# Patient Record
Sex: MALE | Race: WHITE | NOT HISPANIC OR LATINO | URBAN - METROPOLITAN AREA
[De-identification: names, ages, dates, MRNs, and addresses within clinical notes are randomized per-mention and may not be internally consistent; named-entity substitution may affect disease eponyms.]

---

## 2017-01-19 ENCOUNTER — OUTPATIENT (OUTPATIENT)
Dept: OUTPATIENT SERVICES | Facility: HOSPITAL | Age: 82
LOS: 1 days | Discharge: HOME | End: 2017-01-19

## 2017-01-19 ENCOUNTER — RESULT REVIEW (OUTPATIENT)
Age: 82
End: 2017-01-19

## 2017-01-19 DIAGNOSIS — Z01.818 ENCOUNTER FOR OTHER PREPROCEDURAL EXAMINATION: ICD-10-CM

## 2017-01-20 ENCOUNTER — OUTPATIENT (OUTPATIENT)
Dept: OUTPATIENT SERVICES | Facility: HOSPITAL | Age: 82
LOS: 1 days | Discharge: HOME | End: 2017-01-20
Payer: MEDICARE

## 2017-01-20 ENCOUNTER — TRANSCRIPTION ENCOUNTER (OUTPATIENT)
Age: 82
End: 2017-01-20

## 2017-01-20 VITALS
TEMPERATURE: 97 F | DIASTOLIC BLOOD PRESSURE: 70 MMHG | HEART RATE: 81 BPM | WEIGHT: 175.05 LBS | RESPIRATION RATE: 18 BRPM | OXYGEN SATURATION: 97 % | SYSTOLIC BLOOD PRESSURE: 120 MMHG | HEIGHT: 69 IN

## 2017-01-20 LAB
ANION GAP SERPL CALC-SCNC: 8 MMOL/L — SIGNIFICANT CHANGE UP (ref 5–17)
BUN SERPL-MCNC: 14 MG/DL — SIGNIFICANT CHANGE UP (ref 7–23)
CALCIUM SERPL-MCNC: 8.5 MG/DL — SIGNIFICANT CHANGE UP (ref 8.5–10.1)
CHLORIDE SERPL-SCNC: 107 MMOL/L — SIGNIFICANT CHANGE UP (ref 96–108)
CO2 SERPL-SCNC: 24 MMOL/L — SIGNIFICANT CHANGE UP (ref 22–31)
CREAT SERPL-MCNC: 0.9 MG/DL — SIGNIFICANT CHANGE UP (ref 0.5–1.3)
GLUCOSE SERPL-MCNC: 112 MG/DL — HIGH (ref 70–99)
HCT VFR BLD CALC: 42.6 % — SIGNIFICANT CHANGE UP (ref 39–50)
HGB BLD-MCNC: 15 G/DL — SIGNIFICANT CHANGE UP (ref 13–17)
INR BLD: 1.27 RATIO — HIGH (ref 0.88–1.16)
MCHC RBC-ENTMCNC: 33.6 PG — SIGNIFICANT CHANGE UP (ref 27–34)
MCHC RBC-ENTMCNC: 35.1 GM/DL — SIGNIFICANT CHANGE UP (ref 32–36)
MCV RBC AUTO: 95.8 FL — SIGNIFICANT CHANGE UP (ref 80–100)
PLATELET # BLD AUTO: 298 K/UL — SIGNIFICANT CHANGE UP (ref 150–400)
POTASSIUM SERPL-MCNC: 4.3 MMOL/L — SIGNIFICANT CHANGE UP (ref 3.5–5.3)
POTASSIUM SERPL-SCNC: 4.3 MMOL/L — SIGNIFICANT CHANGE UP (ref 3.5–5.3)
PROTHROM AB SERPL-ACNC: 14.3 SEC — HIGH (ref 10–13.1)
RBC # BLD: 4.45 M/UL — SIGNIFICANT CHANGE UP (ref 4.2–5.8)
RBC # FLD: 12 % — SIGNIFICANT CHANGE UP (ref 11–15)
SODIUM SERPL-SCNC: 139 MMOL/L — SIGNIFICANT CHANGE UP (ref 135–145)
WBC # BLD: 8.9 K/UL — SIGNIFICANT CHANGE UP (ref 3.8–10.5)
WBC # FLD AUTO: 8.9 K/UL — SIGNIFICANT CHANGE UP (ref 3.8–10.5)

## 2017-01-20 PROCEDURE — 88300 SURGICAL PATH GROSS: CPT | Mod: 26

## 2017-01-20 NOTE — ASU PATIENT PROFILE, ADULT - PMH
BPH (Benign Prostatic Hyperplasia)    Hypertension    Hypotension  Postural, positive Tilt Table Test.  Syncope

## 2017-01-20 NOTE — BRIEF OPERATIVE NOTE - PROCEDURE
Extracapsular cataract extraction with posterior chamber insertion of intraocular lens  01/20/2017    Active  DEONTE

## 2017-01-20 NOTE — BRIEF OPERATIVE NOTE - PRE-OP DX
Cataract, nuclear sclerotic senile, bilateral  01/20/2017    Active  Abhinav Estrada Cataract, nuclear sclerotic senile, bilateral  01/20/2017    Active  Abhinav Estrada  Cataracta brunescens, left  01/20/2017    Active  Abhinav Estrada

## 2017-01-20 NOTE — H&P ADULT. - HISTORY OF PRESENT ILLNESS
Patient with painless, progressive visual loss OS>OD.  He is on coumadin for afib which has been held. His visual acuity of OD 20/100 and OS 20/400 is interfering with daily activities.  He was found at slit lamp exam to have a mature 4+ dense nuclear sclerotic cataract OS>OD.  His abnormal labs are being followed by his internist.  Risks and benefits of cataract surgery were discussed.  Due to the density of the lens, a ECCE is contemplated.

## 2017-01-20 NOTE — ASU PATIENT PROFILE, ADULT - REASON FOR ADMISSION, PROFILE
Left patient teaching: safety be mindful of depth preception may be off for two days,make sure foot is planted jayden when using stairs. cataract

## 2017-01-23 LAB — SURGICAL PATHOLOGY FINAL REPORT - CH: SIGNIFICANT CHANGE UP

## 2017-01-24 DIAGNOSIS — H25.092 OTHER AGE-RELATED INCIPIENT CATARACT, LEFT EYE: ICD-10-CM

## 2017-01-24 DIAGNOSIS — I10 ESSENTIAL (PRIMARY) HYPERTENSION: ICD-10-CM

## 2017-01-24 DIAGNOSIS — I48.91 UNSPECIFIED ATRIAL FIBRILLATION: ICD-10-CM

## 2017-01-24 DIAGNOSIS — Z79.01 LONG TERM (CURRENT) USE OF ANTICOAGULANTS: ICD-10-CM

## 2017-01-24 DIAGNOSIS — N40.0 BENIGN PROSTATIC HYPERPLASIA WITHOUT LOWER URINARY TRACT SYMPTOMS: ICD-10-CM

## 2017-01-24 DIAGNOSIS — Z96.643 PRESENCE OF ARTIFICIAL HIP JOINT, BILATERAL: ICD-10-CM

## 2017-05-01 ENCOUNTER — OUTPATIENT (OUTPATIENT)
Dept: OUTPATIENT SERVICES | Facility: HOSPITAL | Age: 82
LOS: 1 days | Discharge: HOME | End: 2017-05-01

## 2017-05-01 DIAGNOSIS — R94.31 ABNORMAL ELECTROCARDIOGRAM [ECG] [EKG]: ICD-10-CM

## 2017-05-04 ENCOUNTER — RESULT REVIEW (OUTPATIENT)
Age: 82
End: 2017-05-04

## 2017-05-04 ENCOUNTER — TRANSCRIPTION ENCOUNTER (OUTPATIENT)
Age: 82
End: 2017-05-04

## 2017-05-04 ENCOUNTER — OUTPATIENT (OUTPATIENT)
Dept: OUTPATIENT SERVICES | Facility: HOSPITAL | Age: 82
LOS: 1 days | Discharge: HOME | End: 2017-05-04
Payer: MEDICARE

## 2017-05-04 VITALS
TEMPERATURE: 98 F | HEART RATE: 67 BPM | RESPIRATION RATE: 16 BRPM | SYSTOLIC BLOOD PRESSURE: 124 MMHG | HEIGHT: 70 IN | DIASTOLIC BLOOD PRESSURE: 75 MMHG | OXYGEN SATURATION: 97 % | WEIGHT: 175.05 LBS

## 2017-05-04 VITALS
RESPIRATION RATE: 16 BRPM | DIASTOLIC BLOOD PRESSURE: 70 MMHG | OXYGEN SATURATION: 100 % | SYSTOLIC BLOOD PRESSURE: 130 MMHG | TEMPERATURE: 98 F | HEART RATE: 80 BPM

## 2017-05-04 DIAGNOSIS — I26.99 OTHER PULMONARY EMBOLISM WITHOUT ACUTE COR PULMONALE: ICD-10-CM

## 2017-05-04 PROCEDURE — 88300 SURGICAL PATH GROSS: CPT | Mod: 26

## 2017-05-04 NOTE — BRIEF OPERATIVE NOTE - PROCEDURE
Extracapsular cataract extraction of right eye with insertion of intraocular lens  05/04/2017    Active  DEONTE

## 2017-05-04 NOTE — BRIEF OPERATIVE NOTE - PRE-OP DX
Nuclear cataract of right eye  05/04/2017    Active  Abhinav Estrada
Nuclear cataract of right eye  05/04/2017    Active  Abhinav Estrada

## 2017-05-04 NOTE — BRIEF OPERATIVE NOTE - POST-OP DX
Nuclear cataract, right  05/04/2017    Active  Abhinav Estrada
Nuclear cataract, right  05/04/2017    Active  Abhinav Estrada

## 2017-05-04 NOTE — PROGRESS NOTE ADULT - SUBJECTIVE AND OBJECTIVE BOX
Patient is s/p ECCE with implant OS. He has painless, progressive visual loss OD.  His visual acuity of OD 20/200 and OS 20/30 is interfering with daily activities.  He was found a slit lamp exam to have a dense nuclear sclerotic cataract OD  Risks and benefits of cataract extraction OD with implant were discussed.  Patient stopped his coumadin 5 days ago.

## 2017-05-04 NOTE — ASU DISCHARGE PLAN (ADULT/PEDIATRIC). - MEDICATION SUMMARY - MEDICATIONS TO TAKE
I will START or STAY ON the medications listed below when I get home from the hospital:    warfarin 5 mg oral tablet  -- 1 tab(s) by mouth once a day  -- Indication: For blood thinner

## 2017-05-08 DIAGNOSIS — H25.89 OTHER AGE-RELATED CATARACT: ICD-10-CM

## 2017-05-08 DIAGNOSIS — Z91.010 ALLERGY TO PEANUTS: ICD-10-CM

## 2017-05-08 LAB — SURGICAL PATHOLOGY FINAL REPORT - CH: SIGNIFICANT CHANGE UP

## 2017-12-28 ENCOUNTER — INPATIENT (INPATIENT)
Facility: HOSPITAL | Age: 82
LOS: 14 days | Discharge: NURSING, REHAB OR LONG-TERM CARE | End: 2018-01-12
Attending: HOSPITALIST | Admitting: HOSPITALIST
Payer: MEDICARE

## 2017-12-28 VITALS
SYSTOLIC BLOOD PRESSURE: 134 MMHG | DIASTOLIC BLOOD PRESSURE: 52 MMHG | HEIGHT: 68 IN | HEART RATE: 75 BPM | OXYGEN SATURATION: 99 % | RESPIRATION RATE: 18 BRPM | TEMPERATURE: 100 F | WEIGHT: 169.98 LBS

## 2017-12-28 DIAGNOSIS — I48.2 CHRONIC ATRIAL FIBRILLATION: ICD-10-CM

## 2017-12-28 DIAGNOSIS — I10 ESSENTIAL (PRIMARY) HYPERTENSION: ICD-10-CM

## 2017-12-28 DIAGNOSIS — J18.1 LOBAR PNEUMONIA, UNSPECIFIED ORGANISM: ICD-10-CM

## 2017-12-28 DIAGNOSIS — N40.0 BENIGN PROSTATIC HYPERPLASIA WITHOUT LOWER URINARY TRACT SYMPTOMS: ICD-10-CM

## 2017-12-28 LAB
ALBUMIN SERPL ELPH-MCNC: 2.3 G/DL — LOW (ref 3.3–5)
ALP SERPL-CCNC: 72 U/L — SIGNIFICANT CHANGE UP (ref 40–120)
ALT FLD-CCNC: 41 U/L — SIGNIFICANT CHANGE UP (ref 12–78)
ANION GAP SERPL CALC-SCNC: 12 MMOL/L — SIGNIFICANT CHANGE UP (ref 5–17)
AST SERPL-CCNC: 55 U/L — HIGH (ref 15–37)
BASOPHILS # BLD AUTO: 0.1 K/UL — SIGNIFICANT CHANGE UP (ref 0–0.2)
BASOPHILS NFR BLD AUTO: 0.7 % — SIGNIFICANT CHANGE UP (ref 0–2)
BILIRUB SERPL-MCNC: 0.5 MG/DL — SIGNIFICANT CHANGE UP (ref 0.2–1.2)
BUN SERPL-MCNC: 41 MG/DL — HIGH (ref 7–23)
CALCIUM SERPL-MCNC: 7.6 MG/DL — LOW (ref 8.5–10.1)
CHLORIDE SERPL-SCNC: 100 MMOL/L — SIGNIFICANT CHANGE UP (ref 96–108)
CK MB CFR SERPL CALC: 1.1 NG/ML — SIGNIFICANT CHANGE UP (ref 0.5–3.6)
CO2 SERPL-SCNC: 23 MMOL/L — SIGNIFICANT CHANGE UP (ref 22–31)
CREAT SERPL-MCNC: 1.07 MG/DL — SIGNIFICANT CHANGE UP (ref 0.5–1.3)
EOSINOPHIL # BLD AUTO: 0 K/UL — SIGNIFICANT CHANGE UP (ref 0–0.5)
EOSINOPHIL NFR BLD AUTO: 0 % — SIGNIFICANT CHANGE UP (ref 0–6)
FLUAV SPEC QL CULT: NEGATIVE — SIGNIFICANT CHANGE UP
FLUBV AG SPEC QL IA: NEGATIVE — SIGNIFICANT CHANGE UP
GLUCOSE SERPL-MCNC: 111 MG/DL — HIGH (ref 70–99)
HCT VFR BLD CALC: 43.7 % — SIGNIFICANT CHANGE UP (ref 39–50)
HGB BLD-MCNC: 15.4 G/DL — SIGNIFICANT CHANGE UP (ref 13–17)
LACTATE SERPL-SCNC: 2.4 MMOL/L — HIGH (ref 0.7–2)
LIDOCAIN IGE QN: 77 U/L — SIGNIFICANT CHANGE UP (ref 73–393)
LYMPHOCYTES # BLD AUTO: 0.8 K/UL — LOW (ref 1–3.3)
LYMPHOCYTES # BLD AUTO: 6.8 % — LOW (ref 13–44)
MAGNESIUM SERPL-MCNC: 2.6 MG/DL — SIGNIFICANT CHANGE UP (ref 1.6–2.6)
MCHC RBC-ENTMCNC: 33.8 PG — SIGNIFICANT CHANGE UP (ref 27–34)
MCHC RBC-ENTMCNC: 35.2 GM/DL — SIGNIFICANT CHANGE UP (ref 32–36)
MCV RBC AUTO: 96.2 FL — SIGNIFICANT CHANGE UP (ref 80–100)
MONOCYTES # BLD AUTO: 0.8 K/UL — SIGNIFICANT CHANGE UP (ref 0–0.9)
MONOCYTES NFR BLD AUTO: 6.6 % — SIGNIFICANT CHANGE UP (ref 2–14)
NEUTROPHILS # BLD AUTO: 10.5 K/UL — HIGH (ref 1.8–7.4)
NEUTROPHILS NFR BLD AUTO: 85.9 % — HIGH (ref 43–77)
NT-PROBNP SERPL-SCNC: 1998 PG/ML — HIGH (ref 0–450)
PLATELET # BLD AUTO: 268 K/UL — SIGNIFICANT CHANGE UP (ref 150–400)
POTASSIUM SERPL-MCNC: 3.7 MMOL/L — SIGNIFICANT CHANGE UP (ref 3.5–5.3)
POTASSIUM SERPL-SCNC: 3.7 MMOL/L — SIGNIFICANT CHANGE UP (ref 3.5–5.3)
PROT SERPL-MCNC: 6.3 GM/DL — SIGNIFICANT CHANGE UP (ref 6–8.3)
RBC # BLD: 4.54 M/UL — SIGNIFICANT CHANGE UP (ref 4.2–5.8)
RBC # FLD: 12.1 % — SIGNIFICANT CHANGE UP (ref 11–15)
SODIUM SERPL-SCNC: 135 MMOL/L — SIGNIFICANT CHANGE UP (ref 135–145)
TROPONIN I SERPL-MCNC: <.015 NG/ML — SIGNIFICANT CHANGE UP (ref 0.01–0.04)
WBC # BLD: 12.2 K/UL — HIGH (ref 3.8–10.5)
WBC # FLD AUTO: 12.2 K/UL — HIGH (ref 3.8–10.5)

## 2017-12-28 PROCEDURE — 71010: CPT | Mod: 26

## 2017-12-28 PROCEDURE — 99223 1ST HOSP IP/OBS HIGH 75: CPT

## 2017-12-28 PROCEDURE — 99285 EMERGENCY DEPT VISIT HI MDM: CPT

## 2017-12-28 RX ORDER — ACETAMINOPHEN 500 MG
650 TABLET ORAL ONCE
Qty: 0 | Refills: 0 | Status: COMPLETED | OUTPATIENT
Start: 2017-12-28 | End: 2017-12-28

## 2017-12-28 RX ORDER — CEFTRIAXONE 500 MG/1
1 INJECTION, POWDER, FOR SOLUTION INTRAMUSCULAR; INTRAVENOUS ONCE
Qty: 0 | Refills: 0 | Status: COMPLETED | OUTPATIENT
Start: 2017-12-28 | End: 2017-12-28

## 2017-12-28 RX ORDER — ACETAMINOPHEN 500 MG
650 TABLET ORAL EVERY 6 HOURS
Qty: 0 | Refills: 0 | Status: DISCONTINUED | OUTPATIENT
Start: 2017-12-28 | End: 2018-01-12

## 2017-12-28 RX ORDER — ALBUTEROL 90 UG/1
2.5 AEROSOL, METERED ORAL EVERY 6 HOURS
Qty: 0 | Refills: 0 | Status: DISCONTINUED | OUTPATIENT
Start: 2017-12-28 | End: 2018-01-01

## 2017-12-28 RX ORDER — AMLODIPINE BESYLATE 2.5 MG/1
2.5 TABLET ORAL DAILY
Qty: 0 | Refills: 0 | Status: DISCONTINUED | OUTPATIENT
Start: 2017-12-28 | End: 2017-12-30

## 2017-12-28 RX ORDER — FINASTERIDE 5 MG/1
5 TABLET, FILM COATED ORAL DAILY
Qty: 0 | Refills: 0 | Status: DISCONTINUED | OUTPATIENT
Start: 2017-12-28 | End: 2018-01-12

## 2017-12-28 RX ORDER — AZITHROMYCIN 500 MG/1
500 TABLET, FILM COATED ORAL ONCE
Qty: 0 | Refills: 0 | Status: COMPLETED | OUTPATIENT
Start: 2017-12-28 | End: 2017-12-28

## 2017-12-28 RX ORDER — IPRATROPIUM/ALBUTEROL SULFATE 18-103MCG
3 AEROSOL WITH ADAPTER (GRAM) INHALATION ONCE
Qty: 0 | Refills: 0 | Status: COMPLETED | OUTPATIENT
Start: 2017-12-28 | End: 2017-12-28

## 2017-12-28 RX ADMIN — Medication 3 MILLILITER(S): at 17:02

## 2017-12-28 RX ADMIN — Medication 650 MILLIGRAM(S): at 18:39

## 2017-12-28 RX ADMIN — AZITHROMYCIN 255 MILLIGRAM(S): 500 TABLET, FILM COATED ORAL at 19:18

## 2017-12-28 RX ADMIN — CEFTRIAXONE 100 GRAM(S): 500 INJECTION, POWDER, FOR SOLUTION INTRAMUSCULAR; INTRAVENOUS at 18:40

## 2017-12-28 NOTE — ED ADULT TRIAGE NOTE - CHIEF COMPLAINT QUOTE
BIBA for difficulty breathing, family called ambulance for patient who is having increased diffculty breathing starting this morning as per ems.

## 2017-12-28 NOTE — ED ADULT NURSE NOTE - OBJECTIVE STATEMENT
assumed care of pt in bed 9. pt alert and oriented times 2. pt states he had a cold since [place]. today pt presents with worsening dyspnea. pt h/o of heart murmur. this nurse placed pt on monitor.

## 2017-12-28 NOTE — H&P ADULT - NSHPPHYSICALEXAM_GEN_ALL_CORE
GENERAL: NAD well-developed  HEAD:  Atraumatic, Normocephalic  EYES: EOMI, PERRLA, conjunctiva and sclera clear  ENMT: No tonsillar erythema, exudates, or enlargement; Moist mucous membranes, Good dentition, No lesions  NECK: Supple, No JVD, Normal thyroid  NERVOUS SYSTEM:  Alert & Oriented X3, Good concentration; Motor Strength 5/5 B/L upper and lower extremities; DTRs 2+ intact and symmetric  CHEST/LUNG: Clear to percussion bilaterally; No rales, rhonchi, wheezing, or rubs  HEART: Regular rate and rhythm; No murmurs, rubs, or gallops  ABDOMEN: Soft, Nontender, Nondistended; Bowel sounds present  EXTREMITIES:  2+ Peripheral Pulses, No clubbing, cyanosis, or edema  LYMPH: No lymphadenopathy   SKIN: No rashes or lesions ICU Vital Signs Last 24 Hrs  T(C): 37.9 (28 Dec 2017 15:39), Max: 37.9 (28 Dec 2017 15:39)  T(F): 100.3 (28 Dec 2017 15:39), Max: 100.3 (28 Dec 2017 15:39)  HR: 86 (28 Dec 2017 19:13) (75 - 86)  BP: 108/56 (28 Dec 2017 19:13) (108/56 - 134/52)  BP(mean): --  ABP: --  ABP(mean): --  RR: 22 (28 Dec 2017 19:13) (18 - 22)  SpO2: 97% (28 Dec 2017 19:13) (97% - 99%)      GENERAL:  well-developed mild respiratory distress   HEAD:  Atraumatic, Normocephalic  EYES: EOMI, PERRLA, conjunctiva and sclera clear  ENMT: No tonsillar erythema, exudates, or enlargement; Moist mucous membranes, Good dentition, No lesions  NECK: Supple, No JVD, Normal thyroid  NERVOUS SYSTEM:  Alert & Oriented X3, Good concentration; Motor Strength 5/5 B/L upper and lower extremities; DTRs 2+ intact and symmetric  CHEST/LUNG: audible wheezing most in right upper chest   HEART: Regular rate and rhythm; No murmurs, rubs, or gallops  ABDOMEN: Soft, Nontender, Nondistended; Bowel sounds present  EXTREMITIES:  2+ Peripheral Pulses, No clubbing, cyanosis, or edema  LYMPH: No lymphadenopathy   SKIN: No rashes or lesions

## 2017-12-28 NOTE — H&P ADULT - NSHPREVIEWOFSYSTEMS_GEN_ALL_CORE
CONSTITUTIONAL: No fever, weight loss, or fatigue  EYES: No eye pain, visual disturbances, or discharge  ENMT:  No difficulty hearing, tinnitus, vertigo; No sinus or throat pain  NECK: No pain or stiffness  RESPIRATORY: No cough, wheezing, chills or hemoptysis; No shortness of breath  CARDIOVASCULAR: No chest pain, palpitations, dizziness, or leg swelling  GASTROINTESTINAL: No abdominal or epigastric pain. No nausea, vomiting, or hematemesis; No diarrhea or constipation. No melena or hematochezia.  GENITOURINARY: No dysuria, frequency, hematuria, or incontinence  NEUROLOGICAL: No headaches, memory loss, loss of strength, numbness, or tremors  SKIN: No itching, burning, rashes, or lesions   LYMPH NODES: No enlarged glands  ENDOCRINE: No heat or cold intolerance; No hair loss  MUSCULOSKELETAL: No joint pain or swelling; No muscle, back, or extremity pain  PSYCHIATRIC: No depression, anxiety, mood swings, or difficulty sleeping  HEME/LYMPH: No easy bruising, or bleeding gums  ALLERGY AND IMMUNOLOGIC: No hives or eczema CONSTITUTIONAL: POSITIVE  fever, weight loss, or fatigue  EYES: No eye pain, visual disturbances, or discharge  ENMT:  No difficulty hearing, tinnitus, vertigo; No sinus or throat pain  NECK: No pain or stiffness  RESPIRATORY: POSITIVE  cough, wheezing, chills or hemoptysis; POSITIVE  shortness of breath  CARDIOVASCULAR: No chest pain, palpitations, dizziness, or leg swelling  GASTROINTESTINAL: No abdominal or epigastric pain. No nausea, vomiting, or hematemesis; No diarrhea or constipation. No melena or hematochezia.  GENITOURINARY: No dysuria, frequency, hematuria, or incontinence  NEUROLOGICAL: No headaches, memory loss, loss of strength, numbness, or tremors  SKIN: No itching, burning, rashes, or lesions   LYMPH NODES: No enlarged glands  ENDOCRINE: No heat or cold intolerance; No hair loss  MUSCULOSKELETAL: No joint pain or swelling; No muscle, back, or extremity pain  PSYCHIATRIC: No depression, anxiety, mood swings, or difficulty sleeping  HEME/LYMPH: No easy bruising, or bleeding gums  ALLERGY AND IMMUNOLOGIC: No hives or eczema

## 2017-12-28 NOTE — ED PROVIDER NOTE - OBJECTIVE STATEMENT
Pt is a 81 yo gentleman with a pmhx of afib on coumadin, hypotension who presents to the ED with sob and cough since [place]. Questionable fever, never checked w thermometer, no change in mental status. Cough is nonproductive, but can hear mucous in lungs. No chest pain, no abdominal pain, no hx of dvt/pe. No recent hospitalizations.

## 2017-12-28 NOTE — H&P ADULT - HISTORY OF PRESENT ILLNESS
Pt is a 83 yo gentleman with a pmhx of afib on coumadin, hypotension who presents to the ED with sob and cough since [place]. Questionable fever, never checked w thermometer, no change in mental status. Cough is nonproductive, but can hear mucous in lungs. No chest pain, no abdominal pain, no hx of dvt/pe. No recent hospitalizations.

## 2017-12-28 NOTE — H&P ADULT - NSHPLABSRESULTS_GEN_ALL_CORE
15.4   12.2  )-----------( 268      ( 28 Dec 2017 17:59 )             43.7   12-28    135  |  100  |  41<H>  ----------------------------<  111<H>  3.7   |  23  |  1.07    Ca    7.6<L>      28 Dec 2017 17:59  Mg     2.6     12-28    TPro  6.3  /  Alb  2.3<L>  /  TBili  0.5  /  DBili  x   /  AST  55<H>  /  ALT  41  /  AlkPhos  72  12-28  < from: Xray Chest 1 View AP/PA. (12.28.17 @ 16:49) >    There is a left lungconsolidation. The right lung is clear. The cardiac   and mediastinal contours are prominent, which may be due to magnification   from AP technique and shallow inspiration. The osseous structures are   intact.

## 2017-12-29 DIAGNOSIS — E87.6 HYPOKALEMIA: ICD-10-CM

## 2017-12-29 LAB
ANION GAP SERPL CALC-SCNC: 10 MMOL/L — SIGNIFICANT CHANGE UP (ref 5–17)
BUN SERPL-MCNC: 41 MG/DL — HIGH (ref 7–23)
CALCIUM SERPL-MCNC: 7.5 MG/DL — LOW (ref 8.5–10.1)
CHLORIDE SERPL-SCNC: 101 MMOL/L — SIGNIFICANT CHANGE UP (ref 96–108)
CO2 SERPL-SCNC: 24 MMOL/L — SIGNIFICANT CHANGE UP (ref 22–31)
CREAT SERPL-MCNC: 0.94 MG/DL — SIGNIFICANT CHANGE UP (ref 0.5–1.3)
GLUCOSE SERPL-MCNC: 112 MG/DL — HIGH (ref 70–99)
HCT VFR BLD CALC: 40.2 % — SIGNIFICANT CHANGE UP (ref 39–50)
HGB BLD-MCNC: 14.2 G/DL — SIGNIFICANT CHANGE UP (ref 13–17)
INR BLD: 3.87 RATIO — HIGH (ref 0.88–1.16)
INR BLD: 5.11 RATIO — CRITICAL HIGH (ref 0.88–1.16)
LACTATE SERPL-SCNC: 2.1 MMOL/L — HIGH (ref 0.7–2)
LACTATE SERPL-SCNC: 3.7 MMOL/L — HIGH (ref 0.7–2)
MCHC RBC-ENTMCNC: 33.4 PG — SIGNIFICANT CHANGE UP (ref 27–34)
MCHC RBC-ENTMCNC: 35.3 GM/DL — SIGNIFICANT CHANGE UP (ref 32–36)
MCV RBC AUTO: 94.6 FL — SIGNIFICANT CHANGE UP (ref 80–100)
PLATELET # BLD AUTO: 269 K/UL — SIGNIFICANT CHANGE UP (ref 150–400)
POTASSIUM SERPL-MCNC: 3.4 MMOL/L — LOW (ref 3.5–5.3)
POTASSIUM SERPL-SCNC: 3.4 MMOL/L — LOW (ref 3.5–5.3)
PROTHROM AB SERPL-ACNC: 43.4 SEC — HIGH (ref 9.8–12.7)
PROTHROM AB SERPL-ACNC: 57.6 SEC — HIGH (ref 9.8–12.7)
RBC # BLD: 4.26 M/UL — SIGNIFICANT CHANGE UP (ref 4.2–5.8)
RBC # FLD: 12.1 % — SIGNIFICANT CHANGE UP (ref 11–15)
SODIUM SERPL-SCNC: 135 MMOL/L — SIGNIFICANT CHANGE UP (ref 135–145)
WBC # BLD: 11.4 K/UL — HIGH (ref 3.8–10.5)
WBC # FLD AUTO: 11.4 K/UL — HIGH (ref 3.8–10.5)

## 2017-12-29 PROCEDURE — 99233 SBSQ HOSP IP/OBS HIGH 50: CPT

## 2017-12-29 RX ORDER — POTASSIUM CHLORIDE 20 MEQ
40 PACKET (EA) ORAL ONCE
Qty: 0 | Refills: 0 | Status: COMPLETED | OUTPATIENT
Start: 2017-12-29 | End: 2017-12-29

## 2017-12-29 RX ORDER — SODIUM CHLORIDE 9 MG/ML
1000 INJECTION INTRAMUSCULAR; INTRAVENOUS; SUBCUTANEOUS
Qty: 0 | Refills: 0 | Status: DISCONTINUED | OUTPATIENT
Start: 2017-12-29 | End: 2017-12-30

## 2017-12-29 RX ORDER — WARFARIN SODIUM 2.5 MG/1
5 TABLET ORAL ONCE
Qty: 0 | Refills: 0 | Status: DISCONTINUED | OUTPATIENT
Start: 2017-12-29 | End: 2017-12-29

## 2017-12-29 RX ADMIN — FINASTERIDE 5 MILLIGRAM(S): 5 TABLET, FILM COATED ORAL at 12:20

## 2017-12-29 RX ADMIN — ALBUTEROL 2.5 MILLIGRAM(S): 90 AEROSOL, METERED ORAL at 07:00

## 2017-12-29 RX ADMIN — ALBUTEROL 2.5 MILLIGRAM(S): 90 AEROSOL, METERED ORAL at 12:14

## 2017-12-29 RX ADMIN — SODIUM CHLORIDE 80 MILLILITER(S): 9 INJECTION INTRAMUSCULAR; INTRAVENOUS; SUBCUTANEOUS at 02:21

## 2017-12-29 RX ADMIN — Medication 40 MILLIEQUIVALENT(S): at 09:53

## 2017-12-29 RX ADMIN — AMLODIPINE BESYLATE 2.5 MILLIGRAM(S): 2.5 TABLET ORAL at 06:16

## 2017-12-29 RX ADMIN — ALBUTEROL 2.5 MILLIGRAM(S): 90 AEROSOL, METERED ORAL at 00:00

## 2017-12-29 RX ADMIN — ALBUTEROL 2.5 MILLIGRAM(S): 90 AEROSOL, METERED ORAL at 17:35

## 2017-12-29 RX ADMIN — SODIUM CHLORIDE 80 MILLILITER(S): 9 INJECTION INTRAMUSCULAR; INTRAVENOUS; SUBCUTANEOUS at 17:56

## 2017-12-29 NOTE — CHART NOTE - NSCHARTNOTEFT_GEN_A_CORE
DCed coumadin as inr 3.87.  As per family, did not take dose today.  Will hold coumadin today, repeat inr tomorrow.

## 2017-12-29 NOTE — PROGRESS NOTE ADULT - SUBJECTIVE AND OBJECTIVE BOX
CHIEF COMPLAINT/INTERVAL HISTORY:    Patient is a 82y old  Male who presents with a chief complaint of pna (28 Dec 2017 18:56)      HPI:  Pt is a 83 yo gentleman with a pmhx of afib on coumadin, hypotension who presents to the ED with sob and cough since [place]. Questionable fever, never checked w thermometer, no change in mental status. Cough is nonproductive, but can hear mucous in lungs. No chest pain, no abdominal pain, no hx of dvt/pe. No recent hospitalizations. (28 Dec 2017 18:56)    Overnight issues  patient feeling better with less short of breath and wheezing   SUBJECTIVE & OBJECTIVE: Pt seen and examined at bedside.   ROS:  CONSTITUTIONAL: No fever, weight loss, or fatigue  NECK: No pain or stiffness  mild shortness of breath  CARDIOVASCULAR: No chest pain, palpitations, dizziness, or leg swelling  GASTROINTESTINAL: No abdominal or epigastric pain. No nausea, vomiting, or hematemesis; No diarrhea or constipation. No melena or hematochezia.  GENITOURINARY: No dysuria, frequency, hematuria, or incontinence  NEUROLOGICAL: No headaches, memory loss, loss of strength, numbness, or tremors  SKIN: No itching, burning, rashes, or lesions   ICU Vital Signs Last 24 Hrs  T(C): 37.3 (29 Dec 2017 07:52), Max: 37.9 (28 Dec 2017 15:39)  T(F): 99.2 (29 Dec 2017 07:52), Max: 100.3 (28 Dec 2017 15:39)  HR: 91 (29 Dec 2017 07:52) (75 - 91)  BP: 126/62 (29 Dec 2017 07:52) (108/56 - 134/52)  BP(mean): --  ABP: --  ABP(mean): --  RR: 18 (29 Dec 2017 07:52) (18 - 25)  SpO2: 95% (29 Dec 2017 07:52) (93% - 99%)        MEDICATIONS  (STANDING):  ALBUTerol    0.083% 2.5 milliGRAM(s) Nebulizer every 6 hours  amLODIPine   Tablet 2.5 milliGRAM(s) Oral daily  finasteride 5 milliGRAM(s) Oral daily  levoFLOXacin IVPB 500 milliGRAM(s) IV Intermittent every 24 hours  levoFLOXacin IVPB      sodium chloride 0.9%. 1000 milliLiter(s) (80 mL/Hr) IV Continuous <Continuous>    MEDICATIONS  (PRN):  acetaminophen   Tablet 650 milliGRAM(s) Oral every 6 hours PRN For Temp greater than 38 C (100.4 F)        PHYSICAL EXAM:    GENERAL: NAD, well-groomed, well-developed  HEAD:  Atraumatic, Normocephalic  EYES: EOMI, PERRLA, conjunctiva and sclera clear  ENMT: Moist mucous membranes  NECK: Supple, No JVD  NERVOUS SYSTEM:  Alert & Oriented X3, Motor Strength 5/5 B/L upper and lower extremities; DTRs 2+ intact and symmetric  CHEST/LUNG: mild wheezing, to left upper lobe   HEART: Regular rate and rhythm; No murmurs, rubs, or gallops  ABDOMEN: Soft, Nontender, Nondistended; Bowel sounds present  EXTREMITIES:  2+ Peripheral Pulses, No clubbing, cyanosis, or edema    LABS:                        14.2   11.4  )-----------( 269      ( 29 Dec 2017 05:12 )             40.2     12-29    135  |  101  |  41<H>  ----------------------------<  112<H>  3.4<L>   |  24  |  0.94    Ca    7.5<L>      29 Dec 2017 05:12  Mg     2.6     12-28    TPro  6.3  /  Alb  2.3<L>  /  TBili  0.5  /  DBili  x   /  AST  55<H>  /  ALT  41  /  AlkPhos  72  12-28    PT/INR - ( 29 Dec 2017 00:45 )   PT: 43.4 sec;   INR: 3.87 ratio               CAPILLARY BLOOD GLUCOSE          RECENT CULTURES:      RADIOLOGY & ADDITIONAL TESTS:  Imaging Personally Reviewed:  [ ] YES      Consultant(s) Notes Reviewed:  [ ] YES     Care Discussed with [ ] Consultants [X ] Patient [ ] Family  [x ]    [x ]  Other; RN  HEALTH ISSUES - PROBLEM Dx:  Chronic atrial fibrillation: Chronic atrial fibrillation  Essential hypertension: Essential hypertension  BPH (Benign Prostatic Hyperplasia): BPH (Benign Prostatic Hyperplasia)  Pneumonia of left lower lobe due to infectious organism: Pneumonia of left lower lobe due to infectious organism        DVT/GI ppx  Discussed with pt @ bedside

## 2017-12-30 DIAGNOSIS — I95.9 HYPOTENSION, UNSPECIFIED: ICD-10-CM

## 2017-12-30 LAB
ANION GAP SERPL CALC-SCNC: 15 MMOL/L — SIGNIFICANT CHANGE UP (ref 5–17)
BUN SERPL-MCNC: 53 MG/DL — HIGH (ref 7–23)
CALCIUM SERPL-MCNC: 6.8 MG/DL — LOW (ref 8.5–10.1)
CHLORIDE SERPL-SCNC: 108 MMOL/L — SIGNIFICANT CHANGE UP (ref 96–108)
CO2 SERPL-SCNC: 16 MMOL/L — LOW (ref 22–31)
CREAT SERPL-MCNC: 1.35 MG/DL — HIGH (ref 0.5–1.3)
GLUCOSE SERPL-MCNC: 152 MG/DL — HIGH (ref 70–99)
HCT VFR BLD CALC: 26.8 % — LOW (ref 39–50)
HCT VFR BLD CALC: 28 % — LOW (ref 39–50)
HCT VFR BLD CALC: 30.4 % — LOW (ref 39–50)
HCT VFR BLD CALC: 32.6 % — LOW (ref 39–50)
HCT VFR BLD CALC: 35.8 % — LOW (ref 39–50)
HGB BLD-MCNC: 10 G/DL — LOW (ref 13–17)
HGB BLD-MCNC: 10.4 G/DL — LOW (ref 13–17)
HGB BLD-MCNC: 11.4 G/DL — LOW (ref 13–17)
HGB BLD-MCNC: 12.5 G/DL — LOW (ref 13–17)
HGB BLD-MCNC: 9.4 G/DL — LOW (ref 13–17)
INR BLD: 2 RATIO — HIGH (ref 0.88–1.16)
INR BLD: 5.35 RATIO — CRITICAL HIGH (ref 0.88–1.16)
INR BLD: 6.79 RATIO — CRITICAL HIGH (ref 0.88–1.16)
LACTATE SERPL-SCNC: 4.7 MMOL/L — CRITICAL HIGH (ref 0.7–2)
LACTATE SERPL-SCNC: 5.2 MMOL/L — CRITICAL HIGH (ref 0.7–2)
MCHC RBC-ENTMCNC: 33.3 PG — SIGNIFICANT CHANGE UP (ref 27–34)
MCHC RBC-ENTMCNC: 33.8 PG — SIGNIFICANT CHANGE UP (ref 27–34)
MCHC RBC-ENTMCNC: 34 PG — SIGNIFICANT CHANGE UP (ref 27–34)
MCHC RBC-ENTMCNC: 34.2 GM/DL — SIGNIFICANT CHANGE UP (ref 32–36)
MCHC RBC-ENTMCNC: 34.2 PG — HIGH (ref 27–34)
MCHC RBC-ENTMCNC: 34.6 PG — HIGH (ref 27–34)
MCHC RBC-ENTMCNC: 34.8 GM/DL — SIGNIFICANT CHANGE UP (ref 32–36)
MCHC RBC-ENTMCNC: 35 GM/DL — SIGNIFICANT CHANGE UP (ref 32–36)
MCHC RBC-ENTMCNC: 35.1 GM/DL — SIGNIFICANT CHANGE UP (ref 32–36)
MCHC RBC-ENTMCNC: 35.7 GM/DL — SIGNIFICANT CHANGE UP (ref 32–36)
MCV RBC AUTO: 96.5 FL — SIGNIFICANT CHANGE UP (ref 80–100)
MCV RBC AUTO: 96.8 FL — SIGNIFICANT CHANGE UP (ref 80–100)
MCV RBC AUTO: 96.8 FL — SIGNIFICANT CHANGE UP (ref 80–100)
MCV RBC AUTO: 97.3 FL — SIGNIFICANT CHANGE UP (ref 80–100)
MCV RBC AUTO: 98.2 FL — SIGNIFICANT CHANGE UP (ref 80–100)
OB PNL STL: POSITIVE
PLATELET # BLD AUTO: 340 K/UL — SIGNIFICANT CHANGE UP (ref 150–400)
PLATELET # BLD AUTO: 357 K/UL — SIGNIFICANT CHANGE UP (ref 150–400)
PLATELET # BLD AUTO: 412 K/UL — HIGH (ref 150–400)
PLATELET # BLD AUTO: 422 K/UL — HIGH (ref 150–400)
PLATELET # BLD AUTO: 435 K/UL — HIGH (ref 150–400)
POTASSIUM SERPL-MCNC: 4.7 MMOL/L — SIGNIFICANT CHANGE UP (ref 3.5–5.3)
POTASSIUM SERPL-SCNC: 4.7 MMOL/L — SIGNIFICANT CHANGE UP (ref 3.5–5.3)
PROTHROM AB SERPL-ACNC: 22.1 SEC — HIGH (ref 9.8–12.7)
PROTHROM AB SERPL-ACNC: 60.4 SEC — HIGH (ref 9.8–12.7)
PROTHROM AB SERPL-ACNC: 77 SEC — HIGH (ref 9.8–12.7)
RBC # BLD: 2.77 M/UL — LOW (ref 4.2–5.8)
RBC # BLD: 2.89 M/UL — LOW (ref 4.2–5.8)
RBC # BLD: 3.12 M/UL — LOW (ref 4.2–5.8)
RBC # BLD: 3.32 M/UL — LOW (ref 4.2–5.8)
RBC # BLD: 3.71 M/UL — LOW (ref 4.2–5.8)
RBC # FLD: 12.2 % — SIGNIFICANT CHANGE UP (ref 11–15)
RBC # FLD: 12.3 % — SIGNIFICANT CHANGE UP (ref 11–15)
RBC # FLD: 12.6 % — SIGNIFICANT CHANGE UP (ref 11–15)
RBC # FLD: 12.6 % — SIGNIFICANT CHANGE UP (ref 11–15)
RBC # FLD: 12.8 % — SIGNIFICANT CHANGE UP (ref 11–15)
SODIUM SERPL-SCNC: 139 MMOL/L — SIGNIFICANT CHANGE UP (ref 135–145)
WBC # BLD: 15.6 K/UL — HIGH (ref 3.8–10.5)
WBC # BLD: 19.5 K/UL — HIGH (ref 3.8–10.5)
WBC # BLD: 23.9 K/UL — HIGH (ref 3.8–10.5)
WBC # BLD: 28.7 K/UL — HIGH (ref 3.8–10.5)
WBC # BLD: 30.4 K/UL — HIGH (ref 3.8–10.5)
WBC # FLD AUTO: 15.6 K/UL — HIGH (ref 3.8–10.5)
WBC # FLD AUTO: 19.5 K/UL — HIGH (ref 3.8–10.5)
WBC # FLD AUTO: 23.9 K/UL — HIGH (ref 3.8–10.5)
WBC # FLD AUTO: 28.7 K/UL — HIGH (ref 3.8–10.5)
WBC # FLD AUTO: 30.4 K/UL — HIGH (ref 3.8–10.5)

## 2017-12-30 PROCEDURE — 99233 SBSQ HOSP IP/OBS HIGH 50: CPT

## 2017-12-30 PROCEDURE — 74000: CPT | Mod: 26,76

## 2017-12-30 PROCEDURE — 71010: CPT | Mod: 26

## 2017-12-30 PROCEDURE — 43752 NASAL/OROGASTRIC W/TUBE PLMT: CPT

## 2017-12-30 RX ORDER — SODIUM CHLORIDE 9 MG/ML
1000 INJECTION INTRAMUSCULAR; INTRAVENOUS; SUBCUTANEOUS
Qty: 0 | Refills: 0 | Status: DISCONTINUED | OUTPATIENT
Start: 2017-12-30 | End: 2018-01-03

## 2017-12-30 RX ORDER — WARFARIN SODIUM 2.5 MG/1
1 TABLET ORAL
Qty: 0 | Refills: 0 | COMMUNITY

## 2017-12-30 RX ORDER — VANCOMYCIN HCL 1 G
1000 VIAL (EA) INTRAVENOUS EVERY 24 HOURS
Qty: 0 | Refills: 0 | Status: DISCONTINUED | OUTPATIENT
Start: 2017-12-30 | End: 2018-01-01

## 2017-12-30 RX ORDER — SODIUM CHLORIDE 9 MG/ML
1000 INJECTION INTRAMUSCULAR; INTRAVENOUS; SUBCUTANEOUS ONCE
Qty: 0 | Refills: 0 | Status: COMPLETED | OUTPATIENT
Start: 2017-12-30 | End: 2017-12-30

## 2017-12-30 RX ORDER — PHYTONADIONE (VIT K1) 5 MG
5 TABLET ORAL ONCE
Qty: 0 | Refills: 0 | Status: COMPLETED | OUTPATIENT
Start: 2017-12-30 | End: 2017-12-30

## 2017-12-30 RX ORDER — AZITHROMYCIN 500 MG/1
250 TABLET, FILM COATED ORAL DAILY
Qty: 0 | Refills: 0 | Status: DISCONTINUED | OUTPATIENT
Start: 2017-12-30 | End: 2018-01-06

## 2017-12-30 RX ORDER — PANTOPRAZOLE SODIUM 20 MG/1
8 TABLET, DELAYED RELEASE ORAL
Qty: 80 | Refills: 0 | Status: DISCONTINUED | OUTPATIENT
Start: 2017-12-30 | End: 2018-01-03

## 2017-12-30 RX ORDER — MEROPENEM 1 G/30ML
500 INJECTION INTRAVENOUS EVERY 8 HOURS
Qty: 0 | Refills: 0 | Status: DISCONTINUED | OUTPATIENT
Start: 2017-12-30 | End: 2018-01-06

## 2017-12-30 RX ADMIN — ALBUTEROL 2.5 MILLIGRAM(S): 90 AEROSOL, METERED ORAL at 05:54

## 2017-12-30 RX ADMIN — SODIUM CHLORIDE 1000 MILLILITER(S): 9 INJECTION INTRAMUSCULAR; INTRAVENOUS; SUBCUTANEOUS at 01:33

## 2017-12-30 RX ADMIN — MEROPENEM 100 MILLIGRAM(S): 1 INJECTION INTRAVENOUS at 07:03

## 2017-12-30 RX ADMIN — AZITHROMYCIN 250 MILLIGRAM(S): 500 TABLET, FILM COATED ORAL at 21:52

## 2017-12-30 RX ADMIN — MEROPENEM 100 MILLIGRAM(S): 1 INJECTION INTRAVENOUS at 21:52

## 2017-12-30 RX ADMIN — MEROPENEM 100 MILLIGRAM(S): 1 INJECTION INTRAVENOUS at 14:22

## 2017-12-30 RX ADMIN — ALBUTEROL 2.5 MILLIGRAM(S): 90 AEROSOL, METERED ORAL at 00:07

## 2017-12-30 RX ADMIN — ALBUTEROL 2.5 MILLIGRAM(S): 90 AEROSOL, METERED ORAL at 23:45

## 2017-12-30 RX ADMIN — Medication 101 MILLIGRAM(S): at 01:31

## 2017-12-30 RX ADMIN — ALBUTEROL 2.5 MILLIGRAM(S): 90 AEROSOL, METERED ORAL at 17:02

## 2017-12-30 RX ADMIN — PANTOPRAZOLE SODIUM 10 MG/HR: 20 TABLET, DELAYED RELEASE ORAL at 18:50

## 2017-12-30 RX ADMIN — SODIUM CHLORIDE 666.67 MILLILITER(S): 9 INJECTION INTRAMUSCULAR; INTRAVENOUS; SUBCUTANEOUS at 03:00

## 2017-12-30 RX ADMIN — SODIUM CHLORIDE 75 MILLILITER(S): 9 INJECTION INTRAMUSCULAR; INTRAVENOUS; SUBCUTANEOUS at 08:14

## 2017-12-30 RX ADMIN — SODIUM CHLORIDE 100 MILLILITER(S): 9 INJECTION INTRAMUSCULAR; INTRAVENOUS; SUBCUTANEOUS at 10:42

## 2017-12-30 RX ADMIN — PANTOPRAZOLE SODIUM 10 MG/HR: 20 TABLET, DELAYED RELEASE ORAL at 09:01

## 2017-12-30 RX ADMIN — ALBUTEROL 2.5 MILLIGRAM(S): 90 AEROSOL, METERED ORAL at 11:00

## 2017-12-30 RX ADMIN — Medication 250 MILLIGRAM(S): at 21:52

## 2017-12-30 RX ADMIN — SODIUM CHLORIDE 100 MILLILITER(S): 9 INJECTION INTRAMUSCULAR; INTRAVENOUS; SUBCUTANEOUS at 18:53

## 2017-12-30 RX ADMIN — PANTOPRAZOLE SODIUM 10 MG/HR: 20 TABLET, DELAYED RELEASE ORAL at 01:32

## 2017-12-30 NOTE — PROGRESS NOTE ADULT - SUBJECTIVE AND OBJECTIVE BOX
CC/F/U for: pneumonia and GI bleed    INTERVAL HPI/OVERNIGHT EVENTS:  Pt seen and examined at bedside.     Allergies/Intolerance: chocolate (Unknown)  No Known Drug Allergies  peanuts (Anaphylaxis)      MEDICATIONS  (STANDING):  ALBUTerol    0.083% 2.5 milliGRAM(s) Nebulizer every 6 hours  finasteride 5 milliGRAM(s) Oral daily  meropenem IVPB 500 milliGRAM(s) IV Intermittent every 8 hours  pantoprazole Infusion 8 mG/Hr (10 mL/Hr) IV Continuous <Continuous>  sodium chloride 0.9%. 1000 milliLiter(s) (125 mL/Hr) IV Continuous <Continuous>    MEDICATIONS  (PRN):  acetaminophen   Tablet 650 milliGRAM(s) Oral every 6 hours PRN For Temp greater than 38 C (100.4 F)        ROS: all systems reviewed and wnl      PHYSICAL EXAMINATION:  Vital Signs Last 24 Hrs  T(C): 37.7 (30 Dec 2017 09:23), Max: 37.7 (30 Dec 2017 09:23)  T(F): 99.8 (30 Dec 2017 09:23), Max: 99.8 (30 Dec 2017 09:23)  HR: 89 (30 Dec 2017 09:23) (72 - 104)  BP: 114/58 (30 Dec 2017 09:23) (113/53 - 137/76)  BP(mean): --  RR: 20 (30 Dec 2017 09:23) (18 - 23)  SpO2: 95% (30 Dec 2017 09:23) (90% - 100%)  CAPILLARY BLOOD GLUCOSE          12-29 @ 07:01  -  12-30 @ 07:00  --------------------------------------------------------  IN: 2790 mL / OUT: 1475 mL / NET: 1315 mL    12-30 @ 07:01  -  12-30 @ 10:08  --------------------------------------------------------  IN: 0 mL / OUT: 200 mL / NET: -200 mL        GENERAL:   NECK: supple, No JVD  CHEST/LUNG: clear to auscultation bilaterally; no rales, rhonchi, or wheezing b/l  HEART: normal S1, S2  ABDOMEN: BS+, soft, ND, NT   EXTREMITIES:  pulses palpable; no clubbing, cyanosis, or edema b/l LEs  NEURO: awake, alert, interactive; moves all extremities  SKIN: no rashes or lesions      LABS:                        10.4   30.4  )-----------( 357      ( 30 Dec 2017 08:47 )             30.4     12-30    139  |  108  |  53<H>  ----------------------------<  152<H>  4.7   |  16<L>  |  1.35<H>    Ca    6.8<L>      30 Dec 2017 04:19  Mg     2.6     12-28    TPro  6.3  /  Alb  2.3<L>  /  TBili  0.5  /  DBili  x   /  AST  55<H>  /  ALT  41  /  AlkPhos  72  12-28    PT/INR - ( 30 Dec 2017 08:47 )   PT: 22.1 sec;   INR: 2.00 ratio CC/F/U for: pneumonia and GI bleed    INTERVAL HPI/OVERNIGHT EVENTS:  Pt seen and examined at bedside.     Allergies/Intolerance: chocolate (Unknown)  No Known Drug Allergies  peanuts (Anaphylaxis)      MEDICATIONS  (STANDING):  ALBUTerol    0.083% 2.5 milliGRAM(s) Nebulizer every 6 hours  finasteride 5 milliGRAM(s) Oral daily  meropenem IVPB 500 milliGRAM(s) IV Intermittent every 8 hours  pantoprazole Infusion 8 mG/Hr (10 mL/Hr) IV Continuous <Continuous>  sodium chloride 0.9%. 1000 milliLiter(s) (125 mL/Hr) IV Continuous <Continuous>    MEDICATIONS  (PRN):  acetaminophen   Tablet 650 milliGRAM(s) Oral every 6 hours PRN For Temp greater than 38 C (100.4 F)        ROS: all systems reviewed and wnl      PHYSICAL EXAMINATION:  Vital Signs Last 24 Hrs  T(C): 37.7 (30 Dec 2017 09:23), Max: 37.7 (30 Dec 2017 09:23)  T(F): 99.8 (30 Dec 2017 09:23), Max: 99.8 (30 Dec 2017 09:23)  HR: 89 (30 Dec 2017 09:23) (72 - 104)  BP: 114/58 (30 Dec 2017 09:23) (113/53 - 137/76)  BP(mean): --  RR: 20 (30 Dec 2017 09:23) (18 - 23)  SpO2: 95% (30 Dec 2017 09:23) (90% - 100%)  CAPILLARY BLOOD GLUCOSE          12-29 @ 07:01  -  12-30 @ 07:00  --------------------------------------------------------  IN: 2790 mL / OUT: 1475 mL / NET: 1315 mL    12-30 @ 07:01  -  12-30 @ 10:08  --------------------------------------------------------  IN: 0 mL / OUT: 200 mL / NET: -200 mL        GENERAL: in bed, NGT in place, no SOB, CP or abdominal pain  NECK: supple, No JVD  CHEST/LUNG: clear to auscultation bilaterally; no rales, rhonchi, or wheezing b/l  HEART: normal S1, S2  ABDOMEN: BS+, soft, ND, NT   EXTREMITIES:  pulses palpable; no clubbing, cyanosis, or edema b/l LEs  NEURO: awake, alert, interactive; moves all extremities  SKIN: no rashes or lesions      LABS:                        10.4   30.4  )-----------( 357      ( 30 Dec 2017 08:47 )             30.4     12-30    139  |  108  |  53<H>  ----------------------------<  152<H>  4.7   |  16<L>  |  1.35<H>    Ca    6.8<L>      30 Dec 2017 04:19  Mg     2.6     12-28    TPro  6.3  /  Alb  2.3<L>  /  TBili  0.5  /  DBili  x   /  AST  55<H>  /  ALT  41  /  AlkPhos  72  12-28    PT/INR - ( 30 Dec 2017 08:47 )   PT: 22.1 sec;   INR: 2.00 ratio

## 2017-12-30 NOTE — CHART NOTE - NSCHARTNOTEFT_GEN_A_CORE
Medicine Hospitalist PA    Requested to place ngtube. NGT placed earlier was dislodged. Placed Bremer sump. Pt tolerated the procedure. Confirmed with auscultation and +biliary output will do KUB.

## 2017-12-30 NOTE — CHART NOTE - NSCHARTNOTEFT_GEN_A_CORE
Spoke with Dr Arredondo regarding current H&H result .  Advised to check CBC in 6 hours.  If <9 then consider transfusion.  Monitor for now.

## 2017-12-30 NOTE — CHART NOTE - NSCHARTNOTEFT_GEN_A_CORE
Medicine Hospitalist PA    Was notified by RN pt passed bloody BM also has blood tinged sputum. Pt seen and examined at bedside, states he feels fine, just sounds congested. Pt spitting out brown tinged sputum with coughing fits. Otherwise denies any complaints, denies cp, sob, n/v, palpitations, headache. Admitted with PNA found to have supratherapeutic INR, 3.87 now elevated to 5.0.     Vital Signs Last 24 Hrs  T(C): 36.8 (29 Dec 2017 23:40), Max: 37.4 (29 Dec 2017 11:00)  T(F): 98.2 (29 Dec 2017 23:40), Max: 99.3 (29 Dec 2017 11:00)  HR: 90 (30 Dec 2017 00:50) (72 - 92)  BP: 111/62 (29 Dec 2017 00:40) (116/67 - 137/76)  RR: 23 (29 Dec 2017 23:40) (18 - 23)  SpO2: 92% (30 Dec 2017 00:50) (90% - 98%)    General: awake, mild respiratory distress  CV: S1 S2  Resp: b/l wheezing, rhonchi, mild labored breathing  Abd: slightly distended, NT, BS+  Ext: no LE edema    A/P: 82 YOM with a PMHx of afib on coumadin (held), HTN, BPH admitted with PNA being treated with ABx now with episode of GI bleed.  - NGT placed to low wall suction: 300mL dark brown output  - STAT KUB: confirm placement  - STAT FOBT: positive  - STAT Vitamin K 5mg IVPB  - STAT Protonix drip  - 1L bolus  - CBC q4hrs  - Vitals q4hrs  - CT abd and pelvis w/wo IV contrast  - STAT LABS CBC/CMP/Lactate/Coags    Labs show increase in WBC 11 to 15. Supratherapeutic INR: 6. Lactate: 5.2. Add another 1L bolus. Repeat INR. Medicine Hospitalist PA    Was notified by RN pt passed bloody BM also has blood tinged sputum. Pt seen and examined at bedside, states he feels fine, just sounds congested. Pt spitting out brown tinged sputum with coughing fits. Otherwise denies any complaints, denies cp, sob, n/v, palpitations, headache. Admitted with PNA found to have supratherapeutic INR, 3.87 now elevated to 5.0.     Vital Signs Last 24 Hrs  T(C): 36.8 (29 Dec 2017 23:40), Max: 37.4 (29 Dec 2017 11:00)  T(F): 98.2 (29 Dec 2017 23:40), Max: 99.3 (29 Dec 2017 11:00)  HR: 90 (30 Dec 2017 00:50) (72 - 92)  BP: 111/62 (29 Dec 2017 00:40) (116/67 - 137/76)  RR: 23 (29 Dec 2017 23:40) (18 - 23)  SpO2: 92% (30 Dec 2017 00:50) (90% - 98%)    General: awake, mild respiratory distress  CV: S1 S2  Resp: b/l wheezing, rhonchi, mild labored breathing  Abd: slightly distended, NT, BS+  Ext: no LE edema    A/P: 82 YOM with a PMHx of afib on coumadin (held), HTN, BPH admitted with PNA being treated with ABx now with episode of GI bleed.  - NGT placed to low wall suction: 300mL dark brown output  - STAT KUB: confirm placement  - STAT FOBT: positive  - STAT Vitamin K 5mg IVPB  - STAT Protonix drip  - 1L bolus  - CBC q4hrs  - Vitals q4hrs  - CT abd and pelvis w/wo IV contrast  - STAT LABS CBC/CMP/Lactate/Coags    Labs show increase in WBC 11 to 15. Supratherapeutic INR: 6. Lactate: 5.2. Add another 1L bolus. Repeat INR.     D/w Dr. Parra    ADDENDUN  INR trending down. No other episodes of blood in BM overnight. Started meropenem for anaerobic coverage for aspiration pna since WBC trending up. Will get GI consult. Blood cultures ordered. Repeat lactate and INR.

## 2017-12-30 NOTE — PROGRESS NOTE ADULT - PROBLEM SELECTOR PLAN 1
levaquin  changed to Meropemen. ID consult called. oxygen  infectious disease consult. Duonebs every 6 hours. levaquin  changed to Meropemen 500 mg every 8 hours. ID consult called today.  Duonebs every 6 hours. HERMILA likely from sepsis. Continue  cc/h.

## 2017-12-30 NOTE — PROGRESS NOTE ADULT - ASSESSMENT
Pt is a 83 yo gentleman with a pmhx of afib on coumadin, hypotension who presents to the ED with sob and cough since [place]. Questionable fever, never checked w thermometer, no change in mental status. Cough is nonproductive, but can hear mucous in lungs. No chest pain, no abdominal pain, no hx of dvt/pe. No recent hospitalizations. (28 Dec 2017 18:56)

## 2017-12-30 NOTE — CONSULT NOTE ADULT - SUBJECTIVE AND OBJECTIVE BOX
Pt is a 83 yo gentleman with a pmhx of afib on coumadin, hypotension who presents to the ED with sob and cough since [place]. Questionable fever, never checked w thermometer, no change in mental status. Cough is nonproductive, but can hear mucous in lungs. No chest pain, no abdominal pain, no hx of dvt/pe. No recent hospitalizations. (28 Dec 2017 18:56)  started on levaquin   course complicated by supratherapeutic pt/nr ; gi bleed and   drop in hb/hct ; also worsening wbc and lactate  merem started yesterday   and wbc even worse     PAST MEDICAL & SURGICAL HISTORY:  Hypertension  BPH (Benign Prostatic Hyperplasia)  Hypotension: Postural, positive Tilt Table Test.  Syncope  S/P hip replacement: Bilateral, Left ORIF and Rt. Hip Replacement      SOCHX:   tobacco,  -  alcohol    FMHX: FA/MO  - contributory       Recent Travel:    Immunizations:    Allergies    chocolate (Unknown)  No Known Drug Allergies  peanuts (Anaphylaxis)    Intolerances        MEDICATIONS  (STANDING):  ALBUTerol    0.083% 2.5 milliGRAM(s) Nebulizer every 6 hours  finasteride 5 milliGRAM(s) Oral daily  meropenem IVPB 500 milliGRAM(s) IV Intermittent every 8 hours  pantoprazole Infusion 8 mG/Hr (10 mL/Hr) IV Continuous <Continuous>  sodium chloride 0.9%. 1000 milliLiter(s) (100 mL/Hr) IV Continuous <Continuous>    MEDICATIONS  (PRN):  acetaminophen   Tablet 650 milliGRAM(s) Oral every 6 hours PRN For Temp greater than 38 C (100.4 F)      REVIEW OF SYSTEMS:  CONSTITUTIONAL: No fever, weight loss, or fatigue  EYES: No eye pain, visual disturbances, or discharge  ENMT:  No difficulty hearing, tinnitus, vertigo; No sinus or throat pain  NECK: No pain or stiffness  BREASTS: No pain, masses, or nipple discharge  RESPIRATORY: No cough, wheezing, chills or hemoptysis; No shortness of breath  CARDIOVASCULAR: No chest pain, palpitations, dizziness, or leg swelling  GASTROINTESTINAL: No abdominal or epigastric pain. No nausea, vomiting, or hematemesis; No diarrhea or constipation. No melena or hematochezia.  GENITOURINARY: No dysuria, frequency, hematuria, or incontinence  NEUROLOGICAL: No headaches, memory loss, loss of strength, numbness, or tremors  SKIN: No itching, burning, rashes, or lesions   LYMPH NODES: No enlarged glands  ENDOCRINE: No heat or cold intolerance; No hair loss  MUSCULOSKELETAL: No joint pain or swelling; No muscle, back, or extremity pain  PSYCHIATRIC: No depression, anxiety, mood swings, or difficulty sleeping  HEME/LYMPH: No easy bruising, or bleeding gums  ALLERGY AND IMMUNOLOGIC: No hives or eczema    VITAL SIGNS:    T(C): 37.6 (12-30-17 @ 11:50), Max: 37.7 (12-30-17 @ 09:23)  T(F): 99.6 (12-30-17 @ 11:50), Max: 99.8 (12-30-17 @ 09:23)  HR: 106 (12-30-17 @ 17:20) (79 - 107)  BP: 97/56 (12-30-17 @ 11:50) (97/56 - 118/62)  RR: 19 (12-30-17 @ 11:50) (19 - 23)  SpO2: 97% (12-30-17 @ 17:20) (90% - 100%)    PHYSICAL EXAM:    GENERAL: NAD, well-groomed, well-developed  HEAD:  Atraumatic, Normocephalic  EYES: EOMI, PERRLA, conjunctiva and sclera clear  ENMT: No tonsillar erythema, exudates, or enlargement; Moist mucous membranes, Good dentition, No lesions  NECK: Supple, No JVD, Normal thyroid  NERVOUS SYSTEM:  Alert & Oriented X3, Good concentration; Motor Strength 5/5 B/L upper and lower extremities; DTRs 2+ intact and symmetric  CHEST/LUNG: Clear to auscultation bilaterally; No rales, rhonchi, wheezing bilaterally  HEART: Regular rate and rhythm; No murmurs, rubs, or gallops  ABDOMEN: Soft, Nontender, Nondistended; Bowel sounds present  EXTREMITIES:  2+ Peripheral Pulses, No clubbing, cyanosis, or edema  LYMPH: No lymphadenopathy noted  SKIN: No rashes or lesions  BACK: no pressor sore     LABS:                         10.0   28.7  )-----------( 422      ( 30 Dec 2017 15:23 )             28.0     12-30    139  |  108  |  53<H>  ----------------------------<  152<H>  4.7   |  16<L>  |  1.35<H>    Ca    6.8<L>      30 Dec 2017 04:19        PT/INR - ( 30 Dec 2017 08:47 )   PT: 22.1 sec;   INR: 2.00 ratio                                 Culture Results:   No growth to date. (12-29 @ 00:18)  Culture Results:   No growth to date. (12-29 @ 00:18)                Radiology: Pt is a 83 yo gentleman with a pmhx of afib on coumadin, hypotension who presents to the ED with sob and cough since [place]. Questionable fever, never checked w thermometer, no change in mental status. Cough is nonproductive, but can hear mucous in lungs. No chest pain, no abdominal pain, no hx of dvt/pe. No recent hospitalizations. (28 Dec 2017 18:56)  started on levaquin   course complicated by supratherapeutic pt/nr ; gi bleed and   drop in hb/hct ; also worsening wbc and lactate  merem started yesterday   and wbc even worse       PAST MEDICAL & SURGICAL HISTORY:  Hypertension  BPH (Benign Prostatic Hyperplasia)  Hypotension: Postural, positive Tilt Table Test.  Syncope  S/P hip replacement: Bilateral, Left ORIF and Rt. Hip Replacement      SOCHX:   no tobacco,  -no  alcohol    FMHX: FA/MO  -non contributory       Recent Travel:    Immunizations:    Allergies    chocolate (Unknown)  No Known Drug Allergies  peanuts (Anaphylaxis)    Intolerances        MEDICATIONS  (STANDING):  ALBUTerol    0.083% 2.5 milliGRAM(s) Nebulizer every 6 hours  finasteride 5 milliGRAM(s) Oral daily  meropenem IVPB 500 milliGRAM(s) IV Intermittent every 8 hours  pantoprazole Infusion 8 mG/Hr (10 mL/Hr) IV Continuous <Continuous>  sodium chloride 0.9%. 1000 milliLiter(s) (100 mL/Hr) IV Continuous <Continuous>    MEDICATIONS  (PRN):  acetaminophen   Tablet 650 milliGRAM(s) Oral every 6 hours PRN For Temp greater than 38 C (100.4 F)      REVIEW OF SYSTEMS:  CONSTITUTIONAL: No fever, weight loss, or fatigue  EYES: No eye pain, visual disturbances, or discharge  ENMT:  No difficulty hearing, tinnitus, vertigo; No sinus or throat pain  NECK: No pain or stiffness  RESPIRATORY: pos  cough, wheezing,   no chills or hemoptysis; No shortness of breath  CARDIOVASCULAR: No chest pain, palpitations, dizziness, or leg swelling  GASTROINTESTINAL: No abdominal or epigastric pain. No nausea, vomiting, or hematemesis; No diarrhea or constipation. No melena or hematochezia.  GENITOURINARY: No dysuria, frequency, hematuria, or incontinence  NEUROLOGICAL: No headaches, memory loss, loss of strength, numbness, or tremors  SKIN: No itching, burning, rashes, or lesions   LYMPH NODES: No enlarged glands  ENDOCRINE: No heat or cold intolerance; No hair loss  MUSCULOSKELETAL: No joint pain or swelling; No muscle, back, or extremity pain  contractures rt arm from scuba diving in [place]   PSYCHIATRIC: No depression, anxiety, mood swings, or difficulty sleeping  HEME/LYMPH: No easy bruising, or bleeding gums  ALLERGY AND IMMUNOLOGIC: No hives or eczema    VITAL SIGNS:    T(C): 37.6 (12-30-17 @ 11:50), Max: 37.7 (12-30-17 @ 09:23)  T(F): 99.6 (12-30-17 @ 11:50), Max: 99.8 (12-30-17 @ 09:23)  HR: 106 (12-30-17 @ 17:20) (79 - 107)  BP: 97/56 (12-30-17 @ 11:50) (97/56 - 118/62)  RR: 19 (12-30-17 @ 11:50) (19 - 23)  SpO2: 97% (12-30-17 @ 17:20) (90% - 100%)    PHYSICAL EXAM:    GENERAL: NAD, well-groomed, well-developed  HEAD:  Atraumatic, Normocephalic  EYES: EOMI, PERRLA, conjunctiva and sclera clear  ENMT: Moist mucous membranes,   very congested  NECK: Supple, No JVD, Normal thyroid  NERVOUS SYSTEM:  Alert & Oriented X3, Good concentration; Motor Strength 5/5 B/L upper and lower extremities;  rt arm and hand contractures  CHEST/LUNG: bilateral  rales, rhonchi, wheezing bilaterally  HEART: Regular rate and rhythm; No murmurs, rubs, or gallops  ABDOMEN: Soft, Nontender, Nondistended; Bowel sounds present  EXTREMITIES:  2+ Peripheral Pulses, No clubbing, cyanosis, or edema  LYMPH: No lymphadenopathy noted  SKIN: No rashes or lesions  BACK: no pressor sore     LABS:                         10.0   28.7  )-----------( 422      ( 30 Dec 2017 15:23 )             28.0     12-30    139  |  108  |  53<H>  ----------------------------<  152<H>  4.7   |  16<L>  |  1.35<H>    Ca    6.8<L>      30 Dec 2017 04:19        PT/INR - ( 30 Dec 2017 08:47 )   PT: 22.1 sec;   INR: 2.00 ratio                                 Culture Results:   No growth to date. (12-29 @ 00:18)  Culture Results:   No growth to date. (12-29 @ 00:18)                Radiology:  < from: Xray Chest 1 View AP/PA. (12.30.17 @ 01:24) >    IMPRESSION: Improving left lateral chest density. Old left rib fractures   again seen.                CORTEZ SILVESTRE M.D., ATTENDING RADIOLOGIST  This document has been electronically signed. Dec 30 2017 11:04AM                < end of copied text >

## 2017-12-30 NOTE — PROGRESS NOTE ADULT - PROBLEM SELECTOR PLAN 4
hold warfarin as inr elevated on arrival. Repeat inr better 2.00  Hgb stable 10.4. Follow CBC every 6 hours. NGT placed last evening.   IV Protonix drip started. GI consult called this AM. hold warfarin as inr elevated on arrival. Repeat inr better 2.00  Hgb stable 10.4. Follow CBC every 6 hours. NGT placed last evening.   IV Protonix drip started. GI consult called this AM.   No need to CT of abdomen at present.

## 2017-12-31 LAB
ABO RH CONFIRMATION: SIGNIFICANT CHANGE UP
BLD GP AB SCN SERPL QL: SIGNIFICANT CHANGE UP
HCT VFR BLD CALC: 25.5 % — LOW (ref 39–50)
HGB BLD-MCNC: 8.7 G/DL — LOW (ref 13–17)
INR BLD: 1.39 RATIO — HIGH (ref 0.88–1.16)
LEGIONELLA AG UR QL: NEGATIVE — SIGNIFICANT CHANGE UP
MCHC RBC-ENTMCNC: 33.5 PG — SIGNIFICANT CHANGE UP (ref 27–34)
MCHC RBC-ENTMCNC: 34.1 GM/DL — SIGNIFICANT CHANGE UP (ref 32–36)
MCV RBC AUTO: 98.3 FL — SIGNIFICANT CHANGE UP (ref 80–100)
PLATELET # BLD AUTO: 469 K/UL — HIGH (ref 150–400)
PROCALCITONIN SERPL-MCNC: 0.52 NG/ML — HIGH (ref 0–0.04)
PROTHROM AB SERPL-ACNC: 15.3 SEC — HIGH (ref 9.8–12.7)
RBC # BLD: 2.59 M/UL — LOW (ref 4.2–5.8)
RBC # FLD: 12.8 % — SIGNIFICANT CHANGE UP (ref 11–15)
WBC # BLD: 20.9 K/UL — HIGH (ref 3.8–10.5)
WBC # FLD AUTO: 20.9 K/UL — HIGH (ref 3.8–10.5)

## 2017-12-31 PROCEDURE — 71250 CT THORAX DX C-: CPT | Mod: 26

## 2017-12-31 PROCEDURE — 99233 SBSQ HOSP IP/OBS HIGH 50: CPT

## 2017-12-31 RX ORDER — SUCRALFATE 1 G
1 TABLET ORAL
Qty: 0 | Refills: 0 | Status: DISCONTINUED | OUTPATIENT
Start: 2017-12-31 | End: 2018-01-12

## 2017-12-31 RX ADMIN — MEROPENEM 100 MILLIGRAM(S): 1 INJECTION INTRAVENOUS at 13:27

## 2017-12-31 RX ADMIN — ALBUTEROL 2.5 MILLIGRAM(S): 90 AEROSOL, METERED ORAL at 17:54

## 2017-12-31 RX ADMIN — ALBUTEROL 2.5 MILLIGRAM(S): 90 AEROSOL, METERED ORAL at 05:36

## 2017-12-31 RX ADMIN — PANTOPRAZOLE SODIUM 10 MG/HR: 20 TABLET, DELAYED RELEASE ORAL at 18:03

## 2017-12-31 RX ADMIN — SODIUM CHLORIDE 75 MILLILITER(S): 9 INJECTION INTRAMUSCULAR; INTRAVENOUS; SUBCUTANEOUS at 22:15

## 2017-12-31 RX ADMIN — Medication 250 MILLIGRAM(S): at 22:09

## 2017-12-31 RX ADMIN — Medication 1 GRAM(S): at 18:33

## 2017-12-31 RX ADMIN — SODIUM CHLORIDE 100 MILLILITER(S): 9 INJECTION INTRAMUSCULAR; INTRAVENOUS; SUBCUTANEOUS at 05:38

## 2017-12-31 RX ADMIN — AZITHROMYCIN 250 MILLIGRAM(S): 500 TABLET, FILM COATED ORAL at 21:27

## 2017-12-31 RX ADMIN — ALBUTEROL 2.5 MILLIGRAM(S): 90 AEROSOL, METERED ORAL at 11:11

## 2017-12-31 RX ADMIN — FINASTERIDE 5 MILLIGRAM(S): 5 TABLET, FILM COATED ORAL at 13:27

## 2017-12-31 RX ADMIN — PANTOPRAZOLE SODIUM 10 MG/HR: 20 TABLET, DELAYED RELEASE ORAL at 06:38

## 2017-12-31 RX ADMIN — MEROPENEM 100 MILLIGRAM(S): 1 INJECTION INTRAVENOUS at 05:33

## 2017-12-31 RX ADMIN — MEROPENEM 100 MILLIGRAM(S): 1 INJECTION INTRAVENOUS at 21:27

## 2017-12-31 NOTE — PROGRESS NOTE ADULT - SUBJECTIVE AND OBJECTIVE BOX
Pt still with dark stools; H/H slowly trending down  off coumadin      MEDICATIONS  (STANDING):  ALBUTerol    0.083% 2.5 milliGRAM(s) Nebulizer every 6 hours  azithromycin   Tablet 250 milliGRAM(s) Oral daily  finasteride 5 milliGRAM(s) Oral daily  meropenem IVPB 500 milliGRAM(s) IV Intermittent every 8 hours  pantoprazole Infusion 8 mG/Hr (10 mL/Hr) IV Continuous <Continuous>  sodium chloride 0.9%. 1000 milliLiter(s) (75 mL/Hr) IV Continuous <Continuous>  vancomycin  IVPB 1000 milliGRAM(s) IV Intermittent every 24 hours    MEDICATIONS  (PRN):  acetaminophen   Tablet 650 milliGRAM(s) Oral every 6 hours PRN For Temp greater than 38 C (100.4 F)      Allergies    chocolate (Unknown)  No Known Drug Allergies  peanuts (Anaphylaxis)    Intolerances        Vital Signs Last 24 Hrs  T(C): 36.6 (31 Dec 2017 17:08), Max: 37.2 (30 Dec 2017 19:19)  T(F): 97.8 (31 Dec 2017 17:08), Max: 98.9 (30 Dec 2017 19:19)  HR: 91 (31 Dec 2017 17:08) (79 - 99)  BP: 131/57 (31 Dec 2017 17:08) (119/61 - 132/77)  BP(mean): --  RR: 18 (31 Dec 2017 17:08) (15 - 19)  SpO2: 95% (31 Dec 2017 17:08) (95% - 97%)    PHYSICAL EXAM:  General: NAD.  CVS: S1, S2  Chest: air entry bilaterally present, decreased on left  Abd: BS present, soft, non-tender      LABS:                        8.7    20.9  )-----------( 469      ( 31 Dec 2017 07:48 )             25.5     12-30    139  |  108  |  53<H>  ----------------------------<  152<H>  4.7   |  16<L>  |  1.35<H>    Ca    6.8<L>      30 Dec 2017 04:19      PT/INR - ( 31 Dec 2017 07:48 )   PT: 15.3 sec;   INR: 1.39 ratio           Continue IV protonix  cbc in AM  add carafate   Chest CT shows ADITI infiltrate

## 2017-12-31 NOTE — PROGRESS NOTE ADULT - ASSESSMENT
Pt is a 83 yo gentleman with a pmhx of afib on coumadin, hypotension who presents to the ED with sob and cough since [place]. Questionable fever, never checked w thermometer, no change in mental status. Cough is nonproductive, but can hear mucous in lungs. No chest pain, no abdominal pain, no hx of dvt/pe. No recent hospitalizations. (28 Dec 2017 18:56) Pt is a 83 yo gentleman with a pmhx of afib on coumadin, hypotension who presents to the ED with sob and cough since [place]. Questionable fever, never checked w thermometer, no change in mental status. Cough is nonproductive, but can hear mucous in lungs. No chest pain, no abdominal pain, no hx of dvt/pe. No recent hospitalizations.

## 2017-12-31 NOTE — CONSULT NOTE ADULT - SUBJECTIVE AND OBJECTIVE BOX
Patient  is an 81 y/o male with PMhx of A fib on coumadin, who presents to the ED with sob and cough since [place]. Admitted to medicine for pneumonia treated with antibiotics followed by  ID. On 12/30 overnight, patient had an episode bloody BM also has blood tinged sputum. Patient INR was high, coumadin was held. Today, patient hemoglobin dropped from 12.5 to 8.7, and 1 unit of blood is being ordered.  Patient had CT of chest done today, showed Left upper lobe pneumonia and small   PAST MEDICAL & SURGICAL HISTORY:  Hypertension  BPH (Benign Prostatic Hyperplasia)  Hypotension: Postural, positive Tilt Table Test.  Syncope  S/P hip replacement: Bilateral, Left ORIF and Rt. Hip Replacement    FAMILY HISTORY:  No pertinent family history in first degree relatives    Social History: Allergies    chocolate (Unknown)  No Known Drug Allergies  peanuts (Anaphylaxis)    Intolerances        MEDICATIONS  (STANDING):  ALBUTerol    0.083% 2.5 milliGRAM(s) Nebulizer every 6 hours  azithromycin   Tablet 250 milliGRAM(s) Oral daily  finasteride 5 milliGRAM(s) Oral daily  meropenem IVPB 500 milliGRAM(s) IV Intermittent every 8 hours  pantoprazole Infusion 8 mG/Hr (10 mL/Hr) IV Continuous <Continuous>  sodium chloride 0.9%. 1000 milliLiter(s) (75 mL/Hr) IV Continuous <Continuous>  sucralfate suspension 1 Gram(s) Oral four times a day  vancomycin  IVPB 1000 milliGRAM(s) IV Intermittent every 24 hours    MEDICATIONS  (PRN):  acetaminophen   Tablet 650 milliGRAM(s) Oral every 6 hours PRN For Temp greater than 38 C (100.4 F)      REVIEW OF SYSTEMS:    CONSTITUTIONAL: No fever, weight loss, or fatigue  EYES: No eye pain, visual disturbances, or discharge  ENMT:  No difficulty hearing, tinnitus, vertigo; No sinus or throat pain  NECK: No pain or stiffness  BREASTS: No pain, masses, or nipple discharge  RESPIRATORY: No cough, wheezing, chills or hemoptysis; No shortness of breath  CARDIOVASCULAR: No chest pain, palpitations, dizziness, or leg swelling  GASTROINTESTINAL: No abdominal or epigastric pain. No nausea, vomiting, or hematemesis; No diarrhea or constipation. No melena or hematochezia.  GENITOURINARY: No dysuria, frequency, hematuria, or incontinence  NEUROLOGICAL: No headaches, memory loss, loss of strength, numbness, or tremors  SKIN: No itching, burning, rashes, or lesions   LYMPH NODES: No enlarged glands  ENDOCRINE: No heat or cold intolerance; No hair loss  MUSCULOSKELETAL: No joint pain or swelling; No muscle, back, or extremity pain  PSYCHIATRIC: No depression, anxiety, mood swings, or difficulty sleeping  HEME/LYMPH: No easy bruising, or bleeding gums  ALLERGY AND IMMUNOLOGIC: No hives or eczema      PHYSICAL EXAM:  ICU Vital Signs Last 24 Hrs  T(C): 36.6 (31 Dec 2017 17:08), Max: 37.2 (30 Dec 2017 19:19)  T(F): 97.8 (31 Dec 2017 17:08), Max: 98.9 (30 Dec 2017 19:19)  HR: 91 (31 Dec 2017 17:08) (79 - 99)  BP: 131/57 (31 Dec 2017 17:08) (119/61 - 132/77)  BP(mean): --  ABP: --  ABP(mean): --  RR: 18 (31 Dec 2017 17:08) (15 - 19)  SpO2: 95% (31 Dec 2017 17:08) (95% - 97%)    GENERAL: NAD, well-groomed, well-developed  HEAD:  Atraumatic, Normocephalic  EYES: EOMI, PERRLA, conjunctiva and sclera clear  NECK: Supple, No JVD, Normal thyroid  NERVOUS SYSTEM:  Alert & Oriented X3, Good concentration;   Motor Strength 5/5 B/L upper and lower extremities; DTRs 2+ intact and symmetric  CHEST/LUNG: CTAbilaterally; No rales, rhonchi, wheezing, or rubs  HEART: Regular rate and rhythm; No murmurs, rubs, or gallops  ABDOMEN: Soft, Nontender, Nondistended; Bowel sounds present  EXTREMITIES:  2+ Peripheral Pulses, No clubbing, cyanosis, or edema  SKIN: No rashes or lesions        LABS:                        8.7    20.9  )-----------( 469      ( 31 Dec 2017 07:48 )             25.5     12-30    139  |  108  |  53<H>  ----------------------------<  152<H>  4.7   |  16<L>  |  1.35<H>    Ca    6.8<L>      30 Dec 2017 04:19      PT/INR - ( 31 Dec 2017 07:48 )   PT: 15.3 sec;   INR: 1.39 ratio                   RADIOLOGY & ADDITIONAL STUDIES:    EKG:            CRITICAL CARE TIME SPENT: Patient  is an 83 y/o male with PMhx of A fib on coumadin, who presents to the ED with sob and cough since [place]. Admitted to medicine for pneumonia treated with antibiotics followed by  ID. On 12/30 overnight, patient had an episode bloody BM also has blood tinged sputum. Patient INR was high, coumadin was held. Today, patient hemoglobin dropped from 12.5 to 8.7, and 1 unit of blood is being ordered.  Patient had CT of chest done today, showed Left upper lobe pneumonia and small b/l pleural effusion    PAST MEDICAL & SURGICAL HISTORY:  BPH (Benign Prostatic Hyperplasia)  Afib on cuomadin  S/P hip replacement: Bilateral, Left ORIF and Rt. Hip Replacement    FAMILY HISTORY:  No pertinent family history in first degree relatives      Allergies:  chocolate (Unknown)  No Known Drug Allergies  peanuts (Anaphylaxis)    Intolerances        MEDICATIONS  (STANDING):  ALBUTerol    0.083% 2.5 milliGRAM(s) Nebulizer every 6 hours  azithromycin   Tablet 250 milliGRAM(s) Oral daily  finasteride 5 milliGRAM(s) Oral daily  meropenem IVPB 500 milliGRAM(s) IV Intermittent every 8 hours  pantoprazole Infusion 8 mG/Hr (10 mL/Hr) IV Continuous <Continuous>  sodium chloride 0.9%. 1000 milliLiter(s) (75 mL/Hr) IV Continuous <Continuous>  sucralfate suspension 1 Gram(s) Oral four times a day  vancomycin  IVPB 1000 milliGRAM(s) IV Intermittent every 24 hours    MEDICATIONS  (PRN):  acetaminophen   Tablet 650 milliGRAM(s) Oral every 6 hours PRN For Temp greater than 38 C (100.4 F)      REVIEW OF SYSTEMS:    some SOB, and mild chest pain when cough      PHYSICAL EXAM:    T(C): 36.6 (31 Dec 2017 17:08), Max: 37.2 (30 Dec 2017 19:19)  T(F): 97.8 (31 Dec 2017 17:08), Max: 98.9 (30 Dec 2017 19:19)  HR: 91 (31 Dec 2017 17:08) (79 - 99)  BP: 131/57 (31 Dec 2017 17:08) (119/61 - 132/77)  RR: 18 (31 Dec 2017 17:08) (15 - 19)  SpO2: 95% (31 Dec 2017 17:08) (95% - 97%)    GENERAL:lying in bed, not in distress, getting albuterol treatmetn    NERVOUS SYSTEM:  Alert & Oriented X3, Good concentration;   CHEST/LUNG: b/l rales, rhonchi, no wheezing  HEART: irrgeular rate and rhythm;   ABDOMEN: Soft, Nontender, Nondistended; Bowel sounds present  EXTREMITIES:  2+ Peripheral Pulses, edema        LABS:                        8.7    20.9  )-----------( 469      ( 31 Dec 2017 07:48 )             25.5     12-30    139  |  108  |  53<H>  ----------------------------<  152<H>  4.7   |  16<L>  |  1.35<H>    Ca    6.8<L>      30 Dec 2017 04:19      PT/INR - ( 31 Dec 2017 07:48 )   PT: 15.3 sec;   INR: 1.39 ratio

## 2017-12-31 NOTE — PROGRESS NOTE ADULT - SUBJECTIVE AND OBJECTIVE BOX
CC/F/U for:  GI bleed, chronic A.fib and LLL pneumonia    INTERVAL HPI/OVERNIGHT EVENTS:  Pt seen and examined at bedside.     Allergies/Intolerance: chocolate (Unknown)  No Known Drug Allergies  peanuts (Anaphylaxis)      MEDICATIONS  (STANDING):  ALBUTerol    0.083% 2.5 milliGRAM(s) Nebulizer every 6 hours  azithromycin   Tablet 250 milliGRAM(s) Oral daily  finasteride 5 milliGRAM(s) Oral daily  meropenem IVPB 500 milliGRAM(s) IV Intermittent every 8 hours  pantoprazole Infusion 8 mG/Hr (10 mL/Hr) IV Continuous <Continuous>  sodium chloride 0.9%. 1000 milliLiter(s) (100 mL/Hr) IV Continuous <Continuous>  vancomycin  IVPB 1000 milliGRAM(s) IV Intermittent every 24 hours    MEDICATIONS  (PRN):  acetaminophen   Tablet 650 milliGRAM(s) Oral every 6 hours PRN For Temp greater than 38 C (100.4 F)        ROS: all systems reviewed and wnl      PHYSICAL EXAMINATION:  Vital Signs Last 24 Hrs  T(C): 36.7 (31 Dec 2017 05:40), Max: 37.6 (30 Dec 2017 11:50)  T(F): 98 (31 Dec 2017 05:40), Max: 99.6 (30 Dec 2017 11:50)  HR: 84 (31 Dec 2017 06:33) (79 - 107)  BP: 119/61 (31 Dec 2017 05:40) (97/56 - 132/77)  BP(mean): --  RR: 19 (31 Dec 2017 05:40) (15 - 19)  SpO2: 97% (31 Dec 2017 06:33) (94% - 97%)  CAPILLARY BLOOD GLUCOSE          12-30 @ 07:01  -  12-31 @ 07:00  --------------------------------------------------------  IN: 2625 mL / OUT: 400 mL / NET: 2225 mL        GENERAL:   NECK: supple, No JVD  CHEST/LUNG: clear to auscultation bilaterally; no rales, rhonchi, or wheezing b/l  HEART: normal S1, S2  ABDOMEN: BS+, soft, ND, NT   EXTREMITIES:  pulses palpable; no clubbing, cyanosis, or edema b/l LEs  NEURO: awake, alert, interactive; moves all extremities  SKIN: no rashes or lesions      LABS:                        8.7    20.9  )-----------( 469      ( 31 Dec 2017 07:48 )             25.5     12-30    139  |  108  |  53<H>  ----------------------------<  152<H>  4.7   |  16<L>  |  1.35<H>    Ca    6.8<L>      30 Dec 2017 04:19      PT/INR - ( 31 Dec 2017 07:48 )   PT: 15.3 sec;   INR: 1.39 ratio CC/F/U for:  GI bleed, chronic A.fib and LLL pneumonia    INTERVAL HPI/OVERNIGHT EVENTS:  Pt seen and examined at bedside.     Allergies/Intolerance: chocolate (Unknown)  No Known Drug Allergies  peanuts (Anaphylaxis)      MEDICATIONS  (STANDING):  ALBUTerol    0.083% 2.5 milliGRAM(s) Nebulizer every 6 hours  azithromycin   Tablet 250 milliGRAM(s) Oral daily  finasteride 5 milliGRAM(s) Oral daily  meropenem IVPB 500 milliGRAM(s) IV Intermittent every 8 hours  pantoprazole Infusion 8 mG/Hr (10 mL/Hr) IV Continuous <Continuous>  sodium chloride 0.9%. 1000 milliLiter(s) (100 mL/Hr) IV Continuous <Continuous>  vancomycin  IVPB 1000 milliGRAM(s) IV Intermittent every 24 hours    MEDICATIONS  (PRN):  acetaminophen   Tablet 650 milliGRAM(s) Oral every 6 hours PRN For Temp greater than 38 C (100.4 F)        ROS: all systems reviewed and wnl      PHYSICAL EXAMINATION:  Vital Signs Last 24 Hrs  T(C): 36.7 (31 Dec 2017 05:40), Max: 37.6 (30 Dec 2017 11:50)  T(F): 98 (31 Dec 2017 05:40), Max: 99.6 (30 Dec 2017 11:50)  HR: 84 (31 Dec 2017 06:33) (79 - 107)  BP: 119/61 (31 Dec 2017 05:40) (97/56 - 132/77)  BP(mean): --  RR: 19 (31 Dec 2017 05:40) (15 - 19)  SpO2: 97% (31 Dec 2017 06:33) (94% - 97%)  CAPILLARY BLOOD GLUCOSE          12-30 @ 07:01  -  12-31 @ 07:00  --------------------------------------------------------  IN: 2625 mL / OUT: 400 mL / NET: 2225 mL        GENERAL: in bed, NAD, no SOB, fevers or abdominal pain. Had melena past 24 hours.   NECK: supple, No JVD  CHEST/LUNG: clear to auscultation bilaterally; no rales, rhonchi, or wheezing b/l  HEART: normal S1, S2  ABDOMEN: BS+, soft, ND, NT   EXTREMITIES:  pulses palpable; no clubbing, cyanosis, or edema b/l LEs  NEURO: awake, alert, interactive; moves all extremities  SKIN: no rashes or lesions      LABS:                        8.7    20.9  )-----------( 469      ( 31 Dec 2017 07:48 )             25.5     12-30    139  |  108  |  53<H>  ----------------------------<  152<H>  4.7   |  16<L>  |  1.35<H>    Ca    6.8<L>      30 Dec 2017 04:19      PT/INR - ( 31 Dec 2017 07:48 )   PT: 15.3 sec;   INR: 1.39 ratio

## 2017-12-31 NOTE — PROGRESS NOTE ADULT - PROBLEM SELECTOR PLAN 4
hold warfarin as inr elevated on arrival. Repeat inr better 1.39.   Hgb trending down 8.7  from 10.4. Follow CBC every AM. NGT removed.    IV Protonix drip started. GI consult in chart. Coumadin reversed and on hold. Likely EGD after weekent. Continue IV Protonix drip. hold warfarin as inr elevated on arrival. Repeat inr better 1.39.   Hgb trending down 8.7  from 10.4. Follow CBC every AM. NGT removed past 24 hours. IV Protonix drip started. GI consult in chart. Coumadin reversed and on hold. Likely EGD after weekend. Continue IV Protonix drip. Started on clears by GI.

## 2017-12-31 NOTE — PROGRESS NOTE ADULT - PROBLEM SELECTOR PLAN 1
ID consult changed to IV Meropemen 500 mg every 8 hours, IV Vancomycin 1,000 mg/day and oral Zithromax 250 mg/day. ID consult reviewed. Duonebs every 6 hours. HERMILA likely from sepsis. Continue  cc/h. Basic panel in AM. ID consult changed to IV Meropemen 500 mg every 8 hours, IV Vancomycin 1,000 mg/day and oral Zithromax 250 mg/day. ID consult reviewed. Duonebs every 6 hours. HERMILA likely from sepsis. Continue  cc/h. Basic panel in AM. ID   consult reviewed. CT chest ordered.

## 2018-01-01 DIAGNOSIS — K92.2 GASTROINTESTINAL HEMORRHAGE, UNSPECIFIED: ICD-10-CM

## 2018-01-01 LAB
ANION GAP SERPL CALC-SCNC: 9 MMOL/L — SIGNIFICANT CHANGE UP (ref 5–17)
BUN SERPL-MCNC: 23 MG/DL — SIGNIFICANT CHANGE UP (ref 7–23)
CALCIUM SERPL-MCNC: 7.2 MG/DL — LOW (ref 8.5–10.1)
CHLORIDE SERPL-SCNC: 112 MMOL/L — HIGH (ref 96–108)
CO2 SERPL-SCNC: 23 MMOL/L — SIGNIFICANT CHANGE UP (ref 22–31)
CREAT SERPL-MCNC: 0.92 MG/DL — SIGNIFICANT CHANGE UP (ref 0.5–1.3)
GLUCOSE SERPL-MCNC: 110 MG/DL — HIGH (ref 70–99)
HCT VFR BLD CALC: 21.1 % — LOW (ref 39–50)
HCT VFR BLD CALC: 26.8 % — LOW (ref 39–50)
HGB BLD-MCNC: 7.4 G/DL — LOW (ref 13–17)
HGB BLD-MCNC: 9.3 G/DL — LOW (ref 13–17)
INR BLD: 1.41 RATIO — HIGH (ref 0.88–1.16)
LACTATE SERPL-SCNC: 3.8 MMOL/L — HIGH (ref 0.7–2)
MCHC RBC-ENTMCNC: 33 PG — SIGNIFICANT CHANGE UP (ref 27–34)
MCHC RBC-ENTMCNC: 33.3 PG — SIGNIFICANT CHANGE UP (ref 27–34)
MCHC RBC-ENTMCNC: 34.7 GM/DL — SIGNIFICANT CHANGE UP (ref 32–36)
MCHC RBC-ENTMCNC: 35 GM/DL — SIGNIFICANT CHANGE UP (ref 32–36)
MCV RBC AUTO: 95.2 FL — SIGNIFICANT CHANGE UP (ref 80–100)
MCV RBC AUTO: 95.3 FL — SIGNIFICANT CHANGE UP (ref 80–100)
PLATELET # BLD AUTO: 317 K/UL — SIGNIFICANT CHANGE UP (ref 150–400)
PLATELET # BLD AUTO: 413 K/UL — HIGH (ref 150–400)
POTASSIUM SERPL-MCNC: 4 MMOL/L — SIGNIFICANT CHANGE UP (ref 3.5–5.3)
POTASSIUM SERPL-SCNC: 4 MMOL/L — SIGNIFICANT CHANGE UP (ref 3.5–5.3)
PROTHROM AB SERPL-ACNC: 15.5 SEC — HIGH (ref 9.8–12.7)
RBC # BLD: 2.22 M/UL — LOW (ref 4.2–5.8)
RBC # BLD: 2.82 M/UL — LOW (ref 4.2–5.8)
RBC # FLD: 13.7 % — SIGNIFICANT CHANGE UP (ref 11–15)
RBC # FLD: 13.8 % — SIGNIFICANT CHANGE UP (ref 11–15)
SODIUM SERPL-SCNC: 144 MMOL/L — SIGNIFICANT CHANGE UP (ref 135–145)
VANCOMYCIN TROUGH SERPL-MCNC: 6 UG/ML — LOW (ref 10–20)
WBC # BLD: 22.2 K/UL — HIGH (ref 3.8–10.5)
WBC # BLD: 26.8 K/UL — HIGH (ref 3.8–10.5)
WBC # FLD AUTO: 22.2 K/UL — HIGH (ref 3.8–10.5)
WBC # FLD AUTO: 26.8 K/UL — HIGH (ref 3.8–10.5)

## 2018-01-01 PROCEDURE — 99233 SBSQ HOSP IP/OBS HIGH 50: CPT

## 2018-01-01 PROCEDURE — 99291 CRITICAL CARE FIRST HOUR: CPT

## 2018-01-01 RX ORDER — FUROSEMIDE 40 MG
40 TABLET ORAL ONCE
Qty: 0 | Refills: 0 | Status: COMPLETED | OUTPATIENT
Start: 2018-01-01 | End: 2018-01-01

## 2018-01-01 RX ORDER — ALBUTEROL 90 UG/1
2.5 AEROSOL, METERED ORAL EVERY 4 HOURS
Qty: 0 | Refills: 0 | Status: DISCONTINUED | OUTPATIENT
Start: 2018-01-01 | End: 2018-01-06

## 2018-01-01 RX ORDER — VANCOMYCIN HCL 1 G
1500 VIAL (EA) INTRAVENOUS EVERY 24 HOURS
Qty: 0 | Refills: 0 | Status: DISCONTINUED | OUTPATIENT
Start: 2018-01-01 | End: 2018-01-02

## 2018-01-01 RX ADMIN — ALBUTEROL 2.5 MILLIGRAM(S): 90 AEROSOL, METERED ORAL at 05:24

## 2018-01-01 RX ADMIN — ALBUTEROL 2.5 MILLIGRAM(S): 90 AEROSOL, METERED ORAL at 22:09

## 2018-01-01 RX ADMIN — Medication 1 GRAM(S): at 23:19

## 2018-01-01 RX ADMIN — Medication 1 GRAM(S): at 12:31

## 2018-01-01 RX ADMIN — SODIUM CHLORIDE 75 MILLILITER(S): 9 INJECTION INTRAMUSCULAR; INTRAVENOUS; SUBCUTANEOUS at 12:34

## 2018-01-01 RX ADMIN — ALBUTEROL 2.5 MILLIGRAM(S): 90 AEROSOL, METERED ORAL at 00:38

## 2018-01-01 RX ADMIN — MEROPENEM 100 MILLIGRAM(S): 1 INJECTION INTRAVENOUS at 22:42

## 2018-01-01 RX ADMIN — SODIUM CHLORIDE 75 MILLILITER(S): 9 INJECTION INTRAMUSCULAR; INTRAVENOUS; SUBCUTANEOUS at 23:19

## 2018-01-01 RX ADMIN — PANTOPRAZOLE SODIUM 10 MG/HR: 20 TABLET, DELAYED RELEASE ORAL at 05:38

## 2018-01-01 RX ADMIN — ALBUTEROL 2.5 MILLIGRAM(S): 90 AEROSOL, METERED ORAL at 17:08

## 2018-01-01 RX ADMIN — FINASTERIDE 5 MILLIGRAM(S): 5 TABLET, FILM COATED ORAL at 12:31

## 2018-01-01 RX ADMIN — PANTOPRAZOLE SODIUM 10 MG/HR: 20 TABLET, DELAYED RELEASE ORAL at 18:32

## 2018-01-01 RX ADMIN — MEROPENEM 100 MILLIGRAM(S): 1 INJECTION INTRAVENOUS at 13:14

## 2018-01-01 RX ADMIN — MEROPENEM 100 MILLIGRAM(S): 1 INJECTION INTRAVENOUS at 05:38

## 2018-01-01 RX ADMIN — AZITHROMYCIN 250 MILLIGRAM(S): 500 TABLET, FILM COATED ORAL at 23:19

## 2018-01-01 RX ADMIN — ALBUTEROL 2.5 MILLIGRAM(S): 90 AEROSOL, METERED ORAL at 11:19

## 2018-01-01 RX ADMIN — Medication 1 GRAM(S): at 05:38

## 2018-01-01 RX ADMIN — Medication 1 GRAM(S): at 00:24

## 2018-01-01 RX ADMIN — Medication 1 GRAM(S): at 17:14

## 2018-01-01 NOTE — PROGRESS NOTE ADULT - ASSESSMENT
Pt is a 81 yo gentleman with a pmhx of afib on coumadin, hypotension who presents to the ED with sob and cough since [place]. Questionable fever, never checked w thermometer, no change in mental status. Cough is nonproductive, but can hear mucous in lungs. No chest pain, no abdominal pain, no hx of dvt/pe. No recent hospitalizations.

## 2018-01-01 NOTE — PROGRESS NOTE ADULT - PROBLEM SELECTOR PLAN 1
ID consult changed to IV Meropemen 500 mg every 8 hours, IV Vancomycin 1,000 mg/day and oral Zithromax 250 mg/day. ID consult reviewed and appreciated. Duonebs every 6 hours. Continue IVF 75 cc/h. BP stable 120/70. Basic panel in AM.    CT chest confirms  ADITI pneumonia and bilateral effusions.

## 2018-01-01 NOTE — PROGRESS NOTE ADULT - SUBJECTIVE AND OBJECTIVE BOX
HPI:  Pt is a 81 yo gentleman with a pmhx of afib on coumadin, hypotension who presents to the ED with sob and cough since [place]. Questionable fever, never checked w thermometer, no change in mental status. Cough is nonproductive, but can hear mucous in lungs. No chest pain, no abdominal pain, no hx of dvt/pe. No recent hospitalizations. (28 Dec 2017 18:56)  work up consistent with community acquired pneumonia   on multiple antibiotics   today complicated by gi bleed and drop in hb/hct       Allergies    chocolate (Unknown)  No Known Drug Allergies  peanuts (Anaphylaxis)    Intolerances        MEDICATIONS  (STANDING):  ALBUTerol    0.083% 2.5 milliGRAM(s) Nebulizer every 6 hours  azithromycin   Tablet 250 milliGRAM(s) Oral daily  finasteride 5 milliGRAM(s) Oral daily  meropenem IVPB 500 milliGRAM(s) IV Intermittent every 8 hours  pantoprazole Infusion 8 mG/Hr (10 mL/Hr) IV Continuous <Continuous>  sodium chloride 0.9%. 1000 milliLiter(s) (75 mL/Hr) IV Continuous <Continuous>  sucralfate suspension 1 Gram(s) Oral four times a day  vancomycin  IVPB 1000 milliGRAM(s) IV Intermittent every 24 hours    MEDICATIONS  (PRN):  acetaminophen   Tablet 650 milliGRAM(s) Oral every 6 hours PRN For Temp greater than 38 C (100.4 F)      REVIEW OF SYSTEMS:    CONSTITUTIONAL: No fever, chills, weight loss, or fatigue  HEENT: No sore throat, runny nose, ear ache  RESPIRATORY: No cough, wheezing, No shortness of breath  CARDIOVASCULAR: No chest pain, palpitations, dizziness  GASTROINTESTINAL: No abdominal pain. No nausea, vomiting, diarrhea  GENITOURINARY: No dysuria, increase frequency, hematuria, or incontinence  NEUROLOGICAL: No headaches, memory loss, loss of strength, numbness, or tremors, no weakness  EXTREMITY: No pedal edema BLE  SKIN: No itching, burning, rashes, or lesions     VITAL SIGNS:  T(C): 37 (01-01-18 @ 11:01), Max: 37.1 (12-31-17 @ 18:38)  T(F): 98.6 (01-01-18 @ 11:01), Max: 98.8 (12-31-17 @ 18:38)  HR: 94 (01-01-18 @ 17:28) (86 - 97)  BP: 127/65 (01-01-18 @ 17:28) (107/61 - 135/59)  RR: 21 (01-01-18 @ 17:28) (18 - 21)  SpO2: 98% (01-01-18 @ 17:28) (92% - 98%)  Wt(kg): --    PHYSICAL EXAM:    GENERAL: not in any distress  HEENT: Neck is supple, normocephalic, atraumatic   CHEST/LUNG: Clear to auscultation bilaterally; No rales, rhonchi, wheezing  HEART: Regular rate and rhythm; No murmurs, rubs, or gallops  ABDOMEN: Soft, Nontender, Nondistended; Bowel sounds present, no rebound   EXTREMITIES:  2+ Peripheral Pulses, No clubbing, cyanosis, or edema  GENITOURINARY:   SKIN: No rashes or lesions  BACK: no pressor sore   NERVOUS SYSTEM:  Alert & Oriented X3, Good concentration  PSYCH: normal affect     LABS:                         7.4    22.2  )-----------( 317      ( 01 Jan 2018 14:53 )             21.1             PT/INR - ( 31 Dec 2017 07:48 )   PT: 15.3 sec;   INR: 1.39 ratio                                 Culture Results:   No growth to date. (12-30 @ 10:23)  Culture Results:   No growth to date. (12-30 @ 10:23)  Culture Results:   No growth to date. (12-29 @ 00:18)  Culture Results:   No growth to date. (12-29 @ 00:18)          Legionella Antigen, Urine: Negative (12-30 @ 10:45)        Radiology: HPI:  Pt is a 81 yo gentleman with a pmhx of afib on coumadin, hypotension who presents to the ED with sob and cough since [place]. Questionable fever, never checked w thermometer, no change in mental status. Cough is nonproductive, but can hear mucous in lungs. No chest pain, no abdominal pain, no hx of dvt/pe. No recent hospitalizations. (28 Dec 2017 18:56)  work up consistent with community acquired pneumonia   on multiple antibiotics   today complicated by gi bleed and drop in hb/hct       Allergies    chocolate (Unknown)  No Known Drug Allergies  peanuts (Anaphylaxis)    Intolerances        MEDICATIONS  (STANDING):  ALBUTerol    0.083% 2.5 milliGRAM(s) Nebulizer every 6 hours  azithromycin   Tablet 250 milliGRAM(s) Oral daily  finasteride 5 milliGRAM(s) Oral daily  meropenem IVPB 500 milliGRAM(s) IV Intermittent every 8 hours  pantoprazole Infusion 8 mG/Hr (10 mL/Hr) IV Continuous <Continuous>  sodium chloride 0.9%. 1000 milliLiter(s) (75 mL/Hr) IV Continuous <Continuous>  sucralfate suspension 1 Gram(s) Oral four times a day  vancomycin  IVPB 1000 milliGRAM(s) IV Intermittent every 24 hours    MEDICATIONS  (PRN):  acetaminophen   Tablet 650 milliGRAM(s) Oral every 6 hours PRN For Temp greater than 38 C (100.4 F)      REVIEW OF SYSTEMS:    poor historian   feels ok   i have a pneumonia !!    VITAL SIGNS:  T(C): 37 (01-01-18 @ 11:01), Max: 37.1 (12-31-17 @ 18:38)  T(F): 98.6 (01-01-18 @ 11:01), Max: 98.8 (12-31-17 @ 18:38)  HR: 94 (01-01-18 @ 17:28) (86 - 97)  BP: 127/65 (01-01-18 @ 17:28) (107/61 - 135/59)  RR: 21 (01-01-18 @ 17:28) (18 - 21)  SpO2: 98% (01-01-18 @ 17:28) (92% - 98%)  Wt(kg): --    PHYSICAL EXAM:    GENERAL: not in any distress  HEENT: Neck is supple, normocephalic, atraumatic   CHEST/LUNG: Clear to auscultation bilaterally; No rales, rhonchi, wheezing  HEART: Regular rate and rhythm; No murmurs, rubs, or gallops  ABDOMEN: Soft, Nontender, Nondistended; Bowel sounds present, no rebound   EXTREMITIES:  2+ Peripheral Pulses, No clubbing, cyanosis, or edema  GENITOURINARY: incontinent   SKIN: No rashes or lesions  BACK: no pressor sore   NERVOUS SYSTEM:  Alert & Oriented X 2   LABS:                         7.4    22.2  )-----------( 317      ( 01 Jan 2018 14:53 )             21.1             PT/INR - ( 31 Dec 2017 07:48 )   PT: 15.3 sec;   INR: 1.39 ratio                                 Culture Results:   No growth to date. (12-30 @ 10:23)  Culture Results:   No growth to date. (12-30 @ 10:23)  Culture Results:   No growth to date. (12-29 @ 00:18)  Culture Results:   No growth to date. (12-29 @ 00:18)          Legionella Antigen, Urine: Negative (12-30 @ 10:45)        Radiology:    < from: CT Chest No Cont (12.31.17 @ 16:15) >    IMPRESSION: Left upper lobe pneumonia and small bilateral pleural   effusions.                    CLAUDETTE BOLIVAR M.D., ATTENDING RADIOLOGIST  This document has been electronically signed. Dec 31 2017  4:29PM                < end of copied text >

## 2018-01-01 NOTE — CONSULT NOTE ADULT - SUBJECTIVE AND OBJECTIVE BOX
Patient is a 82y old  Male who presents with a chief complaint of pna (28 Dec 2017 18:56)      HPI:  Pt is a 83 yo gentleman with a pmhx of afib on coumadin, hypotension who presents to the ED with sob and cough since [place]. Questionable fever, never checked w thermometer, no change in mental status. Cough is nonproductive, but can hear mucous in lungs. No chest pain, no abdominal pain, no hx of dvt/pe. No recent hospitalizations. (28 Dec 2017 18:56)    Patient admitted to floor with hg 15.  over course of 4 days, hg drop to 7's despite transfusion. patient having bleeding from above and below.  Patient also wheezing and complaining of SOB.        Allergies    chocolate (Unknown)  No Known Drug Allergies  peanuts (Anaphylaxis)    Intolerances        MEDICATIONS  (STANDING):  ALBUTerol    0.083% 2.5 milliGRAM(s) Nebulizer every 6 hours  azithromycin   Tablet 250 milliGRAM(s) Oral daily  finasteride 5 milliGRAM(s) Oral daily  meropenem IVPB 500 milliGRAM(s) IV Intermittent every 8 hours  pantoprazole Infusion 8 mG/Hr (10 mL/Hr) IV Continuous <Continuous>  sodium chloride 0.9%. 1000 milliLiter(s) (75 mL/Hr) IV Continuous <Continuous>  sucralfate suspension 1 Gram(s) Oral four times a day  vancomycin  IVPB 1000 milliGRAM(s) IV Intermittent every 24 hours    MEDICATIONS  (PRN):  acetaminophen   Tablet 650 milliGRAM(s) Oral every 6 hours PRN For Temp greater than 38 C (100.4 F)      Daily     Daily Weight in k.7 (2018 05:46)    Drug Dosing Weight  Height (cm): 172.72 (28 Dec 2017 15:39)  Weight (kg): 77.1 (28 Dec 2017 15:39)  BMI (kg/m2): 25.8 (28 Dec 2017 15:39)  BSA (m2): 1.91 (28 Dec 2017 15:39)    PAST MEDICAL & SURGICAL HISTORY:  Hypertension  BPH (Benign Prostatic Hyperplasia)  Hypotension: Postural, positive Tilt Table Test.  Syncope  S/P hip replacement: Bilateral, Left ORIF and Rt. Hip Replacement      FAMILY HISTORY:  No pertinent family history in first degree relatives      SOCIAL HISTORY: denies tobacco ivdu     ADVANCE DIRECTIVES: full code    REVIEW OF SYSTEMS:    CONSTITUTIONAL: No fever, weight loss, or fatigue  EYES: No eye pain, visual disturbances, or discharge  ENMT:  No difficulty hearing, tinnitus, vertigo; No sinus or throat pain  NECK: No pain or stiffness  BREASTS: No pain, masses, or nipple discharge  RESPIRATORY: +cough, wheezing, +chills; +shortness of breath  CARDIOVASCULAR: No chest pain, palpitations, dizziness, or leg swelling  GASTROINTESTINAL: No abdominal or epigastric pain. No nausea, vomiting, or hematemesis; No diarrhea or constipation. + melena  GENITOURINARY: No dysuria, frequency, hematuria, or incontinence  NEUROLOGICAL: No headaches, memory loss, loss of strength, numbness, or tremors  SKIN: No itching, burning, rashes, or lesions   LYMPH NODES: No enlarged glands  ENDOCRINE: No heat or cold intolerance; No hair loss  MUSCULOSKELETAL: No joint pain or swelling; chronic right arm pain  PSYCHIATRIC: No depression, anxiety, mood swings, or difficulty sleeping  HEME/LYMPH: No easy bruising, or bleeding gums          ICU Vital Signs Last 24 Hrs  T(C): 36.7 (2018 18:24), Max: 37 (2018 11:01)  T(F): 98 (2018 18:24), Max: 98.6 (2018 11:01)  HR: 98 (2018 18:24) (86 - 98)  BP: 144/68 (2018 18:24) (107/61 - 144/68)  BP(mean): --  ABP: --  ABP(mean): --  RR: 18 (2018 18:24) (18 - 21)  SpO2: 94% (2018 18:24) (93% - 98%)          I&O's Detail    31 Dec 2017 07:01  -  2018 07:00  --------------------------------------------------------  IN:    Oral Fluid: 720 mL    Packed Red Blood Cells: 370 mL    pantoprazole Infusion: 196 mL    sodium chloride 0.9%.: 1650 mL    Solution: 250 mL    Solution: 150 mL  Total IN: 3336 mL    OUT:  Total OUT: 0 mL    Total NET: 3336 mL      2018 07:01  -  2018 18:50  --------------------------------------------------------  IN:    pantoprazole Infusion: 100 mL    sodium chloride 0.9%.: 900 mL  Total IN: 1000 mL    OUT:  Total OUT: 0 mL    Total NET: 1000 mL          PHYSICAL EXAM:    GENERAL: NAD, well-groomed, well-developed  HEAD:  Atraumatic, Normocephalic  EYES: EOMI, PERRLA, conjunctiva and sclera clear  ENMT: No tonsillar erythema, exudates, or enlargement; Moist mucous membranes, Good dentition, No lesions  NECK: Supple, No JVD, Normal thyroid  NERVOUS SYSTEM:  Alert & Oriented X3, Good concentration; Motor Strength 5/5 left upper and lower extremities; DTRs 2+ intact and symmetric  CHEST/LUNG: Clear to percussion bilaterally; wheezes throughout lung field  HEART: irreg; No murmurs, rubs, or gallops  ABDOMEN: Soft, Nontender, Nondistended; Bowel sounds present  EXTREMITIES:  2+ Peripheral Pulses, No clubbing, cyanosis, or edema  LYMPH: No lymphadenopathy noted  SKIN: No rashes or lesions    LABS:  CBC Full  -  ( 2018 14:53 )  WBC Count : 22.2 K/uL  Hemoglobin : 7.4 g/dL  Hematocrit : 21.1 %  Platelet Count - Automated : 317 K/uL  Mean Cell Volume : 95.3 fl  Mean Cell Hemoglobin : 33.3 pg  Mean Cell Hemoglobin Concentration : 35.0 gm/dL  Auto Neutrophil # : x  Auto Lymphocyte # : x  Auto Monocyte # : x  Auto Eosinophil # : x  Auto Basophil # : x  Auto Neutrophil % : x  Auto Lymphocyte % : x  Auto Monocyte % : x  Auto Eosinophil % : x  Auto Basophil % : x          CAPILLARY BLOOD GLUCOSE        PT/INR - ( 31 Dec 2017 07:48 )   PT: 15.3 sec;   INR: 1.39 ratio          Culture Results:   No growth to date. ( @ 10:23)  Culture Results:   No growth to date. ( @ 10:23)  Lactate, Blood: 4.7 mmol/L ( @ 08:42)  Lactate, Blood: 5.2 mmol/L ( @ 00:32)  bloo    EKG: afib    ECHO, US: pending    RADIOLOGY:< from: CT Chest No Cont (17 @ 16:15) >  XAM:  CT CHEST                            PROCEDURE DATE:  2017          INTERPRETATION:  CLINICAL INFORMATION: Cough. Leukocytosis.    COMPARISON: None    PROCEDURE:   CT of the Chest was performed without intravenous contrast.  Sagittal and coronal reformats were performed.      FINDINGS:    CHEST:     LUNGS AND LARGE AIRWAYS: Secretions are present in the distal trachea.   Left upper lobe interstitial and airspace opacities. Bibasilar dependent   atelectasis. No pulmonary nodules.  PLEURA: Small bilateral pleural effusions, left greater than right.  VESSELS: Within normal limits.  HEART: Heart size is normal.No pericardial effusion.  MEDIASTINUM AND RANDOLPH: No lymphadenopathy.  CHEST WALL AND LOWER NECK: Within normal limits.  VISUALIZED UPPER ABDOMEN: 1 cm segment right hepatic lobe cyst. Bilateral   renal cysts.  BONES: Old healed left-sided rib fractures. Degenerative changes of the   spine..    IMPRESSION: Left upper lobe pneumonia and small bilateral pleural   effusions.      CRITICAL CARE TIME SPENT:40 min

## 2018-01-01 NOTE — PROGRESS NOTE ADULT - PROBLEM SELECTOR PLAN 4
hold warfarin as inr elevated on arrival. Repeat inr better 1.39.   Hgb trending down 7.4 from 9.2. Follow CBC every AM. NGT removed past 24 hours. IV Protonix drip started. GI consult in chart. Coumadin reversed and on hold. Likely EGD when stable. Continue IV Protonix drip. Started on clears by GI. ICU consult noted and reviewed.  DVT prophylaxis with PAS. hold warfarin as inr elevated on arrival. Repeat inr better 1.39.   Hgb trending down 7.4 from 9.2. Follow CBC every AM. NGT removed past 24 hours. IV Protonix drip started. 2 units PRBC given today. GI consult in chart. Coumadin reversed and on hold. Likely EGD when stable. Continue IV Protonix drip. Started on clears by GI. ICU consult noted and reviewed.  DVT prophylaxis with PAS. hold warfarin as inr elevated on arrival. Repeat inr better 1.39.   Hgb trending down 7.4 from 9.2. Follow CBC every AM. NGT removed past 24 hours. IV Protonix drip started. 2 units PRBC given today. GI consult in chart. Coumadin reversed and on hold. Likely EGD when stable. Continue IV Protonix drip. Started on clears by GI. ICU consult noted and reviewed.  DVT prophylaxis with PAS.  TTE ordered to theresa POOLE.

## 2018-01-01 NOTE — PROGRESS NOTE ADULT - ASSESSMENT
community acquired pneumonia gi bleed   persistent increased wbc   ?? ischemic colitis   will get ct abdominal and pelvis   will follow with you thanks

## 2018-01-01 NOTE — CONSULT NOTE ADULT - ASSESSMENT
82 year old male with GI bleed and PNA      NEURO: monitor mental status, q2hour neuro checks  RESP: q4 hour nebs, may require diuresis.  patient being treated for CAP  CV:maintain map >65.  needs tte.  hx of afib, hold ac in setting of GI bleed. s/p vit K. trend pt/ptt  FEN/GI: NPO, follow up GI consult.  will need scope above and below  RENAL: monitor cr and uop. give 40mg  of lasix in setting of transfusion if UOP not great than 2 ml/kg/hour  ID: CAP tx, CTX and azithro  ENDO : SSI , maintain Bg 110-180  LABS: repeat cbc cmp coags lactate 4 hours after transfusion  PPX:scd hold [facility]  ETHICS: full code  DISPO: admit to MICU for further monitoring and treatment    I have decided to review and order of clinical lab tests  Relevant radiology studies were reviewed  Decision made to obtain available old records  Discussion of test results with performing physician  Review and summarization of old records obtaining history from .... discussion of case with another health care provider  I have visualized independent imaging, EKGs and radiologies studies available but not otherwise officially read  Patient is critically ill and could decompensate imminently.  The patient required immediate attention    ccm 40 min
Patient  is an 83 y/o male with PMhx of A fib on coumadin, admitted for pneumonia, now with GI bleed  ICU consulted for GI bleed, patient no active bleed, when evaluated.  Patient hemoglobin is 8.7, seen by GI, recommend protonix drip, and carafate, with cbc in the morning.  Patient is hemodynic stable, /66 HR 84 O2 100% not in distress   -continue antibiotics per ID for pneumonia.    At this point patient does not need icu care, if condition changes, please re-consult.
community acquired pneumonia   post ?? viral x 1 week sick at home   most info from wife by bedside   antibiotics extended to [place] methicillin resitant staph aureus   will follow with you thanks   ct chest am

## 2018-01-01 NOTE — PROGRESS NOTE ADULT - SUBJECTIVE AND OBJECTIVE BOX
Pt stable - no active bleeding  On antibx for pnuemonia      MEDICATIONS  (STANDING):  ALBUTerol    0.083% 2.5 milliGRAM(s) Nebulizer every 6 hours  azithromycin   Tablet 250 milliGRAM(s) Oral daily  finasteride 5 milliGRAM(s) Oral daily  meropenem IVPB 500 milliGRAM(s) IV Intermittent every 8 hours  pantoprazole Infusion 8 mG/Hr (10 mL/Hr) IV Continuous <Continuous>  sodium chloride 0.9%. 1000 milliLiter(s) (75 mL/Hr) IV Continuous <Continuous>  sucralfate suspension 1 Gram(s) Oral four times a day  vancomycin  IVPB 1000 milliGRAM(s) IV Intermittent every 24 hours    MEDICATIONS  (PRN):  acetaminophen   Tablet 650 milliGRAM(s) Oral every 6 hours PRN For Temp greater than 38 C (100.4 F)      Allergies    chocolate (Unknown)  No Known Drug Allergies  peanuts (Anaphylaxis)    Intolerances        Vital Signs Last 24 Hrs  T(C): 37 (01 Jan 2018 11:01), Max: 37.1 (31 Dec 2017 18:38)  T(F): 98.6 (01 Jan 2018 11:01), Max: 98.8 (31 Dec 2017 18:38)  HR: 91 (01 Jan 2018 11:01) (86 - 97)  BP: 120/69 (01 Jan 2018 11:01) (107/61 - 135/59)  BP(mean): --  RR: 20 (01 Jan 2018 11:01) (18 - 20)  SpO2: 94% (01 Jan 2018 11:01) (92% - 97%)    PHYSICAL EXAM:  General: NAD.  CVS: S1, S2  Chest: air entry bilaterally present; decrease BS  Abd: BS present, soft, non-tender      LABS:                        9.3    26.8  )-----------( 413      ( 01 Jan 2018 07:50 )             26.8           PT/INR - ( 31 Dec 2017 07:48 )   PT: 15.3 sec;   INR: 1.39 ratio           continue antibx  follow labs Pt stable - had bloody BM today  On antibx for pnuemonia      MEDICATIONS  (STANDING):  ALBUTerol    0.083% 2.5 milliGRAM(s) Nebulizer every 6 hours  azithromycin   Tablet 250 milliGRAM(s) Oral daily  finasteride 5 milliGRAM(s) Oral daily  meropenem IVPB 500 milliGRAM(s) IV Intermittent every 8 hours  pantoprazole Infusion 8 mG/Hr (10 mL/Hr) IV Continuous <Continuous>  sodium chloride 0.9%. 1000 milliLiter(s) (75 mL/Hr) IV Continuous <Continuous>  sucralfate suspension 1 Gram(s) Oral four times a day  vancomycin  IVPB 1000 milliGRAM(s) IV Intermittent every 24 hours    MEDICATIONS  (PRN):  acetaminophen   Tablet 650 milliGRAM(s) Oral every 6 hours PRN For Temp greater than 38 C (100.4 F)      Allergies    chocolate (Unknown)  No Known Drug Allergies  peanuts (Anaphylaxis)    Intolerances        Vital Signs Last 24 Hrs  T(C): 37 (01 Jan 2018 11:01), Max: 37.1 (31 Dec 2017 18:38)  T(F): 98.6 (01 Jan 2018 11:01), Max: 98.8 (31 Dec 2017 18:38)  HR: 91 (01 Jan 2018 11:01) (86 - 97)  BP: 120/69 (01 Jan 2018 11:01) (107/61 - 135/59)  BP(mean): --  RR: 20 (01 Jan 2018 11:01) (18 - 20)  SpO2: 94% (01 Jan 2018 11:01) (92% - 97%)    PHYSICAL EXAM:  General: NAD.  CVS: S1, S2  Chest: air entry bilaterally present; decrease BS  Abd: BS present, soft, non-tender      LABS:                        9.3    26.8  )-----------( 413      ( 01 Jan 2018 07:50 )             26.8           PT/INR - ( 31 Dec 2017 07:48 )   PT: 15.3 sec;   INR: 1.39 ratio         Repeat CBC at 3pm  continue antibx  follow labs

## 2018-01-01 NOTE — PROGRESS NOTE ADULT - SUBJECTIVE AND OBJECTIVE BOX
CC/F/U for: GI bleed, anemia and pneumonia    INTERVAL HPI/OVERNIGHT EVENTS:  Pt seen and examined at bedside.     Allergies/Intolerance: chocolate (Unknown)  No Known Drug Allergies  peanuts (Anaphylaxis)      MEDICATIONS  (STANDING):  ALBUTerol    0.083% 2.5 milliGRAM(s) Nebulizer every 6 hours  azithromycin   Tablet 250 milliGRAM(s) Oral daily  finasteride 5 milliGRAM(s) Oral daily  meropenem IVPB 500 milliGRAM(s) IV Intermittent every 8 hours  pantoprazole Infusion 8 mG/Hr (10 mL/Hr) IV Continuous <Continuous>  sodium chloride 0.9%. 1000 milliLiter(s) (75 mL/Hr) IV Continuous <Continuous>  sucralfate suspension 1 Gram(s) Oral four times a day  vancomycin  IVPB 1000 milliGRAM(s) IV Intermittent every 24 hours    MEDICATIONS  (PRN):  acetaminophen   Tablet 650 milliGRAM(s) Oral every 6 hours PRN For Temp greater than 38 C (100.4 F)        ROS: all systems reviewed and wnl      PHYSICAL EXAMINATION:  Vital Signs Last 24 Hrs  T(C): 36.9 (01 Jan 2018 17:28), Max: 37.1 (31 Dec 2017 18:38)  T(F): 98.4 (01 Jan 2018 17:28), Max: 98.8 (31 Dec 2017 18:38)  HR: 91 (01 Jan 2018 17:49) (86 - 94)  BP: 127/65 (01 Jan 2018 17:28) (107/61 - 135/59)  BP(mean): --  RR: 21 (01 Jan 2018 17:28) (18 - 21)  SpO2: 96% (01 Jan 2018 17:49) (93% - 98%)  CAPILLARY BLOOD GLUCOSE          12-31 @ 07:01 - 01-01 @ 07:00  --------------------------------------------------------  IN: 3336 mL / OUT: 0 mL / NET: 3336 mL    01-01 @ 07:01 - 01-01 @ 18:16  --------------------------------------------------------  IN: 1000 mL / OUT: 0 mL / NET: 1000 mL        GENERAL: alert, answers questions appropriately, no CP, SOB, had melena earlier today  NECK: supple, No JVD  CHEST/LUNG: clear to auscultation bilaterally; no rales, rhonchi, or wheezing b/l  HEART: normal S1, S2  ABDOMEN: BS+, soft, ND, NT   EXTREMITIES:  pulses palpable; no clubbing, cyanosis, or edema b/l LEs  NEURO: awake, alert, interactive; moves all extremities  SKIN: no rashes or lesions      LABS:                        7.4    22.2  )-----------( 317      ( 01 Jan 2018 14:53 )             21.1           PT/INR - ( 31 Dec 2017 07:48 )   PT: 15.3 sec;   INR: 1.39 ratio

## 2018-01-02 LAB
ALBUMIN SERPL ELPH-MCNC: 2.1 G/DL — LOW (ref 3.3–5)
ALP SERPL-CCNC: 91 U/L — SIGNIFICANT CHANGE UP (ref 40–120)
ALT FLD-CCNC: 90 U/L — HIGH (ref 12–78)
ANION GAP SERPL CALC-SCNC: 11 MMOL/L — SIGNIFICANT CHANGE UP (ref 5–17)
AST SERPL-CCNC: 141 U/L — HIGH (ref 15–37)
BILIRUB SERPL-MCNC: 1.9 MG/DL — HIGH (ref 0.2–1.2)
BUN SERPL-MCNC: 20 MG/DL — SIGNIFICANT CHANGE UP (ref 7–23)
CALCIUM SERPL-MCNC: 7.1 MG/DL — LOW (ref 8.5–10.1)
CHLORIDE SERPL-SCNC: 109 MMOL/L — HIGH (ref 96–108)
CO2 SERPL-SCNC: 22 MMOL/L — SIGNIFICANT CHANGE UP (ref 22–31)
CREAT SERPL-MCNC: 0.86 MG/DL — SIGNIFICANT CHANGE UP (ref 0.5–1.3)
GLUCOSE SERPL-MCNC: 112 MG/DL — HIGH (ref 70–99)
HCT VFR BLD CALC: 34.3 % — LOW (ref 39–50)
HGB BLD-MCNC: 12.6 G/DL — LOW (ref 13–17)
INR BLD: 1.45 RATIO — HIGH (ref 0.88–1.16)
MAGNESIUM SERPL-MCNC: 2.3 MG/DL — SIGNIFICANT CHANGE UP (ref 1.6–2.6)
MCHC RBC-ENTMCNC: 34.3 PG — HIGH (ref 27–34)
MCHC RBC-ENTMCNC: 36.6 GM/DL — HIGH (ref 32–36)
MCV RBC AUTO: 93.6 FL — SIGNIFICANT CHANGE UP (ref 80–100)
PHOSPHATE SERPL-MCNC: 1.8 MG/DL — LOW (ref 2.5–4.5)
PLATELET # BLD AUTO: 341 K/UL — SIGNIFICANT CHANGE UP (ref 150–400)
POTASSIUM SERPL-MCNC: 4.1 MMOL/L — SIGNIFICANT CHANGE UP (ref 3.5–5.3)
POTASSIUM SERPL-SCNC: 4.1 MMOL/L — SIGNIFICANT CHANGE UP (ref 3.5–5.3)
PROT SERPL-MCNC: 5.4 GM/DL — LOW (ref 6–8.3)
PROTHROM AB SERPL-ACNC: 15.9 SEC — HIGH (ref 9.8–12.7)
RBC # BLD: 3.66 M/UL — LOW (ref 4.2–5.8)
RBC # FLD: 13.5 % — SIGNIFICANT CHANGE UP (ref 11–15)
SODIUM SERPL-SCNC: 142 MMOL/L — SIGNIFICANT CHANGE UP (ref 135–145)
WBC # BLD: 29.3 K/UL — HIGH (ref 3.8–10.5)
WBC # FLD AUTO: 29.3 K/UL — HIGH (ref 3.8–10.5)

## 2018-01-02 PROCEDURE — 99233 SBSQ HOSP IP/OBS HIGH 50: CPT

## 2018-01-02 PROCEDURE — 71045 X-RAY EXAM CHEST 1 VIEW: CPT | Mod: 26

## 2018-01-02 RX ORDER — ACETYLCYSTEINE 200 MG/ML
2 VIAL (ML) MISCELLANEOUS EVERY 8 HOURS
Qty: 0 | Refills: 0 | Status: DISCONTINUED | OUTPATIENT
Start: 2018-01-02 | End: 2018-01-06

## 2018-01-02 RX ORDER — VANCOMYCIN HCL 1 G
750 VIAL (EA) INTRAVENOUS EVERY 12 HOURS
Qty: 0 | Refills: 0 | Status: DISCONTINUED | OUTPATIENT
Start: 2018-01-02 | End: 2018-01-06

## 2018-01-02 RX ADMIN — MEROPENEM 100 MILLIGRAM(S): 1 INJECTION INTRAVENOUS at 15:23

## 2018-01-02 RX ADMIN — SODIUM CHLORIDE 75 MILLILITER(S): 9 INJECTION INTRAMUSCULAR; INTRAVENOUS; SUBCUTANEOUS at 16:15

## 2018-01-02 RX ADMIN — ALBUTEROL 2.5 MILLIGRAM(S): 90 AEROSOL, METERED ORAL at 17:57

## 2018-01-02 RX ADMIN — ALBUTEROL 2.5 MILLIGRAM(S): 90 AEROSOL, METERED ORAL at 01:07

## 2018-01-02 RX ADMIN — Medication 1 GRAM(S): at 18:00

## 2018-01-02 RX ADMIN — Medication 1 GRAM(S): at 11:28

## 2018-01-02 RX ADMIN — Medication 1 GRAM(S): at 05:32

## 2018-01-02 RX ADMIN — MEROPENEM 100 MILLIGRAM(S): 1 INJECTION INTRAVENOUS at 21:48

## 2018-01-02 RX ADMIN — FINASTERIDE 5 MILLIGRAM(S): 5 TABLET, FILM COATED ORAL at 11:27

## 2018-01-02 RX ADMIN — Medication 40 MILLIGRAM(S): at 00:25

## 2018-01-02 RX ADMIN — ALBUTEROL 2.5 MILLIGRAM(S): 90 AEROSOL, METERED ORAL at 09:18

## 2018-01-02 RX ADMIN — SODIUM CHLORIDE 75 MILLILITER(S): 9 INJECTION INTRAMUSCULAR; INTRAVENOUS; SUBCUTANEOUS at 21:48

## 2018-01-02 RX ADMIN — ALBUTEROL 2.5 MILLIGRAM(S): 90 AEROSOL, METERED ORAL at 12:43

## 2018-01-02 RX ADMIN — Medication 300 MILLIGRAM(S): at 00:25

## 2018-01-02 RX ADMIN — ALBUTEROL 2.5 MILLIGRAM(S): 90 AEROSOL, METERED ORAL at 06:06

## 2018-01-02 RX ADMIN — AZITHROMYCIN 250 MILLIGRAM(S): 500 TABLET, FILM COATED ORAL at 21:48

## 2018-01-02 RX ADMIN — ALBUTEROL 2.5 MILLIGRAM(S): 90 AEROSOL, METERED ORAL at 22:14

## 2018-01-02 RX ADMIN — Medication 150 MILLIGRAM(S): at 18:00

## 2018-01-02 RX ADMIN — MEROPENEM 100 MILLIGRAM(S): 1 INJECTION INTRAVENOUS at 05:32

## 2018-01-02 RX ADMIN — PANTOPRAZOLE SODIUM 10 MG/HR: 20 TABLET, DELAYED RELEASE ORAL at 16:41

## 2018-01-02 RX ADMIN — Medication 2 MILLILITER(S): at 22:13

## 2018-01-02 RX ADMIN — Medication 2 MILLILITER(S): at 12:42

## 2018-01-02 NOTE — PROGRESS NOTE ADULT - ASSESSMENT
community acquired pneumonia gi bleed   persistent increased wbc   ?? ischemic colitis   will get ct abdominal and pelvis still pending   discussed with nursing   now in icu

## 2018-01-02 NOTE — DIETITIAN INITIAL EVALUATION ADULT. - OTHER INFO
Pt seen for CCU admission. Pt lives with wife; noted wife does cooking & food shopping. Pt with few natural teeth & prostetic right arm requires food cut up.  Pt with LLL pneumonia & GIB states appetite & po intake poor during hospitalization. Last BM x 1(1/1).  Noted pt with<50% nutritional needs x 5 days. (Pt NPO/Clear liquids x 4 days hospitalization). Pt unsure of UBW.

## 2018-01-02 NOTE — PROGRESS NOTE ADULT - ASSESSMENT
Pt is an 83 yo WM with h/o A fib on Coumadin, BPH and b/l hip replacements who presented 2 to SOB and cough; admitted 2 to PNA (CT chest L PNA with pleural effusion). Pt then with an episode bloody BM and also had blood tinged sputum in setting of elevated INR. Since then pt has had decreasing Hb and was transfused PRBC. Admitting dx: 1) L PNA 2) Acute blood loss anemia 2 to GIB    Resp: Supplemental O2   ID: Abx as per ID  CVS: Stable  HEME: Would still not restart Coumadin/ Follow Hb  FEN: May start clears and then dc IVF if taking clears well  GI: F/u as per GI/ No immediate EGD/Colonoscopy/ PPI + Carafate  Social: Daughter at the bedside and updated

## 2018-01-02 NOTE — DIETITIAN INITIAL EVALUATION ADULT. - PERTINENT LABORATORY DATA
01-02 Na 142 mmol/L Glu 112 mg/dL<H> K+ 4.1 mmol/L Cr  0.86 mg/dL BUN 20 mg/dL Phos 1.8 mg/dL<L> Alb 2.1 g/dL<L> PAB n/a   Hgb 12.6 g/dL<L> Hct 34.3 %<L>, WBC=29.3(1/1)

## 2018-01-02 NOTE — DIETITIAN INITIAL EVALUATION ADULT. - NS AS NUTRI INTERV MEALS SNACK
Adv po diet when medically feasible Full liquids to low fiber diet/Other (specify)/Texture-modified diet/Fiber - modified diet

## 2018-01-02 NOTE — PROGRESS NOTE ADULT - SUBJECTIVE AND OBJECTIVE BOX
Pt with active GI bleeding last night - case discussed with DR Nichols and pt transferred to CCU for closer observation  Transfused 2u prbc's last night (total 3 units)  on antibx for pneumonia      MEDICATIONS  (STANDING):  ALBUTerol    0.083% 2.5 milliGRAM(s) Nebulizer every 4 hours  azithromycin   Tablet 250 milliGRAM(s) Oral daily  finasteride 5 milliGRAM(s) Oral daily  meropenem IVPB 500 milliGRAM(s) IV Intermittent every 8 hours  pantoprazole Infusion 8 mG/Hr (10 mL/Hr) IV Continuous <Continuous>  sodium chloride 0.9%. 1000 milliLiter(s) (75 mL/Hr) IV Continuous <Continuous>  sucralfate suspension 1 Gram(s) Oral four times a day  vancomycin  IVPB 1500 milliGRAM(s) IV Intermittent every 24 hours    MEDICATIONS  (PRN):  acetaminophen   Tablet 650 milliGRAM(s) Oral every 6 hours PRN For Temp greater than 38 C (100.4 F)      Allergies    chocolate (Unknown)  No Known Drug Allergies  peanuts (Anaphylaxis)    Intolerances        Vital Signs Last 24 Hrs  T(C): 37.4 (02 Jan 2018 08:00), Max: 37.6 (01 Jan 2018 21:00)  T(F): 99.3 (02 Jan 2018 08:00), Max: 99.6 (01 Jan 2018 21:00)  HR: 85 (02 Jan 2018 08:00) (84 - 99)  BP: 128/72 (02 Jan 2018 08:00) (102/69 - 152/72)  BP(mean): 85 (02 Jan 2018 08:00) (73 - 96)  RR: 18 (02 Jan 2018 08:00) (18 - 33)  SpO2: 96% (02 Jan 2018 08:00) (91% - 98%)    PHYSICAL EXAM:  General: NAD.  CVS: S1, S2  Chest: air entry bilaterally present but decreased  Abd: BS present, soft, non-tender      LABS:                        12.6   29.3  )-----------( 341      ( 02 Jan 2018 04:28 )             34.3     01-02    142  |  109<H>  |  20  ----------------------------<  112<H>  4.1   |  22  |  0.86    Ca    7.1<L>      02 Jan 2018 04:28  Phos  1.8     01-02  Mg     2.3     01-02    TPro  5.4<L>  /  Alb  2.1<L>  /  TBili  1.9<H>  /  DBili  x   /  AST  141<H>  /  ALT  90<H>  /  AlkPhos  91  01-02    PT/INR - ( 02 Jan 2018 04:28 )   PT: 15.9 sec;   INR: 1.45 ratio         Continue to monitor H/H; INR still a little elevated  continue protonix and carafate  will possibly advance to clear liquids today  pt will eventually need EGD but if H/H remains stable would prefer to hold off until respiratory status improves

## 2018-01-02 NOTE — PROGRESS NOTE ADULT - SUBJECTIVE AND OBJECTIVE BOX
Pt is a 81 yo gentleman with a pmhx of afib on coumadin, hypotension who presents to the ED with sob and cough since [place]. Questionable fever, never checked w thermometer, no change in mental status. Cough is nonproductive, but can hear mucous in lungs. No chest pain, no abdominal pain, no hx of dvt/pe. No recent hospitalizations. (28 Dec 2017 18:56)  work up consistent with community acquired pneumonia   on multiple antibiotics   today complicated by gi bleed and drop in hb/hct   in icu   Allergies    chocolate (Unknown)  No Known Drug Allergies  peanuts (Anaphylaxis)    Intolerances        MEDICATIONS  (STANDING):  acetylcysteine 20% Inhalation 2 milliLiter(s) Inhalation every 8 hours  ALBUTerol    0.083% 2.5 milliGRAM(s) Nebulizer every 4 hours  azithromycin   Tablet 250 milliGRAM(s) Oral daily  finasteride 5 milliGRAM(s) Oral daily  meropenem IVPB 500 milliGRAM(s) IV Intermittent every 8 hours  pantoprazole Infusion 8 mG/Hr (10 mL/Hr) IV Continuous <Continuous>  sodium chloride 0.9%. 1000 milliLiter(s) (75 mL/Hr) IV Continuous <Continuous>  sucralfate suspension 1 Gram(s) Oral four times a day  vancomycin  IVPB 750 milliGRAM(s) IV Intermittent every 12 hours    MEDICATIONS  (PRN):  acetaminophen   Tablet 650 milliGRAM(s) Oral every 6 hours PRN For Temp greater than 38 C (100.4 F)      REVIEW OF SYSTEMS:  feels well   no complaints   VITAL SIGNS:  T(C): 37.3 (01-02-18 @ 15:01), Max: 37.6 (01-01-18 @ 21:00)  T(F): 99.1 (01-02-18 @ 15:01), Max: 99.6 (01-01-18 @ 21:00)  HR: 82 (01-02-18 @ 18:10) (78 - 129)  BP: 123/58 (01-02-18 @ 18:00) (102/69 - 152/72)  RR: 23 (01-02-18 @ 18:00) (16 - 33)  SpO2: 95% (01-02-18 @ 18:10) (91% - 99%)  Wt(kg): --    PHYSICAL EXAM:    GENERAL: not in any distress  HEENT: Neck is supple, normocephalic, atraumatic   CHEST/LUNG: Clear to auscultation bilaterally; No rales, rhonchi, wheezing  HEART: Regular rate and rhythm; No murmurs, rubs, or gallops  ABDOMEN: Soft, Nontender, Nondistended; Bowel sounds present, no rebound   EXTREMITIES:  2+ Peripheral Pulses, No clubbing, cyanosis, or edema  GENITOURINARY: NEGATIVE   SKIN: No rashes or lesions  BACK: no pressor sore   NERVOUS SYSTEM:  Alert & Oriented X3,   LABS:                         12.6   29.3  )-----------( 341      ( 02 Jan 2018 04:28 )             34.3     01-02    142  |  109<H>  |  20  ----------------------------<  112<H>  4.1   |  22  |  0.86    Ca    7.1<L>      02 Jan 2018 04:28  Phos  1.8     01-02  Mg     2.3     01-02    TPro  5.4<L>  /  Alb  2.1<L>  /  TBili  1.9<H>  /  DBili  x   /  AST  141<H>  /  ALT  90<H>  /  AlkPhos  91  01-02    LIVER FUNCTIONS - ( 02 Jan 2018 04:28 )  Alb: 2.1 g/dL / Pro: 5.4 gm/dL / ALK PHOS: 91 U/L / ALT: 90 U/L / AST: 141 U/L / GGT: x           PT/INR - ( 02 Jan 2018 04:28 )   PT: 15.9 sec;   INR: 1.45 ratio                           Vancomycin Level, Trough: 6.0 ug/mL (01-01 @ 22:06)        Culture Results:   No growth to date. (12-30 @ 10:23)  Culture Results:   No growth to date. (12-30 @ 10:23)  Culture Results:   No growth to date. (12-29 @ 00:18)  Culture Results:   No growth to date. (12-29 @ 00:18)          Legionella Antigen, Urine: Negative (12-30 @ 10:45)        Radiology:  awaiting ct

## 2018-01-02 NOTE — PROGRESS NOTE ADULT - SUBJECTIVE AND OBJECTIVE BOX
HPI:  Pt is an 83 yo WM with h/o A fib on Coumadin, BPH and b/l hip replacements who presented 2 to SOB and cough; admitted 2 to PNA (CT chest L PNA with pleural effusion). Pt then with an episode bloody BM and also had blood tinged sputum in setting of elevated INR. Since then pt has had decreasing Hb and was transfused PRBC      ## Labs:  CBC:                        12.6   29.3  )-----------( 341      ( 2018 04:28 )             34.3     Chem:      142  |  109<H>  |  20  ----------------------------<  112<H>  4.1   |  22  |  0.86    Ca    7.1<L>      2018 04:28  Phos  1.8       Mg     2.3         TPro  5.4<L>  /  Alb  2.1<L>  /  TBili  1.9<H>  /  DBili  x   /  AST  141<H>  /  ALT  90<H>  /  AlkPhos  91  02    Coags:  PT/INR - ( 2018 04:28 )   PT: 15.9 sec;   INR: 1.45 ratio        ## Imaging:    ## Medications:  azithromycin   Tablet 250 milliGRAM(s) Oral daily  meropenem IVPB 500 milliGRAM(s) IV Intermittent every 8 hours  vancomycin  IVPB 1500 milliGRAM(s) IV Intermittent every 24 hours      acetylcysteine 20% Inhalation 2 milliLiter(s) Inhalation every 8 hours  ALBUTerol    0.083% 2.5 milliGRAM(s) Nebulizer every 4 hours    finasteride 5 milliGRAM(s) Oral daily      pantoprazole Infusion 8 mG/Hr IV Continuous <Continuous>  sucralfate suspension 1 Gram(s) Oral four times a day    acetaminophen   Tablet 650 milliGRAM(s) Oral every 6 hours PRN    ## Vitals:  T(C): 37.4 (18 @ 11:49), Max: 37.6 (18 @ 21:00)  HR: 83 (18 @ 12:53) (78 - 99)  BP: 132/68 (18 @ 11:00) (102/69 - 152/72)  BP(mean): 80 (18 @ 11:00) (73 - 96)  RR: 20 (18 @ 11:00) (16 - 33)  SpO2: 97% (18 @ 12:53) (91% - 98%)  Wt(kg): --  Vent:   AB-01 @ 07:  -   @ 07:00  --------------------------------------------------------  IN: 1825 mL / OUT: 2325 mL / NET: -500 mL     @ 07:  -   @ 13:49  --------------------------------------------------------  IN: 170 mL / OUT: 300 mL / NET: -130 mL      ## P/E:  Gen: lying comfortably in bed in no apparent distress  Lungs: b/l exp wheezes  Heart: Irregular  Abd: Soft/+BS  Ext: R UE contracted/ b/l LE edema  Neuro: Awake/ alert    CENTRAL LINE: [ ] YES [ x] NO  LOCATION:   DATE INSERTED:  REMOVE: [ ] YES [ ] NO      CARLOS: [ ] YES [ ] NO    DATE INSERTED:  REMOVE:  [ ] YES [ ] NO      A-LINE:  [ ] YES [x ] NO  LOCATION:   DATE INSERTED:  REMOVE:  [ ] YES [ ] NO  EXPLAIN:    CODE STATUS: [x ] full code  [ ] DNR  [ ] DNI  [ ] [facility]  Goals of care discussion: [ ] yes

## 2018-01-02 NOTE — DIETITIAN INITIAL EVALUATION ADULT. - PHYSICAL APPEARANCE
overweight/BMI=29.6; +1 edema Ja ankles, Feet & arms; Nutrition focused physical exam conducted ; found no signs of malnutrition

## 2018-01-03 LAB
ANION GAP SERPL CALC-SCNC: 10 MMOL/L — SIGNIFICANT CHANGE UP (ref 5–17)
BUN SERPL-MCNC: 16 MG/DL — SIGNIFICANT CHANGE UP (ref 7–23)
CALCIUM SERPL-MCNC: 6.8 MG/DL — LOW (ref 8.5–10.1)
CHLORIDE SERPL-SCNC: 108 MMOL/L — SIGNIFICANT CHANGE UP (ref 96–108)
CO2 SERPL-SCNC: 25 MMOL/L — SIGNIFICANT CHANGE UP (ref 22–31)
CREAT SERPL-MCNC: 0.67 MG/DL — SIGNIFICANT CHANGE UP (ref 0.5–1.3)
CULTURE RESULTS: SIGNIFICANT CHANGE UP
CULTURE RESULTS: SIGNIFICANT CHANGE UP
GLUCOSE SERPL-MCNC: 98 MG/DL — SIGNIFICANT CHANGE UP (ref 70–99)
HCT VFR BLD CALC: 31.9 % — LOW (ref 39–50)
HCT VFR BLD CALC: 35.2 % — LOW (ref 39–50)
HGB BLD-MCNC: 11.3 G/DL — LOW (ref 13–17)
HGB BLD-MCNC: 12.4 G/DL — LOW (ref 13–17)
MAGNESIUM SERPL-MCNC: 2.1 MG/DL — SIGNIFICANT CHANGE UP (ref 1.6–2.6)
MCHC RBC-ENTMCNC: 33.2 PG — SIGNIFICANT CHANGE UP (ref 27–34)
MCHC RBC-ENTMCNC: 33.4 PG — SIGNIFICANT CHANGE UP (ref 27–34)
MCHC RBC-ENTMCNC: 35.1 GM/DL — SIGNIFICANT CHANGE UP (ref 32–36)
MCHC RBC-ENTMCNC: 35.4 GM/DL — SIGNIFICANT CHANGE UP (ref 32–36)
MCV RBC AUTO: 94.5 FL — SIGNIFICANT CHANGE UP (ref 80–100)
MCV RBC AUTO: 94.5 FL — SIGNIFICANT CHANGE UP (ref 80–100)
PHOSPHATE SERPL-MCNC: 1.7 MG/DL — LOW (ref 2.5–4.5)
PLATELET # BLD AUTO: 310 K/UL — SIGNIFICANT CHANGE UP (ref 150–400)
PLATELET # BLD AUTO: 330 K/UL — SIGNIFICANT CHANGE UP (ref 150–400)
POTASSIUM SERPL-MCNC: 3.4 MMOL/L — LOW (ref 3.5–5.3)
POTASSIUM SERPL-SCNC: 3.4 MMOL/L — LOW (ref 3.5–5.3)
RBC # BLD: 3.37 M/UL — LOW (ref 4.2–5.8)
RBC # BLD: 3.73 M/UL — LOW (ref 4.2–5.8)
RBC # FLD: 13.5 % — SIGNIFICANT CHANGE UP (ref 11–15)
RBC # FLD: 13.5 % — SIGNIFICANT CHANGE UP (ref 11–15)
SODIUM SERPL-SCNC: 143 MMOL/L — SIGNIFICANT CHANGE UP (ref 135–145)
SPECIMEN SOURCE: SIGNIFICANT CHANGE UP
SPECIMEN SOURCE: SIGNIFICANT CHANGE UP
VANCOMYCIN TROUGH SERPL-MCNC: 12.8 UG/ML — SIGNIFICANT CHANGE UP (ref 10–20)
WBC # BLD: 22.7 K/UL — HIGH (ref 3.8–10.5)
WBC # BLD: 22.8 K/UL — HIGH (ref 3.8–10.5)
WBC # FLD AUTO: 22.7 K/UL — HIGH (ref 3.8–10.5)
WBC # FLD AUTO: 22.8 K/UL — HIGH (ref 3.8–10.5)

## 2018-01-03 PROCEDURE — 93306 TTE W/DOPPLER COMPLETE: CPT | Mod: 26

## 2018-01-03 PROCEDURE — 99232 SBSQ HOSP IP/OBS MODERATE 35: CPT

## 2018-01-03 RX ORDER — POTASSIUM CHLORIDE 20 MEQ
40 PACKET (EA) ORAL ONCE
Qty: 0 | Refills: 0 | Status: COMPLETED | OUTPATIENT
Start: 2018-01-03 | End: 2018-01-03

## 2018-01-03 RX ORDER — PANTOPRAZOLE SODIUM 20 MG/1
40 TABLET, DELAYED RELEASE ORAL
Qty: 0 | Refills: 0 | Status: DISCONTINUED | OUTPATIENT
Start: 2018-01-03 | End: 2018-01-04

## 2018-01-03 RX ORDER — POTASSIUM PHOSPHATE, MONOBASIC POTASSIUM PHOSPHATE, DIBASIC 236; 224 MG/ML; MG/ML
15 INJECTION, SOLUTION INTRAVENOUS ONCE
Qty: 0 | Refills: 0 | Status: COMPLETED | OUTPATIENT
Start: 2018-01-03 | End: 2018-01-03

## 2018-01-03 RX ADMIN — Medication 150 MILLIGRAM(S): at 17:21

## 2018-01-03 RX ADMIN — ALBUTEROL 2.5 MILLIGRAM(S): 90 AEROSOL, METERED ORAL at 18:02

## 2018-01-03 RX ADMIN — ALBUTEROL 2.5 MILLIGRAM(S): 90 AEROSOL, METERED ORAL at 13:12

## 2018-01-03 RX ADMIN — PANTOPRAZOLE SODIUM 40 MILLIGRAM(S): 20 TABLET, DELAYED RELEASE ORAL at 17:22

## 2018-01-03 RX ADMIN — Medication 2 MILLILITER(S): at 21:54

## 2018-01-03 RX ADMIN — ALBUTEROL 2.5 MILLIGRAM(S): 90 AEROSOL, METERED ORAL at 21:54

## 2018-01-03 RX ADMIN — Medication 1 GRAM(S): at 13:41

## 2018-01-03 RX ADMIN — FINASTERIDE 5 MILLIGRAM(S): 5 TABLET, FILM COATED ORAL at 13:42

## 2018-01-03 RX ADMIN — MEROPENEM 100 MILLIGRAM(S): 1 INJECTION INTRAVENOUS at 05:53

## 2018-01-03 RX ADMIN — ALBUTEROL 2.5 MILLIGRAM(S): 90 AEROSOL, METERED ORAL at 06:05

## 2018-01-03 RX ADMIN — Medication 1 GRAM(S): at 05:53

## 2018-01-03 RX ADMIN — MEROPENEM 100 MILLIGRAM(S): 1 INJECTION INTRAVENOUS at 13:41

## 2018-01-03 RX ADMIN — Medication 1 GRAM(S): at 00:19

## 2018-01-03 RX ADMIN — Medication 1 GRAM(S): at 17:22

## 2018-01-03 RX ADMIN — ALBUTEROL 2.5 MILLIGRAM(S): 90 AEROSOL, METERED ORAL at 01:36

## 2018-01-03 RX ADMIN — ALBUTEROL 2.5 MILLIGRAM(S): 90 AEROSOL, METERED ORAL at 09:26

## 2018-01-03 RX ADMIN — Medication 1 GRAM(S): at 23:40

## 2018-01-03 RX ADMIN — AZITHROMYCIN 250 MILLIGRAM(S): 500 TABLET, FILM COATED ORAL at 22:56

## 2018-01-03 RX ADMIN — Medication 2 MILLILITER(S): at 13:12

## 2018-01-03 RX ADMIN — MEROPENEM 100 MILLIGRAM(S): 1 INJECTION INTRAVENOUS at 22:56

## 2018-01-03 RX ADMIN — Medication 2 MILLILITER(S): at 06:05

## 2018-01-03 RX ADMIN — POTASSIUM PHOSPHATE, MONOBASIC POTASSIUM PHOSPHATE, DIBASIC 63.75 MILLIMOLE(S): 236; 224 INJECTION, SOLUTION INTRAVENOUS at 06:33

## 2018-01-03 RX ADMIN — Medication 150 MILLIGRAM(S): at 08:00

## 2018-01-03 RX ADMIN — Medication 40 MILLIEQUIVALENT(S): at 05:53

## 2018-01-03 NOTE — PHYSICAL THERAPY INITIAL EVALUATION ADULT - ACTIVE RANGE OF MOTION EXAMINATION, REHAB EVAL
R shoulder limited to degrees flexion, R elbow fixed in flexion 90 degrees, R wrist drop (pt has a brace for hand/not at bedside), B hip flexion 40 degrees, B ankles to neutral

## 2018-01-03 NOTE — PHYSICAL THERAPY INITIAL EVALUATION ADULT - BED MOBILITY LIMITATIONS, REHAB EVAL
decreased ability to use arms for pushing/pulling/decreased ability to use legs for bridging/pushing/R UE

## 2018-01-03 NOTE — PHYSICAL THERAPY INITIAL EVALUATION ADULT - FOLLOWS COMMANDS/ANSWERS QUESTIONS, REHAB EVAL
verbal cues and manual guidance with carryover/able to follow single-step instructions/100% of the time

## 2018-01-03 NOTE — PHYSICAL THERAPY INITIAL EVALUATION ADULT - GENERAL OBSERVATIONS, REHAB EVAL
Pt encountered supine in bed, alert and oriented x3, cardiac monitor, venodyne pumps, supplemental oxygen via nasal canula, NAD.

## 2018-01-03 NOTE — PROGRESS NOTE ADULT - SUBJECTIVE AND OBJECTIVE BOX
Pt stable - appears more comfortable  on IV antibx    MEDICATIONS  (STANDING):  acetylcysteine 20% Inhalation 2 milliLiter(s) Inhalation every 8 hours  ALBUTerol    0.083% 2.5 milliGRAM(s) Nebulizer every 4 hours  azithromycin   Tablet 250 milliGRAM(s) Oral daily  finasteride 5 milliGRAM(s) Oral daily  meropenem IVPB 500 milliGRAM(s) IV Intermittent every 8 hours  pantoprazole Infusion 8 mG/Hr (10 mL/Hr) IV Continuous <Continuous>  sodium chloride 0.9%. 1000 milliLiter(s) (75 mL/Hr) IV Continuous <Continuous>  sucralfate suspension 1 Gram(s) Oral four times a day  vancomycin  IVPB 750 milliGRAM(s) IV Intermittent every 12 hours    MEDICATIONS  (PRN):  acetaminophen   Tablet 650 milliGRAM(s) Oral every 6 hours PRN For Temp greater than 38 C (100.4 F)      Allergies    chocolate (Unknown)  No Known Drug Allergies  peanuts (Anaphylaxis)    Intolerances        Vital Signs Last 24 Hrs  T(C): 36.9 (03 Jan 2018 05:00), Max: 37.4 (02 Jan 2018 11:49)  T(F): 98.5 (03 Jan 2018 05:00), Max: 99.4 (02 Jan 2018 11:49)  HR: 80 (03 Jan 2018 11:00) (75 - 129)  BP: 145/73 (03 Jan 2018 11:00) (100/56 - 145/73)  BP(mean): 86 (03 Jan 2018 11:00) (57 - 87)  RR: 18 (03 Jan 2018 11:00) (12 - 28)  SpO2: 97% (03 Jan 2018 11:00) (91% - 99%)    PHYSICAL EXAM:  General: NAD.  CVS: S1, S2  Chest: air entry bilaterally present  Abd: BS present, soft, non-tender      LABS:                        11.3   22.7  )-----------( 310      ( 03 Jan 2018 04:22 )             31.9     01-03    143  |  108  |  16  ----------------------------<  98  3.4<L>   |  25  |  0.67    Ca    6.8<L>      03 Jan 2018 04:22  Phos  1.7     01-03  Mg     2.1     01-03    TPro  5.4<L>  /  Alb  2.1<L>  /  TBili  1.9<H>  /  DBili  x   /  AST  141<H>  /  ALT  90<H>  /  AlkPhos  91  01-02    PT/INR - ( 02 Jan 2018 04:28 )   PT: 15.9 sec;   INR: 1.45 ratio           continue IV antibx  cbc, INR in AM  continue protonix and Carafate

## 2018-01-03 NOTE — PROGRESS NOTE ADULT - SUBJECTIVE AND OBJECTIVE BOX
Pt is a 81 yo gentleman with a pmhx of afib on coumadin, hypotension who presents to the ED with sob and cough since [place]. Questionable fever, never checked w thermometer, no change in mental status. Cough is nonproductive, but can hear mucous in lungs. No chest pain, no abdominal pain, no hx of dvt/pe. No recent hospitalizations. (28 Dec 2017 18:56)  work up consistent with community acquired pneumonia   on multiple antibiotics   today complicated by gi bleed and drop in hb/hct  back on reg floor   in icu x 2 days   Allergies    chocolate (Unknown)  No Known Drug Allergies  peanuts (Anaphylaxis)    Intolerances        MEDICATIONS  (STANDING):  acetylcysteine 20% Inhalation 2 milliLiter(s) Inhalation every 8 hours  ALBUTerol    0.083% 2.5 milliGRAM(s) Nebulizer every 4 hours  azithromycin   Tablet 250 milliGRAM(s) Oral daily  finasteride 5 milliGRAM(s) Oral daily  meropenem IVPB 500 milliGRAM(s) IV Intermittent every 8 hours  pantoprazole  Injectable 40 milliGRAM(s) IV Push two times a day  sucralfate suspension 1 Gram(s) Oral four times a day  vancomycin  IVPB 750 milliGRAM(s) IV Intermittent every 12 hours    MEDICATIONS  (PRN):  acetaminophen   Tablet 650 milliGRAM(s) Oral every 6 hours PRN For Temp greater than 38 C (100.4 F)      REVIEW OF SYSTEMS:    feels better   no more bleeding   VITAL SIGNS:  T(C): 36.7 (01-03-18 @ 18:48), Max: 37.2 (01-03-18 @ 12:00)  T(F): 98.1 (01-03-18 @ 18:48), Max: 98.9 (01-03-18 @ 12:00)  HR: 81 (01-03-18 @ 21:58) (75 - 93)  BP: 136/72 (01-03-18 @ 18:48) (100/56 - 145/73)  RR: 18 (01-03-18 @ 18:48) (12 - 21)  SpO2: 94% (01-03-18 @ 21:58) (91% - 98%)  Wt(kg): --    PHYSICAL EXAM:    GENERAL: not in any distress  HEENT: Neck is supple, normocephalic, atraumatic   CHEST/LUNG: Clear to auscultation bilaterally; No rales, rhonchi, wheezing  HEART: Regular rate and rhythm; No murmurs, rubs, or gallops  ABDOMEN: Soft, Nontender, Nondistended; Bowel sounds present, no rebound   EXTREMITIES:  2+ Peripheral Pulses, No clubbing, cyanosis, or edema  GENITOURINARY: NEGATIVE   SKIN: No rashes or lesions  BACK: no pressor sore   NERVOUS SYSTEM:  Alert & Oriented X3, LABS:                         12.4   22.8  )-----------( 330      ( 03 Jan 2018 16:00 )             35.2     01-03    143  |  108  |  16  ----------------------------<  98  3.4<L>   |  25  |  0.67    Ca    6.8<L>      03 Jan 2018 04:22  Phos  1.7     01-03  Mg     2.1     01-03    TPro  5.4<L>  /  Alb  2.1<L>  /  TBili  1.9<H>  /  DBili  x   /  AST  141<H>  /  ALT  90<H>  /  AlkPhos  91  01-02    LIVER FUNCTIONS - ( 02 Jan 2018 04:28 )  Alb: 2.1 g/dL / Pro: 5.4 gm/dL / ALK PHOS: 91 U/L / ALT: 90 U/L / AST: 141 U/L / GGT: x           PT/INR - ( 02 Jan 2018 04:28 )   PT: 15.9 sec;   INR: 1.45 ratio                           Vancomycin Level, Trough: 12.8 ug/mL (01-03 @ 04:22)        Culture Results:   No growth to date. (12-30 @ 10:23)  Culture Results:   No growth to date. (12-30 @ 10:23)  Culture Results:   No growth at 5 days. (12-29 @ 00:18)  Culture Results:   No growth at 5 days. (12-29 @ 00:18)          Legionella Antigen, Urine: Negative (12-30 @ 10:45)        Radiology:

## 2018-01-03 NOTE — PHYSICAL THERAPY INITIAL EVALUATION ADULT - SIT-TO-STAND BALANCE
Regarding: possible yeast infection  ----- Message from Lilia Hawkins sent at 6/17/2017 12:52 PM CDT -----  Patient Name: Jah Rodriguez  Specialist or PCP: Dr Kimble  Pregnant (If Yes, how long?):no  Symptoms: possible yeast infection   Call Back #:[phone]  Is the patient’s permanent residence located in [place], [place], or a [facility]? Yes [facility] [address]  Call Center Account #:[id number]       to be assessed

## 2018-01-03 NOTE — PHYSICAL THERAPY INITIAL EVALUATION ADULT - ADDITIONAL COMMENTS
Pt has a rolling walker, there is a ramp to enter home, no steps to negotiate. Pt has had a home health aide in the past, not recently.

## 2018-01-03 NOTE — PROGRESS NOTE ADULT - ASSESSMENT
community acquired pneumonia gi bleed   persistent increased wbc   ?? ischemic colitis   will get ct abdominal and pelvis still pending   continue broad spectrum antibiotics

## 2018-01-03 NOTE — PROGRESS NOTE ADULT - SUBJECTIVE AND OBJECTIVE BOX
HPI:  Pt is an 83 yo WM with h/o A fib on Coumadin, BPH and b/l hip replacements who presented 2 to SOB and cough; admitted 2 to PNA (CT chest L PNA with pleural effusion). Pt then with an episode bloody BM and also had blood tinged sputum in setting of elevated INR. Since then pt has had decreasing Hb and was transfused PRBC. Admitting dx: 1) L PNA 2) Acute blood loss anemia 2 to GIB      24 hr events: Uneventful      ## Labs:  CBC:                        11.3   22.7  )-----------( 310      ( 2018 04:22 )             31.9     Chem:      143  |  108  |  16  ----------------------------<  98  3.4<L>   |  25  |  0.67    Ca    6.8<L>      2018 04:22  Phos  1.7       Mg     2.1         TPro  5.4<L>  /  Alb  2.1<L>  /  TBili  1.9<H>  /  DBili  x   /  AST  141<H>  /  ALT  90<H>  /  AlkPhos  91  -    Coags:  PT/INR - ( 2018 04:28 )   PT: 15.9 sec;   INR: 1.45 ratio         ## Imaging:    ## Medications:  azithromycin   Tablet 250 milliGRAM(s) Oral daily  meropenem IVPB 500 milliGRAM(s) IV Intermittent every 8 hours  vancomycin  IVPB 750 milliGRAM(s) IV Intermittent every 12 hours      acetylcysteine 20% Inhalation 2 milliLiter(s) Inhalation every 8 hours  ALBUTerol    0.083% 2.5 milliGRAM(s) Nebulizer every 4 hours    finasteride 5 milliGRAM(s) Oral daily      pantoprazole Infusion 8 mG/Hr IV Continuous <Continuous>  sucralfate suspension 1 Gram(s) Oral four times a day    acetaminophen   Tablet 650 milliGRAM(s) Oral every 6 hours PRN      ## Vitals:  T(C): 36.9 (18 @ 05:00), Max: 37.3 (18 @ 15:01)  HR: 78 (18 @ 12:00) (75 - 129)  BP: 135/65 (18 @ 12:00) (100/56 - 145/73)  BP(mean): 80 (18 @ 12:00) (57 - 87)  RR: 16 (18 @ 12:00) (12 - 26)  SpO2: 96% (18 @ 12:00) (91% - 99%)  Wt(kg): --  Vent:   AB-02 @ 07:01  -   @ 07:00  --------------------------------------------------------  IN: 2450 mL / OUT: 1275 mL / NET: 1175 mL     @ 07:01  -   @ 12:59  --------------------------------------------------------  IN: 425 mL / OUT: 400 mL / NET: 25 mL      ## P/E:  Gen: lying comfortably in bed in no apparent distress  Lungs: CTA  Heart: Irregular  Abd: Soft/+BS  Ext: R UE contracted/ b/l LE edema  Neuro: Awake/ alert    CENTRAL LINE: [ ] YES [ x] NO  LOCATION:   DATE INSERTED:  REMOVE: [ ] YES [ ] NO      GONZALEZ: [ ] YES [ ] NO    DATE INSERTED:  REMOVE:  [ ] YES [ ] NO      A-LINE:  [ ] YES [x ] NO  LOCATION:   DATE INSERTED:  REMOVE:  [ ] YES [ ] NO  EXPLAIN:    CODE STATUS: [x ] full code  [ ] DNR  [ ] DNI  [ ] [facility]  Goals of care discussion: [ ] yes

## 2018-01-03 NOTE — PROGRESS NOTE ADULT - ASSESSMENT
Pt is an 83 yo WM with h/o A fib on Coumadin, BPH and b/l hip replacements who presented 2 to SOB and cough; admitted 2 to PNA (CT chest L PNA with pleural effusion). Pt then with an episode bloody BM and also had blood tinged sputum in setting of elevated INR. Since then pt has had decreasing Hb and was transfused PRBC. Admitting dx: 1) L PNA 2) Acute blood loss anemia 2 to GIB    Resp: Supplemental O2   ID: Abx as per ID  CVS: Stable  HEME: Would still not restart Coumadin/ Follow Hb  FEN: Advance po diet as per GI/ Replace K and Phos  GI: F/u as per GI/ No immediate EGD/Colonoscopy/ PPI + Carafate  Social: Wife at the bedside and updated/ May transfer out of ICU

## 2018-01-03 NOTE — CHART NOTE - NSCHARTNOTEFT_GEN_A_CORE
Pt medically stable for transfer to medical floor.      Pt is an 81 yo WM with h/o A fib on Coumadin, BPH and b/l hip replacements who presented 2 to SOB and cough; admitted 2 to PNA (CT chest L PNA with pleural effusion). Pt then with an episode bloody BM and also had blood tinged sputum in setting of elevated INR. Since then pt has had decreasing Hb and was transfused PRBC. Admitting dx: 1) L PNA 2) Acute blood loss anemia 2 to GIB    Pt continues to be stable after last blood transfusion with no further episodes of bleeding.  As per GI, will advance diet for now and change Protonix to 40mg bid.  Will plan for EGD once pulmonary status improves. IV abx as per ID.  CT abd ordered by Dr. Veliz Pt medically stable for transfer to medical floor.  signed out to Dr. Shine    Pt is an 81 yo WM with h/o A fib on Coumadin, BPH and b/l hip replacements who presented 2 to SOB and cough; admitted 2 to PNA (CT chest L PNA with pleural effusion). Pt then with an episode bloody BM and also had blood tinged sputum in setting of elevated INR. Since then pt has had decreasing Hb and was transfused PRBC. Admitting dx: 1) L PNA 2) Acute blood loss anemia 2 to GIB    Pt continues to be stable after last blood transfusion with no further episodes of bleeding.  As per GI, will advance diet for now and change Protonix to 40mg bid.  Will plan for EGD once pulmonary status improves. IV abx as per ID.  CT abd ordered by Dr. Veliz

## 2018-01-04 DIAGNOSIS — D62 ACUTE POSTHEMORRHAGIC ANEMIA: ICD-10-CM

## 2018-01-04 DIAGNOSIS — E83.39 OTHER DISORDERS OF PHOSPHORUS METABOLISM: ICD-10-CM

## 2018-01-04 DIAGNOSIS — J18.9 PNEUMONIA, UNSPECIFIED ORGANISM: ICD-10-CM

## 2018-01-04 LAB
ANION GAP SERPL CALC-SCNC: 8 MMOL/L — SIGNIFICANT CHANGE UP (ref 5–17)
BUN SERPL-MCNC: 15 MG/DL — SIGNIFICANT CHANGE UP (ref 7–23)
CALCIUM SERPL-MCNC: 7.1 MG/DL — LOW (ref 8.5–10.1)
CHLORIDE SERPL-SCNC: 107 MMOL/L — SIGNIFICANT CHANGE UP (ref 96–108)
CO2 SERPL-SCNC: 25 MMOL/L — SIGNIFICANT CHANGE UP (ref 22–31)
CREAT SERPL-MCNC: 0.58 MG/DL — SIGNIFICANT CHANGE UP (ref 0.5–1.3)
CULTURE RESULTS: SIGNIFICANT CHANGE UP
CULTURE RESULTS: SIGNIFICANT CHANGE UP
GLUCOSE SERPL-MCNC: 95 MG/DL — SIGNIFICANT CHANGE UP (ref 70–99)
HCT VFR BLD CALC: 32.9 % — LOW (ref 39–50)
HCT VFR BLD CALC: 32.9 % — LOW (ref 39–50)
HGB BLD-MCNC: 11.7 G/DL — LOW (ref 13–17)
HGB BLD-MCNC: 12 G/DL — LOW (ref 13–17)
INR BLD: 1.24 RATIO — HIGH (ref 0.88–1.16)
MAGNESIUM SERPL-MCNC: 2 MG/DL — SIGNIFICANT CHANGE UP (ref 1.6–2.6)
MCHC RBC-ENTMCNC: 34.2 PG — HIGH (ref 27–34)
MCHC RBC-ENTMCNC: 35.2 PG — HIGH (ref 27–34)
MCHC RBC-ENTMCNC: 35.7 GM/DL — SIGNIFICANT CHANGE UP (ref 32–36)
MCHC RBC-ENTMCNC: 36.6 GM/DL — HIGH (ref 32–36)
MCV RBC AUTO: 95.8 FL — SIGNIFICANT CHANGE UP (ref 80–100)
MCV RBC AUTO: 95.9 FL — SIGNIFICANT CHANGE UP (ref 80–100)
PHOSPHATE SERPL-MCNC: 1.7 MG/DL — LOW (ref 2.5–4.5)
PLATELET # BLD AUTO: 268 K/UL — SIGNIFICANT CHANGE UP (ref 150–400)
PLATELET # BLD AUTO: 286 K/UL — SIGNIFICANT CHANGE UP (ref 150–400)
POTASSIUM SERPL-MCNC: 3.8 MMOL/L — SIGNIFICANT CHANGE UP (ref 3.5–5.3)
POTASSIUM SERPL-SCNC: 3.8 MMOL/L — SIGNIFICANT CHANGE UP (ref 3.5–5.3)
PROTHROM AB SERPL-ACNC: 13.6 SEC — HIGH (ref 9.8–12.7)
RBC # BLD: 3.43 M/UL — LOW (ref 4.2–5.8)
RBC # BLD: 3.43 M/UL — LOW (ref 4.2–5.8)
RBC # FLD: 14.2 % — SIGNIFICANT CHANGE UP (ref 11–15)
RBC # FLD: 14.4 % — SIGNIFICANT CHANGE UP (ref 11–15)
SODIUM SERPL-SCNC: 140 MMOL/L — SIGNIFICANT CHANGE UP (ref 135–145)
SPECIMEN SOURCE: SIGNIFICANT CHANGE UP
SPECIMEN SOURCE: SIGNIFICANT CHANGE UP
VANCOMYCIN TROUGH SERPL-MCNC: 9.5 UG/ML — LOW (ref 10–20)
WBC # BLD: 18.6 K/UL — HIGH (ref 3.8–10.5)
WBC # BLD: 19.5 K/UL — HIGH (ref 3.8–10.5)
WBC # FLD AUTO: 18.6 K/UL — HIGH (ref 3.8–10.5)
WBC # FLD AUTO: 19.5 K/UL — HIGH (ref 3.8–10.5)

## 2018-01-04 PROCEDURE — 99233 SBSQ HOSP IP/OBS HIGH 50: CPT

## 2018-01-04 RX ORDER — PANTOPRAZOLE SODIUM 20 MG/1
40 TABLET, DELAYED RELEASE ORAL
Qty: 0 | Refills: 0 | Status: DISCONTINUED | OUTPATIENT
Start: 2018-01-04 | End: 2018-01-08

## 2018-01-04 RX ORDER — SODIUM,POTASSIUM PHOSPHATES 278-250MG
1 POWDER IN PACKET (EA) ORAL
Qty: 0 | Refills: 0 | Status: COMPLETED | OUTPATIENT
Start: 2018-01-04 | End: 2018-01-05

## 2018-01-04 RX ORDER — PANTOPRAZOLE SODIUM 20 MG/1
40 TABLET, DELAYED RELEASE ORAL
Qty: 0 | Refills: 0 | Status: DISCONTINUED | OUTPATIENT
Start: 2018-01-04 | End: 2018-01-04

## 2018-01-04 RX ADMIN — PANTOPRAZOLE SODIUM 40 MILLIGRAM(S): 20 TABLET, DELAYED RELEASE ORAL at 18:49

## 2018-01-04 RX ADMIN — Medication 1 GRAM(S): at 18:49

## 2018-01-04 RX ADMIN — Medication 2 MILLILITER(S): at 14:53

## 2018-01-04 RX ADMIN — MEROPENEM 100 MILLIGRAM(S): 1 INJECTION INTRAVENOUS at 22:58

## 2018-01-04 RX ADMIN — ALBUTEROL 2.5 MILLIGRAM(S): 90 AEROSOL, METERED ORAL at 14:54

## 2018-01-04 RX ADMIN — ALBUTEROL 2.5 MILLIGRAM(S): 90 AEROSOL, METERED ORAL at 09:36

## 2018-01-04 RX ADMIN — Medication 1 TABLET(S): at 18:48

## 2018-01-04 RX ADMIN — Medication 1 GRAM(S): at 05:37

## 2018-01-04 RX ADMIN — ALBUTEROL 2.5 MILLIGRAM(S): 90 AEROSOL, METERED ORAL at 18:34

## 2018-01-04 RX ADMIN — PANTOPRAZOLE SODIUM 40 MILLIGRAM(S): 20 TABLET, DELAYED RELEASE ORAL at 05:38

## 2018-01-04 RX ADMIN — ALBUTEROL 2.5 MILLIGRAM(S): 90 AEROSOL, METERED ORAL at 01:34

## 2018-01-04 RX ADMIN — Medication 1 GRAM(S): at 12:01

## 2018-01-04 RX ADMIN — Medication 2 MILLILITER(S): at 05:57

## 2018-01-04 RX ADMIN — MEROPENEM 100 MILLIGRAM(S): 1 INJECTION INTRAVENOUS at 13:37

## 2018-01-04 RX ADMIN — Medication 1 GRAM(S): at 23:52

## 2018-01-04 RX ADMIN — ALBUTEROL 2.5 MILLIGRAM(S): 90 AEROSOL, METERED ORAL at 05:56

## 2018-01-04 RX ADMIN — Medication 150 MILLIGRAM(S): at 18:49

## 2018-01-04 RX ADMIN — Medication 2 MILLILITER(S): at 21:29

## 2018-01-04 RX ADMIN — ALBUTEROL 2.5 MILLIGRAM(S): 90 AEROSOL, METERED ORAL at 21:29

## 2018-01-04 RX ADMIN — FINASTERIDE 5 MILLIGRAM(S): 5 TABLET, FILM COATED ORAL at 12:01

## 2018-01-04 RX ADMIN — AZITHROMYCIN 250 MILLIGRAM(S): 500 TABLET, FILM COATED ORAL at 21:46

## 2018-01-04 RX ADMIN — Medication 1 TABLET(S): at 22:58

## 2018-01-04 RX ADMIN — MEROPENEM 100 MILLIGRAM(S): 1 INJECTION INTRAVENOUS at 05:35

## 2018-01-04 RX ADMIN — Medication 150 MILLIGRAM(S): at 10:25

## 2018-01-04 NOTE — PROGRESS NOTE ADULT - SUBJECTIVE AND OBJECTIVE BOX
Patient is a 82y old  Male who presents with a chief complaint of pna (28 Dec 2017 18:56)       OVERNIGHT EVENTS: pt had an episode of bleeding per rectum    MEDICATIONS  (STANDING):  acetylcysteine 20% Inhalation 2 milliLiter(s) Inhalation every 8 hours  ALBUTerol    0.083% 2.5 milliGRAM(s) Nebulizer every 4 hours  azithromycin   Tablet 250 milliGRAM(s) Oral daily  finasteride 5 milliGRAM(s) Oral daily  meropenem IVPB 500 milliGRAM(s) IV Intermittent every 8 hours  pantoprazole    Tablet 40 milliGRAM(s) Oral two times a day before meals  sucralfate suspension 1 Gram(s) Oral four times a day  vancomycin  IVPB 750 milliGRAM(s) IV Intermittent every 12 hours    MEDICATIONS  (PRN):  acetaminophen   Tablet 650 milliGRAM(s) Oral every 6 hours PRN For Temp greater than 38 C (100.4 F)    Vital Signs Last 24 Hrs  T(C): 37.1 (04 Jan 2018 11:58), Max: 37.1 (03 Jan 2018 16:00)  T(F): 98.7 (04 Jan 2018 11:58), Max: 98.8 (03 Jan 2018 16:00)  HR: 86 (04 Jan 2018 11:58) (81 - 94)  BP: 127/67 (04 Jan 2018 11:58) (121/64 - 141/74)  BP(mean): 82 (03 Jan 2018 18:00) (76 - 88)  RR: 18 (04 Jan 2018 11:58) (16 - 20)  SpO2: 98% (04 Jan 2018 11:58) (92% - 98%)    PHYSICAL EXAM:  GENERAL: NAD, lying  in bed, daughter at bedside  HEAD:  Atraumatic, Normocephalic  EYES: EOMI, PERRLA, conjunctiva and sclera clear  ENMT: No tonsillar erythema, exudates, or enlargement; Moist mucous membranes   NECK: Supple, No JVD   NERVOUS SYSTEM:  Alert, awake , oriented, responding appropriately and asking Qs  CHEST/LUNG: Clear to auscultation  bilaterally; No rales, rhonchi, wheezing, or rubs  HEART: Regular rate and rhythm; No murmurs, rubs, or gallops  ABDOMEN: Soft, Nontender, Nondistended; Bowel sounds present  EXTREMITIES:   RUE contracted, b/l LE edema     LABS:                        12.0   18.6  )-----------( 268      ( 04 Jan 2018 08:37 )             32.9     01-04    140  |  107  |  15  ----------------------------<  95  3.8   |  25  |  0.58    Ca    7.1<L>      04 Jan 2018 08:37  Phos  1.7     01-04  Mg     2.0     01-04      PT/INR - ( 04 Jan 2018 08:37 )   PT: 13.6 sec;   INR: 1.24 ratio            cardiac markers     CAPILLARY BLOOD GLUCOSE        Cultures    RADIOLOGY & ADDITIONAL TESTS:    Imaging Personally Reviewed:  [ x] YES  [ ] NO    Consultant(s) Notes Reviewed:  [x ] YES  [ ] NO    Care Discussed with Consultants/Other Providers [ x] YES  [ ] NO Patient is a 82y old  Male who presents with a chief complaint of pna (28 Dec 2017 18:56)       OVERNIGHT EVENTS: pt had an episode of bleeding per rectum    MEDICATIONS  (STANDING):  acetylcysteine 20% Inhalation 2 milliLiter(s) Inhalation every 8 hours  ALBUTerol    0.083% 2.5 milliGRAM(s) Nebulizer every 4 hours  azithromycin   Tablet 250 milliGRAM(s) Oral daily  finasteride 5 milliGRAM(s) Oral daily  meropenem IVPB 500 milliGRAM(s) IV Intermittent every 8 hours  pantoprazole    Tablet 40 milliGRAM(s) Oral two times a day before meals  sucralfate suspension 1 Gram(s) Oral four times a day  vancomycin  IVPB 750 milliGRAM(s) IV Intermittent every 12 hours    MEDICATIONS  (PRN):  acetaminophen   Tablet 650 milliGRAM(s) Oral every 6 hours PRN For Temp greater than 38 C (100.4 F)    Vital Signs Last 24 Hrs  T(C): 37.1 (04 Jan 2018 11:58), Max: 37.1 (03 Jan 2018 16:00)  T(F): 98.7 (04 Jan 2018 11:58), Max: 98.8 (03 Jan 2018 16:00)  HR: 86 (04 Jan 2018 11:58) (81 - 94)  BP: 127/67 (04 Jan 2018 11:58) (121/64 - 141/74)  BP(mean): 82 (03 Jan 2018 18:00) (76 - 88)  RR: 18 (04 Jan 2018 11:58) (16 - 20)  SpO2: 98% (04 Jan 2018 11:58) (92% - 98%)    PHYSICAL EXAM:  GENERAL: NAD, lying  in bed, daughter at bedside  HEAD:  Atraumatic, Normocephalic  EYES: EOMI, PERRLA, conjunctiva and sclera clear  ENMT: No tonsillar erythema, exudates, or enlargement; Moist mucous membranes   NECK: Supple, No JVD   NERVOUS SYSTEM:  Alert, awake , oriented, responding appropriately and asking Qs  CHEST/LUNG: Clear to auscultation  bilaterally; No rales, rhonchi, wheezing, or rubs  HEART: Regular rate and rhythm; No murmurs, rubs, or gallops  ABDOMEN: Soft, Nontender, Nondistended; Bowel sounds present  EXTREMITIES:   RUE contracted, b/l LE edema     LABS:                        12.0   18.6  )-----------( 268      ( 04 Jan 2018 08:37 )             32.9     01-04    140  |  107  |  15  ----------------------------<  95  3.8   |  25  |  0.58    Ca    7.1<L>      04 Jan 2018 08:37  Phos  1.7     01-04  Mg     2.0     01-04      PT/INR - ( 04 Jan 2018 08:37 )   PT: 13.6 sec;   INR: 1.24 ratio            cardiac markers     CAPILLARY BLOOD GLUCOSE    Cultures    RADIOLOGY & ADDITIONAL TESTS: < from: Xray Chest 1 View AP -PORTABLE-Routine (01.02.18 @ 08:45) >  Worsening multifocal pneumonia. Left pleural effusion.       Imaging Personally Reviewed:  [ x] YES  [ ] NO    Consultant(s) Notes Reviewed:  [x ] YES  [ ] NO    Care Discussed with Consultants/Other Providers [ x] YES  [ ] NO

## 2018-01-04 NOTE — PROGRESS NOTE ADULT - ASSESSMENT
81 yo WM with h/o A fib on Coumadin, BPH and b/l hip replacements who presented 2 to SOB and cough; admitted 2 to PNA (CT chest L PNA with pleural effusion). Pt then with an episode bloody BM and also had blood tinged sputum in setting of elevated INR. Had  transfused PRBC, now trasfered to medical floor

## 2018-01-05 ENCOUNTER — RESULT REVIEW (OUTPATIENT)
Age: 83
End: 2018-01-05

## 2018-01-05 LAB
ANION GAP SERPL CALC-SCNC: 6 MMOL/L — SIGNIFICANT CHANGE UP (ref 5–17)
BUN SERPL-MCNC: 11 MG/DL — SIGNIFICANT CHANGE UP (ref 7–23)
CALCIUM SERPL-MCNC: 7.1 MG/DL — LOW (ref 8.5–10.1)
CHLORIDE SERPL-SCNC: 108 MMOL/L — SIGNIFICANT CHANGE UP (ref 96–108)
CO2 SERPL-SCNC: 27 MMOL/L — SIGNIFICANT CHANGE UP (ref 22–31)
CREAT SERPL-MCNC: 0.67 MG/DL — SIGNIFICANT CHANGE UP (ref 0.5–1.3)
GLUCOSE SERPL-MCNC: 115 MG/DL — HIGH (ref 70–99)
HCT VFR BLD CALC: 35 % — LOW (ref 39–50)
HGB BLD-MCNC: 11.6 G/DL — LOW (ref 13–17)
INR BLD: 1.27 RATIO — HIGH (ref 0.88–1.16)
MCHC RBC-ENTMCNC: 32.4 PG — SIGNIFICANT CHANGE UP (ref 27–34)
MCHC RBC-ENTMCNC: 33.1 GM/DL — SIGNIFICANT CHANGE UP (ref 32–36)
MCV RBC AUTO: 98 FL — SIGNIFICANT CHANGE UP (ref 80–100)
PLATELET # BLD AUTO: 288 K/UL — SIGNIFICANT CHANGE UP (ref 150–400)
POTASSIUM SERPL-MCNC: 3.6 MMOL/L — SIGNIFICANT CHANGE UP (ref 3.5–5.3)
POTASSIUM SERPL-SCNC: 3.6 MMOL/L — SIGNIFICANT CHANGE UP (ref 3.5–5.3)
PROTHROM AB SERPL-ACNC: 13.9 SEC — HIGH (ref 9.8–12.7)
RBC # BLD: 3.57 M/UL — LOW (ref 4.2–5.8)
RBC # FLD: 15 % — SIGNIFICANT CHANGE UP (ref 11–15)
SODIUM SERPL-SCNC: 141 MMOL/L — SIGNIFICANT CHANGE UP (ref 135–145)
WBC # BLD: 18.6 K/UL — HIGH (ref 3.8–10.5)
WBC # FLD AUTO: 18.6 K/UL — HIGH (ref 3.8–10.5)

## 2018-01-05 PROCEDURE — 99233 SBSQ HOSP IP/OBS HIGH 50: CPT

## 2018-01-05 PROCEDURE — 88312 SPECIAL STAINS GROUP 1: CPT | Mod: 26

## 2018-01-05 PROCEDURE — 88305 TISSUE EXAM BY PATHOLOGIST: CPT | Mod: 26

## 2018-01-05 RX ORDER — SODIUM CHLORIDE 9 MG/ML
1000 INJECTION, SOLUTION INTRAVENOUS
Qty: 0 | Refills: 0 | Status: DISCONTINUED | OUTPATIENT
Start: 2018-01-05 | End: 2018-01-05

## 2018-01-05 RX ORDER — SOD SULF/SODIUM/NAHCO3/KCL/PEG
4000 SOLUTION, RECONSTITUTED, ORAL ORAL ONCE
Qty: 0 | Refills: 0 | Status: COMPLETED | OUTPATIENT
Start: 2018-01-07 | End: 2018-01-07

## 2018-01-05 RX ORDER — NYSTATIN CREAM 100000 [USP'U]/G
1 CREAM TOPICAL THREE TIMES A DAY
Qty: 0 | Refills: 0 | Status: DISCONTINUED | OUTPATIENT
Start: 2018-01-05 | End: 2018-01-12

## 2018-01-05 RX ADMIN — MEROPENEM 100 MILLIGRAM(S): 1 INJECTION INTRAVENOUS at 22:22

## 2018-01-05 RX ADMIN — ALBUTEROL 2.5 MILLIGRAM(S): 90 AEROSOL, METERED ORAL at 11:35

## 2018-01-05 RX ADMIN — PANTOPRAZOLE SODIUM 40 MILLIGRAM(S): 20 TABLET, DELAYED RELEASE ORAL at 05:19

## 2018-01-05 RX ADMIN — NYSTATIN CREAM 1 APPLICATION(S): 100000 CREAM TOPICAL at 18:40

## 2018-01-05 RX ADMIN — Medication 1 GRAM(S): at 18:09

## 2018-01-05 RX ADMIN — Medication 1 GRAM(S): at 23:39

## 2018-01-05 RX ADMIN — Medication 2 MILLILITER(S): at 22:10

## 2018-01-05 RX ADMIN — Medication 2 MILLILITER(S): at 05:44

## 2018-01-05 RX ADMIN — ALBUTEROL 2.5 MILLIGRAM(S): 90 AEROSOL, METERED ORAL at 18:38

## 2018-01-05 RX ADMIN — MEROPENEM 100 MILLIGRAM(S): 1 INJECTION INTRAVENOUS at 15:50

## 2018-01-05 RX ADMIN — AZITHROMYCIN 250 MILLIGRAM(S): 500 TABLET, FILM COATED ORAL at 22:23

## 2018-01-05 RX ADMIN — MEROPENEM 100 MILLIGRAM(S): 1 INJECTION INTRAVENOUS at 05:19

## 2018-01-05 RX ADMIN — SODIUM CHLORIDE 100 MILLILITER(S): 9 INJECTION, SOLUTION INTRAVENOUS at 14:43

## 2018-01-05 RX ADMIN — Medication 1 TABLET(S): at 18:09

## 2018-01-05 RX ADMIN — Medication 150 MILLIGRAM(S): at 06:05

## 2018-01-05 RX ADMIN — ALBUTEROL 2.5 MILLIGRAM(S): 90 AEROSOL, METERED ORAL at 05:43

## 2018-01-05 RX ADMIN — Medication 150 MILLIGRAM(S): at 18:09

## 2018-01-05 RX ADMIN — PANTOPRAZOLE SODIUM 40 MILLIGRAM(S): 20 TABLET, DELAYED RELEASE ORAL at 18:10

## 2018-01-05 RX ADMIN — Medication 1 TABLET(S): at 15:58

## 2018-01-05 RX ADMIN — ALBUTEROL 2.5 MILLIGRAM(S): 90 AEROSOL, METERED ORAL at 01:19

## 2018-01-05 RX ADMIN — NYSTATIN CREAM 1 APPLICATION(S): 100000 CREAM TOPICAL at 22:23

## 2018-01-05 RX ADMIN — FINASTERIDE 5 MILLIGRAM(S): 5 TABLET, FILM COATED ORAL at 15:50

## 2018-01-05 RX ADMIN — ALBUTEROL 2.5 MILLIGRAM(S): 90 AEROSOL, METERED ORAL at 22:11

## 2018-01-05 NOTE — PROGRESS NOTE ADULT - ASSESSMENT
83 yo WM with h/o A fib on Coumadin, BPH and b/l hip replacements who presented 2 to SOB and cough; admitted 2 to PNA (CT chest L PNA with pleural effusion). Pt then with an episode bloody BM and also had blood tinged sputum in setting of elevated INR. Had  transfused PRBC, now trasfered to medical floor

## 2018-01-05 NOTE — PROGRESS NOTE ADULT - SUBJECTIVE AND OBJECTIVE BOX
HPI:  Pt is a 83 yo gentleman with a pmhx of afib on coumadin, hypotension who presents to the ED with sob and cough since [place]. Questionable fever, never checked w thermometer, no change in mental status. Cough is nonproductive, but can hear mucous in lungs. No chest pain, no abdominal pain, no hx of dvt/pe. No recent hospitalizations. (28 Dec 2017 18:56)      Allergies    chocolate (Unknown)  No Known Drug Allergies  peanuts (Anaphylaxis)    Intolerances        MEDICATIONS  (STANDING):  acetylcysteine 20% Inhalation 2 milliLiter(s) Inhalation every 8 hours  ALBUTerol    0.083% 2.5 milliGRAM(s) Nebulizer every 4 hours  azithromycin   Tablet 250 milliGRAM(s) Oral daily  finasteride 5 milliGRAM(s) Oral daily  meropenem IVPB 500 milliGRAM(s) IV Intermittent every 8 hours  pantoprazole  Injectable 40 milliGRAM(s) IV Push two times a day  sucralfate suspension 1 Gram(s) Oral four times a day  vancomycin  IVPB 750 milliGRAM(s) IV Intermittent every 12 hours    MEDICATIONS  (PRN):  acetaminophen   Tablet 650 milliGRAM(s) Oral every 6 hours PRN For Temp greater than 38 C (100.4 F)      REVIEW OF SYSTEMS:    CONSTITUTIONAL: No fever, chills, weight loss, or fatigue  HEENT: No sore throat, runny nose, ear ache  RESPIRATORY: No cough, wheezing, No shortness of breath  CARDIOVASCULAR: No chest pain, palpitations, dizziness  GASTROINTESTINAL: No abdominal pain. No nausea, vomiting, diarrhea  GENITOURINARY: No dysuria, increase frequency, hematuria, or incontinence  NEUROLOGICAL: No headaches, memory loss, loss of strength, numbness, or tremors, no weakness  EXTREMITY: No pedal edema BLE  SKIN: No itching, burning, rashes, or lesions     VITAL SIGNS:  T(C): 36.7 (01-05-18 @ 10:55), Max: 36.9 (01-05-18 @ 05:38)  T(F): 98 (01-05-18 @ 10:55), Max: 98.4 (01-05-18 @ 05:38)  HR: 89 (01-05-18 @ 10:55) (81 - 95)  BP: 129/89 (01-05-18 @ 10:55) (117/62 - 129/89)  RR: 18 (01-05-18 @ 10:55) (17 - 18)  SpO2: 98% (01-05-18 @ 10:55) (93% - 98%)  Wt(kg): --    PHYSICAL EXAM:    GENERAL: not in any distress  HEENT: Neck is supple, normocephalic, atraumatic   CHEST/LUNG: Clear to auscultation bilaterally; No rales, rhonchi, wheezing  HEART: Regular rate and rhythm; No murmurs, rubs, or gallops  ABDOMEN: Soft, Nontender, Nondistended; Bowel sounds present, no rebound   EXTREMITIES:  2+ Peripheral Pulses, No clubbing, cyanosis, or edema  GENITOURINARY:   SKIN: No rashes or lesions  BACK: no pressor sore   NERVOUS SYSTEM:  Alert & Oriented X3, Good concentration  PSYCH: normal affect     LABS:                         11.6   18.6  )-----------( 288      ( 05 Jan 2018 07:50 )             35.0     01-05    141  |  108  |  11  ----------------------------<  115<H>  3.6   |  27  |  0.67    Ca    7.1<L>      05 Jan 2018 07:50  Phos  1.7     01-04  Mg     2.0     01-04        PT/INR - ( 05 Jan 2018 07:50 )   PT: 13.9 sec;   INR: 1.27 ratio                           Vancomycin Level, Trough: 9.5 ug/mL (01-04 @ 08:37)        Culture Results:   No growth at 5 days. (12-30 @ 10:23)  Culture Results:   No growth at 5 days. (12-30 @ 10:23)                Radiology: HPI:  Pt is a 83 yo gentleman with a pmhx of afib on coumadin, hypotension who presents to the ED with sob and cough since [place]. Questionable fever, never checked w thermometer, no change in mental status. Cough is nonproductive, but can hear mucous in lungs. No chest pain, no abdominal pain, no hx of dvt/pe. No recent hospitalizations. (28 Dec 2017 18:56)      Allergies    chocolate (Unknown)  No Known Drug Allergies  peanuts (Anaphylaxis)    Intolerances        MEDICATIONS  (STANDING):  acetylcysteine 20% Inhalation 2 milliLiter(s) Inhalation every 8 hours  ALBUTerol    0.083% 2.5 milliGRAM(s) Nebulizer every 4 hours  azithromycin   Tablet 250 milliGRAM(s) Oral daily  finasteride 5 milliGRAM(s) Oral daily  meropenem IVPB 500 milliGRAM(s) IV Intermittent every 8 hours  pantoprazole  Injectable 40 milliGRAM(s) IV Push two times a day  sucralfate suspension 1 Gram(s) Oral four times a day  vancomycin  IVPB 750 milliGRAM(s) IV Intermittent every 12 hours    MEDICATIONS  (PRN):  acetaminophen   Tablet 650 milliGRAM(s) Oral every 6 hours PRN For Temp greater than 38 C (100.4 F)      REVIEW OF SYSTEMS:    no change in status   bleeding yesterday once   gi noted  VITAL SIGNS:  T(C): 36.7 (01-05-18 @ 10:55), Max: 36.9 (01-05-18 @ 05:38)  T(F): 98 (01-05-18 @ 10:55), Max: 98.4 (01-05-18 @ 05:38)  HR: 89 (01-05-18 @ 10:55) (81 - 95)  BP: 129/89 (01-05-18 @ 10:55) (117/62 - 129/89)  RR: 18 (01-05-18 @ 10:55) (17 - 18)  SpO2: 98% (01-05-18 @ 10:55) (93% - 98%)  Wt(kg): --    PHYSICAL EXAM:    GENERAL: not in any distress  HEENT: Neck is supple, normocephalic, atraumatic   CHEST/LUNG: Clear to auscultation bilaterally; No rales, rhonchi, wheezing  HEART: Regular rate and rhythm; No murmurs, rubs, or gallops  ABDOMEN: Soft, Nontender, Nondistended; Bowel sounds present, no rebound   EXTREMITIES:  2+ Peripheral Pulses, No clubbing, cyanosis,   1 plus  edema  SKIN: No rashes or lesions  BACK: no pressor sore   NERVOUS SYSTEM:  Alert & Oriented X3,   LABS:                         11.6   18.6  )-----------( 288      ( 05 Jan 2018 07:50 )             35.0     01-05    141  |  108  |  11  ----------------------------<  115<H>  3.6   |  27  |  0.67    Ca    7.1<L>      05 Jan 2018 07:50  Phos  1.7     01-04  Mg     2.0     01-04        PT/INR - ( 05 Jan 2018 07:50 )   PT: 13.9 sec;   INR: 1.27 ratio                           Vancomycin Level, Trough: 9.5 ug/mL (01-04 @ 08:37)        Culture Results:   No growth at 5 days. (12-30 @ 10:23)  Culture Results:   No growth at 5 days. (12-30 @ 10:23)                Radiology:    < from: Xray Chest 1 View AP -PORTABLE-Routine (01.02.18 @ 08:45) >  MPRESSION:  Worsening multifocal pneumonia. Left pleural effusion.    This was marked is a fourth finger x-ray but is a chest x-ray.                DIANNA SIMON M.D., ATTENDING RADIOLOGIST  This document has been electronically signed. Jan 2 2018 11:22AM          < end of copied text >

## 2018-01-05 NOTE — PROGRESS NOTE ADULT - SUBJECTIVE AND OBJECTIVE BOX
see gastroscopy report    Imp: GI bleed; anemia; elevated INR    Plan: advance diet; follow CBC; will consider colonoscopy if Hgb drops further

## 2018-01-05 NOTE — PROGRESS NOTE ADULT - ASSESSMENT
community acquired pneumonia gi bleed   persistent increased wbc   ?? ischemic colitis   continue current treatment   mildly better wbc count

## 2018-01-05 NOTE — PROGRESS NOTE ADULT - SUBJECTIVE AND OBJECTIVE BOX
Patient is a 82y old  Male who presents with a chief complaint of pna (28 Dec 2017 18:56)       OVERNIGHT EVENTS: none reported    MEDICATIONS  (STANDING):  acetylcysteine 20% Inhalation 2 milliLiter(s) Inhalation every 8 hours  ALBUTerol    0.083% 2.5 milliGRAM(s) Nebulizer every 4 hours  azithromycin   Tablet 250 milliGRAM(s) Oral daily  finasteride 5 milliGRAM(s) Oral daily  lactated ringers. 1000 milliLiter(s) (100 mL/Hr) IV Continuous <Continuous>  meropenem IVPB 500 milliGRAM(s) IV Intermittent every 8 hours  pantoprazole  Injectable 40 milliGRAM(s) IV Push two times a day  sucralfate suspension 1 Gram(s) Oral four times a day  vancomycin  IVPB 750 milliGRAM(s) IV Intermittent every 12 hours    MEDICATIONS  (PRN):  acetaminophen   Tablet 650 milliGRAM(s) Oral every 6 hours PRN For Temp greater than 38 C (100.4 F)          Vital Signs Last 24 Hrs  T(C): 36.6 (05 Jan 2018 14:45), Max: 36.9 (05 Jan 2018 05:38)  T(F): 97.8 (05 Jan 2018 14:45), Max: 98.4 (05 Jan 2018 05:38)  HR: 86 (05 Jan 2018 15:25) (81 - 95)  BP: 125/57 (05 Jan 2018 15:25) (94/51 - 129/89)  BP(mean): --  RR: 15 (05 Jan 2018 15:25) (15 - 22)  SpO2: 95% (05 Jan 2018 15:25) (93% - 98%)     PHYSICAL EXAM:  GENERAL: NAD, lying  in bed, daughter at bedside  HEAD:  Atraumatic, Normocephalic  EYES: EOMI, PERRLA, conjunctiva and sclera clear  ENMT: No tonsillar erythema, exudates, or enlargement; Moist mucous membranes   NECK: Supple, No JVD   NERVOUS SYSTEM:  Alert, awake , oriented, responding appropriately and asking Qs  CHEST/LUNG: Clear to auscultation  bilaterally; No rales, rhonchi, wheezing, or rubs  HEART: Irregular  ABDOMEN: Soft, Nontender, Nondistended; Bowel sounds present  EXTREMITIES:   RUE contracted, b/l LE edema         LABS:                        11.6   18.6  )-----------( 288      ( 05 Jan 2018 07:50 )             35.0     01-05    141  |  108  |  11  ----------------------------<  115<H>  3.6   |  27  |  0.67    Ca    7.1<L>      05 Jan 2018 07:50  Phos  1.7     01-04  Mg     2.0     01-04      PT/INR - ( 05 Jan 2018 07:50 )   PT: 13.9 sec;   INR: 1.27 ratio            cardiac markers     CAPILLARY BLOOD GLUCOSE        Cultures    RADIOLOGY & ADDITIONAL TESTS:    Imaging Personally Reviewed:  [x ] YES  [ ] NO    Consultant(s) Notes Reviewed:  [x ] YES  [ ] NO    Care Discussed with Consultants/Other Providers [ x] YES  [ ] NO

## 2018-01-06 DIAGNOSIS — E43 UNSPECIFIED SEVERE PROTEIN-CALORIE MALNUTRITION: ICD-10-CM

## 2018-01-06 LAB
ANION GAP SERPL CALC-SCNC: 5 MMOL/L — SIGNIFICANT CHANGE UP (ref 5–17)
BUN SERPL-MCNC: 12 MG/DL — SIGNIFICANT CHANGE UP (ref 7–23)
CALCIUM SERPL-MCNC: 7.1 MG/DL — LOW (ref 8.5–10.1)
CHLORIDE SERPL-SCNC: 109 MMOL/L — HIGH (ref 96–108)
CO2 SERPL-SCNC: 27 MMOL/L — SIGNIFICANT CHANGE UP (ref 22–31)
CREAT SERPL-MCNC: 0.66 MG/DL — SIGNIFICANT CHANGE UP (ref 0.5–1.3)
GLUCOSE SERPL-MCNC: 100 MG/DL — HIGH (ref 70–99)
HCT VFR BLD CALC: 33.1 % — LOW (ref 39–50)
HGB BLD-MCNC: 11.5 G/DL — LOW (ref 13–17)
INR BLD: 1.24 RATIO — HIGH (ref 0.88–1.16)
MCHC RBC-ENTMCNC: 34.1 PG — HIGH (ref 27–34)
MCHC RBC-ENTMCNC: 34.8 GM/DL — SIGNIFICANT CHANGE UP (ref 32–36)
MCV RBC AUTO: 98 FL — SIGNIFICANT CHANGE UP (ref 80–100)
PLATELET # BLD AUTO: 238 K/UL — SIGNIFICANT CHANGE UP (ref 150–400)
POTASSIUM SERPL-MCNC: 3.6 MMOL/L — SIGNIFICANT CHANGE UP (ref 3.5–5.3)
POTASSIUM SERPL-SCNC: 3.6 MMOL/L — SIGNIFICANT CHANGE UP (ref 3.5–5.3)
PROTHROM AB SERPL-ACNC: 13.6 SEC — HIGH (ref 9.8–12.7)
RBC # BLD: 3.38 M/UL — LOW (ref 4.2–5.8)
RBC # FLD: 14.8 % — SIGNIFICANT CHANGE UP (ref 11–15)
SODIUM SERPL-SCNC: 141 MMOL/L — SIGNIFICANT CHANGE UP (ref 135–145)
WBC # BLD: 18.5 K/UL — HIGH (ref 3.8–10.5)
WBC # FLD AUTO: 18.5 K/UL — HIGH (ref 3.8–10.5)

## 2018-01-06 PROCEDURE — 99233 SBSQ HOSP IP/OBS HIGH 50: CPT

## 2018-01-06 RX ORDER — IPRATROPIUM/ALBUTEROL SULFATE 18-103MCG
3 AEROSOL WITH ADAPTER (GRAM) INHALATION EVERY 6 HOURS
Qty: 0 | Refills: 0 | Status: DISCONTINUED | OUTPATIENT
Start: 2018-01-06 | End: 2018-01-11

## 2018-01-06 RX ADMIN — NYSTATIN CREAM 1 APPLICATION(S): 100000 CREAM TOPICAL at 14:54

## 2018-01-06 RX ADMIN — ALBUTEROL 2.5 MILLIGRAM(S): 90 AEROSOL, METERED ORAL at 05:51

## 2018-01-06 RX ADMIN — Medication 1 GRAM(S): at 14:55

## 2018-01-06 RX ADMIN — Medication 3 MILLILITER(S): at 11:08

## 2018-01-06 RX ADMIN — Medication 150 MILLIGRAM(S): at 06:22

## 2018-01-06 RX ADMIN — NYSTATIN CREAM 1 APPLICATION(S): 100000 CREAM TOPICAL at 21:43

## 2018-01-06 RX ADMIN — PANTOPRAZOLE SODIUM 40 MILLIGRAM(S): 20 TABLET, DELAYED RELEASE ORAL at 17:03

## 2018-01-06 RX ADMIN — NYSTATIN CREAM 1 APPLICATION(S): 100000 CREAM TOPICAL at 05:23

## 2018-01-06 RX ADMIN — Medication 2 MILLILITER(S): at 05:51

## 2018-01-06 RX ADMIN — MEROPENEM 100 MILLIGRAM(S): 1 INJECTION INTRAVENOUS at 05:22

## 2018-01-06 RX ADMIN — PANTOPRAZOLE SODIUM 40 MILLIGRAM(S): 20 TABLET, DELAYED RELEASE ORAL at 05:23

## 2018-01-06 RX ADMIN — Medication 3 MILLILITER(S): at 17:08

## 2018-01-06 RX ADMIN — Medication 1 GRAM(S): at 17:03

## 2018-01-06 RX ADMIN — Medication 1 GRAM(S): at 05:23

## 2018-01-06 RX ADMIN — Medication 3 MILLILITER(S): at 23:50

## 2018-01-06 RX ADMIN — FINASTERIDE 5 MILLIGRAM(S): 5 TABLET, FILM COATED ORAL at 14:55

## 2018-01-06 NOTE — PROGRESS NOTE ADULT - SUBJECTIVE AND OBJECTIVE BOX
Patient is a 82y old  Male who presents with a chief complaint of pna (28 Dec 2017 18:56)       OVERNIGHT EVENTS:    MEDICATIONS  (STANDING):  acetylcysteine 20% Inhalation 2 milliLiter(s) Inhalation every 8 hours  ALBUTerol    0.083% 2.5 milliGRAM(s) Nebulizer every 4 hours  azithromycin   Tablet 250 milliGRAM(s) Oral daily  finasteride 5 milliGRAM(s) Oral daily  meropenem IVPB 500 milliGRAM(s) IV Intermittent every 8 hours  nystatin Powder 1 Application(s) Topical three times a day  pantoprazole  Injectable 40 milliGRAM(s) IV Push two times a day  sucralfate suspension 1 Gram(s) Oral four times a day  vancomycin  IVPB 750 milliGRAM(s) IV Intermittent every 12 hours    MEDICATIONS  (PRN):  acetaminophen   Tablet 650 milliGRAM(s) Oral every 6 hours PRN For Temp greater than 38 C (100.4 F)         Vital Signs Last 24 Hrs  T(C): 36.6 (06 Jan 2018 05:39), Max: 37.1 (05 Jan 2018 16:40)  T(F): 97.9 (06 Jan 2018 05:39), Max: 98.7 (05 Jan 2018 16:40)  HR: 78 (06 Jan 2018 07:01) (78 - 95)  BP: 130/67 (06 Jan 2018 05:39) (94/51 - 130/67)  BP(mean): --  RR: 17 (06 Jan 2018 05:39) (15 - 22)  SpO2: 96% (06 Jan 2018 07:01) (91% - 98%)      PHYSICAL EXAM:  GENERAL: NAD, lying in bed , denies any further bleeding episodes  HEAD:  Atraumatic, Normocephalic  EYES: EOMI, PERRLA, conjunctiva and sclera clear  ENMT: No tonsillar erythema, exudates, or enlargement; Moist mucous membranes   NECK: Supple, No JVD   NERVOUS SYSTEM:  Alert, awake , oriented, responding appropriately and asking Qs  CHEST/LUNG: Clear to auscultation  bilaterally; No rales, rhonchi, wheezing, or rubs  HEART: Irregular  ABDOMEN: Soft, Nontender, Nondistended; Bowel sounds present  EXTREMITIES:   RUE contracted, b/l LE edema    LABS:                        11.5   18.5  )-----------( 238      ( 06 Jan 2018 09:06 )             33.1     01-06    141  |  109<H>  |  12  ----------------------------<  100<H>  3.6   |  27  |  0.66    Ca    7.1<L>      06 Jan 2018 09:06      PT/INR - ( 06 Jan 2018 09:06 )   PT: 13.6 sec;   INR: 1.24 ratio            cardiac markers     CAPILLARY BLOOD GLUCOSE        Cultures    RADIOLOGY & ADDITIONAL TESTS:    Imaging Personally Reviewed:  [x ] YES  [ ] NO    Consultant(s) Notes Reviewed:  [x ] YES  [ ] NO    Care Discussed with Consultants/Other Providers [ x] YES  [ ] NO

## 2018-01-06 NOTE — PROGRESS NOTE ADULT - SUBJECTIVE AND OBJECTIVE BOX
HPI:  Pt is a 83 yo gentleman with a pmhx of afib on coumadin, hypotension who presents to the ED with sob and cough since [place]. Questionable fever, never checked w thermometer, no change in mental status. Cough is nonproductive, but can hear mucous in lungs. No chest pain, no abdominal pain, no hx of dvt/pe. No recent hospitalizations. (28 Dec 2017 18:56)  work up consistent with community acquired pneumonia  also course complicated by gi bleed and persistent increased in wbc   today   rash all over   yesterday in groin and back     Allergies    chocolate (Unknown)  No Known Drug Allergies  peanuts (Anaphylaxis)    Intolerances        MEDICATIONS  (STANDING):  ALBUTerol/ipratropium for Nebulization 3 milliLiter(s) Nebulizer every 6 hours  finasteride 5 milliGRAM(s) Oral daily  nystatin Powder 1 Application(s) Topical three times a day  pantoprazole  Injectable 40 milliGRAM(s) IV Push two times a day  sucralfate suspension 1 Gram(s) Oral four times a day    MEDICATIONS  (PRN):  acetaminophen   Tablet 650 milliGRAM(s) Oral every 6 hours PRN For Temp greater than 38 C (100.4 F)      REVIEW OF SYSTEMS:    poor historian   seen with wife   no cough   denies itching   no sob  =    VITAL SIGNS:  T(C): 36.2 (01-06-18 @ 17:12), Max: 37.2 (01-06-18 @ 10:10)  T(F): 97.2 (01-06-18 @ 17:12), Max: 99 (01-06-18 @ 10:10)  HR: 83 (01-06-18 @ 17:12) (74 - 95)  BP: 112/60 (01-06-18 @ 17:12) (108/57 - 130/67)  RR: 18 (01-06-18 @ 17:12) (17 - 23)  SpO2: 96% (01-06-18 @ 17:12) (91% - 97%)  Wt(kg): --    PHYSICAL EXAM:    GENERAL: not in any distress  HEENT: Neck is supple, normocephalic, atraumatic   CHEST/LUNG: Clear to auscultation bilaterally; No rales, rhonchi, wheezing  HEART: Regular rate and rhythm; No murmurs, rubs, or gallops  ABDOMEN: Soft, Nontender, Nondistended; Bowel sounds present, no rebound   EXTREMITIES:  2+ Peripheral Pulses, No clubbing, cyanosis, or edema  GENITOURINARY: groin rash   SKIN: rash over chest abdominal and entire back   this am only in back   BACK: no pressor sore   NERVOUS SYSTEM:  Alert & Oriented X3,   LABS:                         11.5   18.5  )-----------( 238      ( 06 Jan 2018 09:06 )             33.1     01-06    141  |  109<H>  |  12  ----------------------------<  100<H>  3.6   |  27  |  0.66    Ca    7.1<L>      06 Jan 2018 09:06        PT/INR - ( 06 Jan 2018 09:06 )   PT: 13.6 sec;   INR: 1.24 ratio                           Vancomycin Level, Trough: 9.5 ug/mL (01-04 @ 08:37)                      Radiology:

## 2018-01-06 NOTE — CHART NOTE - NSCHARTNOTEFT_GEN_A_CORE
Assessment:  Pt with gastrointestinal hemorrhage pending colonoscopy 1/8. Pt with Lt  pneumonia & pleural effusion presents with +SOB & Cough. Pt with acute  blood loss & anemia s/p transfusion.      Factors impacting intake: [ ] none [ ] nausea  [ ] vomiting [ ] diarrhea [ ] constipation  [ ]chewing problems [ ] swallowing issues  [x ] other: Gastrointestinal hemorrhage & shortness of breath    Diet Prescription: Diet, Clear Liquid:   No Red Dye  Supplement Feeding Modality:  Oral  Ensure Clear Cans or Servings Per Day:  1       Frequency:  Three Times a day (01-03-18 @ 15:16)  Diet, NPO:   NPO for Procedure/Test     NPO Start Date: 07-Jan-2018,   NPO Start Time: 23:59  Except Medications (01-05-18 @ 14:05)    Intake: 0-80% consumed of clear liquid fed by staff    Current Weight:  Wt=93.1k(1/6); +3 edema Ja legs & ankles, Wt=92.9kg(12/30)  % Weight Change  0.2kg wt gain x 1 week    Pertinent Medications: MEDICATIONS  (STANDING):  acetylcysteine 20% Inhalation 2 milliLiter(s) Inhalation every 8 hours  ALBUTerol    0.083% 2.5 milliGRAM(s) Nebulizer every 4 hours  azithromycin   Tablet 250 milliGRAM(s) Oral daily  finasteride 5 milliGRAM(s) Oral daily  meropenem IVPB 500 milliGRAM(s) IV Intermittent every 8 hours  nystatin Powder 1 Application(s) Topical three times a day  pantoprazole  Injectable 40 milliGRAM(s) IV Push two times a day  sucralfate suspension 1 Gram(s) Oral four times a day  vancomycin  IVPB 750 milliGRAM(s) IV Intermittent every 12 hours    MEDICATIONS  (PRN):  acetaminophen   Tablet 650 milliGRAM(s) Oral every 6 hours PRN For Temp greater than 38 C (100.4 F)    Pertinent Labs: 01-06 Na141 mmol/L Glu 100 mg/dL<H> K+ 3.6 mmol/L Cr  0.66 mg/dL BUN 12 mg/dL Phos n/a   Alb n/a   PAB n/a        CAPILLARY BLOOD GLUCOSE    Nutrition focused physical exam conducted ; found signs of malnutrition [ ]absent [x ]present.  Subcutaneous fat loss: [WNL ] Orbital fat pads region, [ WNL]Buccal fat region, [WNL ]Triceps region,  [WNL ]Ribs region.  Muscle wasting: [Moderate ]Temples region, [ ]Clavicle region, [ WNL]Shoulder region, [unable ]Scapula region, [WNL ]Interosseous region,  [edematous ]thigh region, [edematous ]Calf region    Skin: intact    Estimated Needs:   [x ] no change since previous assessment  [ ] recalculated:     Previous Nutrition Diagnosis:   [x ] Inadequate Energy Intake [ ]Inadequate Oral Intake [ ] Excessive Energy Intake   [ ] Underweight [ ] Increased Nutrient Needs [ ] Overweight/Obesity   [ ] Altered GI Function [ ] Unintended Weight Loss [ ] Food & Nutrition Related Knowledge Deficit [ ] severe Malnutrition [ ] moderate malnutrition    Nutrition Diagnosis is [x ] ongoing  [ ] resolved  [ ] improved  [ ] not applicable     New Nutrition Diagnosis:  [ ] Inadequate Energy Intake [ ]Inadequate Oral Intake [ ] Excessive Energy Intake   [ ] Underweight [ ] Increased Nutrient Needs [ ] Overweight/Obesity   [ ] Altered GI Function [ ] Unintended Weight Loss [ ] Food & Nutrition Related Knowledge Deficit [x ] severe Malnutrition in the context of acute illness    Related to: Inadequate energy & protein intake related to Gastrointestinal hemorrhage & Lt pneumonia with pleural effusion    As evidenced by:  +3 edema Ja legs & ankles & po intake <75% nutritional needs x 7 days hospitalization      Interventions:   Recommend  [x ] Continue: Continue Clear liquids/Ensure Enlive 3 x day(720kcal & 24gm protein)  [ x] Change Diet To: Adv po diet pending colonoscopy 1/8 consider Full liquids/Ensure Enlive 3 x day(1050kcal & 20gm protein) to Low Fiber/Ensure Enlive 3 x day  [ ] Nutrition Supplement:  [ ] Nutrition Support:  [ ] Other:     Monitoring and Evaluation:   [x ] PO intake [ x ] Tolerance to diet prescription [ x ] weights [ x ] labs[ x ] follow up per protocol  [ ] other: Assessment:  Pt with gastrointestinal hemorrhage pending colonoscopy 1/8. Pt with Lt  pneumonia & pleural effusion presents with +SOB & Cough. Pt with acute  blood loss & anemia s/p transfusion.      Factors impacting intake: [ ] none [ ] nausea  [ ] vomiting [ ] diarrhea [ ] constipation  [ ]chewing problems [ ] swallowing issues  [x ] other: Gastrointestinal hemorrhage & shortness of breath    Diet Prescription: Diet, Clear Liquid:   No Red Dye  Supplement Feeding Modality:  Oral  Ensure Clear Cans or Servings Per Day:  1       Frequency:  Three Times a day (01-03-18 @ 15:16)  Diet, NPO:   NPO for Procedure/Test     NPO Start Date: 07-Jan-2018,   NPO Start Time: 23:59  Except Medications (01-05-18 @ 14:05)    Intake: 0-80% consumed of clear liquid fed by staff    Current Weight:  Wt=93.1k(1/6); +3 edema Ja legs & ankles, Wt=92.9kg(12/30)  % Weight Change  0.2kg wt gain x 1 week    Pertinent Medications: MEDICATIONS  (STANDING):  acetylcysteine 20% Inhalation 2 milliLiter(s) Inhalation every 8 hours  ALBUTerol    0.083% 2.5 milliGRAM(s) Nebulizer every 4 hours  azithromycin   Tablet 250 milliGRAM(s) Oral daily  finasteride 5 milliGRAM(s) Oral daily  meropenem IVPB 500 milliGRAM(s) IV Intermittent every 8 hours  nystatin Powder 1 Application(s) Topical three times a day  pantoprazole  Injectable 40 milliGRAM(s) IV Push two times a day  sucralfate suspension 1 Gram(s) Oral four times a day  vancomycin  IVPB 750 milliGRAM(s) IV Intermittent every 12 hours    MEDICATIONS  (PRN):  acetaminophen   Tablet 650 milliGRAM(s) Oral every 6 hours PRN For Temp greater than 38 C (100.4 F)    Pertinent Labs: 01-06 Na141 mmol/L Glu 100 mg/dL<H> K+ 3.6 mmol/L Cr  0.66 mg/dL BUN 12 mg/dL Phos n/a   Alb n/a   PAB n/a        CAPILLARY BLOOD GLUCOSE    Nutrition focused physical exam conducted ; found signs of malnutrition [ ]absent [x ]present.  Subcutaneous fat loss: [WNL ] Orbital fat pads region, [ WNL]Buccal fat region, [WNL ]Triceps region,  [WNL ]Ribs region.  Muscle wasting: [Moderate ]Temples region, [ ]Clavicle region, [ WNL]Shoulder region, [unable ]Scapula region, [WNL ]Interosseous region,  [edematous ]thigh region, [edematous ]Calf region    Skin: intact    Estimated Needs:   [x ] no change since previous assessment  [ ] recalculated:     Previous Nutrition Diagnosis:   [x ] Inadequate Energy Intake [ ]Inadequate Oral Intake [ ] Excessive Energy Intake   [ ] Underweight [ ] Increased Nutrient Needs [ ] Overweight/Obesity   [ ] Altered GI Function [ ] Unintended Weight Loss [ ] Food & Nutrition Related Knowledge Deficit [ ] severe Malnutrition [ ] moderate malnutrition    Nutrition Diagnosis is [x ] ongoing  [ ] resolved  [ ] improved  [ ] not applicable     New Nutrition Diagnosis:  [ ] Inadequate Energy Intake [ ]Inadequate Oral Intake [ ] Excessive Energy Intake   [ ] Underweight [ ] Increased Nutrient Needs [ ] Overweight/Obesity   [ ] Altered GI Function [ ] Unintended Weight Loss [ ] Food & Nutrition Related Knowledge Deficit [x ] severe Malnutrition in the context of acute illness    Related to: Inadequate energy & protein intake related to Gastrointestinal hemorrhage & Lt pneumonia with pleural effusion    As evidenced by:  +3 edema Ja legs & ankles & po intake <50% nutritional needs x 7 days hospitalization      Interventions:   Recommend  [x ] Continue: Continue Clear liquids/Ensure Enlive 3 x day(720kcal & 24gm protein)  [ x] Change Diet To: Adv po diet pending colonoscopy 1/8 consider Full liquids/Ensure Enlive 3 x day(1050kcal & 20gm protein) to Low Fiber/Ensure Enlive 3 x day  [ ] Nutrition Supplement:  [ ] Nutrition Support:  [ ] Other:     Monitoring and Evaluation:   [x ] PO intake [ x ] Tolerance to diet prescription [ x ] weights [ x ] labs[ x ] follow up per protocol  [ ] other: Assessment:  Pt with gastrointestinal hemorrhage pending colonoscopy 1/8. Pt with Lt  pneumonia & pleural effusion presents with +SOB & Cough. Pt with acute  blood loss & anemia s/p transfusion.      Factors impacting intake: [ ] none [ ] nausea  [ ] vomiting [ ] diarrhea [ ] constipation  [ ]chewing problems [ ] swallowing issues  [x ] other: Gastrointestinal hemorrhage & shortness of breath    Diet Prescription: Diet, Clear Liquid:   No Red Dye  Supplement Feeding Modality:  Oral  Ensure Clear Cans or Servings Per Day:  1       Frequency:  Three Times a day (01-03-18 @ 15:16)  Diet, NPO:   NPO for Procedure/Test     NPO Start Date: 07-Jan-2018,   NPO Start Time: 23:59  Except Medications (01-05-18 @ 14:05)    Intake: 0-80% consumed of clear liquid fed by staff    Current Weight:  Wt=93.1k(1/6); +3 edema Ja legs & ankles, Wt=92.9kg(12/30)  % Weight Change  0.2kg wt gain x 1 week    Pertinent Medications: MEDICATIONS  (STANDING):  acetylcysteine 20% Inhalation 2 milliLiter(s) Inhalation every 8 hours  ALBUTerol    0.083% 2.5 milliGRAM(s) Nebulizer every 4 hours  azithromycin   Tablet 250 milliGRAM(s) Oral daily  finasteride 5 milliGRAM(s) Oral daily  meropenem IVPB 500 milliGRAM(s) IV Intermittent every 8 hours  nystatin Powder 1 Application(s) Topical three times a day  pantoprazole  Injectable 40 milliGRAM(s) IV Push two times a day  sucralfate suspension 1 Gram(s) Oral four times a day  vancomycin  IVPB 750 milliGRAM(s) IV Intermittent every 12 hours    MEDICATIONS  (PRN):  acetaminophen   Tablet 650 milliGRAM(s) Oral every 6 hours PRN For Temp greater than 38 C (100.4 F)    Pertinent Labs: 01-06 Na141 mmol/L Glu 100 mg/dL<H> K+ 3.6 mmol/L Cr  0.66 mg/dL BUN 12 mg/dL Phos n/a   Alb n/a   PAB n/a , WBC=18.5(1/6), Alb=2.1(1/2)       CAPILLARY BLOOD GLUCOSE    Nutrition focused physical exam conducted ; found signs of malnutrition [ ]absent [x ]present.  Subcutaneous fat loss: [WNL ] Orbital fat pads region, [ WNL]Buccal fat region, [WNL ]Triceps region,  [WNL ]Ribs region.  Muscle wasting: [Moderate ]Temples region, [ ]Clavicle region, [ WNL]Shoulder region, [unable ]Scapula region, [WNL ]Interosseous region,  [edematous ]thigh region, [edematous ]Calf region    Skin: intact    Estimated Needs:   [x ] no change since previous assessment  [ ] recalculated:     Previous Nutrition Diagnosis:   [x ] Inadequate Energy Intake [ ]Inadequate Oral Intake [ ] Excessive Energy Intake   [ ] Underweight [ ] Increased Nutrient Needs [ ] Overweight/Obesity   [ ] Altered GI Function [ ] Unintended Weight Loss [ ] Food & Nutrition Related Knowledge Deficit [ ] severe Malnutrition [ ] moderate malnutrition    Nutrition Diagnosis is [x ] ongoing  [ ] resolved  [ ] improved  [ ] not applicable     New Nutrition Diagnosis:  [ ] Inadequate Energy Intake [ ]Inadequate Oral Intake [ ] Excessive Energy Intake   [ ] Underweight [ ] Increased Nutrient Needs [ ] Overweight/Obesity   [ ] Altered GI Function [ ] Unintended Weight Loss [ ] Food & Nutrition Related Knowledge Deficit [x ] severe Malnutrition in the context of acute illness    Related to: Inadequate energy & protein intake related to Gastrointestinal hemorrhage & Lt pneumonia with pleural effusion    As evidenced by:  +3 edema Ja legs & ankles & po intake <50% nutritional needs x 7 days hospitalization      Interventions:   Recommend  [x ] Continue: Continue Clear liquids/Ensure Enlive 3 x day(720kcal & 24gm protein)  [ x] Change Diet To: Adv po diet pending colonoscopy 1/8 consider Full liquids/Ensure Enlive 3 x day(1050kcal & 20gm protein) to Low Fiber/Ensure Enlive 3 x day  [ ] Nutrition Supplement:  [ ] Nutrition Support:  [ ] Other:     Monitoring and Evaluation:   [x ] PO intake [ x ] Tolerance to diet prescription [ x ] weights [ x ] labs[ x ] follow up per protocol  [ ] other: Assessment:  Pt with gastrointestinal hemorrhage pending colonoscopy 1/8. Pt with Lt  pneumonia & pleural effusion presents with +SOB & Cough. Pt with acute  blood loss & anemia s/p transfusion.  Pt currently NPO/Clear liquids x 7 days. Last BM x 1(1/5/18)    Factors impacting intake: [ ] none [ ] nausea  [ ] vomiting [ ] diarrhea [ ] constipation  [ ]chewing problems [ ] swallowing issues  [x ] other: Gastrointestinal hemorrhage & shortness of breath & +cough    Diet Prescription: Diet, Clear Liquid:   No Red Dye  Supplement Feeding Modality:  Oral  Ensure Clear Cans or Servings Per Day:  1       Frequency:  Three Times a day (01-03-18 @ 15:16)  Diet, NPO:   NPO for Procedure/Test     NPO Start Date: 07-Jan-2018,   NPO Start Time: 23:59  Except Medications (01-05-18 @ 14:05)    Intake: 0-80% consumed of clear liquid fed by staff;    Current Weight:  Wt=93.1k(1/6); +3 edema Ja legs & ankles, Wt=92.9kg(12/30)  % Weight Change  0.2kg wt gain x 1 week    Pertinent Medications: MEDICATIONS  (STANDING):  acetylcysteine 20% Inhalation 2 milliLiter(s) Inhalation every 8 hours  ALBUTerol    0.083% 2.5 milliGRAM(s) Nebulizer every 4 hours  azithromycin   Tablet 250 milliGRAM(s) Oral daily  finasteride 5 milliGRAM(s) Oral daily  meropenem IVPB 500 milliGRAM(s) IV Intermittent every 8 hours  nystatin Powder 1 Application(s) Topical three times a day  pantoprazole  Injectable 40 milliGRAM(s) IV Push two times a day  sucralfate suspension 1 Gram(s) Oral four times a day  vancomycin  IVPB 750 milliGRAM(s) IV Intermittent every 12 hours    MEDICATIONS  (PRN):  acetaminophen   Tablet 650 milliGRAM(s) Oral every 6 hours PRN For Temp greater than 38 C (100.4 F)    Pertinent Labs: 01-06 Na141 mmol/L Glu 100 mg/dL<H> K+ 3.6 mmol/L Cr  0.66 mg/dL BUN 12 mg/dL Phos n/a   Alb n/a   PAB n/a , WBC=18.5(1/6), Alb=2.1(1/2)       CAPILLARY BLOOD GLUCOSE    Nutrition focused physical exam conducted ; found signs of malnutrition [ ]absent [x ]present.  Subcutaneous fat loss: [WNL ] Orbital fat pads region, [ WNL]Buccal fat region, [WNL ]Triceps region,  [WNL ]Ribs region.  Muscle wasting: [Moderate ]Temples region, [ ]Clavicle region, [ WNL]Shoulder region, [unable ]Scapula region, [WNL ]Interosseous region,  [edematous ]thigh region, [edematous ]Calf region    Skin: intact    Estimated Needs:   [x ] no change since previous assessment  [ ] recalculated:     Previous Nutrition Diagnosis:   [x ] Inadequate Energy Intake [ ]Inadequate Oral Intake [ ] Excessive Energy Intake   [ ] Underweight [ ] Increased Nutrient Needs [ ] Overweight/Obesity   [ ] Altered GI Function [ ] Unintended Weight Loss [ ] Food & Nutrition Related Knowledge Deficit [ ] severe Malnutrition [ ] moderate malnutrition    Nutrition Diagnosis is [x ] ongoing  [ ] resolved  [ ] improved  [ ] not applicable     New Nutrition Diagnosis:  [ ] Inadequate Energy Intake [ ]Inadequate Oral Intake [ ] Excessive Energy Intake   [ ] Underweight [ ] Increased Nutrient Needs [ ] Overweight/Obesity   [ ] Altered GI Function [ ] Unintended Weight Loss [ ] Food & Nutrition Related Knowledge Deficit [x ] severe Malnutrition in the context of acute illness    Related to: Inadequate energy & protein intake related to Gastrointestinal hemorrhage & Lt pneumonia with pleural effusion    As evidenced by:  +3 edema Ja legs & ankles & po intake <50% nutritional needs x 7 days hospitalization      Interventions:   Recommend  [x ] Continue: Continue Clear liquids/Ensure Enlive 3 x day(720kcal & 24gm protein)  [ x] Change Diet To: Adv po diet pending colonoscopy 1/8 consider Full liquids/Ensure Enlive 3 x day(1050kcal & 20gm protein) to Low Fiber/Ensure Enlive 3 x day  [ ] Nutrition Supplement:  [ ] Nutrition Support:  [ ] Other:     Monitoring and Evaluation:   [x ] PO intake [ x ] Tolerance to diet prescription [ x ] weights [ x ] labs[ x ] follow up per protocol  [ ] other: Assessment:  Pt with gastrointestinal hemorrhage pending colonoscopy 1/8. Pt with Lt  pneumonia & pleural effusion presents with +SOB & Cough. Pt with acute  blood loss & anemia s/p transfusion.  Pt currently NPO/Clear liquids x 6 days of hospitalization. Last BM x 1(1/5/18)    Factors impacting intake: [ ] none [ ] nausea  [ ] vomiting [ ] diarrhea [ ] constipation  [ ]chewing problems [ ] swallowing issues  [x ] other: Gastrointestinal hemorrhage & shortness of breath & +cough    Diet Prescription: Diet, Clear Liquid:   No Red Dye  Supplement Feeding Modality:  Oral  Ensure Clear Cans or Servings Per Day:  1       Frequency:  Three Times a day (01-03-18 @ 15:16)  Diet, NPO:   NPO for Procedure/Test     NPO Start Date: 07-Jan-2018,   NPO Start Time: 23:59  Except Medications (01-05-18 @ 14:05)    Intake: 0-80% consumed of clear liquid fed by staff;    Current Weight:  Wt=93.1k(1/6); +3 edema Ja legs & ankles, Wt=92.9kg(12/30)  % Weight Change  0.2kg wt gain x 1 week    Pertinent Medications: MEDICATIONS  (STANDING):  acetylcysteine 20% Inhalation 2 milliLiter(s) Inhalation every 8 hours  ALBUTerol    0.083% 2.5 milliGRAM(s) Nebulizer every 4 hours  azithromycin   Tablet 250 milliGRAM(s) Oral daily  finasteride 5 milliGRAM(s) Oral daily  meropenem IVPB 500 milliGRAM(s) IV Intermittent every 8 hours  nystatin Powder 1 Application(s) Topical three times a day  pantoprazole  Injectable 40 milliGRAM(s) IV Push two times a day  sucralfate suspension 1 Gram(s) Oral four times a day  vancomycin  IVPB 750 milliGRAM(s) IV Intermittent every 12 hours    MEDICATIONS  (PRN):  acetaminophen   Tablet 650 milliGRAM(s) Oral every 6 hours PRN For Temp greater than 38 C (100.4 F)    Pertinent Labs: 01-06 Na141 mmol/L Glu 100 mg/dL<H> K+ 3.6 mmol/L Cr  0.66 mg/dL BUN 12 mg/dL Phos n/a   Alb n/a   PAB n/a , WBC=18.5(1/6), Alb=2.1(1/2)       CAPILLARY BLOOD GLUCOSE    Nutrition focused physical exam conducted ; found signs of malnutrition [ ]absent [x ]present.  Subcutaneous fat loss: [WNL ] Orbital fat pads region, [ WNL]Buccal fat region, [WNL ]Triceps region,  [WNL ]Ribs region.  Muscle wasting: [Moderate ]Temples region, [ ]Clavicle region, [ WNL]Shoulder region, [unable ]Scapula region, [WNL ]Interosseous region,  [edematous ]thigh region, [edematous ]Calf region    Skin: intact    Estimated Needs:   [x ] no change since previous assessment  [ ] recalculated:     Previous Nutrition Diagnosis:   [x ] Inadequate Energy Intake [ ]Inadequate Oral Intake [ ] Excessive Energy Intake   [ ] Underweight [ ] Increased Nutrient Needs [ ] Overweight/Obesity   [ ] Altered GI Function [ ] Unintended Weight Loss [ ] Food & Nutrition Related Knowledge Deficit [ ] severe Malnutrition [ ] moderate malnutrition    Nutrition Diagnosis is [x ] ongoing  [ ] resolved  [ ] improved  [ ] not applicable     New Nutrition Diagnosis:  [ ] Inadequate Energy Intake [ ]Inadequate Oral Intake [ ] Excessive Energy Intake   [ ] Underweight [ ] Increased Nutrient Needs [ ] Overweight/Obesity   [ ] Altered GI Function [ ] Unintended Weight Loss [ ] Food & Nutrition Related Knowledge Deficit [x ] severe Malnutrition in the context of acute illness    Related to: Inadequate energy & protein intake related to Gastrointestinal hemorrhage & Lt pneumonia with pleural effusion    As evidenced by:  +3 edema Ja legs & ankles & po intake <50% nutritional needs x 6 days hospitalization      Interventions:   Recommend  [x ] Continue: Continue Clear liquids/Ensure Enlive 3 x day(720kcal & 24gm protein)  [ x] Change Diet To: Adv po diet pending colonoscopy 1/8 consider Full liquids/Ensure Enlive 3 x day(1050kcal & 20gm protein) to Low Fiber/Ensure Enlive 3 x day  [ ] Nutrition Supplement:  [ ] Nutrition Support:  [ ] Other:     Monitoring and Evaluation:   [x ] PO intake [ x ] Tolerance to diet prescription [ x ] weights [ x ] labs[ x ] follow up per protocol  [ ] other:

## 2018-01-06 NOTE — CHART NOTE - NSCHARTNOTEFT_GEN_A_CORE
Upon Nutritional Assessment by the Registered Dietitian your patient was determined to meet criteria / has evidence of the following diagnosis/diagnoses:          [ ]  Mild Protein Calorie Malnutrition        [ ]  Moderate Protein Calorie Malnutrition        [x ] Severe Protein Calorie Malnutrition        [ ] Unspecified Protein Calorie Malnutrition        [ ] Underweight / BMI <19        [ ] Morbid Obesity / BMI > 40      Findings as based on:  •  Comprehensive nutrition assessment and consultation  •  Calorie counts (nutrient intake analysis)  •  Food acceptance and intake status from observations by staff  •  Follow up  •  Patient education  •  Intervention secondary to interdisciplinary rounds  •   concerns      Treatment:    The following diet has been recommended:  Adv po diet when medically feasible Full liquids/Ensure Enlive 3 x day(1050kcal & 60gm protein) to low fiber/Ensure Enlive 3 x day    PROVIDER Section:     By signing this assessment you are acknowledging and agree with the diagnosis/diagnoses assigned by the Registered Dietitian    Comments:

## 2018-01-07 DIAGNOSIS — R21 RASH AND OTHER NONSPECIFIC SKIN ERUPTION: ICD-10-CM

## 2018-01-07 LAB
ANION GAP SERPL CALC-SCNC: 7 MMOL/L — SIGNIFICANT CHANGE UP (ref 5–17)
BUN SERPL-MCNC: 12 MG/DL — SIGNIFICANT CHANGE UP (ref 7–23)
CALCIUM SERPL-MCNC: 7.5 MG/DL — LOW (ref 8.5–10.1)
CHLORIDE SERPL-SCNC: 107 MMOL/L — SIGNIFICANT CHANGE UP (ref 96–108)
CO2 SERPL-SCNC: 26 MMOL/L — SIGNIFICANT CHANGE UP (ref 22–31)
CREAT SERPL-MCNC: 0.69 MG/DL — SIGNIFICANT CHANGE UP (ref 0.5–1.3)
GLUCOSE SERPL-MCNC: 105 MG/DL — HIGH (ref 70–99)
HCT VFR BLD CALC: 34.3 % — LOW (ref 39–50)
HGB BLD-MCNC: 11.5 G/DL — LOW (ref 13–17)
INR BLD: 1.28 RATIO — HIGH (ref 0.88–1.16)
MCHC RBC-ENTMCNC: 33.2 PG — SIGNIFICANT CHANGE UP (ref 27–34)
MCHC RBC-ENTMCNC: 33.5 GM/DL — SIGNIFICANT CHANGE UP (ref 32–36)
MCV RBC AUTO: 99.1 FL — SIGNIFICANT CHANGE UP (ref 80–100)
PHOSPHATE SERPL-MCNC: 2.1 MG/DL — LOW (ref 2.5–4.5)
PLATELET # BLD AUTO: 242 K/UL — SIGNIFICANT CHANGE UP (ref 150–400)
POTASSIUM SERPL-MCNC: 3.8 MMOL/L — SIGNIFICANT CHANGE UP (ref 3.5–5.3)
POTASSIUM SERPL-SCNC: 3.8 MMOL/L — SIGNIFICANT CHANGE UP (ref 3.5–5.3)
PROTHROM AB SERPL-ACNC: 14 SEC — HIGH (ref 9.8–12.7)
RBC # BLD: 3.46 M/UL — LOW (ref 4.2–5.8)
RBC # FLD: 14.9 % — SIGNIFICANT CHANGE UP (ref 11–15)
SODIUM SERPL-SCNC: 140 MMOL/L — SIGNIFICANT CHANGE UP (ref 135–145)
WBC # BLD: 19.8 K/UL — HIGH (ref 3.8–10.5)
WBC # FLD AUTO: 19.8 K/UL — HIGH (ref 3.8–10.5)

## 2018-01-07 PROCEDURE — 99233 SBSQ HOSP IP/OBS HIGH 50: CPT

## 2018-01-07 RX ORDER — HYDROCORTISONE 1 %
1 OINTMENT (GRAM) TOPICAL
Qty: 0 | Refills: 0 | Status: DISCONTINUED | OUTPATIENT
Start: 2018-01-07 | End: 2018-01-12

## 2018-01-07 RX ORDER — SODIUM,POTASSIUM PHOSPHATES 278-250MG
1 POWDER IN PACKET (EA) ORAL
Qty: 0 | Refills: 0 | Status: COMPLETED | OUTPATIENT
Start: 2018-01-07 | End: 2018-01-09

## 2018-01-07 RX ADMIN — PANTOPRAZOLE SODIUM 40 MILLIGRAM(S): 20 TABLET, DELAYED RELEASE ORAL at 17:17

## 2018-01-07 RX ADMIN — Medication 1 GRAM(S): at 05:07

## 2018-01-07 RX ADMIN — NYSTATIN CREAM 1 APPLICATION(S): 100000 CREAM TOPICAL at 21:18

## 2018-01-07 RX ADMIN — Medication 1 GRAM(S): at 17:18

## 2018-01-07 RX ADMIN — Medication 1 GRAM(S): at 00:06

## 2018-01-07 RX ADMIN — Medication 1 TABLET(S): at 17:18

## 2018-01-07 RX ADMIN — Medication 1 GRAM(S): at 11:51

## 2018-01-07 RX ADMIN — Medication 4000 MILLILITER(S): at 11:50

## 2018-01-07 RX ADMIN — Medication 1 TABLET(S): at 21:18

## 2018-01-07 RX ADMIN — Medication 1 TABLET(S): at 13:08

## 2018-01-07 RX ADMIN — Medication 10 MILLIGRAM(S): at 17:17

## 2018-01-07 RX ADMIN — Medication 3 MILLILITER(S): at 11:36

## 2018-01-07 RX ADMIN — Medication 3 MILLILITER(S): at 05:44

## 2018-01-07 RX ADMIN — PANTOPRAZOLE SODIUM 40 MILLIGRAM(S): 20 TABLET, DELAYED RELEASE ORAL at 05:07

## 2018-01-07 RX ADMIN — FINASTERIDE 5 MILLIGRAM(S): 5 TABLET, FILM COATED ORAL at 11:51

## 2018-01-07 RX ADMIN — NYSTATIN CREAM 1 APPLICATION(S): 100000 CREAM TOPICAL at 13:08

## 2018-01-07 RX ADMIN — Medication 3 MILLILITER(S): at 17:10

## 2018-01-07 RX ADMIN — NYSTATIN CREAM 1 APPLICATION(S): 100000 CREAM TOPICAL at 05:07

## 2018-01-07 RX ADMIN — Medication 1 APPLICATION(S): at 17:17

## 2018-01-07 NOTE — PROGRESS NOTE ADULT - PROBLEM SELECTOR PLAN 1
-   Vancomycin IV, zithromax ,Meropenam were held 2/2 to rash by   - ID  on board   -monitor leukocytosis

## 2018-01-07 NOTE — PROGRESS NOTE ADULT - SUBJECTIVE AND OBJECTIVE BOX
Patient is a 82y old  Male who presents with a chief complaint of pna (28 Dec 2017 18:56)       OVERNIGHT EVENTS: rash, not itchy    MEDICATIONS  (STANDING):  ALBUTerol/ipratropium for Nebulization 3 milliLiter(s) Nebulizer every 6 hours  bisacodyl 10 milliGRAM(s) Oral once  finasteride 5 milliGRAM(s) Oral daily  nystatin Powder 1 Application(s) Topical three times a day  pantoprazole  Injectable 40 milliGRAM(s) IV Push two times a day  polyethylene glycol/electrolyte Solution. 4000 milliLiter(s) Oral once  sucralfate suspension 1 Gram(s) Oral four times a day    MEDICATIONS  (PRN):  acetaminophen   Tablet 650 milliGRAM(s) Oral every 6 hours PRN For Temp greater than 38 C (100.4 F)       Vital Signs Last 24 Hrs  T(C): 36.9 (07 Jan 2018 05:14), Max: 37.4 (06 Jan 2018 23:51)  T(F): 98.4 (07 Jan 2018 05:14), Max: 99.4 (06 Jan 2018 23:51)  HR: 86 (07 Jan 2018 05:44) (77 - 90)  BP: 124/55 (07 Jan 2018 05:14) (112/60 - 132/60)  BP(mean): --  RR: 18 (07 Jan 2018 05:14) (17 - 18)  SpO2: 100% (07 Jan 2018 05:44) (94% - 100%)     PHYSICAL EXAM:  GENERAL: NAD, lying in bed , denies any further bleeding episodes, daughter at bedside   HEAD:  Atraumatic, Normocephalic  EYES: EOMI, PERRLA, conjunctiva and sclera clear  ENMT: No tonsillar erythema, exudates, or enlargement; Moist mucous membranes   NECK: Supple, No JVD   NERVOUS SYSTEM:  Alert, awake , oriented, responding appropriately and asking Qs  CHEST/LUNG: Clear to auscultation  bilaterally; No rales, rhonchi, wheezing, or rubs  HEART: Irregular  ABDOMEN: Soft, Nontender, Nondistended; Bowel sounds present  EXTREMITIES:   RUE contracted, b/l LE edema  SKIN : generalized rash       LABS:                        11.5   19.8  )-----------( 242      ( 07 Jan 2018 07:14 )             34.3     01-07    140  |  107  |  12  ----------------------------<  105<H>  3.8   |  26  |  0.69    Ca    7.5<L>      07 Jan 2018 07:14  Phos  2.1     01-07      PT/INR - ( 07 Jan 2018 07:14 )   PT: 14.0 sec;   INR: 1.28 ratio            cardiac markers     CAPILLARY BLOOD GLUCOSE        Cultures    RADIOLOGY & ADDITIONAL TESTS:    Imaging Personally Reviewed:  [x ] YES  [ ] NO    Consultant(s) Notes Reviewed:  [x ] YES  [ ] NO    Care Discussed with Consultants/Other Providers [x ] YES  [ ] NO

## 2018-01-08 ENCOUNTER — RESULT REVIEW (OUTPATIENT)
Age: 83
End: 2018-01-08

## 2018-01-08 LAB
ANION GAP SERPL CALC-SCNC: 9 MMOL/L — SIGNIFICANT CHANGE UP (ref 5–17)
BUN SERPL-MCNC: 12 MG/DL — SIGNIFICANT CHANGE UP (ref 7–23)
CALCIUM SERPL-MCNC: 7.1 MG/DL — LOW (ref 8.5–10.1)
CHLORIDE SERPL-SCNC: 109 MMOL/L — HIGH (ref 96–108)
CO2 SERPL-SCNC: 24 MMOL/L — SIGNIFICANT CHANGE UP (ref 22–31)
CREAT SERPL-MCNC: 0.64 MG/DL — SIGNIFICANT CHANGE UP (ref 0.5–1.3)
GLUCOSE SERPL-MCNC: 78 MG/DL — SIGNIFICANT CHANGE UP (ref 70–99)
HCT VFR BLD CALC: 34.1 % — LOW (ref 39–50)
HGB BLD-MCNC: 11.8 G/DL — LOW (ref 13–17)
INR BLD: 1.33 RATIO — HIGH (ref 0.88–1.16)
MCHC RBC-ENTMCNC: 34.2 PG — HIGH (ref 27–34)
MCHC RBC-ENTMCNC: 34.5 GM/DL — SIGNIFICANT CHANGE UP (ref 32–36)
MCV RBC AUTO: 99.2 FL — SIGNIFICANT CHANGE UP (ref 80–100)
PLATELET # BLD AUTO: 216 K/UL — SIGNIFICANT CHANGE UP (ref 150–400)
POTASSIUM SERPL-MCNC: 4 MMOL/L — SIGNIFICANT CHANGE UP (ref 3.5–5.3)
POTASSIUM SERPL-SCNC: 4 MMOL/L — SIGNIFICANT CHANGE UP (ref 3.5–5.3)
PROTHROM AB SERPL-ACNC: 14.6 SEC — HIGH (ref 9.8–12.7)
RBC # BLD: 3.44 M/UL — LOW (ref 4.2–5.8)
RBC # FLD: 14.3 % — SIGNIFICANT CHANGE UP (ref 11–15)
SODIUM SERPL-SCNC: 142 MMOL/L — SIGNIFICANT CHANGE UP (ref 135–145)
SURGICAL PATHOLOGY FINAL REPORT - CH: SIGNIFICANT CHANGE UP
WBC # BLD: 17.7 K/UL — HIGH (ref 3.8–10.5)
WBC # FLD AUTO: 17.7 K/UL — HIGH (ref 3.8–10.5)

## 2018-01-08 PROCEDURE — 99233 SBSQ HOSP IP/OBS HIGH 50: CPT

## 2018-01-08 PROCEDURE — 88305 TISSUE EXAM BY PATHOLOGIST: CPT | Mod: 26

## 2018-01-08 RX ORDER — SODIUM CHLORIDE 9 MG/ML
1000 INJECTION, SOLUTION INTRAVENOUS
Qty: 0 | Refills: 0 | Status: DISCONTINUED | OUTPATIENT
Start: 2018-01-08 | End: 2018-01-08

## 2018-01-08 RX ORDER — WARFARIN SODIUM 2.5 MG/1
5 TABLET ORAL ONCE
Qty: 0 | Refills: 0 | Status: COMPLETED | OUTPATIENT
Start: 2018-01-08 | End: 2018-01-08

## 2018-01-08 RX ORDER — ONDANSETRON 8 MG/1
4 TABLET, FILM COATED ORAL ONCE
Qty: 0 | Refills: 0 | Status: DISCONTINUED | OUTPATIENT
Start: 2018-01-08 | End: 2018-01-08

## 2018-01-08 RX ORDER — PANTOPRAZOLE SODIUM 20 MG/1
40 TABLET, DELAYED RELEASE ORAL
Qty: 0 | Refills: 0 | Status: DISCONTINUED | OUTPATIENT
Start: 2018-01-08 | End: 2018-01-12

## 2018-01-08 RX ADMIN — Medication 1 APPLICATION(S): at 05:31

## 2018-01-08 RX ADMIN — Medication 1 TABLET(S): at 23:25

## 2018-01-08 RX ADMIN — Medication 1 GRAM(S): at 05:31

## 2018-01-08 RX ADMIN — Medication 1 TABLET(S): at 17:50

## 2018-01-08 RX ADMIN — NYSTATIN CREAM 1 APPLICATION(S): 100000 CREAM TOPICAL at 23:25

## 2018-01-08 RX ADMIN — PANTOPRAZOLE SODIUM 40 MILLIGRAM(S): 20 TABLET, DELAYED RELEASE ORAL at 05:31

## 2018-01-08 RX ADMIN — SODIUM CHLORIDE 75 MILLILITER(S): 9 INJECTION, SOLUTION INTRAVENOUS at 10:31

## 2018-01-08 RX ADMIN — Medication 3 MILLILITER(S): at 06:09

## 2018-01-08 RX ADMIN — WARFARIN SODIUM 5 MILLIGRAM(S): 2.5 TABLET ORAL at 23:25

## 2018-01-08 RX ADMIN — Medication 3 MILLILITER(S): at 11:10

## 2018-01-08 RX ADMIN — NYSTATIN CREAM 1 APPLICATION(S): 100000 CREAM TOPICAL at 05:31

## 2018-01-08 RX ADMIN — Medication 1 APPLICATION(S): at 19:32

## 2018-01-08 RX ADMIN — Medication 3 MILLILITER(S): at 00:09

## 2018-01-08 RX ADMIN — Medication 1 GRAM(S): at 00:02

## 2018-01-08 RX ADMIN — Medication 3 MILLILITER(S): at 23:52

## 2018-01-08 RX ADMIN — FINASTERIDE 5 MILLIGRAM(S): 5 TABLET, FILM COATED ORAL at 17:50

## 2018-01-08 RX ADMIN — Medication 3 MILLILITER(S): at 18:08

## 2018-01-08 RX ADMIN — Medication 1 GRAM(S): at 17:50

## 2018-01-08 NOTE — PROGRESS NOTE ADULT - PROBLEM SELECTOR PLAN 6
-Full liquids/Ensure Enlive 3 x day(1050kcal & 60gm protein)  swallow consult recommended prior to advancing diet as pt occassionally has cough

## 2018-01-08 NOTE — PROGRESS NOTE ADULT - SUBJECTIVE AND OBJECTIVE BOX
Pt is a 81 yo gentleman with a pmhx of afib on coumadin, hypotension who presents to the ED with sob and cough since [place]. Questionable fever, never checked w thermometer, no change in mental status. Cough is nonproductive, but can hear mucous in lungs. No chest pain, no abdominal pain, no hx of dvt/pe. No recent hospitalizations. (28 Dec 2017 18:56)  work up consistent with community acquired pneumonia  also course complicated by gi bleed and persistent increased in wbc   today   rash all over   yesterday in groin and back     No Known Drug Allergies  peanuts (Anaphylaxis)    Intolerances        MEDICATIONS  (STANDING):  ALBUTerol/ipratropium for Nebulization 3 milliLiter(s) Nebulizer every 6 hours  finasteride 5 milliGRAM(s) Oral daily  hydrocortisone 0.5% Ointment 1 Application(s) Topical two times a day  nystatin Powder 1 Application(s) Topical three times a day  pantoprazole    Tablet 40 milliGRAM(s) Oral before breakfast  potassium acid phosphate/sodium acid phosphate tablet (K-PHOS No. 2) 1 Tablet(s) Oral four times a day with meals  sucralfate suspension 1 Gram(s) Oral four times a day  warfarin 5 milliGRAM(s) Oral once    MEDICATIONS  (PRN):  acetaminophen   Tablet 650 milliGRAM(s) Oral every 6 hours PRN For Temp greater than 38 C (100.4 F)      REVIEW OF SYSTEMS:    feels chilly   otherwise ok   wife by bedside     VITAL SIGNS:  T(C): 36.4 (01-08-18 @ 11:16), Max: 37.1 (01-07-18 @ 17:02)  T(F): 97.5 (01-08-18 @ 11:16), Max: 98.7 (01-07-18 @ 17:02)  HR: 92 (01-08-18 @ 11:16) (84 - 94)  BP: 122/69 (01-08-18 @ 15:10) (88/46 - 122/69)  RR: 18 (01-08-18 @ 15:10) (17 - 31)  SpO2: 98% (01-08-18 @ 15:10) (93% - 98%)  Wt(kg): --    PHYSICAL EXAM:    GENERAL: not in any distress  HEENT: Neck is supple, normocephalic, atraumatic   CHEST/LUNG: Clear to auscultation bilaterally; No rales, rhonchi, wheezing  HEART: Regular rate and rhythm; No murmurs, rubs, or gallops  ABDOMEN: Soft, Nontender, Nondistended; Bowel sounds present, no rebound   EXTREMITIES:  2+ Peripheral Pulses, No clubbing, cyanosis, or edema  SKIN: gen body rash   BACK: no pressor sore   NERVOUS SYSTEM:  Alert & Oriented X2      LABS:                         11.8   17.7  )-----------( 216      ( 08 Jan 2018 08:02 )             34.1     01-08    142  |  109<H>  |  12  ----------------------------<  78  4.0   |  24  |  0.64    Ca    7.1<L>      08 Jan 2018 08:02  Phos  2.1     01-07        PT/INR - ( 08 Jan 2018 08:02 )   PT: 14.6 sec;   INR: 1.33 ratio                                               Radiology:

## 2018-01-08 NOTE — PROGRESS NOTE ADULT - PROBLEM SELECTOR PLAN 4
-  Discussed in detail w/  - ok to restart AC as benefits outweigh risks obviously with caution , starting low dose coumadin  - monitor CBC, INR

## 2018-01-08 NOTE — PROGRESS NOTE ADULT - SUBJECTIVE AND OBJECTIVE BOX
Patient is a 82y old  Male who presents with a chief complaint of pna (28 Dec 2017 18:56)       OVERNIGHT EVENTS:    MEDICATIONS  (STANDING):  ALBUTerol/ipratropium for Nebulization 3 milliLiter(s) Nebulizer every 6 hours  finasteride 5 milliGRAM(s) Oral daily  hydrocortisone 0.5% Ointment 1 Application(s) Topical two times a day  nystatin Powder 1 Application(s) Topical three times a day  pantoprazole  Injectable 40 milliGRAM(s) IV Push two times a day  potassium acid phosphate/sodium acid phosphate tablet (K-PHOS No. 2) 1 Tablet(s) Oral four times a day with meals  sucralfate suspension 1 Gram(s) Oral four times a day    MEDICATIONS  (PRN):  acetaminophen   Tablet 650 milliGRAM(s) Oral every 6 hours PRN For Temp greater than 38 C (100.4 F)       Vital Signs Last 24 Hrs  T(C): 36.4 (08 Jan 2018 11:16), Max: 37.1 (07 Jan 2018 17:02)  T(F): 97.5 (08 Jan 2018 11:16), Max: 98.7 (07 Jan 2018 17:02)  HR: 92 (08 Jan 2018 11:16) (84 - 94)  BP: 110/60 (08 Jan 2018 11:16) (88/46 - 122/63)  BP(mean): --  RR: 17 (08 Jan 2018 11:16) (17 - 31)  SpO2: 98% (08 Jan 2018 11:16) (93% - 98%)     PHYSICAL EXAM:  GENERAL: NAD, lying in bed , denies any further bleeding episodes, daughter at bedside   HEAD:  Atraumatic, Normocephalic  EYES: EOMI, PERRLA, conjunctiva and sclera clear  ENMT: No tonsillar erythema, exudates, or enlargement; Moist mucous membranes   NECK: Supple, No JVD   NERVOUS SYSTEM:  Alert, awake , oriented, responding appropriately and asking Qs  CHEST/LUNG: Clear to auscultation  bilaterally; No rales, rhonchi, wheezing, or rubs  HEART: Irregular  ABDOMEN: Soft, Nontender, Nondistended; Bowel sounds present  EXTREMITIES:   RUE contracted, b/l LE edema  SKIN : generalized rash       LABS:                        11.8   17.7  )-----------( 216      ( 08 Jan 2018 08:02 )             34.1     01-08    142  |  109<H>  |  12  ----------------------------<  78  4.0   |  24  |  0.64    Ca    7.1<L>      08 Jan 2018 08:02  Phos  2.1     01-07      PT/INR - ( 08 Jan 2018 08:02 )   PT: 14.6 sec;   INR: 1.33 ratio            cardiac markers     CAPILLARY BLOOD GLUCOSE        Cultures    RADIOLOGY & ADDITIONAL TESTS:    Imaging Personally Reviewed:  [x ] YES  [ ] NO    Consultant(s) Notes Reviewed:  [x ] YES  [ ] NO    Care Discussed with Consultants/Other Providers [x ] YES  [ ] NO

## 2018-01-08 NOTE — PROGRESS NOTE ADULT - ASSESSMENT
community acquired pneumonia gi bleed   persistent increased wbc ;; better off antibiotics   gi notes seen   hold off antibiotics   follow closely

## 2018-01-08 NOTE — PROGRESS NOTE ADULT - ASSESSMENT
81 yo WM with h/o A fib on Coumadin, BPH and b/l hip replacements who presented 2 to SOB and cough; admitted 2 to PNA (CT chest L PNA with pleural effusion). Pt then with an episode bloody BM and also had blood tinged sputum in setting of elevated INR. Had  transfused PRBC,  trasfered to medical floor, s/p EGD and colonoscopy

## 2018-01-09 LAB
ANION GAP SERPL CALC-SCNC: 12 MMOL/L — SIGNIFICANT CHANGE UP (ref 5–17)
BUN SERPL-MCNC: 15 MG/DL — SIGNIFICANT CHANGE UP (ref 7–23)
CALCIUM SERPL-MCNC: 7.5 MG/DL — LOW (ref 8.5–10.1)
CHLORIDE SERPL-SCNC: 106 MMOL/L — SIGNIFICANT CHANGE UP (ref 96–108)
CO2 SERPL-SCNC: 23 MMOL/L — SIGNIFICANT CHANGE UP (ref 22–31)
CREAT SERPL-MCNC: 0.67 MG/DL — SIGNIFICANT CHANGE UP (ref 0.5–1.3)
GLUCOSE SERPL-MCNC: 72 MG/DL — SIGNIFICANT CHANGE UP (ref 70–99)
HCT VFR BLD CALC: 34.4 % — LOW (ref 39–50)
HGB BLD-MCNC: 11.5 G/DL — LOW (ref 13–17)
INR BLD: 1.31 RATIO — HIGH (ref 0.88–1.16)
MCHC RBC-ENTMCNC: 33.4 GM/DL — SIGNIFICANT CHANGE UP (ref 32–36)
MCHC RBC-ENTMCNC: 33.4 PG — SIGNIFICANT CHANGE UP (ref 27–34)
MCV RBC AUTO: 100 FL — SIGNIFICANT CHANGE UP (ref 80–100)
PLATELET # BLD AUTO: 250 K/UL — SIGNIFICANT CHANGE UP (ref 150–400)
POTASSIUM SERPL-MCNC: 3.9 MMOL/L — SIGNIFICANT CHANGE UP (ref 3.5–5.3)
POTASSIUM SERPL-SCNC: 3.9 MMOL/L — SIGNIFICANT CHANGE UP (ref 3.5–5.3)
PROTHROM AB SERPL-ACNC: 14.4 SEC — HIGH (ref 9.8–12.7)
RBC # BLD: 3.44 M/UL — LOW (ref 4.2–5.8)
RBC # FLD: 14.7 % — SIGNIFICANT CHANGE UP (ref 11–15)
SODIUM SERPL-SCNC: 141 MMOL/L — SIGNIFICANT CHANGE UP (ref 135–145)
SURGICAL PATHOLOGY FINAL REPORT - CH: SIGNIFICANT CHANGE UP
WBC # BLD: 18.2 K/UL — HIGH (ref 3.8–10.5)
WBC # FLD AUTO: 18.2 K/UL — HIGH (ref 3.8–10.5)

## 2018-01-09 PROCEDURE — 99233 SBSQ HOSP IP/OBS HIGH 50: CPT

## 2018-01-09 RX ORDER — WARFARIN SODIUM 2.5 MG/1
5 TABLET ORAL ONCE
Qty: 0 | Refills: 0 | Status: COMPLETED | OUTPATIENT
Start: 2018-01-09 | End: 2018-01-09

## 2018-01-09 RX ADMIN — Medication 1 GRAM(S): at 23:30

## 2018-01-09 RX ADMIN — Medication 1 TABLET(S): at 08:55

## 2018-01-09 RX ADMIN — Medication 1 GRAM(S): at 13:17

## 2018-01-09 RX ADMIN — Medication 3 MILLILITER(S): at 17:17

## 2018-01-09 RX ADMIN — Medication 3 MILLILITER(S): at 06:08

## 2018-01-09 RX ADMIN — FINASTERIDE 5 MILLIGRAM(S): 5 TABLET, FILM COATED ORAL at 13:17

## 2018-01-09 RX ADMIN — NYSTATIN CREAM 1 APPLICATION(S): 100000 CREAM TOPICAL at 13:17

## 2018-01-09 RX ADMIN — PANTOPRAZOLE SODIUM 40 MILLIGRAM(S): 20 TABLET, DELAYED RELEASE ORAL at 08:55

## 2018-01-09 RX ADMIN — NYSTATIN CREAM 1 APPLICATION(S): 100000 CREAM TOPICAL at 21:44

## 2018-01-09 RX ADMIN — NYSTATIN CREAM 1 APPLICATION(S): 100000 CREAM TOPICAL at 05:42

## 2018-01-09 RX ADMIN — WARFARIN SODIUM 5 MILLIGRAM(S): 2.5 TABLET ORAL at 21:45

## 2018-01-09 RX ADMIN — Medication 3 MILLILITER(S): at 11:18

## 2018-01-09 RX ADMIN — Medication 1 APPLICATION(S): at 05:42

## 2018-01-09 RX ADMIN — Medication 1 GRAM(S): at 05:43

## 2018-01-09 RX ADMIN — Medication 1 GRAM(S): at 19:04

## 2018-01-09 RX ADMIN — Medication 1 APPLICATION(S): at 19:04

## 2018-01-09 NOTE — SWALLOW BEDSIDE ASSESSMENT ADULT - COMMENTS
CXR 1/2/2018 IMPRESSION:Worsening multifocal pneumonia. Left pleural effusion.This was marked is a fourth finger x-ray but is a chest x-ray. pt stated "it's hard to breath" after trials of thin and at the end of the eval

## 2018-01-09 NOTE — SWALLOW BEDSIDE ASSESSMENT ADULT - SWALLOW EVAL: PATIENT/FAMILY GOALS STATEMENT
pt reported "it's getting better"- referring to the cough and he denied difficulty swallowing. pt asked if he can have food

## 2018-01-09 NOTE — SWALLOW BEDSIDE ASSESSMENT ADULT - ORAL PHASE
Within functional limits Delayed oral transit time Delayed oral transit time/oral holding - suspect compensatory

## 2018-01-09 NOTE — PROGRESS NOTE ADULT - SUBJECTIVE AND OBJECTIVE BOX
Patient is a 82y old  Male who presents with a chief complaint of pna (28 Dec 2017 18:56)       OVERNIGHT EVENTS: no reported events    MEDICATIONS  (STANDING):  ALBUTerol/ipratropium for Nebulization 3 milliLiter(s) Nebulizer every 6 hours  finasteride 5 milliGRAM(s) Oral daily  hydrocortisone 0.5% Ointment 1 Application(s) Topical two times a day  nystatin Powder 1 Application(s) Topical three times a day  pantoprazole    Tablet 40 milliGRAM(s) Oral before breakfast  sucralfate suspension 1 Gram(s) Oral four times a day    MEDICATIONS  (PRN):  acetaminophen   Tablet 650 milliGRAM(s) Oral every 6 hours PRN For Temp greater than 38 C (100.4 F)      Vital Signs Last 24 Hrs  T(C): 37.3 (09 Jan 2018 05:18), Max: 37.3 (09 Jan 2018 05:18)  T(F): 99.2 (09 Jan 2018 05:18), Max: 99.2 (09 Jan 2018 05:18)  HR: 91 (09 Jan 2018 11:19) (62 - 97)  BP: 113/56 (09 Jan 2018 05:18) (106/53 - 122/69)  BP(mean): --  RR: 18 (09 Jan 2018 05:18) (18 - 18)  SpO2: 94% (09 Jan 2018 11:19) (93% - 98%)        PHYSICAL EXAM:  GENERAL: NAD, lying in bed , denies any further bleeding episodes, spouse at bedside   HEAD:  Atraumatic, Normocephalic  EYES: EOMI, PERRLA, conjunctiva and sclera clear  ENMT: No tonsillar erythema, exudates, or enlargement; Moist mucous membranes   NECK: Supple, No JVD   NERVOUS SYSTEM:  Alert, awake , oriented, responding appropriately and asking Qs  CHEST/LUNG: Clear to auscultation  bilaterally; No rales, rhonchi, wheezing, or rubs  HEART: Irregular  ABDOMEN: Soft, Nontender, Nondistended; Bowel sounds present  EXTREMITIES:   RUE contracted, b/l LE edema  SKIN : generalized rash (improving)    LABS:                        11.5   18.2  )-----------( 250      ( 09 Jan 2018 07:34 )             34.4     01-09    141  |  106  |  15  ----------------------------<  72  3.9   |  23  |  0.67    Ca    7.5<L>      09 Jan 2018 07:34      PT/INR - ( 09 Jan 2018 07:34 )   PT: 14.4 sec;   INR: 1.31 ratio            cardiac markers     CAPILLARY BLOOD GLUCOSE        Cultures    RADIOLOGY & ADDITIONAL TESTS:    Imaging Personally Reviewed:  [ x] YES  [ ] NO    Consultant(s) Notes Reviewed:  [x] YES  [ ] NO    Care Discussed with Consultants/Other Providers [x ] YES  [ ] NO

## 2018-01-09 NOTE — PROGRESS NOTE ADULT - ASSESSMENT
community acquired pneumonia gi bleed   persistent increased wbc ;; better off antibiotics   gi notes seen   hold off antibiotics   follow closely  rash ?? drug rash   leucocytosis persists

## 2018-01-09 NOTE — PROGRESS NOTE ADULT - PROBLEM SELECTOR PLAN 7
-improving, ? drug related, improving rash   -abx held for the same by   - hydrcortisone ointment topical

## 2018-01-09 NOTE — PROGRESS NOTE ADULT - PROBLEM SELECTOR PLAN 4
-  Discussed in detail w/  - ok to restart AC as benefits outweigh risks obviously with caution , started   coumadin 5mg    - monitor CBC, INR

## 2018-01-09 NOTE — SWALLOW BEDSIDE ASSESSMENT ADULT - SLP PERTINENT HISTORY OF CURRENT PROBLEM
83 yo WM with h/o A fib on Coumadin, BPH and b/l hip replacements who presented 2 to SOB and cough; admitted 2 to PNA (CT chest L PNA with pleural effusion). Pt then with an episode bloody BM and also had blood tinged sputum in setting of elevated INR. Had  transfused PRBC,  trasnfered to medical floor, s/p EGD and colonoscopy

## 2018-01-09 NOTE — PROGRESS NOTE ADULT - SUBJECTIVE AND OBJECTIVE BOX
Pt is a 81 yo gentleman with a pmhx of afib on coumadin, hypotension who presents to the ED with sob and cough since [place]. Questionable fever, never checked w thermometer, no change in mental status. Cough is nonproductive, but can hear mucous in lungs. No chest pain, no abdominal pain, no hx of dvt/pe. No recent hospitalizations. (28 Dec 2017 18:56)  work up consistent with community acquired pneumonia  also course complicated by gi bleed and persistent increased in wbc   today   rash all over   but better    Allergies    chocolate (Unknown)  No Known Drug Allergies  peanuts (Anaphylaxis)    Intolerances        MEDICATIONS  (STANDING):  ALBUTerol/ipratropium for Nebulization 3 milliLiter(s) Nebulizer every 6 hours  finasteride 5 milliGRAM(s) Oral daily  hydrocortisone 0.5% Ointment 1 Application(s) Topical two times a day  nystatin Powder 1 Application(s) Topical three times a day  pantoprazole    Tablet 40 milliGRAM(s) Oral before breakfast  sucralfate suspension 1 Gram(s) Oral four times a day  warfarin 5 milliGRAM(s) Oral once    MEDICATIONS  (PRN):  acetaminophen   Tablet 650 milliGRAM(s) Oral every 6 hours PRN For Temp greater than 38 C (100.4 F)      REVIEW OF SYSTEMS:    no change in status   no itch   no cough short of breath     VITAL SIGNS:  T(C): 37.1 (01-09-18 @ 16:50), Max: 37.3 (01-09-18 @ 05:18)  T(F): 98.8 (01-09-18 @ 16:50), Max: 99.2 (01-09-18 @ 05:18)  HR: 90 (01-09-18 @ 17:30) (62 - 95)  BP: 111/57 (01-09-18 @ 16:50) (111/57 - 116/60)  RR: 17 (01-09-18 @ 16:50) (17 - 18)  SpO2: 94% (01-09-18 @ 17:30) (94% - 99%)  Wt(kg): --    PHYSICAL EXAM:    GENERAL: not in any distress  HEENT: Neck is supple, normocephalic, atraumatic   CHEST/LUNG: Clear to auscultation bilaterally; No rales, rhonchi, wheezing  HEART: Regular rate and rhythm; No murmurs, rubs, or gallops  ABDOMEN: Soft, Nontender, Nondistended; Bowel sounds present, no rebound   EXTREMITIES:  2+ Peripheral Pulses, No clubbing, cyanosis, or edema  SKIN: gen rash   BACK: no pressor sore   NERVOUS SYSTEM:  Alert & Oriented X3,LABS:                         11.5   18.2  )-----------( 250      ( 09 Jan 2018 07:34 )             34.4     01-09    141  |  106  |  15  ----------------------------<  72  3.9   |  23  |  0.67    Ca    7.5<L>      09 Jan 2018 07:34        PT/INR - ( 09 Jan 2018 07:34 )   PT: 14.4 sec;   INR: 1.31 ratio                                               Radiology:

## 2018-01-09 NOTE — PROGRESS NOTE ADULT - PROBLEM SELECTOR PLAN 6
-Full liquids/Ensure Enlive 3 x day(1050kcal & 60gm protein)  - swallow consult recommended prior to advancing diet as pt occassionally has cough w/ diet

## 2018-01-09 NOTE — PROGRESS NOTE ADULT - ASSESSMENT
81 yo WM with h/o A fib on Coumadin, BPH and b/l hip replacements who presented 2 to SOB and cough; admitted 2 to PNA (CT chest L PNA with pleural effusion). Pt then with an episode bloody BM and also had blood tinged sputum in setting of elevated INR. Had  transfused PRBC,  trasnfered to medical floor, s/p EGD and colonoscopy

## 2018-01-09 NOTE — SWALLOW BEDSIDE ASSESSMENT ADULT - SWALLOW EVAL: RECOMMENDED FEEDING/EATING TECHNIQUES
maintain upright posture during/after eating for 30 mins/allow for swallow between intakes/small sips/bites

## 2018-01-09 NOTE — SWALLOW BEDSIDE ASSESSMENT ADULT - SWALLOW EVAL: DIAGNOSIS
pt presented with baseline cough therefore difficult to assess bedside however oropharyngeal phases of swallow marked by increased mastication time, possible oral holding with thin liquid- suspect compensatory, delay in swallow trigger with adequate laryngeal elevation. inconsistent cough across textures unclear if related to baseline cough

## 2018-01-10 ENCOUNTER — TRANSCRIPTION ENCOUNTER (OUTPATIENT)
Age: 83
End: 2018-01-10

## 2018-01-10 LAB
HCT VFR BLD CALC: 34.4 % — LOW (ref 39–50)
HGB BLD-MCNC: 11.6 G/DL — LOW (ref 13–17)
INR BLD: 1.51 RATIO — HIGH (ref 0.88–1.16)
MCHC RBC-ENTMCNC: 33.6 GM/DL — SIGNIFICANT CHANGE UP (ref 32–36)
MCHC RBC-ENTMCNC: 33.8 PG — SIGNIFICANT CHANGE UP (ref 27–34)
MCV RBC AUTO: 100.5 FL — HIGH (ref 80–100)
PLATELET # BLD AUTO: 206 K/UL — SIGNIFICANT CHANGE UP (ref 150–400)
PROTHROM AB SERPL-ACNC: 16.6 SEC — HIGH (ref 9.8–12.7)
RBC # BLD: 3.43 M/UL — LOW (ref 4.2–5.8)
RBC # FLD: 14.6 % — SIGNIFICANT CHANGE UP (ref 11–15)
WBC # BLD: 12.2 K/UL — HIGH (ref 3.8–10.5)
WBC # FLD AUTO: 12.2 K/UL — HIGH (ref 3.8–10.5)

## 2018-01-10 PROCEDURE — 99233 SBSQ HOSP IP/OBS HIGH 50: CPT

## 2018-01-10 RX ORDER — SUCRALFATE 1 G
10 TABLET ORAL
Qty: 0 | Refills: 0 | DISCHARGE
Start: 2018-01-10

## 2018-01-10 RX ORDER — AMOXICILLIN 250 MG/5ML
1 SUSPENSION, RECONSTITUTED, ORAL (ML) ORAL
Qty: 0 | Refills: 0 | COMMUNITY

## 2018-01-10 RX ORDER — PANTOPRAZOLE SODIUM 20 MG/1
1 TABLET, DELAYED RELEASE ORAL
Qty: 0 | Refills: 0 | DISCHARGE
Start: 2018-01-10

## 2018-01-10 RX ORDER — AMLODIPINE BESYLATE 2.5 MG/1
1 TABLET ORAL
Qty: 0 | Refills: 0 | COMMUNITY

## 2018-01-10 RX ORDER — HYDROCORTISONE 1 %
1 OINTMENT (GRAM) TOPICAL
Qty: 0 | Refills: 0 | DISCHARGE
Start: 2018-01-10

## 2018-01-10 RX ADMIN — Medication 1 APPLICATION(S): at 05:39

## 2018-01-10 RX ADMIN — NYSTATIN CREAM 1 APPLICATION(S): 100000 CREAM TOPICAL at 22:23

## 2018-01-10 RX ADMIN — Medication 3 MILLILITER(S): at 18:07

## 2018-01-10 RX ADMIN — PANTOPRAZOLE SODIUM 40 MILLIGRAM(S): 20 TABLET, DELAYED RELEASE ORAL at 08:18

## 2018-01-10 RX ADMIN — NYSTATIN CREAM 1 APPLICATION(S): 100000 CREAM TOPICAL at 15:33

## 2018-01-10 RX ADMIN — Medication 3 MILLILITER(S): at 05:25

## 2018-01-10 RX ADMIN — NYSTATIN CREAM 1 APPLICATION(S): 100000 CREAM TOPICAL at 05:39

## 2018-01-10 RX ADMIN — Medication 1 APPLICATION(S): at 18:05

## 2018-01-10 RX ADMIN — Medication 1 GRAM(S): at 12:52

## 2018-01-10 RX ADMIN — Medication 3 MILLILITER(S): at 11:21

## 2018-01-10 RX ADMIN — Medication 1 GRAM(S): at 05:38

## 2018-01-10 RX ADMIN — FINASTERIDE 5 MILLIGRAM(S): 5 TABLET, FILM COATED ORAL at 12:52

## 2018-01-10 RX ADMIN — Medication 3 MILLILITER(S): at 00:31

## 2018-01-10 RX ADMIN — Medication 1 GRAM(S): at 18:06

## 2018-01-10 NOTE — DISCHARGE NOTE ADULT - PLAN OF CARE
completed antibiotic course stable now, monitor repleted warfarin 5mg, INR check daily and redose coumadin accordingly , goal INR=2-3 continue with hydrocortisone ointment Diet, Dysphagia 3 Soft-Thin Liquids stable

## 2018-01-10 NOTE — DISCHARGE NOTE ADULT - PATIENT PORTAL LINK FT
“You can access the [facility] Patient Portal, offered by [facility], by registering with the following website: [email or web address]”

## 2018-01-10 NOTE — DISCHARGE NOTE ADULT - MEDICATION SUMMARY - MEDICATIONS TO STOP TAKING
I will STOP taking the medications listed below when I get home from the hospital:    amoxicillin 500 mg oral tablet  -- 1 tab(s) by mouth every 8 hours    amLODIPine 2.5 mg oral tablet  -- 1 tab(s) by mouth once a day    warfarin 1 mg oral tablet  -- 1 tab(s) by mouth once a day    oseltamivir 12 mg/mL oral suspension

## 2018-01-10 NOTE — PROGRESS NOTE ADULT - SUBJECTIVE AND OBJECTIVE BOX
Pt tolerated colonoscopy and polypectomy well  s/p 3.0cm polypectomy  no complaints today      MEDICATIONS  (STANDING):  ALBUTerol/ipratropium for Nebulization 3 milliLiter(s) Nebulizer every 6 hours  finasteride 5 milliGRAM(s) Oral daily  hydrocortisone 0.5% Ointment 1 Application(s) Topical two times a day  nystatin Powder 1 Application(s) Topical three times a day  pantoprazole    Tablet 40 milliGRAM(s) Oral before breakfast  sucralfate suspension 1 Gram(s) Oral four times a day    MEDICATIONS  (PRN):  acetaminophen   Tablet 650 milliGRAM(s) Oral every 6 hours PRN For Temp greater than 38 C (100.4 F)      Allergies    chocolate (Unknown)  No Known Drug Allergies  peanuts (Anaphylaxis)    Intolerances        Vital Signs Last 24 Hrs  T(C): 36.9 (10 [place] 2018 05:06), Max: 37.1 (09 Jan 2018 16:50)  T(F): 98.4 (10 [place] 2018 05:06), Max: 98.8 (09 Jan 2018 16:50)  HR: 80 (10 [place]dino 2018 05:30) (80 - 95)  BP: 116/57 (10 Galdino 2018 05:06) (111/57 - 116/57)  BP(mean): --  RR: 20 (10 Gald[place] 2018 05:06) (17 - 20)  SpO2: 95% (10 Galdino 2018 05:30) (94% - 99%)    PHYSICAL EXAM:  General: NAD.  CVS: S1, S2  Chest: air entry bilaterally present  Abd: BS present, soft, non-tender      LABS:                        11.6   12.2  )-----------( 206      ( 10 Galdino 2018 07:45 )             34.4     01-09    141  |  106  |  15  ----------------------------<  72  3.9   |  23  |  0.67    Ca    7.5<L>      09 Jan 2018 07:34      PT/INR - ( 10 Galdino 2018 07:45 )   PT: 16.6 sec;   INR: 1.51 ratio           Advance diet   await polyp pathology

## 2018-01-10 NOTE — DISCHARGE NOTE ADULT - CARE PLAN
Principal Discharge DX:	Multifocal pneumonia  Instructions for follow-up, activity and diet:	completed antibiotic course  Secondary Diagnosis:	Lower GI bleed  Instructions for follow-up, activity and diet:	stable now, monitor  Secondary Diagnosis:	Acute blood loss anemia  Instructions for follow-up, activity and diet:	stable now, monitor  Secondary Diagnosis:	Chronic atrial fibrillation  Instructions for follow-up, activity and diet:	repleted  Secondary Diagnosis:	Severe protein-calorie malnutrition  Instructions for follow-up, activity and diet:	warfarin 5mg, INR check daily and redose coumadin accordingly , goal INR=2-3  Secondary Diagnosis:	Rash  Instructions for follow-up, activity and diet:	continue with hydrocortisone ointment  Secondary Diagnosis:	Dysphagia, unspecified type  Instructions for follow-up, activity and diet:	Diet, Dysphagia 3 Soft-Thin Liquids Principal Discharge DX:	Multifocal pneumonia  Goal:	stable  Instructions for follow-up, activity and diet:	completed antibiotic course  Secondary Diagnosis:	Lower GI bleed  Instructions for follow-up, activity and diet:	stable now, monitor  Secondary Diagnosis:	Acute blood loss anemia  Instructions for follow-up, activity and diet:	stable now, monitor  Secondary Diagnosis:	Chronic atrial fibrillation  Instructions for follow-up, activity and diet:	repleted  Secondary Diagnosis:	Severe protein-calorie malnutrition  Instructions for follow-up, activity and diet:	warfarin 5mg, INR check daily and redose coumadin accordingly , goal INR=2-3  Secondary Diagnosis:	Rash  Instructions for follow-up, activity and diet:	continue with hydrocortisone ointment  Secondary Diagnosis:	Dysphagia, unspecified type  Instructions for follow-up, activity and diet:	Diet, Dysphagia 3 Soft-Thin Liquids Principal Discharge DX:	Multifocal pneumonia  Goal:	stable  Assessment and plan of treatment:	completed antibiotic course  Secondary Diagnosis:	Lower GI bleed  Assessment and plan of treatment:	stable now, monitor  Secondary Diagnosis:	Acute blood loss anemia  Assessment and plan of treatment:	stable now, monitor  Secondary Diagnosis:	Chronic atrial fibrillation  Assessment and plan of treatment:	repleted  Secondary Diagnosis:	Severe protein-calorie malnutrition  Assessment and plan of treatment:	warfarin 5mg, INR check daily and redose coumadin accordingly , goal INR=2-3  Secondary Diagnosis:	Rash  Assessment and plan of treatment:	continue with hydrocortisone ointment  Secondary Diagnosis:	Dysphagia, unspecified type  Assessment and plan of treatment:	Diet, Dysphagia 3 Soft-Thin Liquids

## 2018-01-10 NOTE — DISCHARGE NOTE ADULT - HOSPITAL COURSE
83 yo WM with h/o A fib on Coumadin, BPH and b/l hip replacements who presented 2 to SOB and cough; admitted 2 to PNA (CT chest L PNA with pleural effusion). Pt then with an episode bloody BM and also had blood tinged sputum in setting of elevated INR. Had  transfused PRBC,  trasnfered to medical floor, s/p EGD and colonoscopy   CAP (community acquired pneumonia).  given abx/10 days : -   IV abx stopped   2/2 to rash by   -monitored off abx. improved leukocytosis   Acute blood loss anemia.  - s/p 3 units PRBC transfusion in CCU  - pt had another episode of bleeding when transferred to medical floor , none since then   - c/w PO protonix  ,  sucralfate  -   EGD  by  - gastritis, hiatal hernia, no ulcers/erosions ,   colonoscopy  done today showing large 3cm pedunculated polyp(probable bleeding source)  which was removed, diverticulosis    - Discussed in detail w/ Dr.Miller Kenneth ley to restart AC as benefits outweigh risks obviously with caution , starting low dose coumadin.      Problem/Plan - 3:  ·  Problem: Gastrointestinal hemorrhage, unspecified gastrointestinal hemorrhage type.  Plan: - s/p 3 units PRBC transfusion in CCU  - pt had another episode of bleeding 4 days ago, none since then   - change to PO protonix  ,  sucralfate  -   EGD  by  - gastritis, hiatal hernia, no ulcers/erosions ,   colonoscopy  done today showing large 3cm pedunculated polyp(probable bleeding source)  which was removed, diverticulosis    - stable CBC for now , transfuse as needed  - Discussed in detail w/ Dr.Miller Kenneth ley to restart AC as benefits outweigh risks obviously with caution , started coumadin 5mg.      Problem/Plan - 4:  ·  Problem: Chronic atrial fibrillation.  Plan: -  Discussed in detail w/ Dr.Miller Kenneth ley to restart AC as benefits outweigh risks obviously with caution , started   coumadin 5mg    - monitor CBC, INR.      Problem/Plan - 5:  ·  Problem: Hypophosphatemia.  Plan: -repleted  - monitor electrolytes.      Problem/Plan - 6:  Problem: Severe protein-calorie malnutrition. Plan: -Full liquids/Ensure Enlive 3 x day(1050kcal & 60gm protein)  - swallow consult recommended prior to advancing diet as pt occassionally has cough w/ diet.     Problem/Plan - 7:  ·  Problem: Rash.  Plan: -improving, ? drug related, improving rash   -abx held for the same by   - hydrcortisone ointment topical. 83 yo WM with h/o A fib on Coumadin, BPH and b/l hip replacements who presented 2 to SOB and cough; admitted 2 to PNA (CT chest L PNA with pleural effusion). Pt then with an episode bloody BM and also had blood tinged sputum in setting of elevated INR. Had  transfused PRBC,  trasnfered to medical floor, s/p EGD and colonoscopy   CAP (community acquired pneumonia).  given abx/10 days : -   IV abx stopped   2/2 to rash by   -monitored off abx. improved leukocytosis   Acute blood loss anemia.  - s/p 3 units PRBC transfusion in CCU  - pt had another episode of bleeding when transferred to medical floor , none since then   - c/w PO protonix  ,  sucralfate  -   EGD  by  - gastritis, hiatal hernia, no ulcers/erosions ,   colonoscopy  done today showing large 3cm pedunculated polyp(probable bleeding source)  which was removed, diverticulosis    - Discussed in detail w/  - ok to restart AC as benefits outweigh risks obviously with caution , started low dose coumadin. , monitor INR and redose accordingly in rehab    Severe protein-calorie malnutrition. : -Full liquids/Ensure Enlive 3 x day(1050kcal & 60gm protein)  - swallow consult recommended prior to advancing diet as pt occassionally has cough w/ diet.     Rash. : -improving, ? drug related, improving rash   -abx held for the same by   - hydrcortisone ointment topical.   PT eval= SAMIR  Discussed w/ patient,  (PMD, upon pt's request), pt's spouse Pat at beside daily in detail. discussed multiple times with the daughter too when at bedside . Pt and his spouse agreeble for rehab and plan of care

## 2018-01-10 NOTE — PROGRESS NOTE ADULT - SUBJECTIVE AND OBJECTIVE BOX
Patient is a 82y old  Male who presents with a chief complaint of PNA (10 Galdino 2018 15:01)       OVERNIGHT EVENTS:    MEDICATIONS  (STANDING):  finasteride 5 milliGRAM(s) Oral daily  hydrocortisone 0.5% Ointment 1 Application(s) Topical two times a day  nystatin Powder 1 Application(s) Topical three times a day  pantoprazole    Tablet 40 milliGRAM(s) Oral before breakfast  sucralfate suspension 1 Gram(s) Oral four times a day  warfarin 5 milliGRAM(s) Oral once    MEDICATIONS  (PRN):  acetaminophen   Tablet 650 milliGRAM(s) Oral every 6 hours PRN For Temp greater than 38 C (100.4 F)  ALBUTerol/ipratropium for Nebulization 3 milliLiter(s) Nebulizer every 6 hours PRN Shortness of Breath and/or Wheezing            Vital Signs Last 24 Hrs  T(C): 36.8 (11 Jan 2018 11:01), Max: 37.4 (11 Jan 2018 05:04)  T(F): 98.2 (11 Jan 2018 11:01), Max: 99.4 (11 Jan 2018 05:04)  HR: 89 (11 Jan 2018 11:36) (86 - 91)  BP: 126/60 (11 Jan 2018 11:01) (115/65 - 132/64)  BP(mean): --  RR: 18 (11 Jan 2018 11:01) (16 - 18)  SpO2: 94% (11 Jan 2018 11:36) (92% - 95%)     PHYSICAL EXAM:  GENERAL: NAD, lying in bed , denies any further bleeding episodes, spouse at bedside   HEAD:  Atraumatic, Normocephalic  EYES: EOMI, PERRLA, conjunctiva and sclera clear  ENMT: No tonsillar erythema, exudates, or enlargement; Moist mucous membranes   NECK: Supple, No JVD   NERVOUS SYSTEM:  Alert, awake , oriented, responding appropriately and asking Qs  CHEST/LUNG: Clear to auscultation  bilaterally; No rales, rhonchi, wheezing, or rubs  HEART: Irregular  ABDOMEN: Soft, Nontender, Nondistended; Bowel sounds present  EXTREMITIES:   RUE contracted, b/l LE edema  SKIN : generalized rash (improving)    LABS:                        11.6   12.2  )-----------( 206      ( 10 Galdino 2018 07:45 )             34.4           PT/INR - ( 10 Galdino 2018 07:45 )   PT: 16.6 sec;   INR: 1.51 ratio            cardiac markers     CAPILLARY BLOOD GLUCOSE        Cultures    RADIOLOGY & ADDITIONAL TESTS:    Imaging Personally Reviewed:  [x ] YES  [ ] NO    Consultant(s) Notes Reviewed:  [x ] YES  [ ] NO    Care Discussed with Consultants/Other Providers [x ] YES  [ ] NO

## 2018-01-10 NOTE — DISCHARGE NOTE ADULT - MEDICATION SUMMARY - MEDICATIONS TO TAKE
I will START or STAY ON the medications listed below when I get home from the hospital:    finasteride 5 mg oral tablet  -- 1 tab(s) by mouth once a day  -- Indication: For BPH (Benign Prostatic Hyperplasia)    warfarin 5 mg oral tablet  -- 1 tab(s) by mouth once a day  -- Indication: For Chronic atrial fibrillation    hydrocortisone 0.5% topical ointment  -- 1 application on skin 2 times a day  -- Indication: For Rash    sucralfate 1 g/10 mL oral suspension  -- 10 milliliter(s) by mouth 4 times a day  -- Indication: For Gerd    pantoprazole 40 mg oral delayed release tablet  -- 1 tab(s) by mouth once a day (before a meal)  -- Indication: For Gerd

## 2018-01-10 NOTE — DISCHARGE NOTE ADULT - SECONDARY DIAGNOSIS.
Lower GI bleed Acute blood loss anemia Chronic atrial fibrillation Severe protein-calorie malnutrition Rash Dysphagia, unspecified type

## 2018-01-10 NOTE — PROGRESS NOTE ADULT - SUBJECTIVE AND OBJECTIVE BOX
Pt is a 81 yo gentleman with a pmhx of afib on coumadin, hypotension who presents to the ED with sob and cough since [place]. Questionable fever, never checked w thermometer, no change in mental status. Cough is nonproductive, but can hear mucous in lungs. No chest pain, no abdominal pain, no hx of dvt/pe. No recent hospitalizations. (28 Dec 2017 18:56)  work up consistent with community acquired pneumonia  also course complicated by gi bleed and persistent increased in wbc   and now rash all over   but better  off antibiotics     chocolate (Unknown)  No Known Drug Allergies  peanuts (Anaphylaxis)    Intolerances        MEDICATIONS  (STANDING):  ALBUTerol/ipratropium for Nebulization 3 milliLiter(s) Nebulizer every 6 hours  finasteride 5 milliGRAM(s) Oral daily  hydrocortisone 0.5% Ointment 1 Application(s) Topical two times a day  nystatin Powder 1 Application(s) Topical three times a day  pantoprazole    Tablet 40 milliGRAM(s) Oral before breakfast  sucralfate suspension 1 Gram(s) Oral four times a day    MEDICATIONS  (PRN):  acetaminophen   Tablet 650 milliGRAM(s) Oral every 6 hours PRN For Temp greater than 38 C (100.4 F)      REVIEW OF SYSTEMS:    feels  better   no cough short of breath   still itching a little   rash is better     VITAL SIGNS:  T(C): 36.6 (01-10-18 @ 16:56), Max: 36.9 (01-10-18 @ 05:06)  T(F): 97.9 (01-10-18 @ 16:56), Max: 98.4 (01-10-18 @ 05:06)  HR: 90 (01-10-18 @ 18:09) (80 - 91)  BP: 115/65 (01-10-18 @ 16:56) (111/54 - 116/57)  RR: 17 (01-10-18 @ 16:56) (16 - 20)  SpO2: 95% (01-10-18 @ 18:09) (92% - 98%)  Wt(kg): --    PHYSICAL EXAM:    GENERAL: not in any distress  HEENT: Neck is supple, normocephalic, atraumatic   CHEST/LUNG: Clear to auscultation bilaterally; No rales, rhonchi, wheezing  HEART: Regular rate and rhythm; No murmurs, rubs, or gallops  ABDOMEN: Soft, Nontender, Nondistended; Bowel sounds present, no rebound   EXTREMITIES:  2+ Peripheral Pulses, No clubbing, cyanosis, or edema  SKIN: resolving rash   BACK: no pressor sore   NERVOUS SYSTEM:  Alert & Oriented X3,  LABS:                         11.6   12.2  )-----------( 206      ( 10 Galdino 2018 07:45 )             34.4     01-09    141  |  106  |  15  ----------------------------<  72  3.9   |  23  |  0.67    Ca    7.5<L>      09 Jan 2018 07:34        PT/INR - ( 10 Galdino 2018 07:45 )   PT: 16.6 sec;   INR: 1.51 ratio                                               Radiology:

## 2018-01-11 PROCEDURE — 99233 SBSQ HOSP IP/OBS HIGH 50: CPT

## 2018-01-11 RX ORDER — IPRATROPIUM/ALBUTEROL SULFATE 18-103MCG
3 AEROSOL WITH ADAPTER (GRAM) INHALATION EVERY 6 HOURS
Qty: 0 | Refills: 0 | Status: DISCONTINUED | OUTPATIENT
Start: 2018-01-11 | End: 2018-01-12

## 2018-01-11 RX ORDER — WARFARIN SODIUM 2.5 MG/1
5 TABLET ORAL ONCE
Qty: 0 | Refills: 0 | Status: COMPLETED | OUTPATIENT
Start: 2018-01-11 | End: 2018-01-11

## 2018-01-11 RX ADMIN — NYSTATIN CREAM 1 APPLICATION(S): 100000 CREAM TOPICAL at 05:29

## 2018-01-11 RX ADMIN — WARFARIN SODIUM 5 MILLIGRAM(S): 2.5 TABLET ORAL at 21:20

## 2018-01-11 RX ADMIN — PANTOPRAZOLE SODIUM 40 MILLIGRAM(S): 20 TABLET, DELAYED RELEASE ORAL at 08:46

## 2018-01-11 RX ADMIN — NYSTATIN CREAM 1 APPLICATION(S): 100000 CREAM TOPICAL at 13:08

## 2018-01-11 RX ADMIN — Medication 1 GRAM(S): at 05:29

## 2018-01-11 RX ADMIN — Medication 1 APPLICATION(S): at 18:01

## 2018-01-11 RX ADMIN — Medication 3 MILLILITER(S): at 11:16

## 2018-01-11 RX ADMIN — Medication 3 MILLILITER(S): at 05:46

## 2018-01-11 RX ADMIN — Medication 1 APPLICATION(S): at 05:29

## 2018-01-11 RX ADMIN — Medication 1 GRAM(S): at 12:22

## 2018-01-11 RX ADMIN — FINASTERIDE 5 MILLIGRAM(S): 5 TABLET, FILM COATED ORAL at 12:22

## 2018-01-11 RX ADMIN — Medication 1 GRAM(S): at 18:00

## 2018-01-11 RX ADMIN — NYSTATIN CREAM 1 APPLICATION(S): 100000 CREAM TOPICAL at 21:20

## 2018-01-11 NOTE — PROGRESS NOTE ADULT - PROVIDER SPECIALTY LIST ADULT
Critical Care
Gastroenterology
Hospitalist
Infectious Disease
Critical Care
Hospitalist

## 2018-01-11 NOTE — PROGRESS NOTE ADULT - SUBJECTIVE AND OBJECTIVE BOX
Patient is a 82y old  Male who presents with a chief complaint of PNA (10 Galdino 2018 15:01)       OVERNIGHT EVENTS:    MEDICATIONS  (STANDING):  finasteride 5 milliGRAM(s) Oral daily  hydrocortisone 0.5% Ointment 1 Application(s) Topical two times a day  nystatin Powder 1 Application(s) Topical three times a day  pantoprazole    Tablet 40 milliGRAM(s) Oral before breakfast  sucralfate suspension 1 Gram(s) Oral four times a day  warfarin 5 milliGRAM(s) Oral once    MEDICATIONS  (PRN):  acetaminophen   Tablet 650 milliGRAM(s) Oral every 6 hours PRN For Temp greater than 38 C (100.4 F)  ALBUTerol/ipratropium for Nebulization 3 milliLiter(s) Nebulizer every 6 hours PRN Shortness of Breath and/or Wheezing       Vital Signs Last 24 Hrs  T(C): 36.8 (11 Jan 2018 11:01), Max: 37.4 (11 Jan 2018 05:04)  T(F): 98.2 (11 Jan 2018 11:01), Max: 99.4 (11 Jan 2018 05:04)  HR: 89 (11 Jan 2018 11:36) (86 - 91)  BP: 126/60 (11 Jan 2018 11:01) (115/65 - 132/64)  BP(mean): --  RR: 18 (11 Jan 2018 11:01) (17 - 18)  SpO2: 94% (11 Jan 2018 11:36) (92% - 95%)       PHYSICAL EXAM:  GENERAL: NAD, lying in bed , denies any further bleeding episodes, spouse at bedside   HEAD:  Atraumatic, Normocephalic  EYES: EOMI, PERRLA, conjunctiva and sclera clear  ENMT: No tonsillar erythema, exudates, or enlargement; Moist mucous membranes   NECK: Supple, No JVD   NERVOUS SYSTEM:  Alert, awake , oriented, responding appropriately and asking Qs  CHEST/LUNG: Clear to auscultation  bilaterally; No rales, rhonchi, wheezing, or rubs  HEART: Irregular  ABDOMEN: Soft, Nontender, Nondistended; Bowel sounds present  EXTREMITIES:   RUE contracted, b/l LE edema  SKIN : generalized rash (improving)    LABS:                        11.6   12.2  )-----------( 206      ( 10 Galdino 2018 07:45 )             34.4           PT/INR - ( 10 Galdino 2018 07:45 )   PT: 16.6 sec;   INR: 1.51 ratio            cardiac markers     CAPILLARY BLOOD GLUCOSE        Cultures    RADIOLOGY & ADDITIONAL TESTS:    Imaging Personally Reviewed:  [ x] YES  [ ] NO    Consultant(s) Notes Reviewed:  [ x] YES  [ ] NO    Care Discussed with Consultants/Other Providers [x ] YES  [ ] NO

## 2018-01-11 NOTE — PROGRESS NOTE ADULT - PROBLEM SELECTOR PROBLEM 5
Hypokalemia
Hypophosphatemia
Hypokalemia
Hypophosphatemia

## 2018-01-11 NOTE — PROGRESS NOTE ADULT - PROBLEM SELECTOR PROBLEM 2
BPH (Benign Prostatic Hyperplasia)
Chronic atrial fibrillation
Acute blood loss anemia
BPH (Benign Prostatic Hyperplasia)
BPH (Benign Prostatic Hyperplasia)
Chronic atrial fibrillation
Chronic atrial fibrillation
Acute blood loss anemia
BPH (Benign Prostatic Hyperplasia)

## 2018-01-11 NOTE — PROGRESS NOTE ADULT - NSHPATTENDINGPLANDISCUSS_GEN_ALL_CORE
pt, pt's daughter
pt, daughter at bedside
pt
pt and daughter in length
pt,daughter
pt, spouse,RN
pt,spouse
pt,spouse at bedside

## 2018-01-11 NOTE — PROGRESS NOTE ADULT - PROBLEM SELECTOR PLAN 2
continue home meds Proscar 5 mg/day
continue home meds Proscar 5 mg/day
- s/p 3 units PRBC transfusion in CCU  - pt had another episode of bleeding 2 days ago, none since then   - c/w protonix to IV BID,  sucralfate  -   EGD  by  - gastritis, hiatal hernia, no ulcers/erosions , if drop in H/H plan to do colonoscopy as per GI   - stable CBC for now , transfuse as needed  - Discussed in detail w/ pt
- s/p 3 units PRBC transfusion in CCU  - pt had another episode of bleeding 3 days ago, none since then   - c/w protonix to IV BID,  sucralfate  -   EGD  by  - gastritis, hiatal hernia, no ulcers/erosions , if drop in H/H plan to do colonoscopy as per GI   - stable CBC for now , transfuse as needed  - Discussed in detail w/ pt
- s/p 3 units PRBC transfusion in CCU  - pt had another episode of bleeding 4 days ago, none since then   - change to PO protonix  ,  sucralfate  -   EGD  by  - gastritis, hiatal hernia, no ulcers/erosions ,   colonoscopy  done today showing large 3cm pedunculated polyp(probable bleeding source)  which was removed, diverticulosis    - stable CBC for now , transfuse as needed  - Discussed in detail w/  - ok to restart AC as benefits outweigh risks obviously with caution , starting low dose coumadin
- s/p 3 units PRBC transfusion in CCU  - pt had another episode of bleeding per rectum today   - changed protonix to IV BID, c/w sucralfate  -  plan for EGD today by     - stable CBC for now , transfuse as needed  - Discussed in detail w/ pt and his daughter at bedside
- s/p 3 units PRBC transfusion in CCU  - pt had another episode of bleeding when transferred to medical floor , none since then   - change to PO protonix  ,  sucralfate  -   EGD  by  - gastritis, hiatal hernia, no ulcers/erosions ,   colonoscopy  done today showing large 3cm pedunculated polyp(probable bleeding source)  which was removed, diverticulosis    - stable CBC for now , transfuse as needed  - Discussed in detail w/  - ok to restart AC as benefits outweigh risks obviously with caution , starting low dose coumadin
continue home meds
continue home meds Proscar 5 mg/day
- s/p 3 units PRBC transfusion in CCU  - pt had another episode of bleeding per rectum today   - changed protonix to IV BID, c/w sucralfate  -  notified, plan for EGD in AM as per him  - monitor CBC, transfuse as needed  - Discussed in detail w/ pt and his daughter at bedside

## 2018-01-11 NOTE — PROGRESS NOTE ADULT - PROBLEM SELECTOR PROBLEM 1
Pneumonia of left lower lobe due to infectious organism
CAP (community acquired pneumonia)
Hypotension
Pneumonia of left lower lobe due to infectious organism
Pneumonia of left lower lobe due to infectious organism
Hypotension
Hypotension
Pneumonia of left lower lobe due to infectious organism

## 2018-01-11 NOTE — PROGRESS NOTE ADULT - PROBLEM SELECTOR PROBLEM 4
Chronic atrial fibrillation
BPH (Benign Prostatic Hyperplasia)
Chronic atrial fibrillation

## 2018-01-11 NOTE — PROGRESS NOTE ADULT - SUBJECTIVE AND OBJECTIVE BOX
Pt stable - no bleeding  discharge planning      MEDICATIONS  (STANDING):  ALBUTerol/ipratropium for Nebulization 3 milliLiter(s) Nebulizer every 6 hours  finasteride 5 milliGRAM(s) Oral daily  hydrocortisone 0.5% Ointment 1 Application(s) Topical two times a day  nystatin Powder 1 Application(s) Topical three times a day  pantoprazole    Tablet 40 milliGRAM(s) Oral before breakfast  sucralfate suspension 1 Gram(s) Oral four times a day  warfarin 5 milliGRAM(s) Oral once    MEDICATIONS  (PRN):  acetaminophen   Tablet 650 milliGRAM(s) Oral every 6 hours PRN For Temp greater than 38 C (100.4 F)      Allergies    chocolate (Unknown)  No Known Drug Allergies  peanuts (Anaphylaxis)    Intolerances        Vital Signs Last 24 Hrs  T(C): 36.8 (11 Jan 2018 11:01), Max: 37.4 (11 Jan 2018 05:04)  T(F): 98.2 (11 Jan 2018 11:01), Max: 99.4 (11 Jan 2018 05:04)  HR: 89 (11 Jan 2018 11:36) (86 - 91)  BP: 126/60 (11 Jan 2018 11:01) (115/65 - 132/64)  BP(mean): --  RR: 18 (11 Jan 2018 11:01) (16 - 18)  SpO2: 94% (11 Jan 2018 11:36) (92% - 95%)    PHYSICAL EXAM:  General: NAD.  CVS: S1, S2  Chest: air entry bilaterally present  Abd: BS present, soft, non-tender      LABS:                        11.6   12.2  )-----------( 206      ( 10 Galdino 2018 07:45 )             34.4           PT/INR - ( 10 Galdino 2018 07:45 )   PT: 16.6 sec;   INR: 1.51 ratio      Surgical Final Report  Final Diagnosis    1. Cecum; biopsy:  - Tubular adenoma  - Fragment of colonic mucosa with melanosis    2. Colon at spelenic flexure; polypectomy:  - Tubulo-villous adenoma    Miranda Burciaga M.D.  (Electronic Signature)  Reported on: 01/09/18    Synoptic Summary    Clinical Information  Colon polyps  Operation/procedure; colonoscopy with hot biopsy & polypectomy    Specimen Description  1     1-Cecal polyp -hot biopsy  2     2-Splenic flexure polyp    Gross Description  1.  The specimen is received in formalin and the specimen  container is labeled as: Cecal polyp-hot biopsy. It consists of  three pink-tan soft tissue fragments, ranging from 1.5 mm to 0.4  cm in greatest dimension, with an aggregate dimension of 0.5 x  0.4 x 0.2 cm.  Entirely submitted.  One cassette containing three  fragments.    2.  The specimen is received in formalin and the specimen  container is labeled as: Splenic flexure polyp. It consists of a  pink-red polyp, measuring 2.0 x 1.8 x 1.1 cm.  Margin inked blue.  Serially sectioned.  Entirely submitted.  Three cassettes.  In addition to other data that may appear on the specimen  container, the label has been inspected to confirm the presence  of the patient's name and date of birth.  BETH 01/08/18 15:36      Pt stable from GI standpoint  follow CBC as outpt

## 2018-01-11 NOTE — PROGRESS NOTE ADULT - PROBLEM SELECTOR PLAN 5
replace
-repleted  - monitor electrolytes
replace
replace
-repleted  - monitor electrolytes
replace

## 2018-01-11 NOTE — PROGRESS NOTE ADULT - PROBLEM SELECTOR PLAN 3
Hold Norvasc given likely GI bleed.
Hold Norvasc given likely GI bleed.
- s/p 3 units PRBC transfusion in CCU  - pt had another episode of bleeding 4 days ago, none since then   - change to PO protonix  ,  sucralfate  -   EGD  by  - gastritis, hiatal hernia, no ulcers/erosions ,   colonoscopy  done today showing large 3cm pedunculated polyp(probable bleeding source)  which was removed, diverticulosis    - stable CBC for now , transfuse as needed  - Discussed in detail w/  - ok to restart AC as benefits outweigh risks obviously with caution , started coumadin 5mg
- s/p 3 units PRBC transfusion in CCU  - pt had another episode of bleeding 4 days ago, none since then   - change to PO protonix  ,  sucralfate  -   EGD  by  - gastritis, hiatal hernia, no ulcers/erosions ,   colonoscopy  done today showing large 3cm pedunculated polyp(probable bleeding source)  which was removed, diverticulosis    - stable CBC for now , transfuse as needed  - Discussed in detail w/  - ok to restart AC as benefits outweigh risks obviously with caution , starting low dose coumadin
- s/p 3 units PRBC transfusion in CCU  - pt had another episode of bleeding per rectum yesterday  - changed protonix to IV BID, c/w sucralfate  -  plan for EGD today by     - stable CBC for now , transfuse as needed
Hold Norvasc given likely GI bleed.
continue home meds
- s/p 3 units PRBC transfusion in CCU  - pt had another episode of bleeding per rectum today   - changed protonix to IV BID, c/w sucralfate  -  notified, plan for EGD in AM as per him  - monitor CBC, transfuse as needed  - Discussed in detail w/ pt and his daughter at bedside

## 2018-01-11 NOTE — PROGRESS NOTE ADULT - ATTENDING COMMENTS
called pt's PCP  [phone]
Pérez called   pt's PCP  [phone] and left voicemail w/ call back info, notified family
PT=[facility], paient and family agreeble with the plan, SW aware, monitor INR , monitor for bleeding s/s , swallow eval and d/c planning in AM . INR to be checked and redose coumadin in [facility]. Discussed in length w/ pt and spouse at bedside
d/c planning pending [facility] placement
d/c planning pending bed availability

## 2018-01-11 NOTE — PROGRESS NOTE ADULT - PROBLEM SELECTOR PROBLEM 3
Essential hypertension
Gastrointestinal hemorrhage, unspecified gastrointestinal hemorrhage type

## 2018-01-12 VITALS
TEMPERATURE: 99 F | SYSTOLIC BLOOD PRESSURE: 127 MMHG | DIASTOLIC BLOOD PRESSURE: 65 MMHG | OXYGEN SATURATION: 92 % | RESPIRATION RATE: 18 BRPM | HEART RATE: 92 BPM

## 2018-01-12 LAB
INR BLD: 1.79 RATIO — HIGH (ref 0.88–1.16)
PROTHROM AB SERPL-ACNC: 19.8 SEC — HIGH (ref 9.8–12.7)

## 2018-01-12 PROCEDURE — 99239 HOSP IP/OBS DSCHRG MGMT >30: CPT

## 2018-01-12 RX ADMIN — NYSTATIN CREAM 1 APPLICATION(S): 100000 CREAM TOPICAL at 05:11

## 2018-01-12 RX ADMIN — Medication 1 GRAM(S): at 11:11

## 2018-01-12 RX ADMIN — FINASTERIDE 5 MILLIGRAM(S): 5 TABLET, FILM COATED ORAL at 11:11

## 2018-01-12 RX ADMIN — Medication 1 GRAM(S): at 05:09

## 2018-01-12 RX ADMIN — PANTOPRAZOLE SODIUM 40 MILLIGRAM(S): 20 TABLET, DELAYED RELEASE ORAL at 08:11

## 2018-01-12 RX ADMIN — Medication 1 APPLICATION(S): at 05:11

## 2018-01-17 DIAGNOSIS — K57.30 DIVERTICULOSIS OF LARGE INTESTINE WITHOUT PERFORATION OR ABSCESS WITHOUT BLEEDING: ICD-10-CM

## 2018-01-17 DIAGNOSIS — T36.95XA ADVERSE EFFECT OF UNSPECIFIED SYSTEMIC ANTIBIOTIC, INITIAL ENCOUNTER: ICD-10-CM

## 2018-01-17 DIAGNOSIS — R21 RASH AND OTHER NONSPECIFIC SKIN ERUPTION: ICD-10-CM

## 2018-01-17 DIAGNOSIS — I95.1 ORTHOSTATIC HYPOTENSION: ICD-10-CM

## 2018-01-17 DIAGNOSIS — I48.2 CHRONIC ATRIAL FIBRILLATION: ICD-10-CM

## 2018-01-17 DIAGNOSIS — R79.1 ABNORMAL COAGULATION PROFILE: ICD-10-CM

## 2018-01-17 DIAGNOSIS — K63.5 POLYP OF COLON: ICD-10-CM

## 2018-01-17 DIAGNOSIS — K29.70 GASTRITIS, UNSPECIFIED, WITHOUT BLEEDING: ICD-10-CM

## 2018-01-17 DIAGNOSIS — K21.9 GASTRO-ESOPHAGEAL REFLUX DISEASE WITHOUT ESOPHAGITIS: ICD-10-CM

## 2018-01-17 DIAGNOSIS — K44.9 DIAPHRAGMATIC HERNIA WITHOUT OBSTRUCTION OR GANGRENE: ICD-10-CM

## 2018-01-17 DIAGNOSIS — Z96.643 PRESENCE OF ARTIFICIAL HIP JOINT, BILATERAL: ICD-10-CM

## 2018-01-17 DIAGNOSIS — R13.10 DYSPHAGIA, UNSPECIFIED: ICD-10-CM

## 2018-01-17 DIAGNOSIS — E83.39 OTHER DISORDERS OF PHOSPHORUS METABOLISM: ICD-10-CM

## 2018-01-17 DIAGNOSIS — E87.6 HYPOKALEMIA: ICD-10-CM

## 2018-01-17 DIAGNOSIS — J18.9 PNEUMONIA, UNSPECIFIED ORGANISM: ICD-10-CM

## 2018-01-17 DIAGNOSIS — Z79.01 LONG TERM (CURRENT) USE OF ANTICOAGULANTS: ICD-10-CM

## 2018-01-17 DIAGNOSIS — D62 ACUTE POSTHEMORRHAGIC ANEMIA: ICD-10-CM

## 2018-01-17 DIAGNOSIS — N40.0 BENIGN PROSTATIC HYPERPLASIA WITHOUT LOWER URINARY TRACT SYMPTOMS: ICD-10-CM

## 2018-01-17 DIAGNOSIS — Z91.010 ALLERGY TO PEANUTS: ICD-10-CM

## 2018-01-17 DIAGNOSIS — E43 UNSPECIFIED SEVERE PROTEIN-CALORIE MALNUTRITION: ICD-10-CM

## 2018-01-17 DIAGNOSIS — J90 PLEURAL EFFUSION, NOT ELSEWHERE CLASSIFIED: ICD-10-CM

## 2018-01-17 DIAGNOSIS — K92.2 GASTROINTESTINAL HEMORRHAGE, UNSPECIFIED: ICD-10-CM

## 2018-01-17 DIAGNOSIS — L25.1 UNSPECIFIED CONTACT DERMATITIS DUE TO DRUGS IN CONTACT WITH SKIN: ICD-10-CM

## 2018-09-27 NOTE — PATIENT PROFILE ADULT. - AS SC BRADEN MOISTURE
CHIEF COMPLAINT:   Patient presents with:  Sinus Problem: lt side ear pain. Onset 5 days      HPI:   Henry Siemens is a 77year old female who presents for cold symptoms for  1  weeks.  Pt reports left ear feels itchy and occasionally feels some pre Tobacco Use      Smoking status: Never Smoker      Smokeless tobacco: Never Used    Alcohol use:  Yes      Alcohol/week: 0.0 oz      Comment: 2 month    Drug use: No        REVIEW OF SYSTEMS:   GENERAL:  normal appetite  SKIN: no rashes or abnormal skin l No prescriptions requested or ordered in this encounter       Risks, benefits, side effects of medication addressed and explained. Patient Instructions     You can continue flonase 2 sprays to each side of the nose, once daily.  You can also take over t · Wash bedding every week in warm water and detergent and dry on a hot setting. · Cover the mattress, box spring, and pillows with allergy covers.   · If possible, sleep in a room with no carpet, curtains, or upholstered furniture.   Cockroaches:  · Store (3) occasionally moist

## 2018-10-28 NOTE — H&P ADULT - ASSESSMENT
Clear 82m with history of atrial fibrillation with pneumonia 82m with history of atrial fibrillation with pneumonia     IMPROVE VTE Individual Risk Assessment          RISK                                                          Points    [  ] Previous VTE                                                3    [  ] Thrombophilia                                             2    [  ] Lower limb paralysis                                    2        (unable to hold up >15 seconds)      [  ] Current Cancer                                             2         (within 6 months)    [  x] Immobilization > 24 hrs                              1    [  ] ICU/CCU stay > 24 hours                            1    [ x ] Age > 60                                                    1    IMPROVE VTE Score _2________

## 2019-09-30 NOTE — PROGRESS NOTE ADULT - SUBJECTIVE AND OBJECTIVE BOX
see colonoscopy report    Imp: Extensive sigmoid diverticulosis; Large 3.0cm pedunculated polyp - Splenic Flexure    Plan: ck path; clear liquids; CBC, L. MCA infarct recrudescence

## 2020-12-01 NOTE — H&P ADULT - PMH
----- Message from Deanne Sparks sent at 12/1/2020 12:08 PM CST -----  Contact: [phone]/self  Who Called: PT  Regarding: covid testing   Would the patient rather a call back or a response via [facility]? Call back  Best Call Back Number: [phone]  Additional Information: wanted to speak with nurse about covid testing for her and her        Spoke with patient and she reports she wants to be placed on a list for the Covid vaccine. Informed patient we are not taking a list for the covid vaccine. Patient reports the vaccine is suppose to be available in the clinic on 12/12/2020. Informed patient this is not true that clinical trials are still underway. Instructed patient to follow the [facility] web site for accurate information. Patient voices understanding.   BPH (Benign Prostatic Hyperplasia)    Hypertension    Hypotension  Postural, positive Tilt Table Test.  Syncope 18344 Wound Check/Suture Removal (no E&M)

## 2021-02-26 NOTE — ASU PATIENT PROFILE, ADULT - AS SC BRADEN FRICTION
[facility] Medicine Progress Note    Patient: Barney Plascencia Date: 2021   : 1938 Attending: Marques Vasquez MD   82 year old male      Chief Complaint: hematuria, tachycardia     Subjective: Reports feeling well. States he doesn't mind it in the hospital. Denies CP, SOB, lightheadedness, f/c, nausea.     Medications/Infusions: Reviewed    Review of Systems: A 10 point comprehensive review of systems was negative other than those pertinent positives/negatives as noted in subjective.                 Vital Last Value 24 Hour Range   Temperature 97.5 °F (36.4 °C) (21 1349) Temp  Min: 97.5 °F (36.4 °C)  Max: 98.8 °F (37.1 °C)   Pulse 68 (21 1349) Pulse  Min: 68  Max: 82   Respiratory 18 (21 1349) Resp  Min: 18  Max: 20   Non-Invasive  Blood Pressure 113/58 (21 1349) BP  Min: 110/60  Max: 119/58   Pulse Oximetry 92 % (21 1349) SpO2  Min: 92 %  Max: 97 %     Vital Today Admitted   Weight 134 kg (21 0500) Weight: 102.5 kg (21 1449)   Height N/A Height: 6' 4\" (193 cm) (21 1449)   BMI N/A BMI (Calculated): 27.51 (21 1449)       Intake/Output:      Intake/Output Summary (Last 24 hours) at 2021 1510  Last data filed at 2021 1349  Gross per 24 hour   Intake 610 ml   Output 2050 ml   Net -1440 ml       Physical Exam:   Constitutional: Elderly, chronically ill appearing, no acute distress   HENT: Atraumatic, normocephalic, external ears and nose appear normal, dry oropharynx   Respiratory: No respiratory distress, diminished but normal breath sounds, no rales, no wheezing, no rhonchi   Cardiovascular: irregularly irregular, II/VI murmur   GI: Soft, nondistended, normal bowel sounds, nontender. No hepatosplenomegaly.   Musculoskeletal: Diffuse muscle wasting. Numerous wounds of right LE, dressing intact. Prevlon boots in place. No joint deformities or tenderness of bilateral upper and lower extremities. Limited ROM of bilateral upper and lower  extremities.   Skin: dry, scattered ecchymosis   Neurologic: No focal deficits noted  Psychiatric: Alert and oriented to place, forgetful.     Laboratory Results:   Recent Labs   Lab 02/26/21  0854 02/26/21  0722 02/25/21  2115 02/25/21  0449 02/24/21  0804 02/24/21  0341  02/23/21  1515   WBC  --   --   --  8.3  --  9.2  --  9.7   HCT 26.4*  --  24.4* 25.9*  --  27.7*  --  27.8*   HGB 8.0*  --  7.4* 8.1*  --  8.5*  --  8.6*   PLT  --   --   --  328  --  331  --  381   INR  --   --   --   --   --   --   --  1.2   PTT  --   --   --   --   --   --   --  32*   SODIUM  --  137  --  135 137  --   --  136   POTASSIUM  --  3.6  --  4.0 4.0  --   --  4.4   CHLORIDE  --  106  --  103 104  --   --  101   CO2  --  25  --  28 29  --   --  26   CALCIUM  --  8.6  --  8.5 8.6  --   --  8.8   GLUCOSE  --  132*  --  112* 135*  --   --  151*   BUN  --  14  --  14 17  --   --  19   CREATININE  --  0.94  --  0.92 1.03  --    < > 1.18*   AST  --   --   --   --  21  --   --  32   GPT  --   --   --   --  27  --   --  35   ALKPT  --   --   --   --  102  --   --  117   BILIRUBIN  --   --   --   --  0.4  --   --  0.4   ALBUMIN  --   --   --   --  2.2*  --   --  2.4*    < > = values in this interval not displayed.       Assessment/Plan:  Sepsis secondary to complicated recurrent urinary tract infection , POA  Hematuria   Urinary retention with chronic Marsh  -previous urine culture grew MRSA, current culture same. Sensitive to bactrim. Will transition from Vanc to Bactrim PO on DC< total duration 3 weeks with weekly BMP  -Marsh exchanged on admission  -CT urogram noted, Urology had recommended cystoscopy to eval hydronephrosis but family and patient decline at this time, would only like medical treatment of UTI  - Creat remains stable    Lactic Acidosis- resolved     Atrial fibrillation with RVR   Ectopy   Ventricular tachycardia   -noted to have quite a bit of ectopy with longer runs of Vtach   -started on metoprolol yesterday, priscilla ok,  last echo 1/21 normal EF with diastolic dysfunction and valvular disease - arrythmias controlled since B blocker  - continue tele  - EP consulted, increased metoprolol.   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 Will conitinue to monitor, no s/o active bleeding- suspect lab error yesterday      Nonocclusive deep venous thrombosis, right lower extremity 2/1   -continue Eliquis      DVT/VTE Prophylaxis:  VTE Pharmacologic Prophylaxis: Yes eliquis   VTE Mechanical Prophylaxis: No mechanical VTE prophylaxis due to Poor lower extremity skin integrity (e.g. wounds, ulcers, cellulitis, etc.)    Discussed with supervising physician Dr. Marques Nair PA-C  [facility]  Pager [phone] from [phone]; after 1900 please contact hospitalist on call at [phone]    Attending addendum  Patient has seen independently.  Discussed with Griselda Nair PA-C.  Agree with documentation.  He did not have pain during my visit.  We discussed goals of care further today.  This remains life prolonging and he does not mind coming back to the hospital for treatable conditions.  However, he does not want to make it difficult for his family and would prefer to be comfortable if he develops a serious medical condition.  His facility is not able to accept him until Monday.  Will continue treatment in the hospital pain.    Marques Vasquez MD        (3) no apparent problem

## 2021-11-26 NOTE — PROGRESS NOTE ADULT - ASSESSMENT
Physical Therapy     Referred by: Dorothy Oneal MD; Medical Diagnosis (from order):    Diagnosis Information      Diagnosis    737.30 (ICD-9-CM) - M41.20 (ICD-10-CM) - Idiopathic scoliosis and kyphoscoliosis    724.1, 338.29 (ICD-9-CM) - M54.6, G89.29 (ICD-10-CM) - Chronic bilateral thoracic back pain                Discharge Summary    Visit:  16   Patient alert and oriented X3.    SUBJECTIVE                                                                                                               No pain today Did relax yesterday because patient had her thanksgiving celebration Wednesday.     OBJECTIVE                                                                                                                         Outcome Measures:   OSWESTRY Total Scored: 9  OSWESTRY Total Possible Score: 50  OSWESTRY Score Calculated: 18 %  (0-20% = minimal disability; 20-40% = moderate disability; 40-60% = severe disability; 60-80% = crippled; % = bed bound) see flowsheet for additional documentation  TREATMENT                                                                                                                  Therapeutic Exercise:  NuStep 8 minutes level 5, 70-79 spm   Sit to/from stand on NuStep x 15 with overhead reach  Glide disc for hip flexion/ extension, hip abduction 15 X each  Balance board dynamic and static  Squats at rail  x 15  Side-stepping at ballet bar 3 X  Supine swiss ball knee to chest, LTR, leg lift, bridge  Supine hip adduction with ball 15 X  Pilates for hip abduction, core strengthening  Standing scapular exercises- V,W,T        Skilled input: posture correction and verbal instruction/cues    Writer verbally educated and received verbal consent for hand placement, positioning of patient, and techniques to be performed today from patient for modality application and therapist position for techniques as described above and how they are pertinent to the patient's plan of  care.    Home Exercise Program/Education Materials:        ASSESSMENT                                                                                                             Patient verbalizes understanding of HEP and strategies she can use to control pain and allow her to stand longer for activities that are a priority for her.   To date the patient has made gains as expected as reported.  Patient Education:   Results of above outlined education: Verbalizes understanding      PLAN                                                                                                                           Discharge from skilled therapy with instructions/recommendations to continue home exercise program follow up with referring provider    Suggestions for next session as indicated: Discharge from physical therapy.      GOALS                                                                                                                           Decrease pain/symptoms to 1-3/10  Improve involved strength to 4/5  Improve involved ROM to WFL    The above improvements in impairments to assist in obtaining goals listed below  Long Term Goals: to be met by end of plan of care  1. Patient will lift 15 lbs from floor to waist with good body mechanics, and with reported manageable/tolerable difficulty/pain for tasks for independent living Status: met   2. Patient will stand for 60 minutes for tasks for independent living and to resume regular physical activity (walking). Status: partially met Able to stand for 30 minutes or less depending. Average is about 10 minute tolerance for standing.  3. Patient will be able to walk for 6 minutes to demonstrate improved activity tolerance and standing tolerance Status: met   4. Oswestry: Patient will complete form to reflect an improved score to less than or equal to 18% to indicate patient reported improvement in function/disability/impairment (minimal detectable change: 12%).  Status:  community acquired pneumonia gi bleed   persistent increased wbc ;; better off antibiotics and almost all resolved   gi notes seen   hold off antibiotics   follow closely  discharge plan   path pending met   5. Patient will be independent with progressed and modified home exercise program. Status: met       Therapy procedure time and total treatment time can be found documented on the Time Entry flowsheet

## 2022-07-19 ENCOUNTER — INPATIENT (INPATIENT)
Facility: HOSPITAL | Age: 87
LOS: 13 days | Discharge: NURSING, REHAB OR LONG-TERM CARE | End: 2022-08-02
Attending: STUDENT IN AN ORGANIZED HEALTH CARE EDUCATION/TRAINING PROGRAM | Admitting: STUDENT IN AN ORGANIZED HEALTH CARE EDUCATION/TRAINING PROGRAM

## 2022-07-19 VITALS
OXYGEN SATURATION: 95 % | HEART RATE: 74 BPM | WEIGHT: 190.04 LBS | HEIGHT: 70 IN | TEMPERATURE: 98 F | DIASTOLIC BLOOD PRESSURE: 74 MMHG | SYSTOLIC BLOOD PRESSURE: 113 MMHG | RESPIRATION RATE: 18 BRPM

## 2022-07-19 LAB
ALBUMIN SERPL ELPH-MCNC: 3 G/DL — LOW (ref 3.3–5)
ALP SERPL-CCNC: 55 U/L — SIGNIFICANT CHANGE UP (ref 40–120)
ALT FLD-CCNC: 20 U/L — SIGNIFICANT CHANGE UP (ref 12–78)
ANION GAP SERPL CALC-SCNC: 7 MMOL/L — SIGNIFICANT CHANGE UP (ref 5–17)
APPEARANCE UR: ABNORMAL
APTT BLD: 40.5 SEC — HIGH (ref 27.5–35.5)
AST SERPL-CCNC: 17 U/L — SIGNIFICANT CHANGE UP (ref 15–37)
BACTERIA # UR AUTO: ABNORMAL
BASOPHILS # BLD AUTO: 0.07 K/UL — SIGNIFICANT CHANGE UP (ref 0–0.2)
BASOPHILS NFR BLD AUTO: 0.4 % — SIGNIFICANT CHANGE UP (ref 0–2)
BILIRUB SERPL-MCNC: 1.1 MG/DL — SIGNIFICANT CHANGE UP (ref 0.2–1.2)
BILIRUB UR-MCNC: NEGATIVE — SIGNIFICANT CHANGE UP
BLD GP AB SCN SERPL QL: SIGNIFICANT CHANGE UP
BUN SERPL-MCNC: 25 MG/DL — HIGH (ref 7–23)
CALCIUM SERPL-MCNC: 8.6 MG/DL — SIGNIFICANT CHANGE UP (ref 8.5–10.1)
CHLORIDE SERPL-SCNC: 107 MMOL/L — SIGNIFICANT CHANGE UP (ref 96–108)
CO2 SERPL-SCNC: 26 MMOL/L — SIGNIFICANT CHANGE UP (ref 22–31)
COLOR SPEC: YELLOW — SIGNIFICANT CHANGE UP
COMMENT - URINE: SIGNIFICANT CHANGE UP
CREAT SERPL-MCNC: 1.18 MG/DL — SIGNIFICANT CHANGE UP (ref 0.5–1.3)
DIFF PNL FLD: ABNORMAL
EGFR: 60 ML/MIN/1.73M2 — SIGNIFICANT CHANGE UP
EOSINOPHIL # BLD AUTO: 0.27 K/UL — SIGNIFICANT CHANGE UP (ref 0–0.5)
EOSINOPHIL NFR BLD AUTO: 1.6 % — SIGNIFICANT CHANGE UP (ref 0–6)
EPI CELLS # UR: SIGNIFICANT CHANGE UP
FLUAV AG NPH QL: SIGNIFICANT CHANGE UP
FLUBV AG NPH QL: SIGNIFICANT CHANGE UP
GLUCOSE SERPL-MCNC: 159 MG/DL — HIGH (ref 70–99)
GLUCOSE UR QL: NEGATIVE MG/DL — SIGNIFICANT CHANGE UP
HCT VFR BLD CALC: 45.9 % — SIGNIFICANT CHANGE UP (ref 39–50)
HGB BLD-MCNC: 16 G/DL — SIGNIFICANT CHANGE UP (ref 13–17)
IMM GRANULOCYTES NFR BLD AUTO: 0.3 % — SIGNIFICANT CHANGE UP (ref 0–1.5)
INR BLD: 2.59 RATIO — HIGH (ref 0.88–1.16)
KETONES UR-MCNC: ABNORMAL
LACTATE SERPL-SCNC: 1.7 MMOL/L — SIGNIFICANT CHANGE UP (ref 0.7–2)
LEUKOCYTE ESTERASE UR-ACNC: ABNORMAL
LYMPHOCYTES # BLD AUTO: 1.47 K/UL — SIGNIFICANT CHANGE UP (ref 1–3.3)
LYMPHOCYTES # BLD AUTO: 9 % — LOW (ref 13–44)
MAGNESIUM SERPL-MCNC: 2.2 MG/DL — SIGNIFICANT CHANGE UP (ref 1.6–2.6)
MCHC RBC-ENTMCNC: 34.3 PG — HIGH (ref 27–34)
MCHC RBC-ENTMCNC: 34.9 G/DL — SIGNIFICANT CHANGE UP (ref 32–36)
MCV RBC AUTO: 98.5 FL — SIGNIFICANT CHANGE UP (ref 80–100)
MONOCYTES # BLD AUTO: 0.86 K/UL — SIGNIFICANT CHANGE UP (ref 0–0.9)
MONOCYTES NFR BLD AUTO: 5.2 % — SIGNIFICANT CHANGE UP (ref 2–14)
NEUTROPHILS # BLD AUTO: 13.7 K/UL — HIGH (ref 1.8–7.4)
NEUTROPHILS NFR BLD AUTO: 83.5 % — HIGH (ref 43–77)
NITRITE UR-MCNC: POSITIVE
NRBC # BLD: 0 /100 WBCS — SIGNIFICANT CHANGE UP (ref 0–0)
NT-PROBNP SERPL-SCNC: 441 PG/ML — SIGNIFICANT CHANGE UP (ref 0–450)
PH UR: 6 — SIGNIFICANT CHANGE UP (ref 5–8)
PLATELET # BLD AUTO: 200 K/UL — SIGNIFICANT CHANGE UP (ref 150–400)
POTASSIUM SERPL-MCNC: 4 MMOL/L — SIGNIFICANT CHANGE UP (ref 3.5–5.3)
POTASSIUM SERPL-SCNC: 4 MMOL/L — SIGNIFICANT CHANGE UP (ref 3.5–5.3)
PROT SERPL-MCNC: 5.6 GM/DL — LOW (ref 6–8.3)
PROT UR-MCNC: 100 MG/DL
PROTHROM AB SERPL-ACNC: 31.4 SEC — HIGH (ref 10.5–13.4)
RBC # BLD: 4.66 M/UL — SIGNIFICANT CHANGE UP (ref 4.2–5.8)
RBC # FLD: 12.9 % — SIGNIFICANT CHANGE UP (ref 10.3–14.5)
RBC CASTS # UR COMP ASSIST: ABNORMAL /HPF (ref 0–4)
SARS-COV-2 RNA SPEC QL NAA+PROBE: SIGNIFICANT CHANGE UP
SODIUM SERPL-SCNC: 140 MMOL/L — SIGNIFICANT CHANGE UP (ref 135–145)
SP GR SPEC: 1.02 — SIGNIFICANT CHANGE UP (ref 1.01–1.02)
UROBILINOGEN FLD QL: NEGATIVE MG/DL — SIGNIFICANT CHANGE UP
WBC # BLD: 16.42 K/UL — HIGH (ref 3.8–10.5)
WBC # FLD AUTO: 16.42 K/UL — HIGH (ref 3.8–10.5)
WBC UR QL: >50

## 2022-07-19 PROCEDURE — 71260 CT THORAX DX C+: CPT | Mod: 26,MA

## 2022-07-19 PROCEDURE — 70487 CT MAXILLOFACIAL W/DYE: CPT | Mod: 26,MA

## 2022-07-19 PROCEDURE — 99285 EMERGENCY DEPT VISIT HI MDM: CPT

## 2022-07-19 PROCEDURE — 93010 ELECTROCARDIOGRAM REPORT: CPT

## 2022-07-19 PROCEDURE — 71045 X-RAY EXAM CHEST 1 VIEW: CPT | Mod: 26

## 2022-07-19 RX ORDER — AZITHROMYCIN 500 MG/1
500 TABLET, FILM COATED ORAL ONCE
Refills: 0 | Status: COMPLETED | OUTPATIENT
Start: 2022-07-19 | End: 2022-07-19

## 2022-07-19 RX ORDER — CEFTRIAXONE 500 MG/1
1000 INJECTION, POWDER, FOR SOLUTION INTRAMUSCULAR; INTRAVENOUS ONCE
Refills: 0 | Status: COMPLETED | OUTPATIENT
Start: 2022-07-19 | End: 2022-07-19

## 2022-07-19 RX ORDER — SODIUM CHLORIDE 9 MG/ML
1000 INJECTION, SOLUTION INTRAVENOUS ONCE
Refills: 0 | Status: COMPLETED | OUTPATIENT
Start: 2022-07-19 | End: 2022-07-19

## 2022-07-19 RX ADMIN — CEFTRIAXONE 1000 MILLIGRAM(S): 500 INJECTION, POWDER, FOR SOLUTION INTRAMUSCULAR; INTRAVENOUS at 22:22

## 2022-07-19 RX ADMIN — AZITHROMYCIN 255 MILLIGRAM(S): 500 TABLET, FILM COATED ORAL at 20:54

## 2022-07-19 RX ADMIN — SODIUM CHLORIDE 1000 MILLILITER(S): 9 INJECTION, SOLUTION INTRAVENOUS at 21:55

## 2022-07-19 RX ADMIN — SODIUM CHLORIDE 1000 MILLILITER(S): 9 INJECTION, SOLUTION INTRAVENOUS at 20:23

## 2022-07-19 RX ADMIN — AZITHROMYCIN 500 MILLIGRAM(S): 500 TABLET, FILM COATED ORAL at 21:55

## 2022-07-19 RX ADMIN — CEFTRIAXONE 100 MILLIGRAM(S): 500 INJECTION, POWDER, FOR SOLUTION INTRAMUSCULAR; INTRAVENOUS at 20:54

## 2022-07-19 NOTE — ED ADULT NURSE NOTE - NS PRO AD PATIENT TYPE
- Patient noted to have a leukocytosis on blood work. She has otherwise been afebrile and without signs or symptoms of infection. Her increased WBC may also be falsely elevated 2/2 being on high dose steroids.  - Will continue to monitor off antibiotics. If patient were to spike a fever, will check blood cultures.  - Monitor CBC and fever curve Health Care Proxy (HCP)

## 2022-07-19 NOTE — ED PROVIDER NOTE - OBJECTIVE STATEMENT
87 year old male with PMH of HTN and s/p bilateral hip replacement who presents to the ED for abnormal labs. Daughter reports pt has been getting lab work done every 4 days and noticed white count has been going up, which concerned home care NP who advised pt to come to the ED for a full evaluation. Pt currently complains of weakness. Otherwise denies nausea, vomiting, diarrhea, fever, chills, and SOB. Pt is vaccinated for Covid x2.

## 2022-07-19 NOTE — ED PROVIDER NOTE - NSICDXPASTMEDICALHX_GEN_ALL_CORE_FT
PAST MEDICAL HISTORY:  BPH (Benign Prostatic Hyperplasia)     Hypertension     Hypotension Postural, positive Tilt Table Test.    Syncope

## 2022-07-19 NOTE — ED ADULT NURSE NOTE - OBJECTIVE STATEMENT
pt sent to ED by MD for abnormal labs . As per EMS elevated WBC and low HGB. Pt denies dizziness, SOB, and pain. as per wife, pt c/o weakness last week. pt told to stop coumadin for 2 days and restarted on half. labs repeated today and pt sent to ED by MD for abnormal labs . As per EMS elevated WBC and low HGB. Pt denies dizziness, SOB, and pain.

## 2022-07-19 NOTE — ED ADULT TRIAGE NOTE - CHIEF COMPLAINT QUOTE
Patient BIBA from home with abnormal lab as per MD high WBCs and low Hgb on coumadin. Denies any pain at this time  hx HTN, HLD, contraction r/t prior injury

## 2022-07-19 NOTE — ED PROVIDER NOTE - CLINICAL SUMMARY MEDICAL DECISION MAKING FREE TEXT BOX
R/o anemia and r/o sepsis. R/o anemia and r/o sepsis.  I read ekg as nsr rate 66, rbbb, lafb, no st elevation or depression, qtc 469. R/o anemia and r/o sepsis.  positive uti. positive cap  admit abx.   I read ekg as nsr rate 66, rbbb, lafb, no st elevation or depression, qtc 469.

## 2022-07-19 NOTE — ED PROVIDER NOTE - PHYSICAL EXAMINATION
Gen: Alert, NAD, pt is chronically ill appearing  Head: NC, AT   Eyes: PERRL, EOMI, normal lids/conjunctiva  ENT: normal hearing, patent oropharynx without erythema/exudate, uvula midline  Neck: supple, no tenderness, Trachea midline  Pulm: Bilateral BS, normal resp effort, no wheeze/stridor/retractions, +crackles in right mid lobe  CV: RRR, no M/R/G, 2+ radial and dp pulses bl, no edema  Abd: soft, NT/ND, +BS, no hepatosplenomegaly, +obese  Mskel: extremities x4 with normal ROM and no joint effusions. no ctl spine ttp. +Right upper extremity contracted  Skin: no rash, no bruising   Neuro: AAOx3, no sensory/motor deficits, CN 2-12 intact

## 2022-07-20 DIAGNOSIS — N39.0 URINARY TRACT INFECTION, SITE NOT SPECIFIED: ICD-10-CM

## 2022-07-20 DIAGNOSIS — N40.0 BENIGN PROSTATIC HYPERPLASIA WITHOUT LOWER URINARY TRACT SYMPTOMS: ICD-10-CM

## 2022-07-20 DIAGNOSIS — I48.20 CHRONIC ATRIAL FIBRILLATION, UNSPECIFIED: ICD-10-CM

## 2022-07-20 DIAGNOSIS — J18.9 PNEUMONIA, UNSPECIFIED ORGANISM: ICD-10-CM

## 2022-07-20 DIAGNOSIS — I10 ESSENTIAL (PRIMARY) HYPERTENSION: ICD-10-CM

## 2022-07-20 DIAGNOSIS — Z29.9 ENCOUNTER FOR PROPHYLACTIC MEASURES, UNSPECIFIED: ICD-10-CM

## 2022-07-20 LAB
ANION GAP SERPL CALC-SCNC: 8 MMOL/L — SIGNIFICANT CHANGE UP (ref 5–17)
BUN SERPL-MCNC: 21 MG/DL — SIGNIFICANT CHANGE UP (ref 7–23)
CALCIUM SERPL-MCNC: 8.3 MG/DL — LOW (ref 8.5–10.1)
CHLORIDE SERPL-SCNC: 107 MMOL/L — SIGNIFICANT CHANGE UP (ref 96–108)
CO2 SERPL-SCNC: 26 MMOL/L — SIGNIFICANT CHANGE UP (ref 22–31)
CREAT SERPL-MCNC: 1.09 MG/DL — SIGNIFICANT CHANGE UP (ref 0.5–1.3)
EGFR: 66 ML/MIN/1.73M2 — SIGNIFICANT CHANGE UP
GLUCOSE SERPL-MCNC: 112 MG/DL — HIGH (ref 70–99)
HCT VFR BLD CALC: 41.3 % — SIGNIFICANT CHANGE UP (ref 39–50)
HGB BLD-MCNC: 14.2 G/DL — SIGNIFICANT CHANGE UP (ref 13–17)
MCHC RBC-ENTMCNC: 33.8 PG — SIGNIFICANT CHANGE UP (ref 27–34)
MCHC RBC-ENTMCNC: 34.4 G/DL — SIGNIFICANT CHANGE UP (ref 32–36)
MCV RBC AUTO: 98.3 FL — SIGNIFICANT CHANGE UP (ref 80–100)
NRBC # BLD: 0 /100 WBCS — SIGNIFICANT CHANGE UP (ref 0–0)
PLATELET # BLD AUTO: 174 K/UL — SIGNIFICANT CHANGE UP (ref 150–400)
POTASSIUM SERPL-MCNC: 3.9 MMOL/L — SIGNIFICANT CHANGE UP (ref 3.5–5.3)
POTASSIUM SERPL-SCNC: 3.9 MMOL/L — SIGNIFICANT CHANGE UP (ref 3.5–5.3)
RBC # BLD: 4.2 M/UL — SIGNIFICANT CHANGE UP (ref 4.2–5.8)
RBC # FLD: 12.8 % — SIGNIFICANT CHANGE UP (ref 10.3–14.5)
SODIUM SERPL-SCNC: 141 MMOL/L — SIGNIFICANT CHANGE UP (ref 135–145)
WBC # BLD: 12.05 K/UL — HIGH (ref 3.8–10.5)
WBC # FLD AUTO: 12.05 K/UL — HIGH (ref 3.8–10.5)

## 2022-07-20 PROCEDURE — 99222 1ST HOSP IP/OBS MODERATE 55: CPT

## 2022-07-20 RX ORDER — PANTOPRAZOLE SODIUM 20 MG/1
40 TABLET, DELAYED RELEASE ORAL
Refills: 0 | Status: DISCONTINUED | OUTPATIENT
Start: 2022-07-20 | End: 2022-08-02

## 2022-07-20 RX ORDER — AMLODIPINE BESYLATE 2.5 MG/1
2.5 TABLET ORAL DAILY
Refills: 0 | Status: DISCONTINUED | OUTPATIENT
Start: 2022-07-20 | End: 2022-07-29

## 2022-07-20 RX ORDER — AZITHROMYCIN 500 MG/1
500 TABLET, FILM COATED ORAL
Refills: 0 | Status: DISCONTINUED | OUTPATIENT
Start: 2022-07-20 | End: 2022-07-22

## 2022-07-20 RX ORDER — WARFARIN SODIUM 2.5 MG/1
5 TABLET ORAL ONCE
Refills: 0 | Status: DISCONTINUED | OUTPATIENT
Start: 2022-07-20 | End: 2022-07-20

## 2022-07-20 RX ORDER — WARFARIN SODIUM 2.5 MG/1
5 TABLET ORAL ONCE
Refills: 0 | Status: COMPLETED | OUTPATIENT
Start: 2022-07-20 | End: 2022-07-20

## 2022-07-20 RX ORDER — HEPARIN SODIUM 5000 [USP'U]/ML
5000 INJECTION INTRAVENOUS; SUBCUTANEOUS EVERY 12 HOURS
Refills: 0 | Status: DISCONTINUED | OUTPATIENT
Start: 2022-07-20 | End: 2022-07-20

## 2022-07-20 RX ORDER — FUROSEMIDE 40 MG
20 TABLET ORAL DAILY
Refills: 0 | Status: DISCONTINUED | OUTPATIENT
Start: 2022-07-20 | End: 2022-07-31

## 2022-07-20 RX ORDER — CEFTRIAXONE 500 MG/1
1000 INJECTION, POWDER, FOR SOLUTION INTRAMUSCULAR; INTRAVENOUS
Refills: 0 | Status: COMPLETED | OUTPATIENT
Start: 2022-07-20 | End: 2022-07-22

## 2022-07-20 RX ORDER — LORATADINE 10 MG/1
10 TABLET ORAL DAILY
Refills: 0 | Status: DISCONTINUED | OUTPATIENT
Start: 2022-07-20 | End: 2022-07-29

## 2022-07-20 RX ORDER — FINASTERIDE 5 MG/1
5 TABLET, FILM COATED ORAL DAILY
Refills: 0 | Status: DISCONTINUED | OUTPATIENT
Start: 2022-07-20 | End: 2022-08-02

## 2022-07-20 RX ORDER — ACETAMINOPHEN 500 MG
650 TABLET ORAL EVERY 6 HOURS
Refills: 0 | Status: DISCONTINUED | OUTPATIENT
Start: 2022-07-20 | End: 2022-08-02

## 2022-07-20 RX ORDER — AMLODIPINE BESYLATE 2.5 MG/1
2.5 TABLET ORAL DAILY
Refills: 0 | Status: DISCONTINUED | OUTPATIENT
Start: 2022-07-20 | End: 2022-07-20

## 2022-07-20 RX ADMIN — Medication 650 MILLIGRAM(S): at 21:35

## 2022-07-20 RX ADMIN — AZITHROMYCIN 255 MILLIGRAM(S): 500 TABLET, FILM COATED ORAL at 21:01

## 2022-07-20 RX ADMIN — WARFARIN SODIUM 5 MILLIGRAM(S): 2.5 TABLET ORAL at 06:44

## 2022-07-20 RX ADMIN — CEFTRIAXONE 100 MILLIGRAM(S): 500 INJECTION, POWDER, FOR SOLUTION INTRAMUSCULAR; INTRAVENOUS at 20:27

## 2022-07-20 RX ADMIN — LORATADINE 10 MILLIGRAM(S): 10 TABLET ORAL at 13:45

## 2022-07-20 RX ADMIN — Medication 650 MILLIGRAM(S): at 22:35

## 2022-07-20 RX ADMIN — FINASTERIDE 5 MILLIGRAM(S): 5 TABLET, FILM COATED ORAL at 13:45

## 2022-07-20 RX ADMIN — PANTOPRAZOLE SODIUM 40 MILLIGRAM(S): 20 TABLET, DELAYED RELEASE ORAL at 06:45

## 2022-07-20 RX ADMIN — Medication 20 MILLIGRAM(S): at 06:44

## 2022-07-20 RX ADMIN — HEPARIN SODIUM 5000 UNIT(S): 5000 INJECTION INTRAVENOUS; SUBCUTANEOUS at 06:45

## 2022-07-20 NOTE — H&P ADULT - PROBLEM SELECTOR PROBLEM 3
Chronic atrial fibrillation pt in ER with c/o CP which started earlier today.  initially tachycardic, HR in 80's on re-eval, ekg with no acute ischemic changes, trop/d-dimer neg.  pt placed in edou for further cardiac w/u.

## 2022-07-20 NOTE — H&P ADULT - ASSESSMENT
Patient is an 87M with a PMH of Afib on coumadin, HTN, BPH who presents to the ED for abnormal labs.  Patient is AAOx3 however is a limited historian.  Patient states that he recently had labs done by his PMD and his WBC was high so he was brought to the ED for evaluation.  Patient otherwise has no active complaints.  Denies history of fever, chills, nausea, vomiting, chest pain, palpitations, dyspnea, cough, nausea, vomiting, diarrhea, dizziness, diaphoresis or syncope.  Vitals stable, labs show leukocytosis.  Will admit to med surg.      IMPROVE VTE Individual Risk Assessment          RISK                                                          Points  [  ] Previous VTE                                                3  [  ] Thrombophilia                                             2  [  ] Lower limb paralysis                                    2        (unable to hold up >15 seconds)    [  ] Current Cancer                                             2         (within 6 months)  [  ] Immobilization > 24 hrs                              1  [  ] ICU/CCU stay > 24 hours                            1  [  ] Age > 60                                                    1    IMPROVE VTE Score - 1

## 2022-07-20 NOTE — PATIENT PROFILE ADULT - FUNCTIONAL ASSESSMENT - BASIC MOBILITY 6.
1-calculated by average/Not able to assess (calculate score using [facility] averaging method)

## 2022-07-20 NOTE — H&P ADULT - HISTORY OF PRESENT ILLNESS
Patient is an 87M with a PMH of Afib on coumadin, HTN, BPH who presents to the ED for abnormal labs.  Patient is AAOx3 however is a limited historian.  Patient states that he recently had labs done by his PMD and his WBC was high so he was brought to the ED for evaluation.  Patient otherwise has no active complaints.  Denies history of fever, chills, nausea, vomiting, chest pain, palpitations, dyspnea, cough, nausea, vomiting, diarrhea, dizziness, diaphoresis or syncope.  Vitals stable, labs show leukocytosis.  Will admit to med surg.

## 2022-07-20 NOTE — PATIENT PROFILE ADULT - PUBLIC BENEFITS
[facility] Orthopaedic Consultation    Alethea Patel MRN# [id number]   Age: 39 year old YOB: 1978     Requesting physician: Ramana              Impression and Recommendation :   Impression:   Recurrent left HAJA dislocation    Recommendations:   NWB LLE  Bed rest  NPO  preop labs ordered  Hold chem dvt ppx  Jordan   Case dw Dr Saleem, Operative plan pending    dw Dr Saleem and Dr Garcia who agree          Chief Complaint:   Recurrent left hip dislocation         History of Present Illness (Resident / Clinician):   39F hx of chronic pain and left hip AVN s/p    Procedures:  4/4/18: revision of left hip resurfacing ot HAJA ( Dr Saleem)  4/16/18: closed reduction left HAJA ([facility],  reduction attempted and taken to OR)  5/18/18: closed reduction left HAJA (Dr Saleem)    P/w left hip pain after bending over to  cigarette from recliner.  Presented to [facility] ED, CMS intact, left hip pain, 10/10, no radiating, worse with ROM, better with pain meds, imaging shows recurrent left HAJA dislocation, ortho consulted, otherwise denies trauma, denies pain outside of left hip, denies new numbness/tingling/weakness, denies fevers, chills, sob, cp.           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)      Anxiety      Depression      Depressive disorder      Gastro-oesophageal reflux disease      IBS (irritable bowel syndrome)      Osteonecrosis (H)      Osteonecrosis (H)        Prior history of blood clot: none  Prior history of bleeding problems: none  Prior history of anesthetic complications: yes, sedation  Currently on opioids:  yes  History of Diabetes: no          Past Surgical History:     Past Surgical History:   Procedure Laterality Date     ARTHROPLASTY KNEE Left 1/8/2016    Procedure: ARTHROPLASTY KNEE;  Surgeon: Cheko Saleem MD;  Location: [facility] OR     ARTHROPLASTY PATELLO-FEMORAL  (KNEE)  6/12/2012    Procedure: ARTHROPLASTY PATELLO-FEMORAL (KNEE);  Right Knee Patella Resurfacing;  Surgeon: Cheko Saleem MD;  Location: [facility] OR     ARTHROPLASTY REVISION HIP Left 4/3/2018    Procedure: ARTHROPLASTY REVISION HIP;  Conversion of Left Hip Rigo-Arthroplasty to Left Total Hip Replacement;  Surgeon: Cheko Saleem MD;  Location: [facility] OR     C PELVIS/HIP JOINT SURGERY UNLISTED       CLOSED REDUCTION HIP Left 5/18/2018    Procedure: CLOSED REDUCTION HIP;  Closed Reduction Left Hip, and Aspiration Culture   Left Hip;  Surgeon: Cheko Saleem MD;  Location: [facility] OR     HIP SURGERY  5/31/2005    Left hip resurfacing hemiarthroplasty     HIP SURGERY  6/5/07     INJECT BLOCK MEDIAL BRANCH CERVICAL/THORACIC/LUMBAR N/A 1/9/2016    Procedure: INJECT BLOCK MEDIAL BRANCH CERVICAL / THORACIC / LUMBAR;  Surgeon: GENERIC ANESTHESIA PROVIDER;  Location: [facility] OR     JOINT REPLACEMENT  2/2/2010    Rt TKA     KNEE SURGERY       ORTHOPEDIC SURGERY      right foot surgery             Social History:     Social History   Substance Use Topics     Smoking status: Current Every Day Smoker     Packs/day: 1.00     Years: 10.00     Types: Cigarettes     Start date: 5/29/1997     Smokeless tobacco: Never Used     Alcohol use No      Comment: very rare             Family History:   Reviewed, noncontributory          Allergies:     Allergies   Allergen Reactions     Chantix [Varenicline] Nausea and Vomiting     Other reaction(s): Vomiting     No Clinical Screening - See Comments      Other reaction(s): Unknown Reaction     Seasonal Allergies      Ciprofloxacin      Other reaction(s): Other  Interacts with other medication     Latex Itching and Rash     Other reaction(s): Other - Describe In Comment Field  Vaginal itching, burning  Other reaction(s): Intolerance             Medications:     No current facility-administered medications for this encounter.      Current Outpatient Prescriptions   Medication Sig     BACLOFEN PO Take 20  mg by mouth 4 times daily      BuPROPion HCl (WELLBUTRIN SR PO) Take 150 mg by mouth 2 times daily     DULoxetine (CYMBALTA) 60 MG EC capsule Take 60 mg by mouth 2 times daily      senna-docusate (SENOKOT-S;PERICOLACE) 8.6-50 MG per tablet Take 2 tablets by mouth 2 times daily     acetaminophen (TYLENOL) 325 MG tablet Take 2 tablets (650 mg) by mouth every 4 hours     albuterol (PROAIR HFA/PROVENTIL HFA/VENTOLIN HFA) 108 (90 BASE) MCG/ACT Inhaler Inhale 2 puffs into the lungs every 6 hours as needed for shortness of breath / dyspnea or wheezing     B Complex Vitamins (B COMPLEX PO) Take 1 tablet by mouth daily Dose unknown; OTC     Escitalopram Oxalate (LEXAPRO PO) Take 20 mg by mouth daily     fluticasone (FLONASE) 50 MCG/ACT nasal spray Spray 2 sprays into both nostrils daily as needed for rhinitis or allergies     gabapentin (NEURONTIN) 300 MG capsule Take 900 mg by mouth 3 times daily      levothyroxine (SYNTHROID, LEVOTHROID) 75 MCG tablet Take 75 mcg by mouth daily     LORazepam (ATIVAN) 1 MG tablet Take 1-2 mg by mouth 3 times daily Max 5 tablets daily     METHADONE HCL PO Take 40 mg by mouth every 8 hours     montelukast (SINGULAIR) 10 MG tablet Take 10 mg by mouth At Bedtime     Multiple Vitamins-Minerals (MULTIVITAMIN ADULT PO) Take 1 tablet by mouth daily     Nutritional Supplements (MELATONIN PO) Take 3 mg by mouth At Bedtime      omeprazole (PRILOSEC OTC) 20 MG tablet Take 20 mg by mouth daily      order for DME [facility]  [facility]  [phone]     Directions: Advance as Tolerated and Instructed by MD     order for DME Equipment being ordered: Bath Seat ()  Treatment Diagnosis: Limited independence in ADLs/mobility due to hip precautions and recent hip surgery     oxyCODONE IR (ROXICODONE) 30 MG tablet Take 120 mg by mouth every 4 hours as needed for moderate to severe pain     POTASSIUM PO Take 1 tablet by mouth daily Dose unknown; OTC     Prochlorperazine Maleate (COMPAZINE PO) Take 10 mg by  mouth every 6 hours as needed.       QUEtiapine (SEROQUEL) 25 MG tablet Take 25 mg by mouth At Bedtime      ranitidine (ZANTAC) 150 MG tablet Take 150 mg by mouth At Bedtime      TIZANIDINE HCL PO Take 2 mg by mouth every 6 hours as needed for muscle spasms             Review of Systems:   10 point ROS negative unless noted in HPI          Physical Exam:     General: somnolent, following commands, NAD.  Neuro: CN II-XII grossly intact.   Skin: No rashes,  skin color normal.  HEENT: Normal.   Lungs: Breathing comfortably and nonlabored, no wheezes or stridor noted.  Heart/Cardiovascular: Regular pulse, no peripheral cyanosis.  Left Lower Extremity: short and IR, otherwise no deformity, skin intact. No significant tenderness to palpation over knee, leg, ankle/foot. No pain with ROM ankle, hip ROM deferred given known dislocation. Motor intact distally TA/GSC/EHL/FHL, strength testing limited by patient pain/somnolence. SILT sp/dp/tibial/saph/sural nerves. DP/PT pulses palpable, 2+, toes warm and well perfused. Compartments soft   No sig b/l LE lymphedema  Psych: normal mood and affect           Data:   Reviewed, recurrent left reji dislocation      Roberto Delcid MD MS  Orthopedic Surgery, PGY-4       DW Dr Saleem            no

## 2022-07-20 NOTE — H&P ADULT - NSICDXPASTMEDICALHX_GEN_ALL_CORE_FT
PAST MEDICAL HISTORY:  BPH (Benign Prostatic Hyperplasia)     Hypertension     Hypotension Postural, positive Tilt Table Test.    Syncope     
no

## 2022-07-20 NOTE — H&P ADULT - NSHPREVIEWOFSYSTEMS_GEN_ALL_CORE
Constitutional: no fever, chills, night sweats  Ears: no hearing changes or ear pain,   Nose: no nasal congestion, sinus pain, or rhinorrhea  Cardio: no chest pain, orthopnea, edema, or palpitations  Resp: no dyspnea, cough, wheezing  GI: no nausea, vomiting, diarrhea, constipation, hematochezia, or melena  : no dysuria, urinary frequency, hematuria  MSK: no back pain, neck pain  Skin: no rash, pruritis   Neuro: no dizziness, lightheadedness, syncope   Heme/Lymph: no bruising or bleeding

## 2022-07-20 NOTE — H&P ADULT - NSHPLABSRESULTS_GEN_ALL_CORE
Recent Vitals  T(C): 36.7 (22 @ 23:59), Max: 36.7 (22 @ 19:49)  HR: 68 (22 @ 23:59) (68 - 74)  BP: 115/68 (22 @ 23:59) (113/74 - 121/64)  RR: 18 (22 @ 23:59) (18 - 18)  SpO2: 98% (22 @ 23:59) (95% - 99%)                        16.0   16.42 )-----------( 200      ( 2022 19:30 )             45.9         140  |  107  |  25<H>  ----------------------------<  159<H>  4.0   |  26  |  1.18    Ca    8.6      2022 22:17  Mg     2.2         TPro  5.6<L>  /  Alb  3.0<L>  /  TBili  1.1  /  DBili  x   /  AST  17  /  ALT  20  /  AlkPhos  55  -    PT/INR - ( 2022 19:30 )   PT: 31.4 sec;   INR: 2.59 ratio         PTT - ( 2022 19:30 )  PTT:40.5 sec  LIVER FUNCTIONS - ( 2022 22:17 )  Alb: 3.0 g/dL / Pro: 5.6 gm/dL / ALK PHOS: 55 U/L / ALT: 20 U/L / AST: 17 U/L / GGT: x           Urinalysis Basic - ( 2022 21:48 )    Color: Yellow / Appearance: very cloudy / S.020 / pH: x  Gluc: x / Ketone: Trace  / Bili: Negative / Urobili: Negative mg/dL   Blood: x / Protein: 100 mg/dL / Nitrite: Positive   Leuk Esterase: Moderate / RBC: 11-25 /HPF / WBC >50   Sq Epi: x / Non Sq Epi: Few / Bacteria: Many        Home Medications:  amLODIPine 2.5 mg oral tablet: 1 tab(s) orally once a day (2022 18:46)  finasteride 5 mg oral tablet: 1 tab(s) orally once a day (2022 18:46)  furosemide 20 mg oral tablet: 1 tab(s) orally once a day (2022 18:46)  loratadine 10 mg oral tablet: 1 tab(s) orally once a day (2022 18:46)  pantoprazole 40 mg oral delayed release tablet: 1 tab(s) orally once a day (before a meal) (2022 18:46)  warfarin 5 mg oral tablet: 1 tab(s) orally once a day (2022 18:46)

## 2022-07-20 NOTE — H&P ADULT - NSHPPHYSICALEXAM_GEN_ALL_CORE
Physical exam:  General: patient in no acute distress, resting comfortably  Head:  Atraumatic, Normocephalic  Eyes: EOMI, PERRLA, clear sclera  Neck: Supple, thyroid nontender, non enlarged  Cardio: S1/S2 +ve,   Resp: clear to ausculation bilaterally, no rales or wheezes  GI: abdomen soft, nontender, non distended, no guarding, BS +ve x 4  Ext: no significant pedal edema, RUE with flexion contractures at elbow, wrist and multiple fingers   Neuro: CN 2-12 intact, no significant motor or sensory deficits.  Skin: No rashes or lesions

## 2022-07-20 NOTE — PATIENT PROFILE ADULT - FALL HARM RISK - HARM RISK INTERVENTIONS
Assistance with ambulation/Assistance OOB with selected safe patient handling equipment/Communicate Risk of Fall with Harm to all staff/Discuss with provider need for PT consult/Monitor gait and stability/Reinforce activity limits and safety measures with patient and family/Tailored Fall Risk Interventions/Visual Cue: Yellow wristband and red socks/Bed in lowest position, wheels locked, appropriate side rails in place/Call bell, personal items and telephone in reach/Instruct patient to call for assistance before getting out of bed or chair/Non-slip footwear when patient is out of bed/[place] to call system/Physically safe environment - no spills, clutter or unnecessary equipment/Purposeful Proactive Rounding/Room/bathroom lighting operational, light cord in reach

## 2022-07-21 LAB
-  STAPHYLOCOCCUS EPIDERMIDIS, METHICILLIN RESISTANT: SIGNIFICANT CHANGE UP
ALBUMIN SERPL ELPH-MCNC: 2.9 G/DL — LOW (ref 3.3–5)
ALP SERPL-CCNC: 58 U/L — SIGNIFICANT CHANGE UP (ref 40–120)
ALT FLD-CCNC: 24 U/L — SIGNIFICANT CHANGE UP (ref 12–78)
ANION GAP SERPL CALC-SCNC: 10 MMOL/L — SIGNIFICANT CHANGE UP (ref 5–17)
AST SERPL-CCNC: 23 U/L — SIGNIFICANT CHANGE UP (ref 15–37)
BILIRUB SERPL-MCNC: 1.4 MG/DL — HIGH (ref 0.2–1.2)
BUN SERPL-MCNC: 21 MG/DL — SIGNIFICANT CHANGE UP (ref 7–23)
CALCIUM SERPL-MCNC: 8.7 MG/DL — SIGNIFICANT CHANGE UP (ref 8.5–10.1)
CHLORIDE SERPL-SCNC: 102 MMOL/L — SIGNIFICANT CHANGE UP (ref 96–108)
CO2 SERPL-SCNC: 26 MMOL/L — SIGNIFICANT CHANGE UP (ref 22–31)
CREAT SERPL-MCNC: 1.06 MG/DL — SIGNIFICANT CHANGE UP (ref 0.5–1.3)
CULTURE RESULTS: SIGNIFICANT CHANGE UP
EGFR: 68 ML/MIN/1.73M2 — SIGNIFICANT CHANGE UP
GLUCOSE SERPL-MCNC: 102 MG/DL — HIGH (ref 70–99)
GRAM STN FLD: SIGNIFICANT CHANGE UP
GRAM STN FLD: SIGNIFICANT CHANGE UP
HCT VFR BLD CALC: 41.4 % — SIGNIFICANT CHANGE UP (ref 39–50)
HGB BLD-MCNC: 14.1 G/DL — SIGNIFICANT CHANGE UP (ref 13–17)
INR BLD: 3.22 RATIO — HIGH (ref 0.88–1.16)
LEGIONELLA AG UR QL: NEGATIVE — SIGNIFICANT CHANGE UP
MAGNESIUM SERPL-MCNC: 2.1 MG/DL — SIGNIFICANT CHANGE UP (ref 1.6–2.6)
MCHC RBC-ENTMCNC: 33.3 PG — SIGNIFICANT CHANGE UP (ref 27–34)
MCHC RBC-ENTMCNC: 34.1 G/DL — SIGNIFICANT CHANGE UP (ref 32–36)
MCV RBC AUTO: 97.9 FL — SIGNIFICANT CHANGE UP (ref 80–100)
METHOD TYPE: SIGNIFICANT CHANGE UP
NRBC # BLD: 0 /100 WBCS — SIGNIFICANT CHANGE UP (ref 0–0)
ORGANISM # SPEC MICROSCOPIC CNT: SIGNIFICANT CHANGE UP
ORGANISM # SPEC MICROSCOPIC CNT: SIGNIFICANT CHANGE UP
PHOSPHATE SERPL-MCNC: 2.6 MG/DL — SIGNIFICANT CHANGE UP (ref 2.5–4.5)
PLATELET # BLD AUTO: 178 K/UL — SIGNIFICANT CHANGE UP (ref 150–400)
POTASSIUM SERPL-MCNC: 3.6 MMOL/L — SIGNIFICANT CHANGE UP (ref 3.5–5.3)
POTASSIUM SERPL-SCNC: 3.6 MMOL/L — SIGNIFICANT CHANGE UP (ref 3.5–5.3)
PROCALCITONIN SERPL-MCNC: 0.12 NG/ML — HIGH (ref 0.02–0.1)
PROT SERPL-MCNC: 5.9 GM/DL — LOW (ref 6–8.3)
PROTHROM AB SERPL-ACNC: 39.1 SEC — HIGH (ref 10.5–13.4)
RBC # BLD: 4.23 M/UL — SIGNIFICANT CHANGE UP (ref 4.2–5.8)
RBC # FLD: 12.6 % — SIGNIFICANT CHANGE UP (ref 10.3–14.5)
S PNEUM AG UR QL: NEGATIVE — SIGNIFICANT CHANGE UP
SODIUM SERPL-SCNC: 138 MMOL/L — SIGNIFICANT CHANGE UP (ref 135–145)
SPECIMEN SOURCE: SIGNIFICANT CHANGE UP
SPECIMEN SOURCE: SIGNIFICANT CHANGE UP
WBC # BLD: 7.71 K/UL — SIGNIFICANT CHANGE UP (ref 3.8–10.5)
WBC # FLD AUTO: 7.71 K/UL — SIGNIFICANT CHANGE UP (ref 3.8–10.5)

## 2022-07-21 PROCEDURE — 99233 SBSQ HOSP IP/OBS HIGH 50: CPT

## 2022-07-21 PROCEDURE — 99223 1ST HOSP IP/OBS HIGH 75: CPT

## 2022-07-21 RX ORDER — NYSTATIN CREAM 100000 [USP'U]/G
1 CREAM TOPICAL
Refills: 0 | Status: COMPLETED | OUTPATIENT
Start: 2022-07-21 | End: 2022-07-31

## 2022-07-21 RX ORDER — WARFARIN SODIUM 2.5 MG/1
5 TABLET ORAL ONCE
Refills: 0 | Status: COMPLETED | OUTPATIENT
Start: 2022-07-21 | End: 2022-07-21

## 2022-07-21 RX ADMIN — AZITHROMYCIN 255 MILLIGRAM(S): 500 TABLET, FILM COATED ORAL at 20:10

## 2022-07-21 RX ADMIN — CEFTRIAXONE 100 MILLIGRAM(S): 500 INJECTION, POWDER, FOR SOLUTION INTRAMUSCULAR; INTRAVENOUS at 20:10

## 2022-07-21 RX ADMIN — FINASTERIDE 5 MILLIGRAM(S): 5 TABLET, FILM COATED ORAL at 11:52

## 2022-07-21 RX ADMIN — Medication 650 MILLIGRAM(S): at 23:57

## 2022-07-21 RX ADMIN — Medication 650 MILLIGRAM(S): at 23:27

## 2022-07-21 RX ADMIN — AMLODIPINE BESYLATE 2.5 MILLIGRAM(S): 2.5 TABLET ORAL at 06:01

## 2022-07-21 RX ADMIN — PANTOPRAZOLE SODIUM 40 MILLIGRAM(S): 20 TABLET, DELAYED RELEASE ORAL at 08:08

## 2022-07-21 RX ADMIN — LORATADINE 10 MILLIGRAM(S): 10 TABLET ORAL at 11:52

## 2022-07-21 RX ADMIN — WARFARIN SODIUM 5 MILLIGRAM(S): 2.5 TABLET ORAL at 22:17

## 2022-07-21 RX ADMIN — Medication 20 MILLIGRAM(S): at 06:01

## 2022-07-21 NOTE — PROGRESS NOTE ADULT - SUBJECTIVE AND OBJECTIVE BOX
HPI:  Patient is an 87M with a PMH of Afib on coumadin, HTN, BPH who presents to the ED for abnormal labs.  Patient is AAOx3 however is a limited historian.  Patient states that he recently had labs done by his PMD and his WBC was high so he was brought to the ED for evaluation.  Patient otherwise has no active complaints.  Denies history of fever, chills, nausea, vomiting, chest pain, palpitations, dyspnea, cough, nausea, vomiting, diarrhea, dizziness, diaphoresis or syncope.  Vitals stable, labs show leukocytosis.  Will admit to med surg.   (2022 02:38)    Patient is a 87y old  Male who presents with a chief complaint of uti, pna (2022 13:04)      INTERVAL HPI/OVERNIGHT EVENTS: no acute events positive blood cultures     MEDICATIONS  (STANDING):  amLODIPine   Tablet 2.5 milliGRAM(s) Oral daily  azithromycin  IVPB 500 milliGRAM(s) IV Intermittent <User Schedule>  cefTRIAXone   IVPB 1000 milliGRAM(s) IV Intermittent <User Schedule>  finasteride 5 milliGRAM(s) Oral daily  furosemide    Tablet 20 milliGRAM(s) Oral daily  loratadine 10 milliGRAM(s) Oral daily  pantoprazole    Tablet 40 milliGRAM(s) Oral before breakfast    MEDICATIONS  (PRN):  acetaminophen     Tablet .. 650 milliGRAM(s) Oral every 6 hours PRN Temp greater or equal to 38C (100.4F), Mild Pain (1 - 3)      Allergies    chocolate (Unknown)  No Known Drug Allergies  peanuts (Anaphylaxis)    Intolerances        REVIEW OF SYSTEMS:  CONSTITUTIONAL: No fever, weight loss, or fatigue  EYES: No eye pain, visual disturbances, or discharge  ENMT:  No difficulty hearing, tinnitus, vertigo; No sinus or throat pain  NECK: No pain or stiffness  BREASTS: No pain, masses, or nipple discharge  RESPIRATORY: No cough, wheezing, chills or hemoptysis; No shortness of breath  CARDIOVASCULAR: No chest pain, palpitations, dizziness, or leg swelling  GASTROINTESTINAL: No abdominal or epigastric pain. No nausea, vomiting, or hematemesis; No diarrhea or constipation. No melena or hematochezia.  GENITOURINARY: No dysuria, frequency, hematuria, or incontinence  NEUROLOGICAL: No headaches, memory loss, loss of strength, numbness, or tremors  SKIN: No itching, burning, rashes, or lesions   LYMPH NODES: No enlarged glands  ENDOCRINE: No heat or cold intolerance; No hair loss  MUSCULOSKELETAL: No joint pain or swelling; No muscle, back, or extremity pain  PSYCHIATRIC: No depression, anxiety, mood swings, or difficulty sleeping  HEME/LYMPH: No easy bruising, or bleeding gums  ALLERGY AND IMMUNOLOGIC: No hives or eczema    Vital Signs Last 24 Hrs  T(C): 36.4 (2022 12:07), Max: 36.7 (2022 05:34)  T(F): 97.5 (2022 12:07), Max: 98.1 (2022 05:34)  HR: 80 (2022 12:07) (60 - 81)  BP: 111/66 (2022 12:07) (103/62 - 113/72)  RR: 18 (2022 12:07) (17 - 18)  SpO2: 93% (2022 12:07) (93% - 96%)    Parameters below as of 2022 17:08  Patient On (Oxygen Delivery Method): room air        PHYSICAL EXAM:  GENERAL: NAD, well-groomed, well-developed  HEAD:  Atraumatic, Normocephalic  EYES: EOMI, PERRLA, conjunctiva and sclera clear  ENMT: No tonsillar erythema, exudates, or enlargement; Moist mucous membranes, Good dentition, No lesions  NECK: Supple, No JVD, Normal thyroid  NERVOUS SYSTEM:  Alert & Oriented X3, Good concentration; Motor Strength 5/5 B/L upper and lower extremities; DTRs 2+ intact and symmetric  CHEST/LUNG: Clear to ascultation  bilaterally; No rales, rhonchi, wheezing, or rubs  HEART: Regular rate and rhythm; No murmurs, rubs, or gallops  ABDOMEN: Soft, Nontender, Nondistended; Bowel sounds present  EXTREMITIES:  2+ Peripheral Pulses, No clubbing, cyanosis, or edema  LYMPH: No lymphadenopathy noted  SKIN: No rashes or lesions    LABS:                        14.1   7.71  )-----------( 178      ( 2022 06:15 )             41.4     07-21    138  |  102  |  21  ----------------------------<  102<H>  3.6   |  26  |  1.06    Ca    8.7      2022 06:15  Phos  2.6       Mg     2.1         TPro  5.9<L>  /  Alb  2.9<L>  /  TBili  1.4<H>  /  DBili  x   /  AST  23  /  ALT  24  /  AlkPhos  58  -    PT/INR - ( 2022 06:15 )   PT: 39.1 sec;   INR: 3.22 ratio         PTT - ( 2022 19:30 )  PTT:40.5 sec  Urinalysis Basic - ( 2022 21:48 )    Color: Yellow / Appearance: very cloudy / S.020 / pH: x  Gluc: x / Ketone: Trace  / Bili: Negative / Urobili: Negative mg/dL   Blood: x / Protein: 100 mg/dL / Nitrite: Positive   Leuk Esterase: Moderate / RBC: 11-25 /HPF / WBC >50   Sq Epi: x / Non Sq Epi: Few / Bacteria: Many      CAPILLARY BLOOD GLUCOSE          RADIOLOGY & ADDITIONAL TESTS:  < from: CT Chest w/ IV Cont (22 @ 23:38) >  IMPRESSION:  Focus of right upper lobe density suspicious for pneumonia.    Ascending aortic dilatation of 4.1 cm.    < end of copied text >    Imaging Personally Reviewed:  [X ] YES  [ ] NO    Consultant(s) Notes Reviewed:  [X ] YES  [ ] NO    Care Discussed with Consultants/Other Providers [X ] YES  [ ] NO

## 2022-07-21 NOTE — PROGRESS NOTE ADULT - ASSESSMENT
Patient is an 87M with a PMH of Afib on coumadin, HTN, BPH who presents to the ED for abnormal labs.  Patient is AAOx3 however is a limited historian.  Patient states that he recently had labs done by his PMD and his WBC was high so he was brought to the ED for evaluation.  Patient otherwise has no active complaints.  Denies history of fever, chills, nausea, vomiting, chest pain, palpitations, dyspnea, cough, nausea, vomiting, diarrhea, dizziness, diaphoresis or syncope.  Vitals stable, labs show leukocytosis.  Will admit to med surg.      on advice of ID will check bladder scan given history of BPH   PT eval

## 2022-07-22 DIAGNOSIS — R78.81 BACTEREMIA: ICD-10-CM

## 2022-07-22 DIAGNOSIS — I71.4 ABDOMINAL AORTIC ANEURYSM, WITHOUT RUPTURE: ICD-10-CM

## 2022-07-22 LAB
-  AMIKACIN: SIGNIFICANT CHANGE UP
-  AMOXICILLIN/CLAVULANIC ACID: SIGNIFICANT CHANGE UP
-  AMPICILLIN/SULBACTAM: SIGNIFICANT CHANGE UP
-  AMPICILLIN: SIGNIFICANT CHANGE UP
-  AZTREONAM: SIGNIFICANT CHANGE UP
-  CEFAZOLIN: SIGNIFICANT CHANGE UP
-  CEFEPIME: SIGNIFICANT CHANGE UP
-  CEFOXITIN: SIGNIFICANT CHANGE UP
-  CEFTRIAXONE: SIGNIFICANT CHANGE UP
-  CIPROFLOXACIN: SIGNIFICANT CHANGE UP
-  ERTAPENEM: SIGNIFICANT CHANGE UP
-  GENTAMICIN: SIGNIFICANT CHANGE UP
-  IMIPENEM: SIGNIFICANT CHANGE UP
-  LEVOFLOXACIN: SIGNIFICANT CHANGE UP
-  MEROPENEM: SIGNIFICANT CHANGE UP
-  NITROFURANTOIN: SIGNIFICANT CHANGE UP
-  PIPERACILLIN/TAZOBACTAM: SIGNIFICANT CHANGE UP
-  TIGECYCLINE: SIGNIFICANT CHANGE UP
-  TOBRAMYCIN: SIGNIFICANT CHANGE UP
-  TRIMETHOPRIM/SULFAMETHOXAZOLE: SIGNIFICANT CHANGE UP
ANION GAP SERPL CALC-SCNC: 7 MMOL/L — SIGNIFICANT CHANGE UP (ref 5–17)
BUN SERPL-MCNC: 20 MG/DL — SIGNIFICANT CHANGE UP (ref 7–23)
CALCIUM SERPL-MCNC: 8.6 MG/DL — SIGNIFICANT CHANGE UP (ref 8.5–10.1)
CHLORIDE SERPL-SCNC: 107 MMOL/L — SIGNIFICANT CHANGE UP (ref 96–108)
CO2 SERPL-SCNC: 25 MMOL/L — SIGNIFICANT CHANGE UP (ref 22–31)
CREAT SERPL-MCNC: 1.15 MG/DL — SIGNIFICANT CHANGE UP (ref 0.5–1.3)
CULTURE RESULTS: SIGNIFICANT CHANGE UP
EGFR: 62 ML/MIN/1.73M2 — SIGNIFICANT CHANGE UP
GLUCOSE SERPL-MCNC: 118 MG/DL — HIGH (ref 70–99)
HCT VFR BLD CALC: 42.4 % — SIGNIFICANT CHANGE UP (ref 39–50)
HGB BLD-MCNC: 14.5 G/DL — SIGNIFICANT CHANGE UP (ref 13–17)
INR BLD: 3.01 RATIO — HIGH (ref 0.88–1.16)
MAGNESIUM SERPL-MCNC: 2.3 MG/DL — SIGNIFICANT CHANGE UP (ref 1.6–2.6)
MCHC RBC-ENTMCNC: 33 PG — SIGNIFICANT CHANGE UP (ref 27–34)
MCHC RBC-ENTMCNC: 34.2 G/DL — SIGNIFICANT CHANGE UP (ref 32–36)
MCV RBC AUTO: 96.4 FL — SIGNIFICANT CHANGE UP (ref 80–100)
METHOD TYPE: SIGNIFICANT CHANGE UP
NRBC # BLD: 0 /100 WBCS — SIGNIFICANT CHANGE UP (ref 0–0)
ORGANISM # SPEC MICROSCOPIC CNT: SIGNIFICANT CHANGE UP
ORGANISM # SPEC MICROSCOPIC CNT: SIGNIFICANT CHANGE UP
PHOSPHATE SERPL-MCNC: 2.6 MG/DL — SIGNIFICANT CHANGE UP (ref 2.5–4.5)
PLATELET # BLD AUTO: 172 K/UL — SIGNIFICANT CHANGE UP (ref 150–400)
POTASSIUM SERPL-MCNC: 3.7 MMOL/L — SIGNIFICANT CHANGE UP (ref 3.5–5.3)
POTASSIUM SERPL-SCNC: 3.7 MMOL/L — SIGNIFICANT CHANGE UP (ref 3.5–5.3)
PROTHROM AB SERPL-ACNC: 36.2 SEC — HIGH (ref 10.5–13.4)
RBC # BLD: 4.4 M/UL — SIGNIFICANT CHANGE UP (ref 4.2–5.8)
RBC # FLD: 12.5 % — SIGNIFICANT CHANGE UP (ref 10.3–14.5)
SODIUM SERPL-SCNC: 139 MMOL/L — SIGNIFICANT CHANGE UP (ref 135–145)
SPECIMEN SOURCE: SIGNIFICANT CHANGE UP
WBC # BLD: 6.67 K/UL — SIGNIFICANT CHANGE UP (ref 3.8–10.5)
WBC # FLD AUTO: 6.67 K/UL — SIGNIFICANT CHANGE UP (ref 3.8–10.5)

## 2022-07-22 PROCEDURE — 99233 SBSQ HOSP IP/OBS HIGH 50: CPT

## 2022-07-22 PROCEDURE — 99232 SBSQ HOSP IP/OBS MODERATE 35: CPT | Mod: FS

## 2022-07-22 RX ORDER — WARFARIN SODIUM 2.5 MG/1
5 TABLET ORAL ONCE
Refills: 0 | Status: COMPLETED | OUTPATIENT
Start: 2022-07-22 | End: 2022-07-22

## 2022-07-22 RX ADMIN — Medication 20 MILLIGRAM(S): at 05:18

## 2022-07-22 RX ADMIN — PANTOPRAZOLE SODIUM 40 MILLIGRAM(S): 20 TABLET, DELAYED RELEASE ORAL at 05:18

## 2022-07-22 RX ADMIN — AMLODIPINE BESYLATE 2.5 MILLIGRAM(S): 2.5 TABLET ORAL at 05:18

## 2022-07-22 RX ADMIN — CEFTRIAXONE 100 MILLIGRAM(S): 500 INJECTION, POWDER, FOR SOLUTION INTRAMUSCULAR; INTRAVENOUS at 20:37

## 2022-07-22 RX ADMIN — NYSTATIN CREAM 1 APPLICATION(S): 100000 CREAM TOPICAL at 17:26

## 2022-07-22 RX ADMIN — NYSTATIN CREAM 1 APPLICATION(S): 100000 CREAM TOPICAL at 05:18

## 2022-07-22 RX ADMIN — LORATADINE 10 MILLIGRAM(S): 10 TABLET ORAL at 11:58

## 2022-07-22 RX ADMIN — WARFARIN SODIUM 5 MILLIGRAM(S): 2.5 TABLET ORAL at 21:40

## 2022-07-22 RX ADMIN — FINASTERIDE 5 MILLIGRAM(S): 5 TABLET, FILM COATED ORAL at 11:58

## 2022-07-22 NOTE — PROGRESS NOTE ADULT - ASSESSMENT
Patient is an 87M with a PMH of Afib on coumadin, HTN, BPH who presents to the ED for abnormal labs.  Patient is AAOx3 however is a limited historian.  Patient states that he recently had labs done by his PMD and his WBC was high so he was brought to the ED for evaluation.  Patient otherwise has no active complaints.  Denies history of fever, chills, nausea, vomiting, chest pain, palpitations, dyspnea, cough, nausea, vomiting, diarrhea, dizziness, diaphoresis or syncope.  Vitals stable, labs show leukocytosis.   on advice of ID will check bladder and US  scan given history of BPH   PT eval

## 2022-07-22 NOTE — PROGRESS NOTE ADULT - ASSESSMENT
Suspect UTI- sx for 3-4 days PTA with dysuria, frequency but no hesitancy/dribbling/sense of retention.  Has radiographic findings that need to be followed to resolution (CT reviewed), but no signs/symptoms of pneumonia  Suspect blood cx is a procurement contaminant  Stop Azithro  F/U Cx  Continue ceftriaxone  I do not feel obligated to add an aminoglycoside dose give decrease in WBC, overall stability  Check Renal/Bladder US and PVR - creatinine higher that prior (albeit 2018)    7/22/22: improving, no fevers, on RA, no WBC, repeat BC x 1 with no growth to date, UC grew E. Coli, sensitivity pending. Today is day #3 of ceftriaxone IV, continue with current abx collection while awaiting for the culture sensitivity. Reached out to RN, requested a bladder scan, if in retention, should be seen by urology.     Suggestions  continue ceftriaxone for now  awaiting for UC sensitivity  obtain bladder scan (msg sent to RN) and if in retention, please call for urology consult, evaluate for prostatitis   trend pt's temperatures and WBC  follow repeat BCs - NGTD    Discussed with Dr. Teague   Msg sent to Dr. Haque  Msg sent to RN via teams

## 2022-07-22 NOTE — PROGRESS NOTE ADULT - SUBJECTIVE AND OBJECTIVE BOX
DONALDO DUFFY  MRN-[id number]    Follow Up:  UTI    Interval History: The pt was seen and examined earlier, no distress, able to state his name, birthday, knows he is in the hospital, did not know which hospital at first but then recalled, able to state current year and president. The reports that he still has faint discomfort with urination. The pt remains afebrile, on RA, no WBC.     PAST MEDICAL & SURGICAL HISTORY:  Syncope      Hypotension  Postural, positive Tilt Table Test.      BPH (Benign Prostatic Hyperplasia)      Hypertension      S/P hip replacement  Bilateral, Left ORIF and Rt. Hip Replacement          ROS:    [ ] Unobtainable because:  [x ] All other systems negative    Constitutional: no fever, no chills  Head: no trauma  Eyes: no vision changes, no eye pain  ENT:  no sore throat, no rhinorrhea  Cardiovascular:  no chest pain, no palpitation  Respiratory:  no SOB, no cough  GI:  no abd pain, no vomiting, no diarrhea  urinary: + dysuria, no hematuria, no flank pain  musculoskeletal:  no joint pain, no joint swelling  skin:  no rash  neurology:  no headache, no seizure, no change in mental status  psych: no anxiety, no depression         Allergies  chocolate (Unknown)  No Known Drug Allergies  peanuts (Anaphylaxis)        ANTIMICROBIALS:  cefTRIAXone   IVPB 1000 <User Schedule>      OTHER MEDS:  acetaminophen     Tablet .. 650 milliGRAM(s) Oral every 6 hours PRN  amLODIPine   Tablet 2.5 milliGRAM(s) Oral daily  finasteride 5 milliGRAM(s) Oral daily  furosemide    Tablet 20 milliGRAM(s) Oral daily  loratadine 10 milliGRAM(s) Oral daily  nystatin Powder 1 Application(s) Topical two times a day  pantoprazole    Tablet 40 milliGRAM(s) Oral before breakfast  warfarin 5 milliGRAM(s) Oral once      Vital Signs Last 24 Hrs  T(C): 36.5 (22 Jul 2022 11:15), Max: 36.8 (22 Jul 2022 00:17)  T(F): 97.7 (22 Jul 2022 11:15), Max: 98.3 (22 Jul 2022 00:17)  HR: 66 (22 Jul 2022 11:15) (66 - 75)  BP: 128/77 (22 Jul 2022 11:15) (110/69 - 137/63)  BP(mean): --  RR: 18 (22 Jul 2022 11:15) (18 - 18)  SpO2: 94% (22 Jul 2022 11:15) (93% - 97%)    Parameters below as of 22 Jul 2022 11:15  Patient On (Oxygen Delivery Method): room air        Physical Exam:  General: Nontoxic-appearing Male in no acute distress. Unkempt.   HEENT: AT/NC. PERRL. EOMI. Anicteric. Conjunctiva pink and moist. Oropharynx clear. Dentition fair. No thrush  Neck: Not rigid. No sense of mass.  Nodes: None palpable.  Lungs: Clear bilaterally without rales, wheezing or rhonchi  Heart: Regular rate and rhythm. No Murmur. No rub. No gallop. No palpable thrill.  Abdomen: Bowel sounds present and normoactive. Soft. Nondistended. Nontender. No sense of mass. No organomegaly.  Back: No spinal tenderness. No costovertebral angle tenderness.   Extremities: No cyanosis or clubbing. 1+ LE edema. Deformity RUE.   Skin: Warm. Dry. Good turgor. No rash. No vasculitic stigmata. Dry and somewhat scaly skin on face   Psychiatric: Appropriate affect and mood for situation.     WBC Count: 6.67 K/uL (07-22 @ 06:15)  WBC Count: 7.71 K/uL (07-21 @ 06:15)  WBC Count: 12.05 K/uL (07-20 @ 08:20)  WBC Count: 16.42 K/uL (07-19 @ 19:30)                            14.5   6.67  )-----------( 172      ( 22 Jul 2022 06:15 )             42.4       07-22    139  |  107  |  20  ----------------------------<  118<H>  3.7   |  25  |  1.15    Ca    8.6      22 Jul 2022 06:15  Phos  2.6     07-22  Mg     2.3     07-22    TPro  5.9<L>  /  Alb  2.9<L>  /  TBili  1.4<H>  /  DBili  x   /  AST  23  /  ALT  24  /  AlkPhos  58  07-21          Creatinine Trend: 1.15<--, 1.06<--, 1.09<--, 1.18<--      MICROBIOLOGY:  v  .Blood Blood-Peripheral  07-21-22   No growth to date.  --  --      Clean Catch Clean Catch (Midstream)  07-19-22   50,000 - 99,000 CFU/mL Escherichia coli  --  --      .Blood Blood  07-19-22   Growth in anaerobic bottle: Staphylococcus capitis  Coag Negative Staphylococcus  Single set isolate, possible contaminant. Contact  Microbiology if susceptibility testing clinically  indicated.  Growth in anaerobic bottle: Globicatella sanguinis  Alpha hemolytic strep  (not Strep. pneumoniae or Enterococcus)  Single set isolate, possible contaminant. Contact  Microbiology if susceptibility testing clinically  indicated.  ***Blood Panel PCR results on this specimen are available  approximately 3 hours after the Gram stain result.***  Gram stain, PCR, and/or culture results may not always  correspond due to difference in methodologies.  ************************************************************  This PCR assay was performed by multiplex PCR. This  Assay tests for 66 bacterial and resistance gene targets.  Please refer to the [facility] test directory  at [email or web address] for details.  --  Blood Culture PCR        Procalcitonin, Serum: 0.12 (07-21-22 @ 06:15)    SARS-CoV-2 Result: NotDetec (07-19-22 @ 19:30)      RADIOLOGY:

## 2022-07-22 NOTE — PROGRESS NOTE ADULT - SUBJECTIVE AND OBJECTIVE BOX
HPI:  Patient is an 87M with a PMH of Afib on coumadin, HTN, BPH who presents to the ED for abnormal labs.  Patient is AAOx3 however is a limited historian.  Patient states that he recently had labs done by his PMD and his WBC was high so he was brought to the ED for evaluation.  Patient otherwise has no active complaints.  Denies history of fever, chills, nausea, vomiting, chest pain, palpitations, dyspnea, cough, nausea, vomiting, diarrhea, dizziness, diaphoresis or syncope.  Vitals stable, labs show leukocytosis.  Will admit to med surg.   (20 Jul 2022 02:38)    Patient is a 87y old  Male who presents with a chief complaint of uti, pna (21 Jul 2022 16:41)      INTERVAL HPI/OVERNIGHT EVENTS: no acute events     MEDICATIONS  (STANDING):  amLODIPine   Tablet 2.5 milliGRAM(s) Oral daily  cefTRIAXone   IVPB 1000 milliGRAM(s) IV Intermittent <User Schedule>  finasteride 5 milliGRAM(s) Oral daily  furosemide    Tablet 20 milliGRAM(s) Oral daily  loratadine 10 milliGRAM(s) Oral daily  nystatin Powder 1 Application(s) Topical two times a day  pantoprazole    Tablet 40 milliGRAM(s) Oral before breakfast  warfarin 5 milliGRAM(s) Oral once    MEDICATIONS  (PRN):  acetaminophen     Tablet .. 650 milliGRAM(s) Oral every 6 hours PRN Temp greater or equal to 38C (100.4F), Mild Pain (1 - 3)      Allergies    chocolate (Unknown)  No Known Drug Allergies  peanuts (Anaphylaxis)    Intolerances        REVIEW OF SYSTEMS:  CONSTITUTIONAL: No fever, weight loss, or fatigue  EYES: No eye pain, visual disturbances, or discharge  ENMT:  No difficulty hearing, tinnitus, vertigo; No sinus or throat pain  NECK: No pain or stiffness  BREASTS: No pain, masses, or nipple discharge  RESPIRATORY: No cough, wheezing, chills or hemoptysis; No shortness of breath  CARDIOVASCULAR: No chest pain, palpitations, dizziness, or leg swelling  GASTROINTESTINAL: No abdominal or epigastric pain. No nausea, vomiting, or hematemesis; No diarrhea or constipation. No melena or hematochezia.  GENITOURINARY: No dysuria, frequency, hematuria, or incontinence  NEUROLOGICAL: No headaches, memory loss, loss of strength, numbness, or tremors  SKIN: No itching, burning, rashes, or lesions   LYMPH NODES: No enlarged glands  ENDOCRINE: No heat or cold intolerance; No hair loss  MUSCULOSKELETAL: No joint pain or swelling; No muscle, back, or extremity pain  PSYCHIATRIC: No depression, anxiety, mood swings, or difficulty sleeping  HEME/LYMPH: No easy bruising, or bleeding gums  ALLERGY AND IMMUNOLOGIC: No hives or eczema    Vital Signs Last 24 Hrs  T(C): 36.8 (22 Jul 2022 05:21), Max: 36.8 (22 Jul 2022 00:17)  T(F): 98.2 (22 Jul 2022 05:21), Max: 98.3 (22 Jul 2022 00:17)  HR: 66 (22 Jul 2022 11:15) (66 - 80)  BP: 128/77 (22 Jul 2022 11:15) (110/69 - 137/63)  RR: 18 (22 Jul 2022 11:15) (18 - 18)  SpO2: 94% (22 Jul 2022 11:15) (93% - 97%)    Parameters below as of 22 Jul 2022 11:15  Patient On (Oxygen Delivery Method): room air        PHYSICAL EXAM:  GENERAL: NAD, well-groomed, well-developed  HEAD:  Atraumatic, Normocephalic  EYES: EOMI, PERRLA, conjunctiva and sclera clear  ENMT: No tonsillar erythema, exudates, or enlargement; Moist mucous membranes, Good dentition, No lesions  NECK: Supple, No JVD, Normal thyroid  NERVOUS SYSTEM:  Alert & Oriented X3, Good concentration; Motor Strength 5/5 B/L upper and lower extremities; DTRs 2+ intact and symmetric  CHEST/LUNG: Clear to ascultation  bilaterally; No rales, rhonchi, wheezing, or rubs  HEART: Regular rate and rhythm; No murmurs, rubs, or gallops  ABDOMEN: Soft, Nontender, Nondistended; Bowel sounds present  EXTREMITIES:  third finger right hand deformity trigger finger?   LYMPH: No lymphadenopathy noted  SKIN: No rashes or lesions    LABS:                        14.5   6.67  )-----------( 172      ( 22 Jul 2022 06:15 )             42.4     07-22    139  |  107  |  20  ----------------------------<  118<H>  3.7   |  25  |  1.15    Ca    8.6      22 Jul 2022 06:15  Phos  2.6     07-22  Mg     2.3     07-22    TPro  5.9<L>  /  Alb  2.9<L>  /  TBili  1.4<H>  /  DBili  x   /  AST  23  /  ALT  24  /  AlkPhos  58  07-21    PT/INR - ( 22 Jul 2022 06:15 )   PT: 36.2 sec;   INR: 3.01 ratio             CAPILLARY BLOOD GLUCOSE          RADIOLOGY & ADDITIONAL TESTS:  < from: CT Chest w/ IV Cont (07.19.22 @ 23:38) >    ACC: [id number] EXAM:  CT CHEST IC                          PROCEDURE DATE:  07/19/2022          INTERPRETATION:  CLINICAL INFORMATION: Pneumonia    COMPARISON: None.    CONTRAST/COMPLICATIONS:  IV Contrast: IV contrast documented in associated exam  Oral Contrast: 12/31/2017  Complications: None reported at time of study completion    PROCEDURE:  CT of the Chest was performed.  Sagittal and coronal reformats were performed.    FINDINGS:    LUNGS AND AIRWAYS: There is a stable 7 x 5 mm nodule of the right lower   lobe. Given its long-term stability, this likely to be benign.    Atelectatic changes are seen posteriorly. Patchy density of the right   upper lobe suspicious for pneumonia. Linear atelectasis or scarring is   seen bilaterally.  PLEURA: No pleural effusion.  MEDIASTINUM AND RANDOLPH: Nonenlarged mediastinal lymph nodes appreciated.  VESSELS: The ascending aorta is dilated measuring 4.1 cm. The main   pulmonary artery is within normal limits. Calcifications of the aorta and   proximal branch vessels are noted.  HEART: The heart size is normal. Coronary and valvular calcifications are   appreciated. There is no significant pericardial effusion.  CHEST WALL AND LOWER NECK: Within normal limits.  VISUALIZED UPPER ABDOMEN: 1.4 cm lobulated cyst of the right hepatic lobe   is noted layering density within the gallbladder likely related to   sludge. Several left-sided renal cysts are noted.  BONES: Degenerative changes of the osseous structures are appreciated.   Old left posteriorrib fractures are noted. There is a curvature of the   spine.    IMPRESSION:  Focus of right upper lobe density suspicious for pneumonia.    Ascending aortic dilatation of 4.1 cm.      < end of copied text >    Imaging Personally Reviewed:  [ ] YES  [ ] NO    Consultant(s) Notes Reviewed:  [ ] YES  [ ] NO    Care Discussed with Consultants/Other Providers [ ] YES  [ ] NO

## 2022-07-23 DIAGNOSIS — N18.2 CHRONIC KIDNEY DISEASE, STAGE 2 (MILD): ICD-10-CM

## 2022-07-23 LAB
ANION GAP SERPL CALC-SCNC: 6 MMOL/L — SIGNIFICANT CHANGE UP (ref 5–17)
BUN SERPL-MCNC: 18 MG/DL — SIGNIFICANT CHANGE UP (ref 7–23)
CALCIUM SERPL-MCNC: 8.5 MG/DL — SIGNIFICANT CHANGE UP (ref 8.5–10.1)
CHLORIDE SERPL-SCNC: 106 MMOL/L — SIGNIFICANT CHANGE UP (ref 96–108)
CO2 SERPL-SCNC: 25 MMOL/L — SIGNIFICANT CHANGE UP (ref 22–31)
CREAT SERPL-MCNC: 1.02 MG/DL — SIGNIFICANT CHANGE UP (ref 0.5–1.3)
EGFR: 72 ML/MIN/1.73M2 — SIGNIFICANT CHANGE UP
GLUCOSE SERPL-MCNC: 109 MG/DL — HIGH (ref 70–99)
HCT VFR BLD CALC: 42.8 % — SIGNIFICANT CHANGE UP (ref 39–50)
HGB BLD-MCNC: 15.1 G/DL — SIGNIFICANT CHANGE UP (ref 13–17)
INR BLD: 3.07 RATIO — HIGH (ref 0.88–1.16)
MCHC RBC-ENTMCNC: 34.5 PG — HIGH (ref 27–34)
MCHC RBC-ENTMCNC: 35.3 G/DL — SIGNIFICANT CHANGE UP (ref 32–36)
MCV RBC AUTO: 97.7 FL — SIGNIFICANT CHANGE UP (ref 80–100)
NRBC # BLD: 0 /100 WBCS — SIGNIFICANT CHANGE UP (ref 0–0)
PLATELET # BLD AUTO: 168 K/UL — SIGNIFICANT CHANGE UP (ref 150–400)
POTASSIUM SERPL-MCNC: 4 MMOL/L — SIGNIFICANT CHANGE UP (ref 3.5–5.3)
POTASSIUM SERPL-SCNC: 4 MMOL/L — SIGNIFICANT CHANGE UP (ref 3.5–5.3)
PROTHROM AB SERPL-ACNC: 37.2 SEC — HIGH (ref 10.5–13.4)
RBC # BLD: 4.38 M/UL — SIGNIFICANT CHANGE UP (ref 4.2–5.8)
RBC # FLD: 12.6 % — SIGNIFICANT CHANGE UP (ref 10.3–14.5)
SODIUM SERPL-SCNC: 137 MMOL/L — SIGNIFICANT CHANGE UP (ref 135–145)
WBC # BLD: 6.52 K/UL — SIGNIFICANT CHANGE UP (ref 3.8–10.5)
WBC # FLD AUTO: 6.52 K/UL — SIGNIFICANT CHANGE UP (ref 3.8–10.5)

## 2022-07-23 PROCEDURE — 99233 SBSQ HOSP IP/OBS HIGH 50: CPT

## 2022-07-23 PROCEDURE — 76700 US EXAM ABDOM COMPLETE: CPT | Mod: 26

## 2022-07-23 RX ORDER — WARFARIN SODIUM 2.5 MG/1
5 TABLET ORAL ONCE
Refills: 0 | Status: COMPLETED | OUTPATIENT
Start: 2022-07-23 | End: 2022-07-23

## 2022-07-23 RX ADMIN — LORATADINE 10 MILLIGRAM(S): 10 TABLET ORAL at 12:21

## 2022-07-23 RX ADMIN — WARFARIN SODIUM 5 MILLIGRAM(S): 2.5 TABLET ORAL at 21:05

## 2022-07-23 RX ADMIN — FINASTERIDE 5 MILLIGRAM(S): 5 TABLET, FILM COATED ORAL at 12:21

## 2022-07-23 RX ADMIN — Medication 20 MILLIGRAM(S): at 05:43

## 2022-07-23 RX ADMIN — PANTOPRAZOLE SODIUM 40 MILLIGRAM(S): 20 TABLET, DELAYED RELEASE ORAL at 05:42

## 2022-07-23 RX ADMIN — NYSTATIN CREAM 1 APPLICATION(S): 100000 CREAM TOPICAL at 16:26

## 2022-07-23 RX ADMIN — NYSTATIN CREAM 1 APPLICATION(S): 100000 CREAM TOPICAL at 05:44

## 2022-07-23 NOTE — PROGRESS NOTE ADULT - SUBJECTIVE AND OBJECTIVE BOX
HPI:  Patient is an 87M with a PMH of Afib on coumadin, HTN, BPH who presents to the ED for abnormal labs.  Patient is AAOx3 however is a limited historian.  Patient states that he recently had labs done by his PMD and his WBC was high so he was brought to the ED for evaluation.  Patient otherwise has no active complaints.  Denies history of fever, chills, nausea, vomiting, chest pain, palpitations, dyspnea, cough, nausea, vomiting, diarrhea, dizziness, diaphoresis or syncope.  Vitals stable, labs show leukocytosis.  Will admit to med surg.   (20 Jul 2022 02:38)    Patient is a 87y old  Male who presents with a chief complaint of uti, pna (21 Jul 2022 16:41)        MEDICATIONS  (STANDING):  amLODIPine   Tablet 2.5 milliGRAM(s) Oral daily  finasteride 5 milliGRAM(s) Oral daily  furosemide    Tablet 20 milliGRAM(s) Oral daily  loratadine 10 milliGRAM(s) Oral daily  nystatin Powder 1 Application(s) Topical two times a day  pantoprazole    Tablet 40 milliGRAM(s) Oral before breakfast    MEDICATIONS  (PRN):  acetaminophen     Tablet .. 650 milliGRAM(s) Oral every 6 hours PRN Temp greater or equal to 38C (100.4F), Mild Pain (1 - 3)      Allergies    chocolate (Unknown)  No Known Drug Allergies  peanuts (Anaphylaxis)    Intolerances      Vital Signs Last 24 Hrs  T(C): 36.8 (23 Jul 2022 12:37), Max: 36.8 (23 Jul 2022 12:37)  T(F): 98.2 (23 Jul 2022 12:37), Max: 98.2 (23 Jul 2022 12:37)  HR: 88 (23 Jul 2022 12:37) (60 - 88)  BP: 121/77 (23 Jul 2022 12:37) (109/60 - 121/77)  BP(mean): --  RR: 18 (23 Jul 2022 12:37) (18 - 18)  SpO2: 95% (23 Jul 2022 12:37) (93% - 95%)    Parameters below as of 23 Jul 2022 05:25  Patient On (Oxygen Delivery Method): room air        PHYSICAL EXAM:  GENERAL: NAD, well-groomed, well-developed  HEAD:  Atraumatic, Normocephalic  EYES: EOMI, PERRLA, conjunctiva and sclera clear  ENMT: No tonsillar erythema, exudates, or enlargement; Moist mucous membranes, Good dentition, No lesions  NECK: Supple,   NERVOUS SYSTEM:  Alert & Oriented X3, Good concentration; Motor Strength 5/5 B/L upper and lower extremities; DTRs 2+ intact and symmetric  CHEST/LUNG: Clear to ascultation  bilaterally; No rales, rhonchi, wheezing, or rubs  HEART: Regular rate and rhythm; No murmurs, rubs, or gallops  ABDOMEN: Soft, Nontender, Nondistended; Bowel sounds present  EXTREMITIES:  third finger right hand deformity trigger finger?   SKIN: No rashes or lesions    LABS:                                     15.1   6.52  )-----------( 168      ( 23 Jul 2022 08:20 )             42.8       07-23    137  |  106  |  18  ----------------------------<  109<H>  4.0   |  25  |  1.02    Ca    8.5      23 Jul 2022 08:20  Phos  2.6     07-22  Mg     2.3     07-22    Prothrombin Time and INR, Plasma (07.23.22 @ 08:20)    Prothrombin Time, Plasma: 37.2: Effective February 23,2022, the reference range has changed. sec    INR: 3.07: Recommended targets/ranges for therapeutic INR:  2.0-3.0 Deep vein thrombosis, pulmonary embolism, atrial fibrillation  2.0-3.0 Mechanical aortic valve, antiphospholipid syndrome with previous  arterial or venous thromboembolism  2.5-3.5 Mechanical mitral valve, double mechanical valve (aortic and  mitral positions, high risk valves)  Note: Chest 2012 Feb;141(2 Suppl):7S-47S  Routine coagulation results should be interpreted with caution when  taking Factor Xa inhibitors or direct thrombin inhibitors; blood sampling  prior to drug intake is recommended. ratio             CAPILLARY BLOOD GLUCOSE          RADIOLOGY & ADDITIONAL TESTS:  < from: CT Chest w/ IV Cont (07.19.22 @ 23:38) >    ACC: [id number] EXAM:  CT CHEST IC                          PROCEDURE DATE:  07/19/2022          INTERPRETATION:  CLINICAL INFORMATION: Pneumonia    COMPARISON: None.    CONTRAST/COMPLICATIONS:  IV Contrast: IV contrast documented in associated exam  Oral Contrast: 12/31/2017  Complications: None reported at time of study completion    PROCEDURE:  CT of the Chest was performed.  Sagittal and coronal reformats were performed.    FINDINGS:    LUNGS AND AIRWAYS: There is a stable 7 x 5 mm nodule of the right lower   lobe. Given its long-term stability, this likely to be benign.    Atelectatic changes are seen posteriorly. Patchy density of the right   upper lobe suspicious for pneumonia. Linear atelectasis or scarring is   seen bilaterally.  PLEURA: No pleural effusion.  MEDIASTINUM AND RANDOLPH: Nonenlarged mediastinal lymph nodes appreciated.  VESSELS: The ascending aorta is dilated measuring 4.1 cm. The main   pulmonary artery is within normal limits. Calcifications of the aorta and   proximal branch vessels are noted.  HEART: The heart size is normal. Coronary and valvular calcifications are   appreciated. There is no significant pericardial effusion.  CHEST WALL AND LOWER NECK: Within normal limits.  VISUALIZED UPPER ABDOMEN: 1.4 cm lobulated cyst of the right hepatic lobe   is noted layering density within the gallbladder likely related to   sludge. Several left-sided renal cysts are noted.  BONES: Degenerative changes of the osseous structures are appreciated.   Old left posteriorrib fractures are noted. There is a curvature of the   spine.    IMPRESSION:  Focus of right upper lobe density suspicious for pneumonia.    Ascending aortic dilatation of 4.1 cm.      < end of copied text >    < from: US Abdomen Complete (US Abdomen Complete .) (07.23.22 @ 11:39) >    IMPRESSION:  Enlarged fatty liver. Small renal cysts    < end of copied text >      Imaging Personally Reviewed:  [ ] YES  [ ] NO    Consultant(s) Notes Reviewed:  [ ] YES  [ ] NO    Care Discussed with Consultants/Other Providers [ ] YES  [ ] NO HPI:  Patient is an 87M with a PMH of Afib on coumadin, HTN, BPH who presents to the ED for abnormal labs.  Patient is AAOx3 however is a limited historian.  Patient states that he recently had labs done by his PMD and his WBC was high so he was brought to the ED for evaluation.  Patient otherwise has no active complaints.  Denies history of fever, chills, nausea, vomiting, chest pain, palpitations, dyspnea, cough, nausea, vomiting, diarrhea, dizziness, diaphoresis or syncope.  Vitals stable, labs show leukocytosis.  Will admit to med surg.   (20 Jul 2022 02:38)    Patient is a 87y old  Male who presents with a chief complaint of uti, pna (21 Jul 2022 16:41)        MEDICATIONS  (STANDING):  amLODIPine   Tablet 2.5 milliGRAM(s) Oral daily  finasteride 5 milliGRAM(s) Oral daily  furosemide    Tablet 20 milliGRAM(s) Oral daily  loratadine 10 milliGRAM(s) Oral daily  nystatin Powder 1 Application(s) Topical two times a day  pantoprazole    Tablet 40 milliGRAM(s) Oral before breakfast    MEDICATIONS  (PRN):  acetaminophen     Tablet .. 650 milliGRAM(s) Oral every 6 hours PRN Temp greater or equal to 38C (100.4F), Mild Pain (1 - 3)      Allergies    chocolate (Unknown)  No Known Drug Allergies  peanuts (Anaphylaxis)    Intolerances      Vital Signs Last 24 Hrs  T(C): 36.8 (23 Jul 2022 12:37), Max: 36.8 (23 Jul 2022 12:37)  T(F): 98.2 (23 Jul 2022 12:37), Max: 98.2 (23 Jul 2022 12:37)  HR: 88 (23 Jul 2022 12:37) (60 - 88)  BP: 121/77 (23 Jul 2022 12:37) (109/60 - 121/77)  BP(mean): --  RR: 18 (23 Jul 2022 12:37) (18 - 18)  SpO2: 95% (23 Jul 2022 12:37) (93% - 95%)    Parameters below as of 23 Jul 2022 05:25  Patient On (Oxygen Delivery Method): room air        PHYSICAL EXAM:  GENERAL: NAD, well-groomed, well-developed  HEAD:  Atraumatic, Normocephalic  EYES: EOMI, PERRLA, conjunctiva and sclera clear  ENMT: No tonsillar erythema, exudates, or enlargement; Moist mucous membranes, Good dentition, No lesions  NECK: Supple,   NERVOUS SYSTEM:  Alert & Oriented X3, Good concentration; Motor Strength 4/5 B/L upper and lower extremities; DTRs 2+ intact and symmetric  CHEST/LUNG: Clear to ascultation  bilaterally; No rales, rhonchi, wheezing, or rubs  HEART: Regular rate and rhythm; No murmurs, rubs, or gallops  ABDOMEN: Soft, Nontender, Nondistended; Bowel sounds present  EXTREMITIES: 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 sec    INR: 3.07: Recommended targets/ranges for therapeutic INR:  2.0-3.0 Deep vein thrombosis, pulmonary embolism, atrial fibrillation  2.0-3.0 Mechanical aortic valve, antiphospholipid syndrome with previous  arterial or venous thromboembolism  2.5-3.5 Mechanical mitral valve, double mechanical valve (aortic and  mitral positions, high risk valves)  Note: Chest 2012 Feb;141(2 Suppl):7S-47S  Routine coagulation results should be interpreted with caution when  taking Factor Xa inhibitors or direct thrombin inhibitors; blood sampling  prior to drug intake is recommended. ratio             CAPILLARY BLOOD GLUCOSE          RADIOLOGY & ADDITIONAL TESTS:  < from: CT Chest w/ IV Cont (07.19.22 @ 23:38) >    ACC: [id number] EXAM:  CT CHEST IC                          PROCEDURE DATE:  07/19/2022          INTERPRETATION:  CLINICAL INFORMATION: Pneumonia    COMPARISON: None.    CONTRAST/COMPLICATIONS:  IV Contrast: IV contrast documented in associated exam  Oral Contrast: 12/31/2017  Complications: None reported at time of study completion    PROCEDURE:  CT of the Chest was performed.  Sagittal and coronal reformats were performed.    FINDINGS:    LUNGS AND AIRWAYS: There is a stable 7 x 5 mm nodule of the right lower   lobe. Given its long-term stability, this likely to be benign.    Atelectatic changes are seen posteriorly. Patchy density of the right   upper lobe suspicious for pneumonia. Linear atelectasis or scarring is   seen bilaterally.  PLEURA: No pleural effusion.  MEDIASTINUM AND RANDOLPH: Nonenlarged mediastinal lymph nodes appreciated.  VESSELS: The ascending aorta is dilated measuring 4.1 cm. The main   pulmonary artery is within normal limits. Calcifications of the aorta and   proximal branch vessels are noted.  HEART: The heart size is normal. Coronary and valvular calcifications are   appreciated. There is no significant pericardial effusion.  CHEST WALL AND LOWER NECK: Within normal limits.  VISUALIZED UPPER ABDOMEN: 1.4 cm lobulated cyst of the right hepatic lobe   is noted layering density within the gallbladder likely related to   sludge. Several left-sided renal cysts are noted.  BONES: Degenerative changes of the osseous structures are appreciated.   Old left posteriorrib fractures are noted. There is a curvature of the   spine.    IMPRESSION:  Focus of right upper lobe density suspicious for pneumonia.    Ascending aortic dilatation of 4.1 cm.      < end of copied text >    < from: US Abdomen Complete (US Abdomen Complete .) (07.23.22 @ 11:39) >    IMPRESSION:  Enlarged fatty liver. Small renal cysts    < end of copied text >      Imaging Personally Reviewed:  [ ] YES  [ ] NO    Consultant(s) Notes Reviewed:  [ ] YES  [ ] NO    Care Discussed with Consultants/Other Providers [ ] YES  [ ] NO

## 2022-07-24 LAB
A1C WITH ESTIMATED AVERAGE GLUCOSE RESULT: 5.8 % — HIGH (ref 4–5.6)
ALBUMIN SERPL ELPH-MCNC: 3 G/DL — LOW (ref 3.3–5)
ALP SERPL-CCNC: 63 U/L — SIGNIFICANT CHANGE UP (ref 40–120)
ALT FLD-CCNC: 39 U/L — SIGNIFICANT CHANGE UP (ref 12–78)
ANION GAP SERPL CALC-SCNC: 8 MMOL/L — SIGNIFICANT CHANGE UP (ref 5–17)
AST SERPL-CCNC: 36 U/L — SIGNIFICANT CHANGE UP (ref 15–37)
BILIRUB SERPL-MCNC: 0.5 MG/DL — SIGNIFICANT CHANGE UP (ref 0.2–1.2)
BUN SERPL-MCNC: 21 MG/DL — SIGNIFICANT CHANGE UP (ref 7–23)
CALCIUM SERPL-MCNC: 8.5 MG/DL — SIGNIFICANT CHANGE UP (ref 8.5–10.1)
CHLORIDE SERPL-SCNC: 108 MMOL/L — SIGNIFICANT CHANGE UP (ref 96–108)
CHOLEST SERPL-MCNC: 134 MG/DL — SIGNIFICANT CHANGE UP
CO2 SERPL-SCNC: 24 MMOL/L — SIGNIFICANT CHANGE UP (ref 22–31)
CREAT SERPL-MCNC: 1.07 MG/DL — SIGNIFICANT CHANGE UP (ref 0.5–1.3)
EGFR: 68 ML/MIN/1.73M2 — SIGNIFICANT CHANGE UP
ESTIMATED AVERAGE GLUCOSE: 120 MG/DL — HIGH (ref 68–114)
GLUCOSE SERPL-MCNC: 110 MG/DL — HIGH (ref 70–99)
HCT VFR BLD CALC: 44.7 % — SIGNIFICANT CHANGE UP (ref 39–50)
HDLC SERPL-MCNC: 27 MG/DL — LOW
HGB BLD-MCNC: 15.3 G/DL — SIGNIFICANT CHANGE UP (ref 13–17)
INR BLD: 3.49 RATIO — HIGH (ref 0.88–1.16)
LIPID PNL WITH DIRECT LDL SERPL: 81 MG/DL — SIGNIFICANT CHANGE UP
MAGNESIUM SERPL-MCNC: 2.3 MG/DL — SIGNIFICANT CHANGE UP (ref 1.6–2.6)
MCHC RBC-ENTMCNC: 33.4 PG — SIGNIFICANT CHANGE UP (ref 27–34)
MCHC RBC-ENTMCNC: 34.2 G/DL — SIGNIFICANT CHANGE UP (ref 32–36)
MCV RBC AUTO: 97.6 FL — SIGNIFICANT CHANGE UP (ref 80–100)
NON HDL CHOLESTEROL: 107 MG/DL — SIGNIFICANT CHANGE UP
NRBC # BLD: 0 /100 WBCS — SIGNIFICANT CHANGE UP (ref 0–0)
PHOSPHATE SERPL-MCNC: 2.7 MG/DL — SIGNIFICANT CHANGE UP (ref 2.5–4.5)
PLATELET # BLD AUTO: 171 K/UL — SIGNIFICANT CHANGE UP (ref 150–400)
POTASSIUM SERPL-MCNC: 3.8 MMOL/L — SIGNIFICANT CHANGE UP (ref 3.5–5.3)
POTASSIUM SERPL-SCNC: 3.8 MMOL/L — SIGNIFICANT CHANGE UP (ref 3.5–5.3)
PROT SERPL-MCNC: 6.1 GM/DL — SIGNIFICANT CHANGE UP (ref 6–8.3)
PROTHROM AB SERPL-ACNC: 42 SEC — HIGH (ref 10.5–13.4)
RBC # BLD: 4.58 M/UL — SIGNIFICANT CHANGE UP (ref 4.2–5.8)
RBC # FLD: 12.7 % — SIGNIFICANT CHANGE UP (ref 10.3–14.5)
SODIUM SERPL-SCNC: 140 MMOL/L — SIGNIFICANT CHANGE UP (ref 135–145)
TRIGL SERPL-MCNC: 127 MG/DL — SIGNIFICANT CHANGE UP
WBC # BLD: 6.07 K/UL — SIGNIFICANT CHANGE UP (ref 3.8–10.5)
WBC # FLD AUTO: 6.07 K/UL — SIGNIFICANT CHANGE UP (ref 3.8–10.5)

## 2022-07-24 PROCEDURE — 99232 SBSQ HOSP IP/OBS MODERATE 35: CPT

## 2022-07-24 RX ORDER — CEFTRIAXONE 500 MG/1
INJECTION, POWDER, FOR SOLUTION INTRAMUSCULAR; INTRAVENOUS
Refills: 0 | Status: DISCONTINUED | OUTPATIENT
Start: 2022-07-24 | End: 2022-07-26

## 2022-07-24 RX ORDER — CEFTRIAXONE 500 MG/1
1000 INJECTION, POWDER, FOR SOLUTION INTRAMUSCULAR; INTRAVENOUS ONCE
Refills: 0 | Status: COMPLETED | OUTPATIENT
Start: 2022-07-24 | End: 2022-07-24

## 2022-07-24 RX ORDER — CEFTRIAXONE 500 MG/1
1000 INJECTION, POWDER, FOR SOLUTION INTRAMUSCULAR; INTRAVENOUS EVERY 24 HOURS
Refills: 0 | Status: DISCONTINUED | OUTPATIENT
Start: 2022-07-25 | End: 2022-07-26

## 2022-07-24 RX ADMIN — CEFTRIAXONE 100 MILLIGRAM(S): 500 INJECTION, POWDER, FOR SOLUTION INTRAMUSCULAR; INTRAVENOUS at 15:30

## 2022-07-24 RX ADMIN — Medication 20 MILLIGRAM(S): at 05:26

## 2022-07-24 RX ADMIN — FINASTERIDE 5 MILLIGRAM(S): 5 TABLET, FILM COATED ORAL at 15:31

## 2022-07-24 RX ADMIN — AMLODIPINE BESYLATE 2.5 MILLIGRAM(S): 2.5 TABLET ORAL at 05:23

## 2022-07-24 RX ADMIN — NYSTATIN CREAM 1 APPLICATION(S): 100000 CREAM TOPICAL at 17:32

## 2022-07-24 RX ADMIN — NYSTATIN CREAM 1 APPLICATION(S): 100000 CREAM TOPICAL at 05:26

## 2022-07-24 RX ADMIN — PANTOPRAZOLE SODIUM 40 MILLIGRAM(S): 20 TABLET, DELAYED RELEASE ORAL at 06:43

## 2022-07-24 RX ADMIN — LORATADINE 10 MILLIGRAM(S): 10 TABLET ORAL at 15:31

## 2022-07-24 NOTE — PROGRESS NOTE ADULT - SUBJECTIVE AND OBJECTIVE BOX
HPI:  Patient is an 87M with a PMH of Afib on coumadin, HTN, BPH who presents to the ED for abnormal labs.  Patient is AAOx3 however is a limited historian.  Patient states that he recently had labs done by his PMD and his WBC was high so he was brought to the ED for evaluation.  Patient otherwise has no active complaints.  Denies history of fever, chills, nausea, vomiting, chest pain, palpitations, dyspnea, cough, nausea, vomiting, diarrhea, dizziness, diaphoresis or syncope.  Vitals stable, labs show leukocytosis.  Will admit to med surg.   (20 Jul 2022 02:38)    Patient is a 87y old  Male who presents with a chief complaint of uti, pna (21 Jul 2022 16:41)        MEDICATIONS  (STANDING):  amLODIPine   Tablet 2.5 milliGRAM(s) Oral daily  finasteride 5 milliGRAM(s) Oral daily  furosemide    Tablet 20 milliGRAM(s) Oral daily  loratadine 10 milliGRAM(s) Oral daily  nystatin Powder 1 Application(s) Topical two times a day  pantoprazole    Tablet 40 milliGRAM(s) Oral before breakfast    MEDICATIONS  (PRN):  acetaminophen     Tablet .. 650 milliGRAM(s) Oral every 6 hours PRN Temp greater or equal to 38C (100.4F), Mild Pain (1 - 3)    Allergies    chocolate (Unknown)  No Known Drug Allergies  peanuts (Anaphylaxis)    Intolerances    Vital Signs Last 24 Hrs  T(C): 36.6 (24 Jul 2022 10:51), Max: 36.8 (23 Jul 2022 12:37)  T(F): 97.8 (24 Jul 2022 10:51), Max: 98.2 (23 Jul 2022 12:37)  HR: 74 (24 Jul 2022 10:51) (64 - 88)  BP: 111/70 (24 Jul 2022 10:51) (111/70 - 132/80)  BP(mean): --  RR: 19 (24 Jul 2022 10:51) (18 - 19)  SpO2: 93% (24 Jul 2022 10:51) (93% - 96%)          PHYSICAL EXAM:  GENERAL: NAD, well-groomed, well-developed  HEAD:  Atraumatic, Normocephalic  EYES: EOMI, PERRLA, conjunctiva and sclera clear  ENMT: No tonsillar erythema, exudates, or enlargement; Moist mucous membranes, Good dentition, No lesions  NECK: Supple,   NERVOUS SYSTEM:  Alert & Oriented X3, Good concentration; Motor Strength 4/5 B/L upper and lower extremities; DTRs 2+ intact and symmetric  CHEST/LUNG: Clear to ascultation  bilaterally; No rales, rhonchi, wheezing, or rubs  HEART: Regular rate and rhythm; No murmurs, rubs, or gallops  ABDOMEN: Soft, Nontender, Nondistended; Bowel sounds present  EXTREMITIES: 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 sec    INR: 3.49: Recommended targets/ranges for therapeutic INR:  2.0-3.0 Deep vein thrombosis, pulmonary embolism, atrial fibrillation  2.0-3.0 Mechanical aortic valve, antiphospholipid syndrome with previous  arterial or venous thromboembolism  2.5-3.5 Mechanical mitral valve, double mechanical valve (aortic and  mitral positions, high risk valves)  Note: Chest 2012 Feb;141(2 Suppl):7S-47S  Routine coagulation results should be interpreted with caution when  taking Factor Xa inhibitors or direct thrombin inhibitors; blood sampling  prior to drug intake is recommended. ratio             CAPILLARY BLOOD GLUCOSE          RADIOLOGY & ADDITIONAL TESTS:  < from: CT Chest w/ IV Cont (07.19.22 @ 23:38) >    ACC: [id number] EXAM:  CT CHEST IC                          PROCEDURE DATE:  07/19/2022          INTERPRETATION:  CLINICAL INFORMATION: Pneumonia    COMPARISON: None.    CONTRAST/COMPLICATIONS:  IV Contrast: IV contrast documented in associated exam  Oral Contrast: 12/31/2017  Complications: None reported at time of study completion    PROCEDURE:  CT of the Chest was performed.  Sagittal and coronal reformats were performed.    FINDINGS:    LUNGS AND AIRWAYS: There is a stable 7 x 5 mm nodule of the right lower   lobe. Given its long-term stability, this likely to be benign.    Atelectatic changes are seen posteriorly. Patchy density of the right   upper lobe suspicious for pneumonia. Linear atelectasis or scarring is   seen bilaterally.  PLEURA: No pleural effusion.  MEDIASTINUM AND RANDOLPH: Nonenlarged mediastinal lymph nodes appreciated.  VESSELS: The ascending aorta is dilated measuring 4.1 cm. The main   pulmonary artery is within normal limits. Calcifications of the aorta and   proximal branch vessels are noted.  HEART: The heart size is normal. Coronary and valvular calcifications are   appreciated. There is no significant pericardial effusion.  CHEST WALL AND LOWER NECK: Within normal limits.  VISUALIZED UPPER ABDOMEN: 1.4 cm lobulated cyst of the right hepatic lobe   is noted layering density within the gallbladder likely related to   sludge. Several left-sided renal cysts are noted.  BONES: Degenerative changes of the osseous structures are appreciated.   Old left posteriorrib fractures are noted. There is a curvature of the   spine.    IMPRESSION:  Focus of right upper lobe density suspicious for pneumonia.    Ascending aortic dilatation of 4.1 cm.      < end of copied text >    < from: US Abdomen Complete (US Abdomen Complete .) (07.23.22 @ 11:39) >    IMPRESSION:  Enlarged fatty liver. Small renal cysts    < end of copied text >      Imaging Personally Reviewed:  [ ] YES  [ ] NO    Consultant(s) Notes Reviewed:  [ ] YES  [ ] NO    Care Discussed with Consultants/Other Providers [ ] YES  [ ] NO

## 2022-07-25 LAB
CULTURE RESULTS: SIGNIFICANT CHANGE UP
FLUAV AG NPH QL: SIGNIFICANT CHANGE UP
FLUBV AG NPH QL: SIGNIFICANT CHANGE UP
INR BLD: 2.85 RATIO — HIGH (ref 0.88–1.16)
PROTHROM AB SERPL-ACNC: 34.6 SEC — HIGH (ref 10.5–13.4)
SARS-COV-2 RNA SPEC QL NAA+PROBE: DETECTED
SPECIMEN SOURCE: SIGNIFICANT CHANGE UP

## 2022-07-25 PROCEDURE — 71045 X-RAY EXAM CHEST 1 VIEW: CPT | Mod: 26

## 2022-07-25 PROCEDURE — 99232 SBSQ HOSP IP/OBS MODERATE 35: CPT

## 2022-07-25 PROCEDURE — 99232 SBSQ HOSP IP/OBS MODERATE 35: CPT | Mod: FS

## 2022-07-25 RX ORDER — WARFARIN SODIUM 2.5 MG/1
4 TABLET ORAL ONCE
Refills: 0 | Status: COMPLETED | OUTPATIENT
Start: 2022-07-25 | End: 2022-07-25

## 2022-07-25 RX ADMIN — NYSTATIN CREAM 1 APPLICATION(S): 100000 CREAM TOPICAL at 06:08

## 2022-07-25 RX ADMIN — LORATADINE 10 MILLIGRAM(S): 10 TABLET ORAL at 11:12

## 2022-07-25 RX ADMIN — FINASTERIDE 5 MILLIGRAM(S): 5 TABLET, FILM COATED ORAL at 11:12

## 2022-07-25 RX ADMIN — AMLODIPINE BESYLATE 2.5 MILLIGRAM(S): 2.5 TABLET ORAL at 06:08

## 2022-07-25 RX ADMIN — PANTOPRAZOLE SODIUM 40 MILLIGRAM(S): 20 TABLET, DELAYED RELEASE ORAL at 06:08

## 2022-07-25 RX ADMIN — WARFARIN SODIUM 4 MILLIGRAM(S): 2.5 TABLET ORAL at 22:01

## 2022-07-25 RX ADMIN — Medication 20 MILLIGRAM(S): at 06:08

## 2022-07-25 RX ADMIN — NYSTATIN CREAM 1 APPLICATION(S): 100000 CREAM TOPICAL at 17:05

## 2022-07-25 RX ADMIN — CEFTRIAXONE 100 MILLIGRAM(S): 500 INJECTION, POWDER, FOR SOLUTION INTRAMUSCULAR; INTRAVENOUS at 13:18

## 2022-07-25 NOTE — PHYSICAL THERAPY INITIAL EVALUATION ADULT - BALANCE TRAINING, PT EVAL
pt will increase sitting static and dynamic balance by full grade for safety with ambulation, ADLs and transfers x8 weeks , standing TBA

## 2022-07-25 NOTE — CONSULT NOTE ADULT - SUBJECTIVE AND OBJECTIVE BOX
UROLOGY CONSULT NOTE    Patient is a 87y old  Male who presents with a chief complaint of uti, pna (25 Jul 2022 15:36)      HPI:  "Patient is an 87M with a PMH of Afib on coumadin, HTN, BPH who presents to the ED for abnormal labs.  Patient is AAOx3 however is a limited historian.  Patient states that he recently had labs done by his PMD and his WBC was high so he was brought to the ED for evaluation.  Patient otherwise has no active complaints.  Denies history of fever, chills, nausea, vomiting, chest pain, palpitations, dyspnea, cough, nausea, vomiting, diarrhea, dizziness, diaphoresis or syncope.  Vitals stable, labs show leukocytosis.  Will admit to med surg."         Urology consulted for dysuria in the setting of +Ucx E.coli 50-99k on day 7 of rocephin. Patient is a poor historian. States never has seen urologist and that pmd dx'd BPH and prescribed finasteride. Cannot tolerate flomax due to orthostatic hypotension. . States he has some burning towards the end of urination but that the burning has improved significantly since beginning of hospital stay. Denies incontinence, urgency, frequency, incomplete emptying, gross hematuria      PAST MEDICAL & SURGICAL HISTORY:  Syncope  Hypotension  Postural, positive Tilt Table Test.  BPH (Benign Prostatic Hyperplasia)  Hypertension  S/P hip replacement  Bilateral, Left ORIF and Rt. Hip Replacement    REVIEW OF SYSTEMS:  Constitutional: Denies fever, weight loss, fatigue  Eye: Denies eye pain, visual changes, discharge, blurred vision  ENT: Denies hearing changes, tinnitus, vertigo, sinus congestion, sore throat  Neck: Denies pain or stiffness  Respiratory: Denies cough, wheezing, chills, hemoptysis, shortness of breath, difficulty breathing  Cardiovascular: Denies chest pain, palpitations, dizziness, leg swelling  Gastrointestinal: Denies abdominal pain, nausea, vomiting, hematemesis, diarrhea, constipation, melena, hematochezia  Genitourinary: Endorses dysuria, Denies frequency, hematuria, retention, incontinence  Neurological: Denies headaches, memory loss, loss of strength, numbness, tremors  Skin: Denies itching, burning, rashes, lesions   Endocrine: Denies heat or cold intolerance, hair loss  Musculoskeletal: Denies joint pain or swelling, back, extremity pain  Psychiatric: Denies depression, anxiety, mood swings, difficulty sleeping, suicidal ideation  Hematology: Denies easy bruising, bleeding gums  Immunologic: Denies hives or eczema    MEDICATIONS  (STANDING):  amLODIPine   Tablet 2.5 milliGRAM(s) Oral daily  cefTRIAXone   IVPB      cefTRIAXone   IVPB 1000 milliGRAM(s) IV Intermittent every 24 hours  finasteride 5 milliGRAM(s) Oral daily  furosemide    Tablet 20 milliGRAM(s) Oral daily  loratadine 10 milliGRAM(s) Oral daily  nystatin Powder 1 Application(s) Topical two times a day  pantoprazole    Tablet 40 milliGRAM(s) Oral before breakfast  warfarin 4 milliGRAM(s) Oral once    MEDICATIONS  (PRN):  acetaminophen     Tablet .. 650 milliGRAM(s) Oral every 6 hours PRN Temp greater or equal to 38C (100.4F), Mild Pain (1 - 3)      Allergies    chocolate (Unknown)  No Known Drug Allergies  peanuts (Anaphylaxis)    Intolerances        SOCIAL HISTORY          Smoking: Yes [ ]  No [x]   ______pk yrs          ETOH  Yes [ ]  No [x]  Social [ ]          DRUGS:  Yes [ ]  No [ x  if so what______________    FAMILY HISTORY:  FH: HTN (hypertension)        Vital Signs Last 24 Hrs  T(C): 36.6 (25 Jul 2022 17:00), Max: 37.1 (25 Jul 2022 05:17)  T(F): 97.9 (25 Jul 2022 17:00), Max: 98.7 (25 Jul 2022 05:17)  HR: 73 (25 Jul 2022 17:00) (64 - 81)  BP: 113/94 (25 Jul 2022 17:00) (100/69 - 122/72)  BP(mean): --  RR: 18 (25 Jul 2022 17:00) (17 - 18)  SpO2: 94% (25 Jul 2022 17:00) (93% - 94%)    Parameters below as of 25 Jul 2022 11:20  Patient On (Oxygen Delivery Method): room air        Physical Exam:    GENERAL: NAD, well-groomed, thin  HEAD:  Atraumatic, Normocephalic  EYES: EOMI, PERRLA, conjunctiva and sclera clear  NECK: Supple, No JVD, Normal thyroid  NERVOUS SYSTEM:  Alert & Oriented X3, Good concentration  CHEST/LUNG: Clear to percussion bilaterally  HEART: Regular rate and rhythm  ABDOMEN: Soft, mildly diffuse tenderness, Nondistended  EXTREMITIES:  2+ Peripheral Pulses  LYMPH: No cervical adenopathy  : No suprapubic tenderness, no bladder distention, no gross hematuria.  Genitalia: Circumcised Phallus, Adequate meatus, no hypospadias. Both testicles  descended, non tender, no mass. No epididymitis or epididymal mass. No  hydrocele.  Rectal Examination: Deferred  SKIN: No rashes or lesions      LABS:                        15.3   6.07  )-----------( 171      ( 24 Jul 2022 07:30 )             44.7     07-24    140  |  108  |  21  ----------------------------<  110<H>  3.8   |  24  |  1.07    Ca    8.5      24 Jul 2022 07:30  Phos  2.7     07-24  Mg     2.3     07-24    TPro  6.1  /  Alb  3.0<L>  /  TBili  0.5  /  DBili  x   /  AST  36  /  ALT  39  /  AlkPhos  63  07-24    PT/INR - ( 25 Jul 2022 08:00 )   PT: 34.6 sec;   INR: 2.85 ratio               RADIOLOGY & ADDITIONAL STUDIES:

## 2022-07-25 NOTE — PROGRESS NOTE ADULT - SUBJECTIVE AND OBJECTIVE BOX
HPI:  Patient is an 87M with a PMH of Afib on coumadin, HTN, BPH who presents to the ED for abnormal labs.  Patient is AAOx3 however is a limited historian.  Patient states that he recently had labs done by his PMD and his WBC was high so he was brought to the ED for evaluation.  Patient otherwise has no active complaints.  Denies history of fever, chills, nausea, vomiting, chest pain, palpitations, dyspnea, cough, nausea, vomiting, diarrhea, dizziness, diaphoresis or syncope.  Vitals stable, labs show leukocytosis.  Will admit to med surg.   (20 Jul 2022 02:38)    Patient is a 87y old  Male who presents with a chief complaint of uti, pna (21 Jul 2022 16:41)    MEDICATIONS  (STANDING):  amLODIPine   Tablet 2.5 milliGRAM(s) Oral daily  cefTRIAXone   IVPB      cefTRIAXone   IVPB 1000 milliGRAM(s) IV Intermittent every 24 hours  finasteride 5 milliGRAM(s) Oral daily  furosemide    Tablet 20 milliGRAM(s) Oral daily  loratadine 10 milliGRAM(s) Oral daily  nystatin Powder 1 Application(s) Topical two times a day  pantoprazole    Tablet 40 milliGRAM(s) Oral before breakfast    MEDICATIONS  (PRN):  acetaminophen     Tablet .. 650 milliGRAM(s) Oral every 6 hours PRN Temp greater or equal to 38C (100.4F), Mild Pain (1 - 3)    Allergies    chocolate (Unknown)  No Known Drug Allergies  peanuts (Anaphylaxis)    Intolerances        Vital Signs Last 24 Hrs  T(C): 37.1 (25 Jul 2022 05:17), Max: 37.1 (25 Jul 2022 05:17)  T(F): 98.7 (25 Jul 2022 05:17), Max: 98.7 (25 Jul 2022 05:17)  HR: 64 (25 Jul 2022 05:17) (64 - 76)  BP: 122/72 (25 Jul 2022 05:17) (111/70 - 122/72)  BP(mean): --  RR: 17 (25 Jul 2022 05:17) (17 - 19)  SpO2: 93% (25 Jul 2022 05:17) (93% - 95%)            PHYSICAL EXAM:  GENERAL: NAD, well-groomed, well-developed  HEAD:  Atraumatic, Normocephalic  EYES: EOMI, PERRLA, conjunctiva and sclera clear  ENMT: No tonsillar erythema, exudates, or enlargement; Moist mucous membranes, Good dentition, No lesions  NECK: Supple,   NERVOUS SYSTEM:  Alert & Oriented X3, Good concentration; Motor Strength 4/5 B/L upper and lower extremities; DTRs 2+ intact and symmetric  CHEST/LUNG: Clear to ascultation  bilaterally; No rales, rhonchi, wheezing, or rubs  HEART: Regular rate and rhythm; No murmurs, rubs, or gallops  ABDOMEN: Soft, Nontender, Nondistended; Bowel sounds present  EXTREMITIES: 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    CONTRAST/COMPLICATIONS:  IV Contrast: IV contrast documented in associated exam  Oral Contrast: 12/31/2017  Complications: None reported at time of study completion    PROCEDURE:  CT of the Chest was performed.  Sagittal and coronal reformats were performed.    FINDINGS:    LUNGS AND AIRWAYS: There is a stable 7 x 5 mm nodule of the right lower   lobe. Given its long-term stability, this likely to be benign.    Atelectatic changes are seen posteriorly. Patchy density of the right   upper lobe suspicious for pneumonia. Linear atelectasis or scarring is   seen bilaterally.  PLEURA: No pleural effusion.  MEDIASTINUM AND ARNDOLPH: Nonenlarged mediastinal lymph nodes appreciated.  VESSELS: The ascending aorta is dilated measuring 4.1 cm. The main   pulmonary artery is within normal limits. Calcifications of the aorta and   proximal branch vessels are noted.  HEART: The heart size is normal. Coronary and valvular calcifications are   appreciated. There is no significant pericardial effusion.  CHEST WALL AND LOWER NECK: Within normal limits.  VISUALIZED UPPER ABDOMEN: 1.4 cm lobulated cyst of the right hepatic lobe   is noted layering density within the gallbladder likely related to   sludge. Several left-sided renal cysts are noted.  BONES: Degenerative changes of the osseous structures are appreciated.   Old left posteriorrib fractures are noted. There is a curvature of the   spine.    IMPRESSION:  Focus of right upper lobe density suspicious for pneumonia.    Ascending aortic dilatation of 4.1 cm.      < end of copied text >    < from: US Abdomen Complete (US Abdomen Complete .) (07.23.22 @ 11:39) >    IMPRESSION:  Enlarged fatty liver. Small renal cysts    < end of copied text >      Imaging Personally Reviewed:  [ ] YES  [ ] NO    Consultant(s) Notes Reviewed:  [ ] YES  [ ] NO    Care Discussed with Consultants/Other Providers [ ] YES  [ ] NO

## 2022-07-25 NOTE — PROGRESS NOTE ADULT - SUBJECTIVE AND OBJECTIVE BOX
DONALDO DUFFY  MRN-[id number]    Follow Up:  uti, pna    Interval History: the pt was seen and examined earlier, no distress, continues to complain of dysuria. The pt is afebrile, no WBC, Cr and LFTs ok.     PAST MEDICAL & SURGICAL HISTORY:  Syncope      Hypotension  Postural, positive Tilt Table Test.      BPH (Benign Prostatic Hyperplasia)      Hypertension      S/P hip replacement  Bilateral, Left ORIF and Rt. Hip Replacement          ROS:    [ ] Unobtainable because:  [x ] All other systems negative    Constitutional: no fever, no chills  Head: no trauma  Eyes: no vision changes, no eye pain  ENT:  no sore throat, no rhinorrhea  Cardiovascular:  no chest pain, no palpitation  Respiratory:  no SOB, no cough  GI:  no abd pain, no vomiting, no diarrhea  urinary: + dysuria, no hematuria, no flank pain  musculoskeletal:  no joint pain, no joint swelling  skin:  no rash  neurology:  no headache, no seizure, no change in mental status  psych: no anxiety, no depression         Allergies  chocolate (Unknown)  No Known Drug Allergies  peanuts (Anaphylaxis)        ANTIMICROBIALS:  cefTRIAXone   IVPB    cefTRIAXone   IVPB 1000 every 24 hours      OTHER MEDS:  acetaminophen     Tablet .. 650 milliGRAM(s) Oral every 6 hours PRN  amLODIPine   Tablet 2.5 milliGRAM(s) Oral daily  finasteride 5 milliGRAM(s) Oral daily  furosemide    Tablet 20 milliGRAM(s) Oral daily  loratadine 10 milliGRAM(s) Oral daily  nystatin Powder 1 Application(s) Topical two times a day  pantoprazole    Tablet 40 milliGRAM(s) Oral before breakfast      Vital Signs Last 24 Hrs  T(C): 37.1 (25 Jul 2022 05:17), Max: 37.1 (25 Jul 2022 05:17)  T(F): 98.7 (25 Jul 2022 05:17), Max: 98.7 (25 Jul 2022 05:17)  HR: 81 (25 Jul 2022 11:20) (64 - 81)  BP: 100/69 (25 Jul 2022 11:20) (100/69 - 122/72)  BP(mean): --  RR: 18 (25 Jul 2022 11:20) (17 - 18)  SpO2: 93% (25 Jul 2022 11:20) (93% - 95%)    Parameters below as of 25 Jul 2022 11:20  Patient On (Oxygen Delivery Method): room air        Physical Exam:  General: Nontoxic-appearing Male in no acute distress. Unkempt.   HEENT: AT/NC. PERRL. EOMI. Anicteric. Conjunctiva pink and moist. Oropharynx clear. Dentition fair. No thrush  Neck: Not rigid. No sense of mass.  Nodes: None palpable.  Lungs: Clear bilaterally without rales, wheezing or rhonchi  Heart: Regular rate and rhythm. No Murmur. No rub. No gallop. No palpable thrill.  Abdomen: Bowel sounds present and normoactive. Soft. Nondistended. Nontender. No sense of mass. No organomegaly.  Back: No spinal tenderness. No costovertebral angle tenderness.   Extremities: No cyanosis or clubbing. 1+ LE edema. Deformity RUE.   Skin: Warm. Dry. Good turgor. No rash. No vasculitic stigmata. Dry and somewhat scaly skin on face   Psychiatric: Appropriate affect and mood for situation.     WBC Count: 6.07 K/uL (07-24 @ 07:30)  WBC Count: 6.52 K/uL (07-23 @ 08:20)  WBC Count: 6.67 K/uL (07-22 @ 06:15)  WBC Count: 7.71 K/uL (07-21 @ 06:15)  WBC Count: 12.05 K/uL (07-20 @ 08:20)  WBC Count: 16.42 K/uL (07-19 @ 19:30)                            15.3   6.07  )-----------( 171      ( 24 Jul 2022 07:30 )             44.7       07-24    140  |  108  |  21  ----------------------------<  110<H>  3.8   |  24  |  1.07    Ca    8.5      24 Jul 2022 07:30  Phos  2.7     07-24  Mg     2.3     07-24    TPro  6.1  /  Alb  3.0<L>  /  TBili  0.5  /  DBili  x   /  AST  36  /  ALT  39  /  AlkPhos  63  07-24          Creatinine Trend: 1.07<--, 1.02<--, 1.15<--, 1.06<--, 1.09<--, 1.18<--      MICROBIOLOGY:  v  .Blood Blood-Peripheral  07-21-22   No growth to date.  --  --      Clean Catch Clean Catch (Midstream)  07-19-22   50,000 - 99,000 CFU/mL Escherichia coli  --  Escherichia coli      .Blood Blood  07-19-22   Growth in anaerobic bottle: Staphylococcus capitis  Coag Negative Staphylococcus  Single set isolate, possible contaminant. Contact  Microbiology if susceptibility testing clinically  indicated.  Growth in anaerobic bottle: Globicatella sanguinis  Alpha hemolytic strep  (not Strep. pneumoniae or Enterococcus)  Single set isolate, possible contaminant. Contact  Microbiology if susceptibility testing clinically  indicated.  ***Blood Panel PCR results on this specimen are available  approximately 3 hours after the Gram stain result.***  Gram stain, PCR, and/or culture results may not always  correspond due to difference in methodologies.  ************************************************************  This PCR assay was performed by multiplex PCR. This  Assay tests for 66 bacterial and resistance gene targets.  Please refer to the [facility] Labs test directory  at [email or web address] for details.  --  Blood Culture PCR      Procalcitonin, Serum: 0.12 (07-21-22 @ 06:15)    SARS-CoV-2 Result: Detected (07-25-22 @ 11:34)      RADIOLOGY:

## 2022-07-25 NOTE — CONSULT NOTE ADULT - ASSESSMENT
Impresison: Patient is an 87M with a PMH of Afib on coumadin, HTN, BPH who presents to the ED for abnormal labs. Urology consulted for dysuria in the setting of +Ucx E.coli 50-99k on day 7 of rocephin.    Recommendations:    --Continue to monitor UO, especially in the setting of COVID  --Would not start flomax due to hx of orthostatic hypotension  --Fits prostatitis picture, would recommend 4-6 weeks of abx      
Suspect UTI- sx for 3-4 days PTA with dysuria, frequency but no hesitancy/dribbling/sense of retention.  Has radiographic findings that need to be followed to resolution (CT reviewed), but no signs/symptoms of pneumonia  Suspect blood cx is a procurement contaminant  Stop Azithro  F/U Cx  Continue ceftriaxone  I do not feel obligated to add an aminoglycoside dose give decrease in WBC, overall stability  Check Renal/Bladder US and PVR - creatiine higher that prior (albeit 2018)  Left message for Dr. Haque  D/W patient  Thank you for the courtesy of this referral.  Srini Teague MD  Attending Physician  [facility]  Division of Infectious Diseases  [phone]

## 2022-07-25 NOTE — PROGRESS NOTE ADULT - ASSESSMENT
Suspect UTI- sx for 3-4 days PTA with dysuria, frequency but no hesitancy/dribbling/sense of retention.  Has radiographic findings that need to be followed to resolution (CT reviewed), but no signs/symptoms of pneumonia  Suspect blood cx is a procurement contaminant  Stop Azithro  F/U Cx  Continue ceftriaxone  I do not feel obligated to add an aminoglycoside dose give decrease in WBC, overall stability  Check Renal/Bladder US and PVR - creatinine higher that prior (albeit 2018)    7/22/22: improving, no fevers, on RA, no WBC, repeat BC x 1 with no growth to date, UC grew E. Coli, sensitivity pending. Today is day #3 of ceftriaxone IV, continue with current abx collection while awaiting for the culture sensitivity. Reached out to RN, requested a bladder scan, if in retention, should be seen by urology.   Attending Addendum--  Case reviewed with NP Alanna Young. Her note reviewed and modified as appropriate.   Patient personally assessed and examined.  Patient states still with some dysuria.   PVR yesterday documented as 47ml in RN note.  Suspect blood isolate is a contaminant  Await ID/S of urine isolate  Await US  I will be available via Teams for any issues that may arise over the weekend.    7/25: afebrile, no WBC, Cr and LFTs ok, repeat BCs with no growth to date, UC with E. Coli, continues to complain of dysuria, would favor urology evaluation, this day #7 of ceftriaxone, will continue abx for now, asked RN to obtain a bladder scan.     Suggestions  continue ceftriaxone for now  follow all culture data   obtain bladder scan - spoke with RN  trend pt's temperatures and WBC  urology consult     Discussed with Dr. Teague  Discussed with RN  Msg sent to Dr. Lees

## 2022-07-26 DIAGNOSIS — R91.1 SOLITARY PULMONARY NODULE: ICD-10-CM

## 2022-07-26 DIAGNOSIS — U07.1 COVID-19: ICD-10-CM

## 2022-07-26 LAB
ALBUMIN SERPL ELPH-MCNC: 2.6 G/DL — LOW (ref 3.3–5)
ALP SERPL-CCNC: 53 U/L — SIGNIFICANT CHANGE UP (ref 40–120)
ALT FLD-CCNC: 57 U/L — SIGNIFICANT CHANGE UP (ref 12–78)
ANION GAP SERPL CALC-SCNC: 8 MMOL/L — SIGNIFICANT CHANGE UP (ref 5–17)
AST SERPL-CCNC: 71 U/L — HIGH (ref 15–37)
BILIRUB SERPL-MCNC: 0.7 MG/DL — SIGNIFICANT CHANGE UP (ref 0.2–1.2)
BUN SERPL-MCNC: 27 MG/DL — HIGH (ref 7–23)
CALCIUM SERPL-MCNC: 8.3 MG/DL — LOW (ref 8.5–10.1)
CHLORIDE SERPL-SCNC: 106 MMOL/L — SIGNIFICANT CHANGE UP (ref 96–108)
CO2 SERPL-SCNC: 21 MMOL/L — LOW (ref 22–31)
CREAT SERPL-MCNC: 0.98 MG/DL — SIGNIFICANT CHANGE UP (ref 0.5–1.3)
CRP SERPL-MCNC: <3 MG/L — SIGNIFICANT CHANGE UP
CULTURE RESULTS: SIGNIFICANT CHANGE UP
D DIMER BLD IA.RAPID-MCNC: <150 NG/ML DDU — SIGNIFICANT CHANGE UP
EGFR: 75 ML/MIN/1.73M2 — SIGNIFICANT CHANGE UP
FERRITIN SERPL-MCNC: 176 NG/ML — SIGNIFICANT CHANGE UP (ref 30–400)
GLUCOSE BLDC GLUCOMTR-MCNC: 119 MG/DL — HIGH (ref 70–99)
GLUCOSE SERPL-MCNC: 95 MG/DL — SIGNIFICANT CHANGE UP (ref 70–99)
HCT VFR BLD CALC: 42.8 % — SIGNIFICANT CHANGE UP (ref 39–50)
HGB BLD-MCNC: 15.2 G/DL — SIGNIFICANT CHANGE UP (ref 13–17)
INR BLD: 2.3 RATIO — HIGH (ref 0.88–1.16)
LDH SERPL L TO P-CCNC: 290 U/L — HIGH (ref 50–242)
MCHC RBC-ENTMCNC: 33.9 PG — SIGNIFICANT CHANGE UP (ref 27–34)
MCHC RBC-ENTMCNC: 35.5 G/DL — SIGNIFICANT CHANGE UP (ref 32–36)
MCV RBC AUTO: 95.3 FL — SIGNIFICANT CHANGE UP (ref 80–100)
NRBC # BLD: 0 /100 WBCS — SIGNIFICANT CHANGE UP (ref 0–0)
PLATELET # BLD AUTO: 167 K/UL — SIGNIFICANT CHANGE UP (ref 150–400)
POTASSIUM SERPL-MCNC: 4.7 MMOL/L — SIGNIFICANT CHANGE UP (ref 3.5–5.3)
POTASSIUM SERPL-SCNC: 4.7 MMOL/L — SIGNIFICANT CHANGE UP (ref 3.5–5.3)
PROT SERPL-MCNC: 6.1 GM/DL — SIGNIFICANT CHANGE UP (ref 6–8.3)
PROTHROM AB SERPL-ACNC: 27.6 SEC — HIGH (ref 10.5–13.4)
RBC # BLD: 4.49 M/UL — SIGNIFICANT CHANGE UP (ref 4.2–5.8)
RBC # FLD: 12.6 % — SIGNIFICANT CHANGE UP (ref 10.3–14.5)
SODIUM SERPL-SCNC: 135 MMOL/L — SIGNIFICANT CHANGE UP (ref 135–145)
SPECIMEN SOURCE: SIGNIFICANT CHANGE UP
WBC # BLD: 5.04 K/UL — SIGNIFICANT CHANGE UP (ref 3.8–10.5)
WBC # FLD AUTO: 5.04 K/UL — SIGNIFICANT CHANGE UP (ref 3.8–10.5)

## 2022-07-26 PROCEDURE — 99233 SBSQ HOSP IP/OBS HIGH 50: CPT

## 2022-07-26 PROCEDURE — 99232 SBSQ HOSP IP/OBS MODERATE 35: CPT | Mod: FS

## 2022-07-26 RX ORDER — WARFARIN SODIUM 2.5 MG/1
5 TABLET ORAL ONCE
Refills: 0 | Status: COMPLETED | OUTPATIENT
Start: 2022-07-26 | End: 2022-07-26

## 2022-07-26 RX ADMIN — WARFARIN SODIUM 5 MILLIGRAM(S): 2.5 TABLET ORAL at 21:54

## 2022-07-26 RX ADMIN — NYSTATIN CREAM 1 APPLICATION(S): 100000 CREAM TOPICAL at 06:11

## 2022-07-26 RX ADMIN — LORATADINE 10 MILLIGRAM(S): 10 TABLET ORAL at 14:04

## 2022-07-26 RX ADMIN — FINASTERIDE 5 MILLIGRAM(S): 5 TABLET, FILM COATED ORAL at 14:04

## 2022-07-26 RX ADMIN — NYSTATIN CREAM 1 APPLICATION(S): 100000 CREAM TOPICAL at 16:13

## 2022-07-26 RX ADMIN — Medication 20 MILLIGRAM(S): at 06:10

## 2022-07-26 RX ADMIN — AMLODIPINE BESYLATE 2.5 MILLIGRAM(S): 2.5 TABLET ORAL at 06:10

## 2022-07-26 RX ADMIN — Medication 1 TABLET(S): at 14:04

## 2022-07-26 RX ADMIN — PANTOPRAZOLE SODIUM 40 MILLIGRAM(S): 20 TABLET, DELAYED RELEASE ORAL at 11:50

## 2022-07-26 NOTE — PHYSICAL THERAPY INITIAL EVALUATION ADULT - PERTINENT HX OF CURRENT PROBLEM, REHAB EVAL
Patient is an 87M with a PMH of Afib on coumadin, HTN, BPH who presents to the ED for abnormal labs.  Patient is AAOx3 however is a limited historian.  Patient states that he recently had labs done by his PMD and his WBC was high so he was brought to the ED for evaluation.  Patient otherwise has no active complaints.
Patient admitted with acute renal failure and leukocystosis, +Covid-19.

## 2022-07-26 NOTE — PHYSICAL THERAPY INITIAL EVALUATION ADULT - SKIN INTEGRITY
PT wound care initial evaluation performed with all wounds documented in flowsheet 2 5.0 A&I./pressure injury

## 2022-07-26 NOTE — PROGRESS NOTE ADULT - SUBJECTIVE AND OBJECTIVE BOX
Patient seen and examined bedside resting comfortably.  No complaints offered.   Voiding spontaneously without difficulty.      T(F): 97.7 (07-26-22 @ 11:06), Max: 98.1 (07-25-22 @ 21:30)  HR: 62 (07-26-22 @ 11:06) (62 - 76)  BP: 105/67 (07-26-22 @ 11:06) (105/67 - 126/77)  RR: 18 (07-26-22 @ 11:06) (18 - 18)  SpO2: 95% (07-26-22 @ 11:06) (94% - 96%)    ROS:  Negative unless otherwise stated    POCT Blood Glucose.: 119 mg/dL (26 Jul 2022 08:06)      PHYSICAL EXAM:    General: NAD, alert and awake  HEENT: NCAT, EOMI, conjunctiva clear  Chest: nonlabored respirations, CTA b/l.  Abdomen: soft, NT/ND.   Extremities: Calf soft, nontender b/l.   : No suprapubic tenderness or bladder distention.  Voiding spontaneously     LABS:  07-26    135  |  106  |  27<H>  ----------------------------<  95  4.7   |  21<L>  |  0.98    Ca    8.3<L>      26 Jul 2022 07:10    TPro  6.1  /  Alb  2.6<L>  /  TBili  0.7  /  DBili  x   /  AST  71<H>  /  ALT  57  /  AlkPhos  53  07-26  PT/INR - ( 26 Jul 2022 07:10 )   PT: 27.6 sec;   INR: 2.30 ratio           I&O's Detail    25 Jul 2022 07:01  -  26 Jul 2022 07:00  --------------------------------------------------------  IN:  Total IN: 0 mL    OUT:    Voided (mL): 950 mL  Total OUT: 950 mL    Total NET: -950 mL

## 2022-07-26 NOTE — PHYSICAL THERAPY INITIAL EVALUATION ADULT - ADDITIONAL COMMENTS
Per PT eval: pt reporting that he lives in a house with no steps to enter, ramp access. Pt uses a platform walker, has a regular bed and a "special" chair, not identified as a recliner. Pt has an aide come 1x week for showering.   Pt ambulates only household distances, MDs come to his home. Pt with no falls in last 3-6 months, no recent PT.
pt reporting that he lives in a house with no steps to enter, ramp access. Pt uses a platform walker, has a regular bed and a "special" chair, not identified as a recliner. Pt has an aide come 1x week for showering.   Pt ambulates only household distances, MDs come to his home. Pt with no falls in last 3-6 months, no recent PT.

## 2022-07-26 NOTE — PROGRESS NOTE ADULT - SUBJECTIVE AND OBJECTIVE BOX
DONALDO DUFYF  MRN-[id number]    Follow Up:  covid 19, prostatitis, uti    Interval History: The pt was seen and examined earlier, no distress, comfortable on RA, denies having chills, still has dysuria. The pt is afebrile, no new cbc.     PAST MEDICAL & SURGICAL HISTORY:  Syncope      Hypotension  Postural, positive Tilt Table Test.      BPH (Benign Prostatic Hyperplasia)      Hypertension      S/P hip replacement  Bilateral, Left ORIF and Rt. Hip Replacement          ROS:    [ ] Unobtainable because:  [x ] All other systems negative    Constitutional: no fever, no chills  Head: no trauma  Eyes: no vision changes, no eye pain  ENT:  no sore throat, no rhinorrhea  Cardiovascular:  no chest pain, no palpitation  Respiratory:  no SOB, no cough  GI:  no abd pain, no vomiting, no diarrhea  urinary: + dysuria, no hematuria, no flank pain  musculoskeletal:  no joint pain, no joint swelling  skin:  no rash  neurology:  no headache, no seizure, no change in mental status  psych: no anxiety, no depression         Allergies  chocolate (Unknown)  No Known Drug Allergies  peanuts (Anaphylaxis)        ANTIMICROBIALS:  trimethoprim  160 mG/sulfamethoxazole 800 mG 1 two times a day      OTHER MEDS:  acetaminophen     Tablet .. 650 milliGRAM(s) Oral every 6 hours PRN  amLODIPine   Tablet 2.5 milliGRAM(s) Oral daily  finasteride 5 milliGRAM(s) Oral daily  furosemide    Tablet 20 milliGRAM(s) Oral daily  loratadine 10 milliGRAM(s) Oral daily  nystatin Powder 1 Application(s) Topical two times a day  pantoprazole    Tablet 40 milliGRAM(s) Oral before breakfast  warfarin 5 milliGRAM(s) Oral once      Vital Signs Last 24 Hrs  T(C): 37 (26 Jul 2022 17:24), Max: 37 (26 Jul 2022 17:24)  T(F): 98.6 (26 Jul 2022 17:24), Max: 98.6 (26 Jul 2022 17:24)  HR: 68 (26 Jul 2022 17:24) (62 - 76)  BP: 124/75 (26 Jul 2022 17:24) (105/67 - 126/77)  BP(mean): --  RR: 18 (26 Jul 2022 17:24) (18 - 18)  SpO2: 94% (26 Jul 2022 17:24) (94% - 96%)    Parameters below as of 26 Jul 2022 11:06  Patient On (Oxygen Delivery Method): room air        Physical Exam:  General: Nontoxic-appearing Male in no acute distress. Unkempt.   HEENT: AT/NC. PERRL. EOMI. Anicteric. Conjunctiva pink and moist. Oropharynx clear. Dentition fair. No thrush  Neck: Not rigid. No sense of mass.  Nodes: None palpable.  Lungs: Clear bilaterally without rales, wheezing or rhonchi  Heart: Regular rate and rhythm. No Murmur. No rub. No gallop. No palpable thrill.  Abdomen: Bowel sounds present and normoactive. Soft. Nondistended. Nontender. No sense of mass. No organomegaly.  Back: No spinal tenderness. No costovertebral angle tenderness. + sacral wound not infected looking   Extremities: No cyanosis or clubbing. 1+ LE edema, trace erythema of left LE - no concern for cellulitis, there is no warmth or any significant swelling, mild tenderness present. Deformity RUE.   Skin: Warm. Dry. Good turgor. No rash. No vasculitic stigmata. Dry and somewhat scaly skin on face   Psychiatric: Appropriate affect and mood for situation.     WBC Count: 5.04 K/uL (07-26 @ 10:45)  WBC Count: 6.07 K/uL (07-24 @ 07:30)  WBC Count: 6.52 K/uL (07-23 @ 08:20)  WBC Count: 6.67 K/uL (07-22 @ 06:15)  WBC Count: 7.71 K/uL (07-21 @ 06:15)  WBC Count: 12.05 K/uL (07-20 @ 08:20)  WBC Count: 16.42 K/uL (07-19 @ 19:30)                            15.2   5.04  )-----------( 167      ( 26 Jul 2022 10:45 )             42.8       07-26    135  |  106  |  27<H>  ----------------------------<  95  4.7   |  21<L>  |  0.98    Ca    8.3<L>      26 Jul 2022 07:10    TPro  6.1  /  Alb  2.6<L>  /  TBili  0.7  /  DBili  x   /  AST  71<H>  /  ALT  57  /  AlkPhos  53  07-26          Creatinine Trend: 0.98<--, 1.07<--, 1.02<--, 1.15<--, 1.06<--, 1.09<--      MICROBIOLOGY:  v  .Blood Blood-Peripheral  07-21-22   No Growth Final  --  --      Clean Catch Clean Catch (Midstream)  07-19-22   50,000 - 99,000 CFU/mL Escherichia coli  --  Escherichia coli      .Blood Blood  07-19-22   Growth in anaerobic bottle: Staphylococcus capitis  Coag Negative Staphylococcus  Single set isolate, possible contaminant. Contact  Microbiology if susceptibility testing clinically  indicated.  Growth in anaerobic bottle: Globicatella sanguinis  Alpha hemolytic strep  (not Strep. pneumoniae or Enterococcus)  Single set isolate, possible contaminant. Contact  Microbiology if susceptibility testing clinically  indicated.  ***Blood Panel PCR results on this specimen are available  approximately 3 hours after the Gram stain result.***  Gram stain, PCR, and/or culture results may not always  correspond due to difference in methodologies.  ************************************************************  This PCR assay was performed by multiplex PCR. This  Assay tests for 66 bacterial and resistance gene targets.  Please refer to the [facility] test directory  at [email or web address] for details.  --  Blood Culture PCR        C-Reactive Protein, Serum: <3 (07-25)    Ferritin, Serum: 176 (07-25)      D-Dimer Assay, Quantitative: <150 (07-26)    Procalcitonin, Serum: 0.12 (07-21-22 @ 06:15)    SARS-CoV-2 Result: Detected (07-25-22 @ 11:34)      RADIOLOGY:

## 2022-07-26 NOTE — PHYSICAL THERAPY INITIAL EVALUATION ADULT - GENERAL OBSERVATIONS, REHAB EVAL
Patient encountered supine in bed, alert.
Pt for funtional evaluation. Pt encountered semi-reclined in bed, A&Ox3, NAD.

## 2022-07-26 NOTE — PHYSICAL THERAPY INITIAL EVALUATION ADULT - DISCHARGE DISPOSITION, PT EVAL
subacute rehab, dressing to be changed if soiled or compromised by RN
sub acute rehab, further functional assessment pending/rehabilitation facility

## 2022-07-26 NOTE — PROGRESS NOTE ADULT - SUBJECTIVE AND OBJECTIVE BOX
HPI:  Patient is an 87M with a PMH of Afib on coumadin, HTN, BPH who presents to the ED for abnormal labs.  Patient is AAOx3 however is a limited historian.  Patient states that he recently had labs done by his PMD and his WBC was high so he was brought to the ED for evaluation.  Patient otherwise has no active complaints.  Denies history of fever, chills, nausea, vomiting, chest pain, palpitations, dyspnea, cough, nausea, vomiting, diarrhea, dizziness, diaphoresis or syncope.  Vitals stable, labs show leukocytosis.  Will admit to med surg.   (20 Jul 2022 02:38)    Patient is a 87y old  Male who presents with a chief complaint of uti, pna (21 Jul 2022 16:41)    MEDICATIONS  (STANDING):  amLODIPine   Tablet 2.5 milliGRAM(s) Oral daily  finasteride 5 milliGRAM(s) Oral daily  furosemide    Tablet 20 milliGRAM(s) Oral daily  loratadine 10 milliGRAM(s) Oral daily  nystatin Powder 1 Application(s) Topical two times a day  pantoprazole    Tablet 40 milliGRAM(s) Oral before breakfast  trimethoprim  160 mG/sulfamethoxazole 800 mG 1 Tablet(s) Oral two times a day    MEDICATIONS  (PRN):  acetaminophen     Tablet .. 650 milliGRAM(s) Oral every 6 hours PRN Temp greater or equal to 38C (100.4F), Mild Pain (1 - 3)      Allergies    chocolate (Unknown)  No Known Drug Allergies  peanuts (Anaphylaxis)    Intolerances    Vital Signs Last 24 Hrs  T(C): 37 (26 Jul 2022 17:24), Max: 37 (26 Jul 2022 17:24)  T(F): 98.6 (26 Jul 2022 17:24), Max: 98.6 (26 Jul 2022 17:24)  HR: 68 (26 Jul 2022 17:24) (62 - 76)  BP: 124/75 (26 Jul 2022 17:24) (105/67 - 126/77)  BP(mean): --  RR: 18 (26 Jul 2022 17:24) (18 - 18)  SpO2: 94% (26 Jul 2022 17:24) (94% - 96%)    Parameters below as of 26 Jul 2022 11:06  Patient On (Oxygen Delivery Method): room air        PHYSICAL EXAM:  GENERAL: NAD, well-groomed, well-developed  HEAD:  Atraumatic, Normocephalic  EYES: EOMI, PERRLA, conjunctiva and sclera clear  ENMT: No tonsillar erythema, exudates, or enlargement; Moist mucous membranes, Good dentition, No lesions  NECK: Supple,   NERVOUS SYSTEM:  Alert & Oriented X3, Good concentration; Motor Strength 4/5 B/L upper and lower extremities; DTRs 2+ intact and symmetric  CHEST/LUNG: Clear to ascultation  bilaterally; No rales, rhonchi, wheezing, or rubs  HEART: Regular rate and rhythm; No murmurs, rubs, or gallops  ABDOMEN: Soft, Nontender, Nondistended; Bowel sounds present  EXTREMITIES: 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 sec    INR: 2.30: Recommended targets/ranges for therapeutic INR:  2.0-3.0 Deep vein thrombosis, pulmonary embolism, atrial fibrillation  2.0-3.0 Mechanical aortic valve, antiphospholipid syndrome with previous  arterial or venous thromboembolism  2.5-3.5 Mechanical mitral valve, double mechanical valve (aortic and  mitral positions, high risk valves)  Note: Chest 2012 Feb;141(2 Suppl):7S-47S  Routine coagulation results should be interpreted with caution when  taking Factor Xa inhibitors or direct thrombin inhibitors; blood sampling  prior to drug intake is recommended. ratio      CAPILLARY BLOOD GLUCOSE          RADIOLOGY & ADDITIONAL TESTS:  < from: CT Chest w/ IV Cont (07.19.22 @ 23:38) >    ACC: [id number] EXAM:  CT CHEST IC                          PROCEDURE DATE:  07/19/2022          INTERPRETATION:  CLINICAL INFORMATION: Pneumonia    COMPARISON: None.    CONTRAST/COMPLICATIONS:  IV Contrast: IV contrast documented in associated exam  Oral Contrast: 12/31/2017  Complications: None reported at time of study completion    PROCEDURE:  CT of the Chest was performed.  Sagittal and coronal reformats were performed.    FINDINGS:    LUNGS AND AIRWAYS: There is a stable 7 x 5 mm nodule of the right lower   lobe. Given its long-term stability, this likely to be benign.    Atelectatic changes are seen posteriorly. Patchy density of the right   upper lobe suspicious for pneumonia. Linear atelectasis or scarring is   seen bilaterally.  PLEURA: No pleural effusion.  MEDIASTINUM AND RANDLOPH: Nonenlarged mediastinal lymph nodes appreciated.  VESSELS: The ascending aorta is dilated measuring 4.1 cm. The main   pulmonary artery is within normal limits. Calcifications of the aorta and   proximal branch vessels are noted.  HEART: The heart size is normal. Coronary and valvular calcifications are   appreciated. There is no significant pericardial effusion.  CHEST WALL AND LOWER NECK: Within normal limits.  VISUALIZED UPPER ABDOMEN: 1.4 cm lobulated cyst of the right hepatic lobe   is noted layering density within the gallbladder likely related to   sludge. Several left-sided renal cysts are noted.  BONES: Degenerative changes of the osseous structures are appreciated.   Old left posteriorrib fractures are noted. There is a curvature of the   spine.    IMPRESSION:  Focus of right upper lobe density suspicious for pneumonia.    Ascending aortic dilatation of 4.1 cm.      < end of copied text >    < from: US Abdomen Complete (US Abdomen Complete .) (07.23.22 @ 11:39) >    IMPRESSION:  Enlarged fatty liver. Small renal cysts    < end of copied text >      Imaging Personally Reviewed:  [ ] YES  [ ] NO    Consultant(s) Notes Reviewed:  [ ] YES  [ ] NO    Care Discussed with Consultants/Other Providers [ ] YES  [ ] NO HPI:  Patient is an 87M with a PMH of Afib on coumadin, HTN, BPH who presents to the ED for abnormal labs.  Patient is AAOx3 however is a limited historian.  Patient states that he recently had labs done by his PMD and his WBC was high so he was brought to the ED for evaluation.  Patient otherwise has no active complaints.  Denies history of fever, chills, nausea, vomiting, chest pain, palpitations, dyspnea, cough, nausea, vomiting, diarrhea, dizziness, diaphoresis or syncope.  Vitals stable, labs show leukocytosis.  Will admit to med surg.   (20 Jul 2022 02:38)    Patient is a 87y old  Male who presents with a chief complaint of uti, pna (21 Jul 2022 16:41)    MEDICATIONS  (STANDING):  amLODIPine   Tablet 2.5 milliGRAM(s) Oral daily  finasteride 5 milliGRAM(s) Oral daily  furosemide    Tablet 20 milliGRAM(s) Oral daily  loratadine 10 milliGRAM(s) Oral daily  nystatin Powder 1 Application(s) Topical two times a day  pantoprazole    Tablet 40 milliGRAM(s) Oral before breakfast  trimethoprim  160 mG/sulfamethoxazole 800 mG 1 Tablet(s) Oral two times a day    MEDICATIONS  (PRN):  acetaminophen     Tablet .. 650 milliGRAM(s) Oral every 6 hours PRN Temp greater or equal to 38C (100.4F), Mild Pain (1 - 3)      Allergies    chocolate (Unknown)  No Known Drug Allergies  peanuts (Anaphylaxis)    Intolerances    Vital Signs Last 24 Hrs  T(C): 37 (26 Jul 2022 17:24), Max: 37 (26 Jul 2022 17:24)  T(F): 98.6 (26 Jul 2022 17:24), Max: 98.6 (26 Jul 2022 17:24)  HR: 68 (26 Jul 2022 17:24) (62 - 76)  BP: 124/75 (26 Jul 2022 17:24) (105/67 - 126/77)  BP(mean): --  RR: 18 (26 Jul 2022 17:24) (18 - 18)  SpO2: 94% (26 Jul 2022 17:24) (94% - 96%)    Parameters below as of 26 Jul 2022 11:06  Patient On (Oxygen Delivery Method): room air      PHYSICAL EXAM:  GENERAL: NAD, well-groomed, well-developed  HEAD:  Atraumatic, Normocephalic  EYES: EOMI, PERRLA, conjunctiva and sclera clear  ENMT: No tonsillar erythema, exudates, or enlargement; Moist mucous membranes, Good dentition, No lesions  NECK: Supple,   NERVOUS SYSTEM:  Alert & Oriented X3, Good concentration; Motor Strength 4/5 B/L upper and lower extremities; DTRs 2+ intact and symmetric  CHEST/LUNG: Clear to ascultation  bilaterally; No rales, rhonchi, wheezing, or rubs  HEART: Regular rate and rhythm; No murmurs, rubs, or gallops  ABDOMEN: Soft, Nontender, Nondistended; Bowel sounds present  EXTREMITIES: 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 sec    INR: 2.30: Recommended targets/ranges for therapeutic INR:  2.0-3.0 Deep vein thrombosis, pulmonary embolism, atrial fibrillation  2.0-3.0 Mechanical aortic valve, antiphospholipid syndrome with previous  arterial or venous thromboembolism  2.5-3.5 Mechanical mitral valve, double mechanical valve (aortic and  mitral positions, high risk valves)  Note: Chest 2012 Feb;141(2 Suppl):7S-47S  Routine coagulation results should be interpreted with caution when  taking Factor Xa inhibitors or direct thrombin inhibitors; blood sampling  prior to drug intake is recommended. ratio      CAPILLARY BLOOD GLUCOSE          RADIOLOGY & ADDITIONAL TESTS:  < from: CT Chest w/ IV Cont (07.19.22 @ 23:38) >    ACC: [id number] EXAM:  CT CHEST IC                          PROCEDURE DATE:  07/19/2022          INTERPRETATION:  CLINICAL INFORMATION: Pneumonia    COMPARISON: None.    CONTRAST/COMPLICATIONS:  IV Contrast: IV contrast documented in associated exam  Oral Contrast: 12/31/2017  Complications: None reported at time of study completion    PROCEDURE:  CT of the Chest was performed.  Sagittal and coronal reformats were performed.    FINDINGS:    LUNGS AND AIRWAYS: There is a stable 7 x 5 mm nodule of the right lower   lobe. Given its long-term stability, this likely to be benign.    Atelectatic changes are seen posteriorly. Patchy density of the right   upper lobe suspicious for pneumonia. Linear atelectasis or scarring is   seen bilaterally.  PLEURA: No pleural effusion.  MEDIASTINUM AND RANDOLPH: Nonenlarged mediastinal lymph nodes appreciated.  VESSELS: The ascending aorta is dilated measuring 4.1 cm. The main   pulmonary artery is within normal limits. Calcifications of the aorta and   proximal branch vessels are noted.  HEART: The heart size is normal. Coronary and valvular calcifications are   appreciated. There is no significant pericardial effusion.  CHEST WALL AND LOWER NECK: Within normal limits.  VISUALIZED UPPER ABDOMEN: 1.4 cm lobulated cyst of the right hepatic lobe   is noted layering density within the gallbladder likely related to   sludge. Several left-sided renal cysts are noted.  BONES: Degenerative changes of the osseous structures are appreciated.   Old left posteriorrib fractures are noted. There is a curvature of the   spine.    IMPRESSION:  Focus of right upper lobe density suspicious for pneumonia.    Ascending aortic dilatation of 4.1 cm.      < end of copied text >    < from: US Abdomen Complete (US Abdomen Complete .) (07.23.22 @ 11:39) >    IMPRESSION:  Enlarged fatty liver. Small renal cysts    < end of copied text >      Imaging Personally Reviewed:  [ ] YES  [ ] NO    Consultant(s) Notes Reviewed:  [ ] YES  [ ] NO    Care Discussed with Consultants/Other Providers [ ] YES  [ ] NO

## 2022-07-26 NOTE — PROGRESS NOTE ADULT - ASSESSMENT
Suspect UTI- sx for 3-4 days PTA with dysuria, frequency but no hesitancy/dribbling/sense of retention.  Has radiographic findings that need to be followed to resolution (CT reviewed), but no signs/symptoms of pneumonia  Suspect blood cx is a procurement contaminant  Stop Azithro  F/U Cx  Continue ceftriaxone  I do not feel obligated to add an aminoglycoside dose give decrease in WBC, overall stability  Check Renal/Bladder US and PVR - creatinine higher that prior (albeit 2018)    7/22/22: improving, no fevers, on RA, no WBC, repeat BC x 1 with no growth to date, UC grew E. Coli, sensitivity pending. Today is day #3 of ceftriaxone IV, continue with current abx collection while awaiting for the culture sensitivity. Reached out to RN, requested a bladder scan, if in retention, should be seen by urology.   Attending Addendum--  Case reviewed with NP Alanna Young. Her note reviewed and modified as appropriate.   Patient personally assessed and examined.  Patient states still with some dysuria.   PVR yesterday documented as 47ml in RN note.  Suspect blood isolate is a contaminant  Await ID/S of urine isolate  Await US  I will be available via Teams for any issues that may arise over the weekend.    7/25: afebrile, no WBC, Cr and LFTs ok, repeat BCs with no growth to date, UC with E. Coli, continues to complain of dysuria, would favor urology evaluation, this day #7 of ceftriaxone, will continue abx for now, asked RN to obtain a bladder scan.   7/26: covid 19 positive now, asymptomatic, vaccinated x 2, no booster shot, cxr with no change, no role for the treatment with remdesivir and decadron at this time as the pt has no symptoms, no role for MAB. The pt was seen by urology, still has symptoms of dysuria, possible prostatitis, will treat with po Bactrim x 4 weeks total.     Suggestions  # Prostatitis   stop ceftriaxone   start po bactrim DS, will need four weeks of treatment with last dose on 8/15/2022  monitor pt's renal function   follow all culture data   trend pt's temperatures and WBC  urology consult appreciated    #Covid 19  asymptomatic, no role for the treatment with decadron and remdesivir, please start if hypoxemia   no role for bebtelovimab at this time  CXR - no changes   trend inflammatory markers  monitor pt's respiratory status  anticoagulation as per protocol  RLE venous doppler pending r/o DVT      Discussed with Dr. King  Discussed with Dr. Lees

## 2022-07-26 NOTE — PROGRESS NOTE ADULT - ASSESSMENT
Impresison: Patient is an 87M with a PMH of Afib on coumadin, HTN, BPH who presents to the ED for abnormal labs. Urology consulted for dysuria in the setting of +Ucx E.coli 50-99k on day 7 of rocephin.    Recommendations:    --Continue to monitor UO, especially in the setting of COVID  --Would not start flomax due to hx of orthostatic hypotension  --Fits prostatitis picture, would recommend 4-6 weeks of abx  --F/u pelvic US

## 2022-07-26 NOTE — PHYSICAL THERAPY INITIAL EVALUATION ADULT - CRITERIA FOR SKILLED THERAPEUTIC INTERVENTIONS
impairments found/functional limitations in following categories/risk reduction/prevention/rehab potential/therapy frequency/predicted duration of therapy intervention/anticipated discharge recommendation
impairments found/risk reduction/prevention/rehab potential/therapy frequency/anticipated discharge recommendation

## 2022-07-26 NOTE — PHYSICAL THERAPY INITIAL EVALUATION ADULT - LEVEL OF INDEPENDENCE: SIT/STAND, REHAB EVAL
deferred due to patient pending R LE doppler
secondary to reporting lightheadedness that did not resolve, vitals taken throughout session/unable to perform

## 2022-07-26 NOTE — PROGRESS NOTE ADULT - NSPROGADDITIONALINFOA_GEN_ALL_CORE
7/25/2022 await Pt eval and final ID reccs
7/25/2022 await Pt eval and final ID reccs
await Pt eval and final ID reccs

## 2022-07-27 LAB
CRP SERPL-MCNC: <3 MG/L — SIGNIFICANT CHANGE UP
D DIMER BLD IA.RAPID-MCNC: 165 NG/ML DDU — SIGNIFICANT CHANGE UP
FERRITIN SERPL-MCNC: 249 NG/ML — SIGNIFICANT CHANGE UP (ref 30–400)
INR BLD: 2.42 RATIO — HIGH (ref 0.88–1.16)
LDH SERPL L TO P-CCNC: 420 U/L — HIGH (ref 50–242)
PROTHROM AB SERPL-ACNC: 29.3 SEC — HIGH (ref 10.5–13.4)

## 2022-07-27 PROCEDURE — 93971 EXTREMITY STUDY: CPT | Mod: 26,RT

## 2022-07-27 PROCEDURE — 99232 SBSQ HOSP IP/OBS MODERATE 35: CPT | Mod: FS

## 2022-07-27 PROCEDURE — 99232 SBSQ HOSP IP/OBS MODERATE 35: CPT

## 2022-07-27 PROCEDURE — 76856 US EXAM PELVIC COMPLETE: CPT | Mod: 26

## 2022-07-27 RX ORDER — WARFARIN SODIUM 2.5 MG/1
4 TABLET ORAL ONCE
Refills: 0 | Status: COMPLETED | OUTPATIENT
Start: 2022-07-27 | End: 2022-07-27

## 2022-07-27 RX ADMIN — LORATADINE 10 MILLIGRAM(S): 10 TABLET ORAL at 11:10

## 2022-07-27 RX ADMIN — Medication 20 MILLIGRAM(S): at 05:24

## 2022-07-27 RX ADMIN — PANTOPRAZOLE SODIUM 40 MILLIGRAM(S): 20 TABLET, DELAYED RELEASE ORAL at 07:27

## 2022-07-27 RX ADMIN — Medication 1 TABLET(S): at 17:19

## 2022-07-27 RX ADMIN — WARFARIN SODIUM 4 MILLIGRAM(S): 2.5 TABLET ORAL at 21:45

## 2022-07-27 RX ADMIN — AMLODIPINE BESYLATE 2.5 MILLIGRAM(S): 2.5 TABLET ORAL at 05:24

## 2022-07-27 RX ADMIN — NYSTATIN CREAM 1 APPLICATION(S): 100000 CREAM TOPICAL at 17:19

## 2022-07-27 RX ADMIN — FINASTERIDE 5 MILLIGRAM(S): 5 TABLET, FILM COATED ORAL at 11:10

## 2022-07-27 RX ADMIN — Medication 1 TABLET(S): at 05:24

## 2022-07-27 RX ADMIN — NYSTATIN CREAM 1 APPLICATION(S): 100000 CREAM TOPICAL at 05:24

## 2022-07-27 NOTE — DIETITIAN INITIAL EVALUATION ADULT - OTHER CALCULATIONS
Ht (cm): 177.8  Wt (kg):   90.1 (7/26)  BMI:  28.5  IBW:  75.5 kg  %IBW: 119%  UBW: stable  %UBW: 100%

## 2022-07-27 NOTE — PROGRESS NOTE ADULT - ASSESSMENT
Suspect UTI- sx for 3-4 days PTA with dysuria, frequency but no hesitancy/dribbling/sense of retention.  Has radiographic findings that need to be followed to resolution (CT reviewed), but no signs/symptoms of pneumonia  Suspect blood cx is a procurement contaminant  Stop Azithro  F/U Cx  Continue ceftriaxone  I do not feel obligated to add an aminoglycoside dose give decrease in WBC, overall stability  Check Renal/Bladder US and PVR - creatinine higher that prior (albeit 2018)    7/22/22: improving, no fevers, on RA, no WBC, repeat BC x 1 with no growth to date, UC grew E. Coli, sensitivity pending. Today is day #3 of ceftriaxone IV, continue with current abx collection while awaiting for the culture sensitivity. Reached out to RN, requested a bladder scan, if in retention, should be seen by urology.   Attending Addendum--  Case reviewed with NP Alanna Young. Her note reviewed and modified as appropriate.   Patient personally assessed and examined.  Patient states still with some dysuria.   PVR yesterday documented as 47ml in RN note.  Suspect blood isolate is a contaminant  Await ID/S of urine isolate  Await US  I will be available via Teams for any issues that may arise over the weekend.    7/25: afebrile, no WBC, Cr and LFTs ok, repeat BCs with no growth to date, UC with E. Coli, continues to complain of dysuria, would favor urology evaluation, this day #7 of ceftriaxone, will continue abx for now, asked RN to obtain a bladder scan.   7/26: covid 19 positive now, asymptomatic, vaccinated x 2, no booster shot, cxr with no change, no role for the treatment with remdesivir and decadron at this time as the pt has no symptoms, no role for MAB. The pt was seen by urology, still has symptoms of dysuria, possible prostatitis, will treat with po Bactrim x 4 weeks total.   7/27: pt's covid 19 remains asymptomatic, no role for the treatment at this time, tolerates Bactrim - treatment for prostatitis. The pt is on coumadin, the dose of coumadin should be adjusted since the pt is on Bactrim, which may increase coumadin activity, monitor INR.   Pt's sacral wound is not infected, no role for abx.     Suggestions  # Prostatitis   continue po bactrim DS, will need four weeks of treatment with last dose on 8/15/2022  monitor pt's INR, Bactrim may increase coumadin activity, the dose may need to be adjusted   monitor pt's renal function   follow all culture data   trend pt's temperatures and WBC  urology f/up appreciated    #Covid 19  asymptomatic, no role for the treatment with decadron and remdesivir, please start if hypoxemia   no role for bebtelovimab at this time  CXR - no changes   trend inflammatory markers  monitor pt's respiratory status  anticoagulation as per protocol  RLE venous doppler negative for DVT    will sign off, re-consult as needed       Discussed with Dr. King  Discussed with Dr. Elliott

## 2022-07-27 NOTE — PROGRESS NOTE ADULT - SUBJECTIVE AND OBJECTIVE BOX
UROLOGY PROGRESS NOTE:     Subjective: Patient seen and examined at bedside. still c/o dysuria      Objective:  Vital signs  T(F): , Max: 98.6 (07-26-22 @ 17:24)  HR: 73 (07-27-22 @ 11:33)  BP: 109/70 (07-27-22 @ 11:33)  SpO2: 94% (07-27-22 @ 11:33)  Wt(kg): --    Output     I&O's Detail    26 Jul 2022 07:01  -  27 Jul 2022 07:00  --------------------------------------------------------  IN:    Oral Fluid: 200 mL  Total IN: 200 mL    OUT:    Voided (mL): 1500 mL  Total OUT: 1500 mL    Total NET: -1300 mL        Physical Exam:  Gen: no acute distress  Back: no CVAT b/l  Abd: soft nt nd  : uncircumcised male, testes descended b/l non tender, no tenderness to perineum    Labs:                        15.2   5.04  )-----------( 167      ( 26 Jul 2022 10:45 )             42.8     07-26    135  |  106  |  27<H>  ----------------------------<  95  4.7   |  21<L>  |  0.98    Ca    8.3<L>      26 Jul 2022 07:10    TPro  6.1  /  Alb  2.6<L>  /  TBili  0.7  /  DBili  x   /  AST  71<H>  /  ALT  57  /  AlkPhos  53  07-26    PT/INR - ( 27 Jul 2022 05:55 )   PT: 29.3 sec;   INR: 2.42 ratio         Urine Cx: 7/19 e.coli    ACC: [id number] EXAM:  US PELVIC COMPLETE                          PROCEDURE DATE:  07/27/2022          INTERPRETATION:  CLINICAL INFORMATION: Enlarged prostate    COMPARISON: CT abdomen/pelvis 7/11/2013    TECHNIQUE:  Transabdominal ultrasound of the prostate    FINDINGS:    Evaluation of the prostate is limited secondary to transabdominal   technique.    Prostate measures 4.0 x 4.2 x 3.7 cm for a volume of 33 mL.    The urinary bladder is distended with a volume of 558 mL. Bladder   diverticula are noted. Patient was unable to void at the time of the   study.    IMPRESSION:    Mildly enlarged prostate.    Distended urinary bladder with multiple bladder diverticula.    Patient unable to void at the time of the study.    --- End of Report ---      MICHAEL MICHAEL MD; Attending Radiologist  This document has been electronically signed. Jul 27 2022 12:55PM

## 2022-07-27 NOTE — PROGRESS NOTE ADULT - ASSESSMENT
Impresison: Patient is an 87M with a PMH of Afib on coumadin, HTN, BPH who presents to the ED for abnormal labs. Urology consulted for dysuria in the setting of +Ucx E.coli 50-99k on day 7 of rocephin.    Recommendations:    -Continue to monitor UO, especially in the setting of COVID  -Would not start flomax due to hx of orthostatic hypotension  -Fits prostatitis picture, would recommend 4-6 weeks of abx  -Recheck PVR  will discuss with urology attending, full recs to follow   Impresison: Patient is an 87M with a PMH of Afib on coumadin, HTN, BPH who presents to the ED for abnormal labs. Urology consulted for dysuria in the setting of +Ucx E.coli 50-99k on day 7 of rocephin.    Recommendations:    -Continue to monitor UO, especially in the setting of COVID  -Would not start flomax due to hx of orthostatic hypotension  -Fits prostatitis picture, would recommend 4-6 weeks of abx  -Recheck PVR  -trial of pyridium for dysuria  -if PVR remains 200 or less will sign off and patient can follow up in the office  seen and discussed with urology attending, FATOU Alexis MD

## 2022-07-27 NOTE — DIETITIAN INITIAL EVALUATION ADULT - PERTINENT LABORATORY DATA
07-26    135  |  106  |  27<H>  ----------------------------<  95  4.7   |  21<L>  |  0.98    Ca    8.3<L>      26 Jul 2022 07:10    TPro  6.1  /  Alb  2.6<L>  /  TBili  0.7  /  DBili  x   /  AST  71<H>  /  ALT  57  /  AlkPhos  53  07-26  A1C with Estimated Average Glucose Result: 5.8 %, 120 (07-24-22)

## 2022-07-27 NOTE — PROGRESS NOTE ADULT - SUBJECTIVE AND OBJECTIVE BOX
DONALDO DUFFY  MRN-[id number]    Follow Up:  prostatitis, UTI, covid 19    Interval History: The pt was seen and examined earlier, no distress, no new complaints. The pt is afebrile, remains on RA.     PAST MEDICAL & SURGICAL HISTORY:  Syncope      Hypotension  Postural, positive Tilt Table Test.      BPH (Benign Prostatic Hyperplasia)      Hypertension      S/P hip replacement  Bilateral, Left ORIF and Rt. Hip Replacement          ROS:    [ ] Unobtainable because:  [x ] All other systems negative    Constitutional: no fever, no chills  Head: no trauma  Eyes: no vision changes, no eye pain  ENT:  no sore throat, no rhinorrhea  Cardiovascular:  no chest pain, no palpitation  Respiratory:  no SOB, occasional cough  GI:  no abd pain, no vomiting, no diarrhea  urinary: mild dysuria, no hematuria, no flank pain  musculoskeletal:  no joint pain, no joint swelling  skin:  no rash  neurology:  no headache, no seizure, no change in mental status  psych: no anxiety, no depression         Allergies  chocolate (Unknown)  No Known Drug Allergies  peanuts (Anaphylaxis)        ANTIMICROBIALS:  trimethoprim  160 mG/sulfamethoxazole 800 mG 1 two times a day      OTHER MEDS:  acetaminophen     Tablet .. 650 milliGRAM(s) Oral every 6 hours PRN  amLODIPine   Tablet 2.5 milliGRAM(s) Oral daily  finasteride 5 milliGRAM(s) Oral daily  furosemide    Tablet 20 milliGRAM(s) Oral daily  loratadine 10 milliGRAM(s) Oral daily  nystatin Powder 1 Application(s) Topical two times a day  pantoprazole    Tablet 40 milliGRAM(s) Oral before breakfast  warfarin 4 milliGRAM(s) Oral once      Vital Signs Last 24 Hrs  T(C): 36.9 (27 Jul 2022 15:45), Max: 36.9 (27 Jul 2022 15:45)  T(F): 98.5 (27 Jul 2022 15:45), Max: 98.5 (27 Jul 2022 15:45)  HR: 91 (27 Jul 2022 15:45) (73 - 91)  BP: 115/70 (27 Jul 2022 15:45) (109/70 - 124/72)  BP(mean): --  RR: 18 (27 Jul 2022 15:45) (18 - 18)  SpO2: 97% (27 Jul 2022 15:45) (94% - 97%)        Physical Exam:  General: Nontoxic-appearing Male in no acute distress.  HEENT: AT/NC. PERRL. EOMI. Anicteric. Conjunctiva pink and moist. Oropharynx clear. Dentition fair. No thrush  Neck: Not rigid. No sense of mass.  Nodes: None palpable.  Lungs: Clear bilaterally without rales, wheezing or rhonchi  Heart: Regular rate and rhythm. No Murmur. No rub. No gallop. No palpable thrill.  Abdomen: Bowel sounds present and normoactive. Soft. Nondistended. Nontender. No sense of mass. No organomegaly.  Back: No spinal tenderness. No costovertebral angle tenderness. + sacral wound not infected looking   Extremities: No cyanosis or clubbing. 1+ LE edema, trace erythema of left LE - no concern for cellulitis, there is no warmth or any significant swelling, mild tenderness present. Deformity RUE.   Skin: Warm. Dry. Good turgor. No rash. No vasculitic stigmata. Dry and somewhat scaly skin on face   Psychiatric: Appropriate affect and mood for situation.     WBC Count: 5.04 K/uL (07-26 @ 10:45)  WBC Count: 6.07 K/uL (07-24 @ 07:30)  WBC Count: 6.52 K/uL (07-23 @ 08:20)  WBC Count: 6.67 K/uL (07-22 @ 06:15)  WBC Count: 7.71 K/uL (07-21 @ 06:15)                            15.2   5.04  )-----------( 167      ( 26 Jul 2022 10:45 )             42.8       07-26    135  |  106  |  27<H>  ----------------------------<  95  4.7   |  21<L>  |  0.98    Ca    8.3<L>      26 Jul 2022 07:10    TPro  6.1  /  Alb  2.6<L>  /  TBili  0.7  /  DBili  x   /  AST  71<H>  /  ALT  57  /  AlkPhos  53  07-26          Creatinine Trend: 0.98<--, 1.07<--, 1.02<--, 1.15<--, 1.06<--, 1.09<--      MICROBIOLOGY:  v  .Blood Blood-Peripheral  07-21-22   No Growth Final  --  --      Clean Catch Clean Catch (Midstream)  07-19-22   50,000 - 99,000 CFU/mL Escherichia coli  --  Escherichia coli      .Blood Blood  07-19-22   Growth in anaerobic bottle: Staphylococcus capitis  Coag Negative Staphylococcus  Single set isolate, possible contaminant. Contact  Microbiology if susceptibility testing clinically  indicated.  Growth in anaerobic bottle: Globicatella sanguinis  Alpha hemolytic strep  (not Strep. pneumoniae or Enterococcus)  Single set isolate, possible contaminant. Contact  Microbiology if susceptibility testing clinically  indicated.  ***Blood Panel PCR results on this specimen are available  approximately 3 hours after the Gram stain result.***  Gram stain, PCR, and/or culture results may not always  correspond due to difference in methodologies.  ************************************************************  This PCR assay was performed by multiplex PCR. This  Assay tests for 66 bacterial and resistance gene targets.  Please refer to the [facility] Labs test directory  at [email or web address] for details.  --  Blood Culture PCR      C-Reactive Protein, Serum: <3 (07-27)  C-Reactive Protein, Serum: <3 (07-25)    Ferritin, Serum: 249 (07-27)  Ferritin, Serum: 176 (07-25)      D-Dimer Assay, Quantitative: 165 (07-27)  D-Dimer Assay, Quantitative: <150 (07-26)    Procalcitonin, Serum: 0.12 (07-21-22 @ 06:15)    SARS-CoV-2 Result: Detected (07-25-22 @ 11:34)      RADIOLOGY:    < from: US Pelvis Complete (US Pelvis Complete .) (07.27.22 @ 12:47) >    ACC: [id number] EXAM:  US PELVIC COMPLETE                          PROCEDURE DATE:  07/27/2022          INTERPRETATION:  CLINICAL INFORMATION: Enlarged prostate    COMPARISON: CT abdomen/pelvis 7/11/2013    TECHNIQUE:  Transabdominal ultrasound of the prostate    FINDINGS:    Evaluation of the prostate is limited secondary to transabdominal   technique.    Prostate measures 4.0 x 4.2 x 3.7 cm for a volume of 33 mL.    The urinary bladder is distended with a volume of 558 mL. Bladder   diverticulaare noted. Patient was unable to void at the time of the   study.    IMPRESSION:    Mildly enlarged prostate.    Distended urinary bladder with multiple bladder diverticula.    Patient unable to void at the time of the study.    --- End of Report ---            MICHAEL MICHAEL MD; Attending Radiologist  This document has been electronically signed. Jul 27 2022 12:55PM    < end of copied text >

## 2022-07-27 NOTE — DIETITIAN INITIAL EVALUATION ADULT - OTHER INFO
Unable to conduct face-to-face interview c pt due to isolation precautions being maintained for COVID; attempt to reach pt via phone was unsuccessful.  Per medical record pt lives c spouse; is independent c ADL. Wt has been stable since 1/2018 per past RD assessment. No reports of any N/V/C/D or chew/swallowing difficulty.

## 2022-07-27 NOTE — PROGRESS NOTE ADULT - ASSESSMENT
Patient is an 87M with a PMH of Afib on coumadin, HTN, BPH who presents to the ED for abnormal labs.  Patient is AAOx3 however is a limited historian.  Patient states that he recently had labs done by his PMD and his WBC was high so he was brought to the ED for evaluation.

## 2022-07-27 NOTE — PROGRESS NOTE ADULT - SUBJECTIVE AND OBJECTIVE BOX
CHIEF COMPLAINT/INTERVAL HISTORY:    Patient is a 87y old  Male who presents with a chief complaint of uti, pna (27 Jul 2022 14:45)      HPI:  Patient is an 87M with a PMH of Afib on coumadin, HTN, BPH who presents to the ED for abnormal labs.  Patient is AAOx3 however is a limited historian.  Patient states that he recently had labs done by his PMD and his WBC was high so he was brought to the ED for evaluation.  Patient otherwise has no active complaints.  Denies history of fever, chills, nausea, vomiting, chest pain, palpitations, dyspnea, cough, nausea, vomiting, diarrhea, dizziness, diaphoresis or syncope.  Vitals stable, labs show leukocytosis.  Will admit to med surg.   (20 Jul 2022 02:38)      SUBJECTIVE & OBJECTIVE: Pt seen and examined at bedside.     ICU Vital Signs Last 24 Hrs  T(C): 36.9 (27 Jul 2022 15:45), Max: 37 (26 Jul 2022 17:24)  T(F): 98.5 (27 Jul 2022 15:45), Max: 98.6 (26 Jul 2022 17:24)  HR: 91 (27 Jul 2022 15:45) (68 - 91)  BP: 115/70 (27 Jul 2022 15:45) (109/70 - 124/75)  BP(mean): --  ABP: --  ABP(mean): --  RR: 18 (27 Jul 2022 15:45) (18 - 18)  SpO2: 97% (27 Jul 2022 15:45) (94% - 97%)        MEDICATIONS  (STANDING):  amLODIPine   Tablet 2.5 milliGRAM(s) Oral daily  finasteride 5 milliGRAM(s) Oral daily  furosemide    Tablet 20 milliGRAM(s) Oral daily  loratadine 10 milliGRAM(s) Oral daily  nystatin Powder 1 Application(s) Topical two times a day  pantoprazole    Tablet 40 milliGRAM(s) Oral before breakfast  trimethoprim  160 mG/sulfamethoxazole 800 mG 1 Tablet(s) Oral two times a day  warfarin 4 milliGRAM(s) Oral once    MEDICATIONS  (PRN):  acetaminophen     Tablet .. 650 milliGRAM(s) Oral every 6 hours PRN Temp greater or equal to 38C (100.4F), Mild Pain (1 - 3)        PHYSICAL EXAM:    GENERAL: NAD, well-developed  HEAD:  Atraumatic, Normocephalic  EYES: EOMI, PERRLA, conjunctiva and sclera clear  ENMT: Moist mucous membranes  NECK: Supple  NERVOUS SYSTEM:  Alert & Oriented X3,  CHEST/LUNG: Clear to auscultation bilaterally; No rales, rhonchi, wheezing, or rubs  HEART: Regular rate and rhythm  ABDOMEN: Soft, Nontender, Bowel sounds present  EXTREMITIES:no cyanosis, or edema    LABS:                        15.2   5.04  )-----------( 167      ( 26 Jul 2022 10:45 )             42.8     07-26    135  |  106  |  27<H>  ----------------------------<  95  4.7   |  21<L>  |  0.98    Ca    8.3<L>      26 Jul 2022 07:10    TPro  6.1  /  Alb  2.6<L>  /  TBili  0.7  /  DBili  x   /  AST  71<H>  /  ALT  57  /  AlkPhos  53  07-26    PT/INR - ( 27 Jul 2022 05:55 )   PT: 29.3 sec;   INR: 2.42 ratio

## 2022-07-28 LAB
INR BLD: 3.13 RATIO — HIGH (ref 0.88–1.16)
PROTHROM AB SERPL-ACNC: 37.7 SEC — HIGH (ref 10.5–13.4)

## 2022-07-28 PROCEDURE — 99232 SBSQ HOSP IP/OBS MODERATE 35: CPT

## 2022-07-28 RX ORDER — PHENAZOPYRIDINE HCL 100 MG
100 TABLET ORAL EVERY 8 HOURS
Refills: 0 | Status: COMPLETED | OUTPATIENT
Start: 2022-07-28 | End: 2022-07-31

## 2022-07-28 RX ADMIN — AMLODIPINE BESYLATE 2.5 MILLIGRAM(S): 2.5 TABLET ORAL at 05:56

## 2022-07-28 RX ADMIN — FINASTERIDE 5 MILLIGRAM(S): 5 TABLET, FILM COATED ORAL at 14:22

## 2022-07-28 RX ADMIN — NYSTATIN CREAM 1 APPLICATION(S): 100000 CREAM TOPICAL at 05:57

## 2022-07-28 RX ADMIN — Medication 1 TABLET(S): at 05:56

## 2022-07-28 RX ADMIN — Medication 100 MILLIGRAM(S): at 21:56

## 2022-07-28 RX ADMIN — PANTOPRAZOLE SODIUM 40 MILLIGRAM(S): 20 TABLET, DELAYED RELEASE ORAL at 05:57

## 2022-07-28 RX ADMIN — NYSTATIN CREAM 1 APPLICATION(S): 100000 CREAM TOPICAL at 18:25

## 2022-07-28 RX ADMIN — Medication 1 TABLET(S): at 18:25

## 2022-07-28 RX ADMIN — Medication 20 MILLIGRAM(S): at 05:56

## 2022-07-28 RX ADMIN — Medication 100 MILLIGRAM(S): at 14:23

## 2022-07-28 RX ADMIN — LORATADINE 10 MILLIGRAM(S): 10 TABLET ORAL at 14:23

## 2022-07-28 NOTE — PROGRESS NOTE ADULT - NS ATTEND AMEND GEN_ALL_CORE FT
Attending Addendum--  Case reviewed with NP Alanna Young. Her note reviewed and modified as appropriate.   Patient personally assessed and examined.  Patient states still with some dysuria.   PVR yesterday documented as 47ml in RN note.  Suspect blood isolate is a contaminant  Await ID/S of urine isolate  Await US  I will be available via Teams for any issues that may arise over the weekend.    Srini Teague MD  Attending Physician  [facility]  Division of Infectious Diseases  [phone]
PT still w some mild terminal dysuria  Was being fed when we rounded   and then 121  AVSS  JULIO CÉSAR w no hydro  Needs rectal exam  iv abx for E Coli, but hx c/w prostatitis  Never saw a urologist: will need out pt f/u  keep Finasteride
per above    to check pvr-?need for catheter    switch to po atb-cont for 4 weeks
I agree with the statements above.  Patient with dysuria. Infection by E.Coli, actually on Bactrim. Will consult in outpatient office.

## 2022-07-28 NOTE — PROGRESS NOTE ADULT - ASSESSMENT
Impresison: Patient is an 87M with a PMH of Afib on coumadin, HTN, BPH who presents to the ED for abnormal labs. Urology consulted for dysuria in the setting of +Ucx E.coli 50-99k on day 7 of rocephin.    Recommendations:    -Would not start flomax due to hx of orthostatic hypotension, would dc with rx for Silodosin   -Fits prostatitis picture, would recommend 4-6 weeks of abx  -trial of pyridium for dysuria  --Patient should followup outpatient for ToV   Impression Patient is an 87M with a PMH of Afib on coumadin, HTN, BPH who presents to the ED for abnormal labs. Urology consulted for dysuria in the setting of +Ucx E.coli 50-99k.    Recommendations:    -Would not start flomax due to hx of orthostatic hypotension, would dc with rx for Silodosin .4mg QHS  -Fits prostatitis picture, would recommend 4-6 weeks of abx  -trial of pyridium for dysuria  --Patient refusing catheter  --Outpatient followup      --Urology to sign off, please reach out with any further questions

## 2022-07-28 NOTE — PROGRESS NOTE ADULT - SUBJECTIVE AND OBJECTIVE BOX
Patient seen and examined bedside resting comfortably.  No complaints offered.   Voiding spontaneously without difficulty.  Denies hematuria and dysuria. Denies nausea and vomiting. Tolerating diet.  Denies chest pain, dyspnea, cough.    T(F): 98.3 (07-28-22 @ 11:31), Max: 98.6 (07-27-22 @ 23:33)  HR: 81 (07-28-22 @ 11:31) (67 - 91)  BP: 122/74 (07-28-22 @ 11:31) (113/69 - 129/69)  RR: 17 (07-28-22 @ 11:31) (17 - 18)  SpO2: 92% (07-28-22 @ 11:31) (92% - 97%)  Wt(kg): --  CAPILLARY BLOOD GLUCOSE          PHYSICAL EXAM:    General: NAD, alert and awake  HEENT: NCAT, EOMI, conjunctiva clear  Chest: nonlabored respirations, CTA b/l.  Abdomen: soft, NT/ND.   Extremities: Calf soft, nontender b/l.   : No suprapubic tenderness or bladder distention.      LABS:      PT/INR - ( 28 Jul 2022 06:05 )   PT: 37.7 sec;   INR: 3.13 ratio           I&O's Detail    27 Jul 2022 07:01  -  28 Jul 2022 07:00  --------------------------------------------------------  IN:    Oral Fluid: 250 mL  Total IN: 250 mL    OUT:    Voided (mL): 400 mL  Total OUT: 400 mL    Total NET: -150 mL Patient seen and examined bedside resting comfortably.  No complaints offered.   Feliciano inserted afternoon 2/2 increasing PVR's results of CT showed bladder diverticula    T(F): 98.3 (07-28-22 @ 11:31), Max: 98.6 (07-27-22 @ 23:33)  HR: 81 (07-28-22 @ 11:31) (67 - 91)  BP: 122/74 (07-28-22 @ 11:31) (113/69 - 129/69)  RR: 17 (07-28-22 @ 11:31) (17 - 18)  SpO2: 92% (07-28-22 @ 11:31) (92% - 97%)      ROS:  Negative unless otherwise stated      PHYSICAL EXAM:    General: NAD, alert and awake  HEENT: NCAT, EOMI, conjunctiva clear  Chest: nonlabored respirations, CTA b/l.  Abdomen: soft, NT/ND.   Extremities: Calf soft, nontender b/l.   : No suprapubic tenderness or bladder distention. Indwelling feliciano    LABS:      PT/INR - ( 28 Jul 2022 06:05 )   PT: 37.7 sec;   INR: 3.13 ratio           I&O's Detail    27 Jul 2022 07:01  -  28 Jul 2022 07:00  --------------------------------------------------------  IN:    Oral Fluid: 250 mL  Total IN: 250 mL    OUT:    Voided (mL): 400 mL  Total OUT: 400 mL    Total NET: -150 mL Patient seen and examined bedside resting comfortably.  No complaints offered.   Recommended feliciano to patient, refused    T(F): 98.3 (07-28-22 @ 11:31), Max: 98.6 (07-27-22 @ 23:33)  HR: 81 (07-28-22 @ 11:31) (67 - 91)  BP: 122/74 (07-28-22 @ 11:31) (113/69 - 129/69)  RR: 17 (07-28-22 @ 11:31) (17 - 18)  SpO2: 92% (07-28-22 @ 11:31) (92% - 97%)      ROS:  Negative unless otherwise stated      PHYSICAL EXAM:    General: NAD, alert and awake  HEENT: NCAT, EOMI, conjunctiva clear  Chest: nonlabored respirations, CTA b/l.  Abdomen: soft, NT/ND.   Extremities: Calf soft, nontender b/l.   : No suprapubic tenderness or bladder distention. Voiding spontaneously, PVR ~280cc    LABS:      PT/INR - ( 28 Jul 2022 06:05 )   PT: 37.7 sec;   INR: 3.13 ratio           I&O's Detail    27 Jul 2022 07:01  -  28 Jul 2022 07:00  --------------------------------------------------------  IN:    Oral Fluid: 250 mL  Total IN: 250 mL    OUT:    Voided (mL): 400 mL  Total OUT: 400 mL    Total NET: -150 mL

## 2022-07-28 NOTE — CHART NOTE - NSCHARTNOTEFT_GEN_A_CORE
Patient continues to retain, PVR's continue to increase, on imaging, bladder with numerous diverticula. Recommending dc with feliciano and outpatient followup

## 2022-07-28 NOTE — PROGRESS NOTE ADULT - SUBJECTIVE AND OBJECTIVE BOX
CHIEF COMPLAINT/INTERVAL HISTORY:    Patient is a 87y old  Male who presents with a chief complaint of uti, pna (27 Jul 2022 18:02)      HPI:  Patient is an 87M with a PMH of Afib on coumadin, HTN, BPH who presents to the ED for abnormal labs.  Patient is AAOx3 however is a limited historian.  Patient states that he recently had labs done by his PMD and his WBC was high so he was brought to the ED for evaluation.  Patient otherwise has no active complaints.  Denies history of fever, chills, nausea, vomiting, chest pain, palpitations, dyspnea, cough, nausea, vomiting, diarrhea, dizziness, diaphoresis or syncope.  Vitals stable, labs show leukocytosis.  Will admit to med surg.   (20 Jul 2022 02:38)      SUBJECTIVE & OBJECTIVE: Pt seen and examined at bedside.   offers no complaints    Vital Signs Last 24 Hrs  T(C): 36.8 (28 Jul 2022 11:31), Max: 37 (27 Jul 2022 23:33)  T(F): 98.3 (28 Jul 2022 11:31), Max: 98.6 (27 Jul 2022 23:33)  HR: 81 (28 Jul 2022 11:31) (67 - 91)  BP: 122/74 (28 Jul 2022 11:31) (113/69 - 129/69)  BP(mean): --  ABP: --  ABP(mean): --  RR: 17 (28 Jul 2022 11:31) (17 - 18)  SpO2: 92% (28 Jul 2022 11:31) (92% - 97%)    O2 Parameters below as of 28 Jul 2022 11:31  Patient On (Oxygen Delivery Method): room air              MEDICATIONS  (STANDING):  amLODIPine   Tablet 2.5 milliGRAM(s) Oral daily  finasteride 5 milliGRAM(s) Oral daily  furosemide    Tablet 20 milliGRAM(s) Oral daily  loratadine 10 milliGRAM(s) Oral daily  nystatin Powder 1 Application(s) Topical two times a day  pantoprazole    Tablet 40 milliGRAM(s) Oral before breakfast  phenazopyridine 100 milliGRAM(s) Oral every 8 hours  trimethoprim  160 mG/sulfamethoxazole 800 mG 1 Tablet(s) Oral two times a day    MEDICATIONS  (PRN):  acetaminophen     Tablet .. 650 milliGRAM(s) Oral every 6 hours PRN Temp greater or equal to 38C (100.4F), Mild Pain (1 - 3)        PHYSICAL EXAM:    GENERAL: NAD, well-developed  HEAD:  Atraumatic, Normocephalic  EYES: EOMI, PERRLA, conjunctiva and sclera clear  ENMT: Moist mucous membranes  NECK: Supple  NERVOUS SYSTEM:  Alert & Oriented, normal speech  CHEST/LUNG: Clear to auscultation bilaterally; No rales, rhonchi, wheezing, or rubs  HEART: S1, S2  ABDOMEN: Soft, Nontender, Bowel sounds present  EXTREMITIES: no cyanosis, or edema, right hand deformity due to prior injury several yr ago     LABS:          PT/INR - ( 28 Jul 2022 06:05 )   PT: 37.7 sec;   INR: 3.13 ratio

## 2022-07-28 NOTE — PROGRESS NOTE ADULT - PROBLEM SELECTOR PLAN 10
appears stable rll lobe- will need outpt fu appears stable rll lobe- will need outpt fu    pt can not go to [facility] till Monday.

## 2022-07-29 LAB
ANION GAP SERPL CALC-SCNC: 5 MMOL/L — SIGNIFICANT CHANGE UP (ref 5–17)
BUN SERPL-MCNC: 22 MG/DL — SIGNIFICANT CHANGE UP (ref 7–23)
CALCIUM SERPL-MCNC: 8.4 MG/DL — LOW (ref 8.5–10.1)
CHLORIDE SERPL-SCNC: 104 MMOL/L — SIGNIFICANT CHANGE UP (ref 96–108)
CO2 SERPL-SCNC: 25 MMOL/L — SIGNIFICANT CHANGE UP (ref 22–31)
CREAT SERPL-MCNC: 1.29 MG/DL — SIGNIFICANT CHANGE UP (ref 0.5–1.3)
EGFR: 54 ML/MIN/1.73M2 — LOW
GLUCOSE SERPL-MCNC: 110 MG/DL — HIGH (ref 70–99)
HCT VFR BLD CALC: 40.7 % — SIGNIFICANT CHANGE UP (ref 39–50)
HGB BLD-MCNC: 14.6 G/DL — SIGNIFICANT CHANGE UP (ref 13–17)
INR BLD: 3.02 RATIO — HIGH (ref 0.88–1.16)
MCHC RBC-ENTMCNC: 34.2 PG — HIGH (ref 27–34)
MCHC RBC-ENTMCNC: 35.9 G/DL — SIGNIFICANT CHANGE UP (ref 32–36)
MCV RBC AUTO: 95.3 FL — SIGNIFICANT CHANGE UP (ref 80–100)
NRBC # BLD: 0 /100 WBCS — SIGNIFICANT CHANGE UP (ref 0–0)
PLATELET # BLD AUTO: 179 K/UL — SIGNIFICANT CHANGE UP (ref 150–400)
POTASSIUM SERPL-MCNC: 3.7 MMOL/L — SIGNIFICANT CHANGE UP (ref 3.5–5.3)
POTASSIUM SERPL-SCNC: 3.7 MMOL/L — SIGNIFICANT CHANGE UP (ref 3.5–5.3)
PROTHROM AB SERPL-ACNC: 36.6 SEC — HIGH (ref 10.5–13.4)
RBC # BLD: 4.27 M/UL — SIGNIFICANT CHANGE UP (ref 4.2–5.8)
RBC # FLD: 12.7 % — SIGNIFICANT CHANGE UP (ref 10.3–14.5)
SODIUM SERPL-SCNC: 134 MMOL/L — LOW (ref 135–145)
WBC # BLD: 5.6 K/UL — SIGNIFICANT CHANGE UP (ref 3.8–10.5)
WBC # FLD AUTO: 5.6 K/UL — SIGNIFICANT CHANGE UP (ref 3.8–10.5)

## 2022-07-29 PROCEDURE — 99232 SBSQ HOSP IP/OBS MODERATE 35: CPT

## 2022-07-29 RX ORDER — POLYETHYLENE GLYCOL 3350 17 G/17G
17 POWDER, FOR SOLUTION ORAL DAILY
Refills: 0 | Status: DISCONTINUED | OUTPATIENT
Start: 2022-07-29 | End: 2022-08-02

## 2022-07-29 RX ORDER — WARFARIN SODIUM 2.5 MG/1
1 TABLET ORAL ONCE
Refills: 0 | Status: COMPLETED | OUTPATIENT
Start: 2022-07-29 | End: 2022-07-29

## 2022-07-29 RX ORDER — TAMSULOSIN HYDROCHLORIDE 0.4 MG/1
0.4 CAPSULE ORAL AT BEDTIME
Refills: 0 | Status: DISCONTINUED | OUTPATIENT
Start: 2022-07-29 | End: 2022-08-02

## 2022-07-29 RX ORDER — POLYETHYLENE GLYCOL 3350 17 G/17G
17 POWDER, FOR SOLUTION ORAL DAILY
Refills: 0 | Status: DISCONTINUED | OUTPATIENT
Start: 2022-07-29 | End: 2022-07-29

## 2022-07-29 RX ADMIN — Medication 650 MILLIGRAM(S): at 07:20

## 2022-07-29 RX ADMIN — POLYETHYLENE GLYCOL 3350 17 GRAM(S): 17 POWDER, FOR SOLUTION ORAL at 18:07

## 2022-07-29 RX ADMIN — LORATADINE 10 MILLIGRAM(S): 10 TABLET ORAL at 11:25

## 2022-07-29 RX ADMIN — Medication 100 MILLIGRAM(S): at 21:53

## 2022-07-29 RX ADMIN — Medication 1 TABLET(S): at 18:14

## 2022-07-29 RX ADMIN — FINASTERIDE 5 MILLIGRAM(S): 5 TABLET, FILM COATED ORAL at 11:25

## 2022-07-29 RX ADMIN — Medication 100 MILLIGRAM(S): at 18:14

## 2022-07-29 RX ADMIN — NYSTATIN CREAM 1 APPLICATION(S): 100000 CREAM TOPICAL at 06:58

## 2022-07-29 RX ADMIN — AMLODIPINE BESYLATE 2.5 MILLIGRAM(S): 2.5 TABLET ORAL at 05:18

## 2022-07-29 RX ADMIN — NYSTATIN CREAM 1 APPLICATION(S): 100000 CREAM TOPICAL at 18:07

## 2022-07-29 RX ADMIN — Medication 650 MILLIGRAM(S): at 06:25

## 2022-07-29 RX ADMIN — TAMSULOSIN HYDROCHLORIDE 0.4 MILLIGRAM(S): 0.4 CAPSULE ORAL at 21:53

## 2022-07-29 RX ADMIN — PANTOPRAZOLE SODIUM 40 MILLIGRAM(S): 20 TABLET, DELAYED RELEASE ORAL at 05:19

## 2022-07-29 RX ADMIN — Medication 100 MILLIGRAM(S): at 05:18

## 2022-07-29 RX ADMIN — Medication 1 TABLET(S): at 05:18

## 2022-07-29 RX ADMIN — WARFARIN SODIUM 1 MILLIGRAM(S): 2.5 TABLET ORAL at 21:54

## 2022-07-29 RX ADMIN — Medication 20 MILLIGRAM(S): at 05:18

## 2022-07-29 NOTE — PROGRESS NOTE ADULT - ASSESSMENT
Impression Patient is an 87M with a PMH of Afib on coumadin, HTN, BPH who presents to the ED for abnormal labs. Urology consulted for dysuria in the setting of +Ucx E.coli 50-99k.    Recommendations:    -Trying Flomax while inpatient, if unable to tolerate can dc with silodosin  --Recommended reevaluating need for antihypertensives and Claritin   --Fits prostatitis picture, would recommend 4-6 weeks of abx  --Trial of pyridium for dysuria  --Patient refusing catheter  --Outpatient followup   Impression Patient is an 87M with a PMH of Afib on coumadin, HTN, BPH who presents to the ED for abnormal labs. Urology consulted for dysuria in the setting of +Ucx E.coli 50-99k.    Recommendations:    -Trying Flomax while inpatient, if unable to tolerate can dc with silodosin  --Recommended reevaluating need for antihypertensives and Claritin   --Fits prostatitis picture, would recommend 4-6 weeks of abx  --Trial of pyridium for dysuria  --D/c with feliciano in place and outpatient followup for ToV  --Outpatient followup      Urology to sign off, please reach out with any further questions

## 2022-07-29 NOTE — PROGRESS NOTE ADULT - SUBJECTIVE AND OBJECTIVE BOX
Patient seen and examined bedside resting comfortably.  No complaints offered.   Voiding spontaneously     T(F): 98 (07-29-22 @ 11:22), Max: 99 (07-28-22 @ 18:24)  HR: 89 (07-29-22 @ 11:22) (61 - 89)  BP: 115/71 (07-29-22 @ 11:22) (108/71 - 124/70)  RR: 18 (07-29-22 @ 11:22) (18 - 20)  SpO2: 91% (07-29-22 @ 11:22) (91% - 93%)      ROS:  Negative unless otherwise stated      PHYSICAL EXAM:    General: NAD, alert and awake  HEENT: NCAT, EOMI, conjunctiva clear  Chest: nonlabored respirations, CTA b/l.  Abdomen: soft, NT/ND.   Extremities: Calf soft, nontender b/l.   : No suprapubic tenderness or bladder distention.  Voiding spontaneously     LABS:                        14.6   5.60  )-----------( 179      ( 29 Jul 2022 07:30 )             40.7   07-29    134<L>  |  104  |  22  ----------------------------<  110<H>  3.7   |  25  |  1.29    Ca    8.4<L>      29 Jul 2022 07:30    PT/INR - ( 29 Jul 2022 07:30 )   PT: 36.6 sec;   INR: 3.02 ratio           I&O's Detail    28 Jul 2022 07:01  -  29 Jul 2022 07:00  --------------------------------------------------------  IN:    Oral Fluid: 480 mL  Total IN: 480 mL    OUT:    Indwelling Catheter - Urethral (mL): 550 mL    Voided (mL): 470 mL  Total OUT: 1020 mL    Total NET: -540 mL

## 2022-07-29 NOTE — PROGRESS NOTE ADULT - SUBJECTIVE AND OBJECTIVE BOX
CHIEF COMPLAINT/INTERVAL HISTORY:    Patient is a 87y old  Male who presents with a chief complaint of uti, pna (29 Jul 2022 12:06)      HPI:  Patient is an 87M with a PMH of Afib on coumadin, HTN, BPH who presents to the ED for abnormal labs.  Patient is AAOx3 however is a limited historian.  Patient states that he recently had labs done by his PMD and his WBC was high so he was brought to the ED for evaluation.  Patient otherwise has no active complaints.  Denies history of fever, chills, nausea, vomiting, chest pain, palpitations, dyspnea, cough, nausea, vomiting, diarrhea, dizziness, diaphoresis or syncope.  Vitals stable, labs show leukocytosis.  Will admit to med surg.   (20 Jul 2022 02:38)      SUBJECTIVE & OBJECTIVE: Pt seen and examined at bedside.   offers no complaints except constipation   no BM x 2 days     Vital Signs Last 24 Hrs  T(C): 36.7 (29 Jul 2022 17:06), Max: 37.2 (28 Jul 2022 18:24)  T(F): 98 (29 Jul 2022 17:06), Max: 99 (28 Jul 2022 18:24)  HR: 88 (29 Jul 2022 17:06) (61 - 89)  BP: 108/67 (29 Jul 2022 17:06) (108/67 - 124/70)  BP(mean): --  ABP: --  ABP(mean): --  RR: 17 (29 Jul 2022 17:06) (17 - 20)  SpO2: 97% (29 Jul 2022 17:06) (91% - 97%)    O2 Parameters below as of 29 Jul 2022 11:22  Patient On (Oxygen Delivery Method): room air              MEDICATIONS  (STANDING):  finasteride 5 milliGRAM(s) Oral daily  furosemide    Tablet 20 milliGRAM(s) Oral daily  nystatin Powder 1 Application(s) Topical two times a day  pantoprazole    Tablet 40 milliGRAM(s) Oral before breakfast  phenazopyridine 100 milliGRAM(s) Oral every 8 hours  polyethylene glycol 3350 17 Gram(s) Oral daily  tamsulosin 0.4 milliGRAM(s) Oral at bedtime  trimethoprim  160 mG/sulfamethoxazole 800 mG 1 Tablet(s) Oral two times a day  warfarin 1 milliGRAM(s) Oral once    MEDICATIONS  (PRN):  acetaminophen     Tablet .. 650 milliGRAM(s) Oral every 6 hours PRN Temp greater or equal to 38C (100.4F), Mild Pain (1 - 3)        PHYSICAL EXAM:    GENERAL: NAD, well-developed  HEAD:  Atraumatic, Normocephalic  EYES: EOMI, PERRLA, conjunctiva and sclera clear  ENMT: Moist mucous membranes  NECK: Supple  NERVOUS SYSTEM:  Alert & Oriented X2-3, with forgetfulness  CHEST/LUNG: Clear to auscultation bilaterally; No rales, rhonchi, wheezing, or rubs  HEART: S1, s2  ABDOMEN: Soft, Nontender, Nondistended; Bowel sounds present  EXTREMITIES: no cyanosis, or edema    LABS:                        14.6   5.60  )-----------( 179      ( 29 Jul 2022 07:30 )             40.7     07-29    134<L>  |  104  |  22  ----------------------------<  110<H>  3.7   |  25  |  1.29    Ca    8.4<L>      29 Jul 2022 07:30      PT/INR - ( 29 Jul 2022 07:30 )   PT: 36.6 sec;   INR: 3.02 ratio               CAPILLARY BLOOD GLUCOSE

## 2022-07-30 LAB
INR BLD: 2.56 RATIO — HIGH (ref 0.88–1.16)
PROTHROM AB SERPL-ACNC: 30.7 SEC — HIGH (ref 10.5–13.4)

## 2022-07-30 PROCEDURE — 99233 SBSQ HOSP IP/OBS HIGH 50: CPT

## 2022-07-30 RX ORDER — SENNA PLUS 8.6 MG/1
2 TABLET ORAL AT BEDTIME
Refills: 0 | Status: DISCONTINUED | OUTPATIENT
Start: 2022-07-30 | End: 2022-08-02

## 2022-07-30 RX ADMIN — Medication 20 MILLIGRAM(S): at 05:52

## 2022-07-30 RX ADMIN — NYSTATIN CREAM 1 APPLICATION(S): 100000 CREAM TOPICAL at 17:55

## 2022-07-30 RX ADMIN — Medication 1 TABLET(S): at 05:52

## 2022-07-30 RX ADMIN — POLYETHYLENE GLYCOL 3350 17 GRAM(S): 17 POWDER, FOR SOLUTION ORAL at 12:44

## 2022-07-30 RX ADMIN — Medication 100 MILLIGRAM(S): at 15:20

## 2022-07-30 RX ADMIN — SENNA PLUS 2 TABLET(S): 8.6 TABLET ORAL at 22:19

## 2022-07-30 RX ADMIN — PANTOPRAZOLE SODIUM 40 MILLIGRAM(S): 20 TABLET, DELAYED RELEASE ORAL at 08:22

## 2022-07-30 RX ADMIN — Medication 1 TABLET(S): at 17:55

## 2022-07-30 RX ADMIN — Medication 100 MILLIGRAM(S): at 05:52

## 2022-07-30 RX ADMIN — Medication 100 MILLIGRAM(S): at 22:19

## 2022-07-30 RX ADMIN — NYSTATIN CREAM 1 APPLICATION(S): 100000 CREAM TOPICAL at 05:54

## 2022-07-30 RX ADMIN — TAMSULOSIN HYDROCHLORIDE 0.4 MILLIGRAM(S): 0.4 CAPSULE ORAL at 22:19

## 2022-07-30 RX ADMIN — FINASTERIDE 5 MILLIGRAM(S): 5 TABLET, FILM COATED ORAL at 12:44

## 2022-07-30 NOTE — PROGRESS NOTE ADULT - SUBJECTIVE AND OBJECTIVE BOX
Patient is a 87y old  Male who presents with a chief complaint of uti, pna (29 Jul 2022 17:43)    INTERVAL HPI/OVERNIGHT EVENTS: Patients seen and examined at bedside this morning. No acute events overnight. Pt reports no BM in several days.     MEDICATIONS  (STANDING):  finasteride 5 milliGRAM(s) Oral daily  furosemide    Tablet 20 milliGRAM(s) Oral daily  nystatin Powder 1 Application(s) Topical two times a day  pantoprazole    Tablet 40 milliGRAM(s) Oral before breakfast  phenazopyridine 100 milliGRAM(s) Oral every 8 hours  polyethylene glycol 3350 17 Gram(s) Oral daily  tamsulosin 0.4 milliGRAM(s) Oral at bedtime  trimethoprim  160 mG/sulfamethoxazole 800 mG 1 Tablet(s) Oral two times a day    MEDICATIONS  (PRN):  acetaminophen     Tablet .. 650 milliGRAM(s) Oral every 6 hours PRN Temp greater or equal to 38C (100.4F), Mild Pain (1 - 3)    Allergies    chocolate (Unknown)  No Known Drug Allergies  peanuts (Anaphylaxis)    Intolerances      REVIEW OF SYSTEMS:  All other systems reviewed and are negative    Vital Signs Last 24 Hrs  T(C): 36.8 (30 Jul 2022 11:04), Max: 36.9 (30 Jul 2022 05:08)  T(F): 98.3 (30 Jul 2022 11:04), Max: 98.4 (30 Jul 2022 05:08)  HR: 84 (30 Jul 2022 11:04) (71 - 88)  BP: 107/70 (30 Jul 2022 11:04) (107/70 - 122/64)  BP(mean): --  RR: 20 (30 Jul 2022 11:04) (17 - 20)  SpO2: 95% (30 Jul 2022 11:04) (95% - 97%)      Daily     Daily   I&O's Summary    29 Jul 2022 07:01  -  30 Jul 2022 07:00  --------------------------------------------------------  IN: 320 mL / OUT: 1000 mL / NET: -680 mL      CAPILLARY BLOOD GLUCOSE    PHYSICAL EXAM:    GENERAL: NAD, well-developed  HEAD:  Atraumatic, Normocephalic  EYES: EOMI, PERRLA, conjunctiva and sclera clear  ENMT: Moist mucous membranes  NECK: Supple  NERVOUS SYSTEM:  Alert & Oriented X2-3, with forgetfulness  CHEST/LUNG: Clear to auscultation bilaterally; No rales, rhonchi, wheezing, or rubs  HEART: S1, s2  ABDOMEN: Soft, Nontender, Nondistended; Bowel sounds present  EXTREMITIES: no cyanosis, or edema    Labs                          14.6   5.60  )-----------( 179      ( 29 Jul 2022 07:30 )             40.7     07-29    134<L>  |  104  |  22  ----------------------------<  110<H>  3.7   |  25  |  1.29    Ca    8.4<L>      29 Jul 2022 07:30      PT/INR - ( 30 Jul 2022 05:40 )   PT: 30.7 sec;   INR: 2.56 ratio

## 2022-07-31 LAB
ALBUMIN SERPL ELPH-MCNC: 3 G/DL — LOW (ref 3.3–5)
ALP SERPL-CCNC: 51 U/L — SIGNIFICANT CHANGE UP (ref 40–120)
ALT FLD-CCNC: 43 U/L — SIGNIFICANT CHANGE UP (ref 12–78)
ANION GAP SERPL CALC-SCNC: 7 MMOL/L — SIGNIFICANT CHANGE UP (ref 5–17)
AST SERPL-CCNC: 37 U/L — SIGNIFICANT CHANGE UP (ref 15–37)
BILIRUB SERPL-MCNC: 0.9 MG/DL — SIGNIFICANT CHANGE UP (ref 0.2–1.2)
BUN SERPL-MCNC: 19 MG/DL — SIGNIFICANT CHANGE UP (ref 7–23)
CALCIUM SERPL-MCNC: 8.4 MG/DL — LOW (ref 8.5–10.1)
CHLORIDE SERPL-SCNC: 106 MMOL/L — SIGNIFICANT CHANGE UP (ref 96–108)
CO2 SERPL-SCNC: 25 MMOL/L — SIGNIFICANT CHANGE UP (ref 22–31)
CREAT SERPL-MCNC: 1.2 MG/DL — SIGNIFICANT CHANGE UP (ref 0.5–1.3)
EGFR: 59 ML/MIN/1.73M2 — LOW
FLUAV AG NPH QL: SIGNIFICANT CHANGE UP
FLUBV AG NPH QL: SIGNIFICANT CHANGE UP
GLUCOSE SERPL-MCNC: 100 MG/DL — HIGH (ref 70–99)
HCT VFR BLD CALC: 41.7 % — SIGNIFICANT CHANGE UP (ref 39–50)
HGB BLD-MCNC: 14.5 G/DL — SIGNIFICANT CHANGE UP (ref 13–17)
INR BLD: 2.11 RATIO — HIGH (ref 0.88–1.16)
MCHC RBC-ENTMCNC: 33.2 PG — SIGNIFICANT CHANGE UP (ref 27–34)
MCHC RBC-ENTMCNC: 34.8 G/DL — SIGNIFICANT CHANGE UP (ref 32–36)
MCV RBC AUTO: 95.4 FL — SIGNIFICANT CHANGE UP (ref 80–100)
NRBC # BLD: 0 /100 WBCS — SIGNIFICANT CHANGE UP (ref 0–0)
PLATELET # BLD AUTO: 188 K/UL — SIGNIFICANT CHANGE UP (ref 150–400)
POTASSIUM SERPL-MCNC: 4 MMOL/L — SIGNIFICANT CHANGE UP (ref 3.5–5.3)
POTASSIUM SERPL-SCNC: 4 MMOL/L — SIGNIFICANT CHANGE UP (ref 3.5–5.3)
PROT SERPL-MCNC: 5.9 GM/DL — LOW (ref 6–8.3)
PROTHROM AB SERPL-ACNC: 25.5 SEC — HIGH (ref 10.5–13.4)
RBC # BLD: 4.37 M/UL — SIGNIFICANT CHANGE UP (ref 4.2–5.8)
RBC # FLD: 12.4 % — SIGNIFICANT CHANGE UP (ref 10.3–14.5)
SARS-COV-2 RNA SPEC QL NAA+PROBE: DETECTED
SODIUM SERPL-SCNC: 138 MMOL/L — SIGNIFICANT CHANGE UP (ref 135–145)
WBC # BLD: 7.72 K/UL — SIGNIFICANT CHANGE UP (ref 3.8–10.5)
WBC # FLD AUTO: 7.72 K/UL — SIGNIFICANT CHANGE UP (ref 3.8–10.5)

## 2022-07-31 PROCEDURE — 99233 SBSQ HOSP IP/OBS HIGH 50: CPT

## 2022-07-31 RX ORDER — FUROSEMIDE 40 MG
20 TABLET ORAL DAILY
Refills: 0 | Status: DISCONTINUED | OUTPATIENT
Start: 2022-07-31 | End: 2022-08-02

## 2022-07-31 RX ADMIN — PANTOPRAZOLE SODIUM 40 MILLIGRAM(S): 20 TABLET, DELAYED RELEASE ORAL at 06:04

## 2022-07-31 RX ADMIN — Medication 600 MILLIGRAM(S): at 13:53

## 2022-07-31 RX ADMIN — SENNA PLUS 2 TABLET(S): 8.6 TABLET ORAL at 22:31

## 2022-07-31 RX ADMIN — TAMSULOSIN HYDROCHLORIDE 0.4 MILLIGRAM(S): 0.4 CAPSULE ORAL at 22:31

## 2022-07-31 RX ADMIN — Medication 20 MILLIGRAM(S): at 06:04

## 2022-07-31 RX ADMIN — Medication 1 TABLET(S): at 06:04

## 2022-07-31 RX ADMIN — NYSTATIN CREAM 1 APPLICATION(S): 100000 CREAM TOPICAL at 17:46

## 2022-07-31 RX ADMIN — Medication 20 MILLIGRAM(S): at 12:19

## 2022-07-31 RX ADMIN — Medication 1 TABLET(S): at 17:46

## 2022-07-31 RX ADMIN — Medication 100 MILLIGRAM(S): at 06:04

## 2022-07-31 RX ADMIN — Medication 600 MILLIGRAM(S): at 17:46

## 2022-07-31 RX ADMIN — NYSTATIN CREAM 1 APPLICATION(S): 100000 CREAM TOPICAL at 06:03

## 2022-07-31 RX ADMIN — POLYETHYLENE GLYCOL 3350 17 GRAM(S): 17 POWDER, FOR SOLUTION ORAL at 12:17

## 2022-07-31 RX ADMIN — FINASTERIDE 5 MILLIGRAM(S): 5 TABLET, FILM COATED ORAL at 12:17

## 2022-07-31 NOTE — PROGRESS NOTE ADULT - SUBJECTIVE AND OBJECTIVE BOX
Patient is a 87y old  Male who presents with a chief complaint of uti, pna (30 Jul 2022 12:08)    INTERVAL HPI/OVERNIGHT EVENTS: Patients seen and examined at bedside this morning. No acute events overnight. Pt reports he has a cough that is productive. Pt put in 4 L NC overnight. At bedside was not wearing the NC. Does not reports SOB.    MEDICATIONS  (STANDING):  finasteride 5 milliGRAM(s) Oral daily  furosemide    Tablet 20 milliGRAM(s) Oral daily  guaiFENesin  milliGRAM(s) Oral every 12 hours  nystatin Powder 1 Application(s) Topical two times a day  pantoprazole    Tablet 40 milliGRAM(s) Oral before breakfast  polyethylene glycol 3350 17 Gram(s) Oral daily  senna 2 Tablet(s) Oral at bedtime  tamsulosin 0.4 milliGRAM(s) Oral at bedtime  trimethoprim  160 mG/sulfamethoxazole 800 mG 1 Tablet(s) Oral two times a day    MEDICATIONS  (PRN):  acetaminophen     Tablet .. 650 milliGRAM(s) Oral every 6 hours PRN Temp greater or equal to 38C (100.4F), Mild Pain (1 - 3)    Allergies    chocolate (Unknown)  No Known Drug Allergies  peanuts (Anaphylaxis)    Intolerances      REVIEW OF SYSTEMS:  All other systems reviewed and are negative    Vital Signs Last 24 Hrs  T(C): 36.4 (31 Jul 2022 11:39), Max: 36.8 (30 Jul 2022 17:29)  T(F): 97.5 (31 Jul 2022 11:39), Max: 98.2 (30 Jul 2022 17:29)  HR: 80 (31 Jul 2022 11:39) (64 - 80)  BP: 121/71 (31 Jul 2022 11:39) (103/63 - 136/79)  BP(mean): --  RR: 18 (31 Jul 2022 11:39) (18 - 19)  SpO2: 94% (31 Jul 2022 11:39) (90% - 97%)    Parameters below as of 31 Jul 2022 05:08  Patient On (Oxygen Delivery Method): nasal cannula      Daily     Daily   I&O's Summary    30 Jul 2022 07:01  -  31 Jul 2022 07:00  --------------------------------------------------------  IN: 820 mL / OUT: 1600 mL / NET: -780 mL      CAPILLARY BLOOD GLUCOSE    GENERAL: NAD, well-developed  HEAD:  Atraumatic, Normocephalic  EYES: EOMI, PERRLA, conjunctiva and sclera clear  ENMT: Moist mucous membranes  NECK: Supple  NERVOUS SYSTEM:  Alert & Oriented X2-3, with forgetfulness  CHEST/LUNG: Clear to auscultation bilaterally; No rales, rhonchi, wheezing, or rubs  HEART: S1, s2  ABDOMEN: Soft, Nontender, Nondistended; Bowel sounds present  EXTREMITIES: no cyanosis, or edema  Labs                          14.5   7.72  )-----------( 188      ( 31 Jul 2022 06:15 )             41.7     07-31    138  |  106  |  19  ----------------------------<  100<H>  4.0   |  25  |  1.20    Ca    8.4<L>      31 Jul 2022 06:15    TPro  5.9<L>  /  Alb  3.0<L>  /  TBili  0.9  /  DBili  x   /  AST  37  /  ALT  43  /  AlkPhos  51  07-31    PT/INR - ( 31 Jul 2022 06:15 )   PT: 25.5 sec;   INR: 2.11 ratio

## 2022-08-01 LAB
INR BLD: 2 RATIO — HIGH (ref 0.88–1.16)
PROTHROM AB SERPL-ACNC: 24.2 SEC — HIGH (ref 10.5–13.4)

## 2022-08-01 PROCEDURE — 99233 SBSQ HOSP IP/OBS HIGH 50: CPT

## 2022-08-01 RX ORDER — PHENAZOPYRIDINE HCL 100 MG
100 TABLET ORAL ONCE
Refills: 0 | Status: COMPLETED | OUTPATIENT
Start: 2022-08-01 | End: 2022-08-01

## 2022-08-01 RX ORDER — ALBUTEROL 90 UG/1
2 AEROSOL, METERED ORAL EVERY 6 HOURS
Refills: 0 | Status: DISCONTINUED | OUTPATIENT
Start: 2022-08-01 | End: 2022-08-02

## 2022-08-01 RX ORDER — BUDESONIDE AND FORMOTEROL FUMARATE DIHYDRATE 160; 4.5 UG/1; UG/1
2 AEROSOL RESPIRATORY (INHALATION)
Refills: 0 | Status: DISCONTINUED | OUTPATIENT
Start: 2022-08-01 | End: 2022-08-02

## 2022-08-01 RX ORDER — WARFARIN SODIUM 2.5 MG/1
4 TABLET ORAL ONCE
Refills: 0 | Status: COMPLETED | OUTPATIENT
Start: 2022-08-01 | End: 2022-08-01

## 2022-08-01 RX ADMIN — WARFARIN SODIUM 4 MILLIGRAM(S): 2.5 TABLET ORAL at 22:35

## 2022-08-01 RX ADMIN — FINASTERIDE 5 MILLIGRAM(S): 5 TABLET, FILM COATED ORAL at 12:31

## 2022-08-01 RX ADMIN — BUDESONIDE AND FORMOTEROL FUMARATE DIHYDRATE 2 PUFF(S): 160; 4.5 AEROSOL RESPIRATORY (INHALATION) at 18:05

## 2022-08-01 RX ADMIN — Medication 1 TABLET(S): at 18:05

## 2022-08-01 RX ADMIN — SENNA PLUS 2 TABLET(S): 8.6 TABLET ORAL at 22:36

## 2022-08-01 RX ADMIN — TAMSULOSIN HYDROCHLORIDE 0.4 MILLIGRAM(S): 0.4 CAPSULE ORAL at 22:35

## 2022-08-01 RX ADMIN — PANTOPRAZOLE SODIUM 40 MILLIGRAM(S): 20 TABLET, DELAYED RELEASE ORAL at 06:13

## 2022-08-01 RX ADMIN — Medication 20 MILLIGRAM(S): at 06:14

## 2022-08-01 RX ADMIN — POLYETHYLENE GLYCOL 3350 17 GRAM(S): 17 POWDER, FOR SOLUTION ORAL at 12:32

## 2022-08-01 RX ADMIN — Medication 600 MILLIGRAM(S): at 18:05

## 2022-08-01 RX ADMIN — ALBUTEROL 2 PUFF(S): 90 AEROSOL, METERED ORAL at 18:05

## 2022-08-01 RX ADMIN — Medication 600 MILLIGRAM(S): at 06:13

## 2022-08-01 RX ADMIN — Medication 650 MILLIGRAM(S): at 18:05

## 2022-08-01 RX ADMIN — Medication 1 TABLET(S): at 06:13

## 2022-08-01 RX ADMIN — Medication 100 MILLIGRAM(S): at 03:21

## 2022-08-01 NOTE — PROGRESS NOTE ADULT - PROVIDER SPECIALTY LIST ADULT
Hospitalist
Infectious Disease
Urology
Urology
Hospitalist
Urology
Urology
Infectious Disease
Hospitalist

## 2022-08-01 NOTE — PROGRESS NOTE ADULT - PROBLEM SELECTOR PLAN 1
WBCs and leuk esterase in urine  Started on ceftriaxone in ED.   7/23/2022 urine cx ecoli continue with antibx  7/26/2022 still with dysuria  urology consult appreciated  change abx to bactrim per ID x 4 wk, last dose 8/15  per urology--flomax trial  Trying Flomax while inpatient, if unable to tolerate can dc with silodosin  -Fits prostatitis picture,  4-6 weeks of abx
WBCs and leuk esterase in urine  Started on ceftriaxone in ED.  Continue for now  Deescalate antibiotic when cultures return
WBCs and leuk esterase in urine  Started on ceftriaxone in ED.   7/23/2022 urine cx ecoli continue with antibx  7/26/2022 still with dysuria  urology consult appreciated  change abx to bactrim per ID x 4 wk, last dose 8/15  per urology--flomax trial  Trying Flomax while inpatient, if unable to tolerate can dc with silodosin  -Fits prostatitis picture,  4-6 weeks of abx
WBCs and leuk esterase in urine  Started on ceftriaxone in ED.  Continue for now  Deescalate antibiotic when cultures return
WBCs and leuk esterase in urine  Started on ceftriaxone in ED.  Continue for now  Deescalate antibiotic when cultures return  7/23/2022 urine cx ecoli continue with antibx  7/26/2022 still with dysuria  urology consult is  appreciate
WBCs and leuk esterase in urine  Started on ceftriaxone in ED.  Continue for now  Deescalate antibiotic when cultures return  7/23/2022 urine cx ecoli continue with antibx
WBCs and leuk esterase in urine  Started on ceftriaxone in ED.  Continue for now  7/23/2022 urine cx ecoli continue with antibx  7/26/2022 still with dysuria  urology consult appreciated  change abx to bactrim per ID x 4 wk, last dose 8/15  per urology--Would not start flomax due to hx of orthostatic hypotension  -Fits prostatitis picture,  4-6 weeks of abx
WBCs and leuk esterase in urine  Started on ceftriaxone in ED.  Continue for now  Deescalate antibiotic when cultures return  7/23/2022 urine cx ecoli continue with antibx  7/26/2022 still with dysuria  urology consult appreciated  change abx to bactrim per ID x 4 wk, last dose 8/15  per urology--Would not start flomax due to hx of orthostatic hypotension  -Fits prostatitis picture,  4-6 weeks of abx

## 2022-08-01 NOTE — PROGRESS NOTE ADULT - PROBLEM SELECTOR PLAN 6
coumadin
VTE prop: heparin sc q12 hrs
coumadin
VTE prop: heparin sc q12 hrs
VTE prop: heparin sc q12 hrs
coumadin

## 2022-08-01 NOTE — PROGRESS NOTE ADULT - PROBLEM SELECTOR PROBLEM 7
AAA (abdominal aortic aneurysm)

## 2022-08-01 NOTE — PROGRESS NOTE ADULT - PROBLEM SELECTOR PLAN 5
hold norvasc as trying flomax
amlodipine
hold norvasc as trying flomax
amlodipine
BP stable   antihypertensives with holding parameters
amlodipine
hold norvasc as trying flomax
amlodipine

## 2022-08-01 NOTE — PROGRESS NOTE ADULT - SUBJECTIVE AND OBJECTIVE BOX
Patient is a 87y old  Male who presents with a chief complaint of uti, pna (31 Jul 2022 12:33)    INTERVAL HPI/OVERNIGHT EVENTS: Patients seen and examined at bedside this morning. No acute events overnight. Pt reports having a productive cough. Currently at room air at bedside. Tolerating room air. At bedside oxygen was 92-94%.     MEDICATIONS  (STANDING):  ALBUTerol    90 MICROgram(s) HFA Inhaler 2 Puff(s) Inhalation every 6 hours  budesonide  80 MICROgram(s)/formoterol 4.5 MICROgram(s) Inhaler 2 Puff(s) Inhalation two times a day  finasteride 5 milliGRAM(s) Oral daily  furosemide    Tablet 20 milliGRAM(s) Oral daily  guaiFENesin  milliGRAM(s) Oral every 12 hours  pantoprazole    Tablet 40 milliGRAM(s) Oral before breakfast  polyethylene glycol 3350 17 Gram(s) Oral daily  senna 2 Tablet(s) Oral at bedtime  tamsulosin 0.4 milliGRAM(s) Oral at bedtime  trimethoprim  160 mG/sulfamethoxazole 800 mG 1 Tablet(s) Oral two times a day    MEDICATIONS  (PRN):  acetaminophen     Tablet .. 650 milliGRAM(s) Oral every 6 hours PRN Temp greater or equal to 38C (100.4F), Mild Pain (1 - 3)    Allergies    chocolate (Unknown)  No Known Drug Allergies  peanuts (Anaphylaxis)    Intolerances      REVIEW OF SYSTEMS:  All other systems reviewed and are negative    Vital Signs Last 24 Hrs  T(C): 37.1 (01 Aug 2022 11:48), Max: 37.1 (01 Aug 2022 05:02)  T(F): 98.7 (01 Aug 2022 11:48), Max: 98.8 (01 Aug 2022 05:02)  HR: 80 (01 Aug 2022 11:48) (61 - 80)  BP: 106/69 (01 Aug 2022 11:48) (106/69 - 130/78)  BP(mean): --  RR: 18 (01 Aug 2022 11:48) (17 - 19)  SpO2: 95% (01 Aug 2022 11:48) (90% - 95%)    Parameters below as of 31 Jul 2022 20:37  Patient On (Oxygen Delivery Method): nasal cannula  O2 Flow (L/min): 4    Daily     Daily   I&O's Summary    31 Jul 2022 07:01  -  01 Aug 2022 07:00  --------------------------------------------------------  IN: 0 mL / OUT: 650 mL / NET: -650 mL      CAPILLARY BLOOD GLUCOSE        PHYSICAL EXAM:  GENERAL: NAD, age appropriate   HEAD:  Atraumatic, Normocephalic  EYES: EOMI, PERRLA, conjunctiva and sclera clear  ENMT: No tonsillar erythema, exudates, or enlargement; Moist mucous membranes, Good dentition, No lesions  NECK: Supple, No JVD, Normal thyroid  NERVOUS SYSTEM:  Alert & Oriented X2, Moderate concentration; Motor Strength 3/5 B/L upper and lower extremities; DTRs 2+ intact and symmetric  CHEST/LUNG: Clear to percussion bilaterally; expiratory wheezes, good air entry   HEART: Regular irregular rate and rhythm; No murmurs, rubs, or gallops  ABDOMEN: Soft, Nontender, Nondistended; Bowel sounds present  EXTREMITIES:  2+ Peripheral Pulses, No clubbing, cyanosis, or edema, Right arm contracture   LYMPH: No lymphadenopathy noted  SKIN: No rashes or lesions    Labs                          14.5   7.72  )-----------( 188      ( 31 Jul 2022 06:15 )             41.7     07-31    138  |  106  |  19  ----------------------------<  100<H>  4.0   |  25  |  1.20    Ca    8.4<L>      31 Jul 2022 06:15    TPro  5.9<L>  /  Alb  3.0<L>  /  TBili  0.9  /  DBili  x   /  AST  37  /  ALT  43  /  AlkPhos  51  07-31    PT/INR - ( 01 Aug 2022 11:30 )   PT: 24.2 sec;   INR: 2.00 ratio

## 2022-08-01 NOTE — PROGRESS NOTE ADULT - PROBLEM SELECTOR PROBLEM 4
BPH without urinary obstruction

## 2022-08-01 NOTE — PROGRESS NOTE ADULT - PROBLEM SELECTOR PLAN 7
4.1 cm less than 5 will need to be followed as outpatient

## 2022-08-01 NOTE — PROGRESS NOTE ADULT - PROBLEM SELECTOR PLAN 9
on surveillance swab for rehab found to be covid positive - defer any treatment to ID regarding covid as pt asymptomatic per ID
on surveillance swab for rehab found to be covid positive -will check inflammatory markers will defer any treatment to ID regarding covid
on surveillance swab for rehab found to be covid positive - defer any treatment to ID regarding covid as pt asymptomatic per ID
on surveillance swab for rehab found to be covid positive
on surveillance swab for rehab found to be covid positive - defer any treatment to ID regarding covid as pt asymptomatic per ID
on surveillance swab for rehab found to be covid positive - defer any treatment to ID regarding covid as pt asymptomatic per ID
on surveillance swab for rehab found to be covid positive -will check inflammatory markers will defer any treatment to ID regarding covid as pt asymptomatic

## 2022-08-01 NOTE — PROGRESS NOTE ADULT - PROBLEM SELECTOR PLAN 4
finasteride
finasteride, flomax  outpt fu in office  per gu Trying Flomax while inpatient, if unable to tolerate can dc with silodosin, fits prostatitis picture,  4-6 weeks of abx
finasteride
finasteride  recheck PVR per   if PVR remains 200 or less then patient can follow up in the office per 
finasteride
finasteride, flomax  outpt fu in office  per gu Trying Flomax while inpatient, if unable to tolerate can dc with silodosin, fits prostatitis picture,  4-6 weeks of abx
finasteride, flomax  outpt fu in office  per gu Trying Flomax while inpatient, if unable to tolerate can dc with silodosin, fits prostatitis picture,  4-6 weeks of abx
finasteride
finasteride, flomax  outpt fu in office  per gu Trying Flomax while inpatient, if unable to tolerate can dc with silodosin, fits prostatitis picture,  4-6 weeks of abx

## 2022-08-01 NOTE — PROGRESS NOTE ADULT - REASON FOR ADMISSION
uti, pna

## 2022-08-01 NOTE — PROGRESS NOTE ADULT - PROBLEM SELECTOR PLAN 2
CT chest shows RUL PNA  although no cough now normal white count   d/c azithromycin   continue ceftriaxone    7/23/2022 antibx per ID
CT chest shows RUL PNA  although no cough now normal white count   d/c azithromycin   s/p ceftriaxone  seen by ID
CT chest shows RUL PNA  although no cough now normal white count   d/c azithromycin   continue ceftriaxone    7/23/2022 antibx per ID
CT chest shows RUL PNA  although no cough now normal white count   d/c azithromycin   s/p ceftriaxone  seen by ID
CT chest shows RUL PNA  ceftriaxone and azithromycin  No dyspnea or cough per patient  legionella negatyive
CT chest shows RUL PNA  although no cough now normal white count   d/c azithromycin   continue ceftriaxone    7/23/2022 antibx per ID
CT chest shows RUL PNA  although no cough now normal white count   d/c azithromycin   s/p ceftriaxone  seen by ID  mucinex
CT chest shows RUL PNA  although no cough now normal white count   d/c azithromycin   continue cefrriaxone
CT chest shows RUL PNA  although no cough now normal white count   d/c azithromycin   continue ceftriaxone    7/23/2022 antibx per ID
CT chest shows RUL PNA  although no cough now normal white count   d/c azithromycin   s/p ceftriaxone  seen by ID
COVID +: on room air, symptomatic treatment  CT chest shows RUL PNA  although no cough now normal white count   d/c azithromycin   s/p ceftriaxone  seen by ID  mucinex
CT chest shows RUL PNA  although no cough now normal white count   d/c azithromycin   s/p ceftriaxone  seen by ID

## 2022-08-01 NOTE — PROGRESS NOTE ADULT - PROBLEM SELECTOR PLAN 3
on coumadin INR-is at 3  7/24/2022 inr-at 3 .4 today ,  decrease dose to 4mg  7/25/2022 await INR- result for today  7/26/2022 INR-2.30 dose coumadin 5 mg
warfarin  tonight   inr in am
warfarin  tonight   inr in am
on coumadin   dose coumadin per INR  bactrim and coumadin can interact-   Bactrim may increase coumadin activity, coumadin  dose may need to be adjusted   monitor pt's renal function, daily INR and adjust coumadin dose accordingly (at home 5 mg coumadin- might need lower dose upon dc )_
on coumadin INR  dose coumadin per INR  bactrim and coumadin can interact- per ID monitor PT/INR IP for couple of days
on coumadin   dose coumadin per INR  bactrim and coumadin can interact-   Bactrim may increase coumadin activity, coumadin  dose may need to be adjusted   monitor pt's renal function, daily INR and adjust coumadin dose accordingly (at home 5 mg coumadin- might need lower dose upon dc )_
on coumadin INR-is at 3  7/24/2022 inr-at 3 .4 today ,  decersae dose to 4mg
on coumadin INR-is at 3  7/24/2022 inr-at 3 .4 today ,  decersae dose to 4mg  7/25/2022 await INR- result for today
on coumadin INR  dose coumadin per INR  bactrim and coumadin can interact-   Bactrim may increase coumadin activity, coumadin  dose may need to be adjusted   monitor pt's renal function, daily INR and adjust coumadin dose accordingly (at hoem 5 mg coumadin- might need lower dose upon dc )_
on coumadin   dose coumadin per INR  bactrim and coumadin can interact-   Bactrim may increase coumadin activity, coumadin  dose may need to be adjusted   monitor pt's renal function, daily INR and adjust coumadin dose accordingly (at home 5 mg coumadin- might need lower dose upon dc )_
on coumadin INR-is at 3
on coumadin   dose coumadin per INR  bactrim and coumadin can interact-   Bactrim may increase coumadin activity, coumadin  dose may need to be adjusted   monitor pt's renal function, daily INR and adjust coumadin dose accordingly (at home 5 mg coumadin- might need lower dose upon dc )_

## 2022-08-01 NOTE — PROGRESS NOTE ADULT - PROBLEM SELECTOR PROBLEM 8
Stage 2 chronic kidney disease

## 2022-08-02 ENCOUNTER — TRANSCRIPTION ENCOUNTER (OUTPATIENT)
Age: 87
End: 2022-08-02

## 2022-08-02 VITALS
HEART RATE: 82 BPM | TEMPERATURE: 99 F | RESPIRATION RATE: 18 BRPM | OXYGEN SATURATION: 92 % | DIASTOLIC BLOOD PRESSURE: 72 MMHG | SYSTOLIC BLOOD PRESSURE: 119 MMHG

## 2022-08-02 LAB
INR BLD: 1.85 RATIO — HIGH (ref 0.88–1.16)
PROTHROM AB SERPL-ACNC: 22.1 SEC — HIGH (ref 10.5–13.4)

## 2022-08-02 PROCEDURE — 99239 HOSP IP/OBS DSCHRG MGMT >30: CPT

## 2022-08-02 RX ORDER — ALBUTEROL 90 UG/1
2 AEROSOL, METERED ORAL
Qty: 0 | Refills: 0 | DISCHARGE
Start: 2022-08-02

## 2022-08-02 RX ORDER — WARFARIN SODIUM 2.5 MG/1
1 TABLET ORAL
Qty: 0 | Refills: 0 | DISCHARGE

## 2022-08-02 RX ORDER — WARFARIN SODIUM 2.5 MG/1
1 TABLET ORAL
Qty: 0 | Refills: 0 | DISCHARGE
Start: 2022-08-02

## 2022-08-02 RX ORDER — LORATADINE 10 MG/1
1 TABLET ORAL
Qty: 0 | Refills: 0 | DISCHARGE

## 2022-08-02 RX ORDER — PANTOPRAZOLE SODIUM 20 MG/1
1 TABLET, DELAYED RELEASE ORAL
Qty: 0 | Refills: 0 | DISCHARGE
Start: 2022-08-02

## 2022-08-02 RX ORDER — FINASTERIDE 5 MG/1
1 TABLET, FILM COATED ORAL
Qty: 0 | Refills: 0 | DISCHARGE

## 2022-08-02 RX ORDER — FUROSEMIDE 40 MG
1 TABLET ORAL
Qty: 0 | Refills: 0 | DISCHARGE

## 2022-08-02 RX ORDER — FUROSEMIDE 40 MG
1 TABLET ORAL
Qty: 0 | Refills: 0 | DISCHARGE
Start: 2022-08-02

## 2022-08-02 RX ORDER — AMLODIPINE BESYLATE 2.5 MG/1
1 TABLET ORAL
Qty: 0 | Refills: 0 | DISCHARGE

## 2022-08-02 RX ORDER — SENNA PLUS 8.6 MG/1
2 TABLET ORAL
Qty: 0 | Refills: 0 | DISCHARGE
Start: 2022-08-02

## 2022-08-02 RX ORDER — FINASTERIDE 5 MG/1
1 TABLET, FILM COATED ORAL
Qty: 0 | Refills: 0 | DISCHARGE
Start: 2022-08-02

## 2022-08-02 RX ORDER — TAMSULOSIN HYDROCHLORIDE 0.4 MG/1
1 CAPSULE ORAL
Qty: 0 | Refills: 0 | DISCHARGE
Start: 2022-08-02

## 2022-08-02 RX ORDER — BUDESONIDE AND FORMOTEROL FUMARATE DIHYDRATE 160; 4.5 UG/1; UG/1
2 AEROSOL RESPIRATORY (INHALATION)
Qty: 0 | Refills: 0 | DISCHARGE
Start: 2022-08-02

## 2022-08-02 RX ADMIN — PANTOPRAZOLE SODIUM 40 MILLIGRAM(S): 20 TABLET, DELAYED RELEASE ORAL at 06:45

## 2022-08-02 RX ADMIN — Medication 600 MILLIGRAM(S): at 06:45

## 2022-08-02 RX ADMIN — Medication 1 TABLET(S): at 06:46

## 2022-08-02 RX ADMIN — BUDESONIDE AND FORMOTEROL FUMARATE DIHYDRATE 2 PUFF(S): 160; 4.5 AEROSOL RESPIRATORY (INHALATION) at 06:46

## 2022-08-02 RX ADMIN — ALBUTEROL 2 PUFF(S): 90 AEROSOL, METERED ORAL at 12:27

## 2022-08-02 RX ADMIN — ALBUTEROL 2 PUFF(S): 90 AEROSOL, METERED ORAL at 06:46

## 2022-08-02 RX ADMIN — FINASTERIDE 5 MILLIGRAM(S): 5 TABLET, FILM COATED ORAL at 12:30

## 2022-08-02 RX ADMIN — ALBUTEROL 2 PUFF(S): 90 AEROSOL, METERED ORAL at 00:35

## 2022-08-02 RX ADMIN — Medication 20 MILLIGRAM(S): at 06:45

## 2022-08-02 RX ADMIN — POLYETHYLENE GLYCOL 3350 17 GRAM(S): 17 POWDER, FOR SOLUTION ORAL at 12:28

## 2022-08-02 NOTE — DISCHARGE NOTE PROVIDER - HOSPITAL COURSE
Patient is an 87M with a PMH of Afib on coumadin, HTN, BPH who presents to the ED for abnormal labs.  Patient is AAOx3 however is a limited historian.  Patient states that he recently had labs done by his PMD and his WBC was high so he was brought to the ED for evaluation. Patient is now medically stable for discharge to rehab.     Problem/Plan - 1:  ·  Problem: UTI (urinary tract infection).   ·  Plan: WBCs and leuk esterase in urine  Started on ceftriaxone in ED.   7/23/2022 urine cx ecoli continue with antibx  7/26/2022 still with dysuria  urology consult appreciated  change abx to bactrim per ID x 4 wk, last dose 8/15  per urology--flomax trial  Trying Flomax while inpatient, if unable to tolerate can dc with silodosin  -Fits prostatitis picture,  4-6 weeks of abx.    Problem/Plan - 2:  ·  Problem: Pneumonia.   ·  Plan: COVID +: on room air, symptomatic treatment  CT chest shows RUL PNA  although no cough now normal white count   d/c azithromycin   s/p ceftriaxone  seen by ID  mucinex.    Problem/Plan - 3:  ·  Problem: Chronic atrial fibrillation.   ·  Plan: on coumadin   dose coumadin per INR  Bactrim may increase coumadin activity, coumadin  dose may need to be adjusted     Problem/Plan - 4:  ·  Problem: BPH without urinary obstruction.   ·  Plan: finasteride, flomax  outpt fu in office  per gu Trying Flomax while inpatient, if unable to tolerate can dc with silodosin, fits prostatitis picture,  4-6 weeks of abx.    Problem/Plan - 5:  ·  Problem: Essential hypertension.   ·  Plan: BP stable   antihypertensives with holding parameters.    Problem/Plan - 6:  ·  Problem: Preventive measure.   ·  Plan: coumadin 4 mg PO QHS    Problem/Plan - 7:  ·  Problem: AAA (abdominal aortic aneurysm).   ·  Plan: 4.1 cm less than 5 will need to be followed as outpatient.    Problem/Plan - 8:  ·  Problem: Stage 2 chronic kidney disease.   ·  Plan: creat stable.    Problem/Plan - 9:  ·  Problem: 2019 novel coronavirus disease (COVID-19).   ·  Plan: on surveillance swab for rehab found to be covid positive.     Problem/Plan - 10:  ·  Problem: Pulmonary nodule.   ·  Plan; appears stable rll lobe- will need outpt fu

## 2022-08-02 NOTE — DISCHARGE NOTE PROVIDER - ATTENDING DISCHARGE PHYSICAL EXAMINATION:
GENERAL: NAD, age appropriate   HEAD:  Atraumatic, Normocephalic  EYES: EOMI, PERRLA, conjunctiva and sclera clear  ENMT: No tonsillar erythema, exudates, or enlargement; Moist mucous membranes, Good dentition, No lesions  NECK: Supple, No JVD, Normal thyroid  NERVOUS SYSTEM:  Alert & Oriented X2, Moderate concentration; Motor Strength 3/5 B/L upper and lower extremities; DTRs 2+ intact and symmetric  CHEST/LUNG: Clear to percussion bilaterally; expiratory wheezes, good air entry   HEART: Regular irregular rate and rhythm; No murmurs, rubs, or gallops  ABDOMEN: Soft, Nontender, Nondistended; Bowel sounds present, + feliciano  EXTREMITIES:  2+ Peripheral Pulses, No clubbing, cyanosis, or edema, Right arm contracture   LYMPH: No lymphadenopathy noted  SKIN: No rashes or lesions

## 2022-08-02 NOTE — DISCHARGE NOTE PROVIDER - NSDCMRMEDTOKEN_GEN_ALL_CORE_FT
albuterol 90 mcg/inh inhalation aerosol: 2 puff(s) inhaled every 6 hours  budesonide-formoterol 80 mcg-4.5 mcg/inh inhalation aerosol: 2 puff(s) inhaled 2 times a day  finasteride 5 mg oral tablet: 1 tab(s) orally once a day  furosemide 20 mg oral tablet: 1 tab(s) orally once a day  guaiFENesin 600 mg oral tablet, extended release: 1 tab(s) orally every 12 hours  pantoprazole 40 mg oral delayed release tablet: 1 tab(s) orally once a day (before a meal)  senna leaf extract oral tablet: 2 tab(s) orally once a day (at bedtime)  sulfamethoxazole-trimethoprim 800 mg-160 mg oral tablet: 1 tab(s) orally 2 times a day.  Last dose 15-Aug-2022  tamsulosin 0.4 mg oral capsule: 1 cap(s) orally once a day (at bedtime)  warfarin 4 mg oral tablet: 1 tab(s) orally once a day

## 2022-08-02 NOTE — DISCHARGE NOTE NURSING/CASE MANAGEMENT/SOCIAL WORK - NSDCPEFALRISK_GEN_ALL_CORE
For information on Fall & Injury Prevention, visit: [email or web address] OR  [email or web address] OR  https://www.cdc.gov/steadi/patient.html

## 2022-08-02 NOTE — DISCHARGE NOTE PROVIDER - NSDCCPCAREPLAN_GEN_ALL_CORE_FT
PRINCIPAL DISCHARGE DIAGNOSIS  Diagnosis: Acute UTI  Assessment and Plan of Treatment:       SECONDARY DISCHARGE DIAGNOSES  Diagnosis: CAP (community acquired pneumonia)  Assessment and Plan of Treatment:

## 2022-08-02 NOTE — DISCHARGE NOTE NURSING/CASE MANAGEMENT/SOCIAL WORK - PATIENT PORTAL LINK FT
You can access the FollowMyHealth Patient Portal offered by [facility] by registering at the following website: [email or web address]. By joining "[employer]’s FollowMyHealth portal, you will also be able to view your health information using other applications (apps) compatible with our system.

## 2022-08-11 DIAGNOSIS — N39.0 URINARY TRACT INFECTION, SITE NOT SPECIFIED: ICD-10-CM

## 2022-08-11 DIAGNOSIS — N40.0 BENIGN PROSTATIC HYPERPLASIA WITHOUT LOWER URINARY TRACT SYMPTOMS: ICD-10-CM

## 2022-08-11 DIAGNOSIS — N41.9 INFLAMMATORY DISEASE OF PROSTATE, UNSPECIFIED: ICD-10-CM

## 2022-08-11 DIAGNOSIS — I48.20 CHRONIC ATRIAL FIBRILLATION, UNSPECIFIED: ICD-10-CM

## 2022-08-11 DIAGNOSIS — J18.9 PNEUMONIA, UNSPECIFIED ORGANISM: ICD-10-CM

## 2022-08-11 DIAGNOSIS — U07.1 COVID-19: ICD-10-CM

## 2022-08-11 DIAGNOSIS — I71.4 ABDOMINAL AORTIC ANEURYSM, WITHOUT RUPTURE: ICD-10-CM

## 2022-08-11 DIAGNOSIS — Z91.010 ALLERGY TO PEANUTS: ICD-10-CM

## 2022-08-11 DIAGNOSIS — Z79.01 LONG TERM (CURRENT) USE OF ANTICOAGULANTS: ICD-10-CM

## 2022-08-11 DIAGNOSIS — I12.9 HYPERTENSIVE CHRONIC KIDNEY DISEASE WITH STAGE 1 THROUGH STAGE 4 CHRONIC KIDNEY DISEASE, OR UNSPECIFIED CHRONIC KIDNEY DISEASE: ICD-10-CM

## 2022-08-11 DIAGNOSIS — N18.2 CHRONIC KIDNEY DISEASE, STAGE 2 (MILD): ICD-10-CM

## 2022-08-27 ENCOUNTER — EMERGENCY (EMERGENCY)
Facility: HOSPITAL | Age: 87
LOS: 0 days | Discharge: HOME | End: 2022-08-28
Attending: EMERGENCY MEDICINE

## 2022-08-27 VITALS
SYSTOLIC BLOOD PRESSURE: 118 MMHG | HEIGHT: 70 IN | DIASTOLIC BLOOD PRESSURE: 73 MMHG | TEMPERATURE: 98 F | RESPIRATION RATE: 18 BRPM | OXYGEN SATURATION: 95 % | WEIGHT: 173.5 LBS | HEART RATE: 85 BPM

## 2022-08-27 DIAGNOSIS — Z79.01 LONG TERM (CURRENT) USE OF ANTICOAGULANTS: ICD-10-CM

## 2022-08-27 DIAGNOSIS — Z91.010 ALLERGY TO PEANUTS: ICD-10-CM

## 2022-08-27 DIAGNOSIS — I48.91 UNSPECIFIED ATRIAL FIBRILLATION: ICD-10-CM

## 2022-08-27 DIAGNOSIS — I95.9 HYPOTENSION, UNSPECIFIED: ICD-10-CM

## 2022-08-27 DIAGNOSIS — I45.10 UNSPECIFIED RIGHT BUNDLE-BRANCH BLOCK: ICD-10-CM

## 2022-08-27 DIAGNOSIS — M79.604 PAIN IN RIGHT LEG: ICD-10-CM

## 2022-08-27 DIAGNOSIS — Z96.643 PRESENCE OF ARTIFICIAL HIP JOINT, BILATERAL: ICD-10-CM

## 2022-08-27 DIAGNOSIS — S82.241A DISPLACED SPIRAL FRACTURE OF SHAFT OF RIGHT TIBIA, INITIAL ENCOUNTER FOR CLOSED FRACTURE: ICD-10-CM
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
## 2022-08-27 DIAGNOSIS — Z91.018 ALLERGY TO OTHER FOODS: ICD-10-CM

## 2022-08-27 DIAGNOSIS — S82.441A DISPLACED SPIRAL FRACTURE OF SHAFT OF RIGHT FIBULA, INITIAL ENCOUNTER FOR CLOSED FRACTURE: ICD-10-CM

## 2022-08-27 DIAGNOSIS — U07.1 COVID-19: ICD-10-CM

## 2022-08-27 DIAGNOSIS — I10 ESSENTIAL (PRIMARY) HYPERTENSION: ICD-10-CM

## 2022-08-27 LAB
ALBUMIN SERPL ELPH-MCNC: 3.5 G/DL — SIGNIFICANT CHANGE UP (ref 3.3–5)
ALP SERPL-CCNC: 58 U/L — SIGNIFICANT CHANGE UP (ref 40–120)
ALT FLD-CCNC: 57 U/L — SIGNIFICANT CHANGE UP (ref 12–78)
ANION GAP SERPL CALC-SCNC: 4 MMOL/L — LOW (ref 5–17)
APTT BLD: 29.7 SEC — SIGNIFICANT CHANGE UP (ref 27.5–35.5)
AST SERPL-CCNC: 41 U/L — HIGH (ref 15–37)
BASOPHILS # BLD AUTO: 0.07 K/UL — SIGNIFICANT CHANGE UP (ref 0–0.2)
BASOPHILS NFR BLD AUTO: 0.6 % — SIGNIFICANT CHANGE UP (ref 0–2)
BILIRUB SERPL-MCNC: 1.3 MG/DL — HIGH (ref 0.2–1.2)
BUN SERPL-MCNC: 27 MG/DL — HIGH (ref 7–23)
CALCIUM SERPL-MCNC: 9.1 MG/DL — SIGNIFICANT CHANGE UP (ref 8.5–10.1)
CHLORIDE SERPL-SCNC: 105 MMOL/L — SIGNIFICANT CHANGE UP (ref 96–108)
CO2 SERPL-SCNC: 26 MMOL/L — SIGNIFICANT CHANGE UP (ref 22–31)
CREAT SERPL-MCNC: 1.17 MG/DL — SIGNIFICANT CHANGE UP (ref 0.5–1.3)
EGFR: 61 ML/MIN/1.73M2 — SIGNIFICANT CHANGE UP
EOSINOPHIL # BLD AUTO: 0.15 K/UL — SIGNIFICANT CHANGE UP (ref 0–0.5)
EOSINOPHIL NFR BLD AUTO: 1.3 % — SIGNIFICANT CHANGE UP (ref 0–6)
FLUAV AG NPH QL: SIGNIFICANT CHANGE UP
FLUBV AG NPH QL: SIGNIFICANT CHANGE UP
GLUCOSE SERPL-MCNC: 112 MG/DL — HIGH (ref 70–99)
HCT VFR BLD CALC: 44.4 % — SIGNIFICANT CHANGE UP (ref 39–50)
HGB BLD-MCNC: 15.1 G/DL — SIGNIFICANT CHANGE UP (ref 13–17)
IMM GRANULOCYTES NFR BLD AUTO: 0.6 % — SIGNIFICANT CHANGE UP (ref 0–1.5)
INR BLD: 1.45 RATIO — HIGH (ref 0.88–1.16)
LYMPHOCYTES # BLD AUTO: 1.74 K/UL — SIGNIFICANT CHANGE UP (ref 1–3.3)
LYMPHOCYTES # BLD AUTO: 14.7 % — SIGNIFICANT CHANGE UP (ref 13–44)
MCHC RBC-ENTMCNC: 33.9 PG — SIGNIFICANT CHANGE UP (ref 27–34)
MCHC RBC-ENTMCNC: 34 G/DL — SIGNIFICANT CHANGE UP (ref 32–36)
MCV RBC AUTO: 99.8 FL — SIGNIFICANT CHANGE UP (ref 80–100)
MONOCYTES # BLD AUTO: 0.79 K/UL — SIGNIFICANT CHANGE UP (ref 0–0.9)
MONOCYTES NFR BLD AUTO: 6.7 % — SIGNIFICANT CHANGE UP (ref 2–14)
NEUTROPHILS # BLD AUTO: 9.02 K/UL — HIGH (ref 1.8–7.4)
NEUTROPHILS NFR BLD AUTO: 76.1 % — SIGNIFICANT CHANGE UP (ref 43–77)
NRBC # BLD: 0 /100 WBCS — SIGNIFICANT CHANGE UP (ref 0–0)
PLATELET # BLD AUTO: 208 K/UL — SIGNIFICANT CHANGE UP (ref 150–400)
POTASSIUM SERPL-MCNC: 3.7 MMOL/L — SIGNIFICANT CHANGE UP (ref 3.5–5.3)
POTASSIUM SERPL-SCNC: 3.7 MMOL/L — SIGNIFICANT CHANGE UP (ref 3.5–5.3)
PROT SERPL-MCNC: 6.6 GM/DL — SIGNIFICANT CHANGE UP (ref 6–8.3)
PROTHROM AB SERPL-ACNC: 17.5 SEC — HIGH (ref 10.5–13.4)
RBC # BLD: 4.45 M/UL — SIGNIFICANT CHANGE UP (ref 4.2–5.8)
RBC # FLD: 12.9 % — SIGNIFICANT CHANGE UP (ref 10.3–14.5)
SARS-COV-2 RNA SPEC QL NAA+PROBE: DETECTED
SODIUM SERPL-SCNC: 135 MMOL/L — SIGNIFICANT CHANGE UP (ref 135–145)
WBC # BLD: 11.84 K/UL — HIGH (ref 3.8–10.5)
WBC # FLD AUTO: 11.84 K/UL — HIGH (ref 3.8–10.5)

## 2022-08-27 PROCEDURE — 73700 CT LOWER EXTREMITY W/O DYE: CPT | Mod: 26,RT,MA,59

## 2022-08-27 PROCEDURE — 73590 X-RAY EXAM OF LOWER LEG: CPT | Mod: 26,RT,76

## 2022-08-27 PROCEDURE — 73610 X-RAY EXAM OF ANKLE: CPT | Mod: 26,RT

## 2022-08-27 PROCEDURE — 73552 X-RAY EXAM OF FEMUR 2/>: CPT | Mod: 26,RT

## 2022-08-27 PROCEDURE — 93010 ELECTROCARDIOGRAM REPORT: CPT

## 2022-08-27 PROCEDURE — 73600 X-RAY EXAM OF ANKLE: CPT | Mod: 26,RT,59

## 2022-08-27 PROCEDURE — 99285 EMERGENCY DEPT VISIT HI MDM: CPT | Mod: CS

## 2022-08-27 PROCEDURE — 73562 X-RAY EXAM OF KNEE 3: CPT | Mod: 26,RT

## 2022-08-27 RX ORDER — MORPHINE SULFATE 50 MG/1
4 CAPSULE, EXTENDED RELEASE ORAL ONCE
Refills: 0 | Status: DISCONTINUED | OUTPATIENT
Start: 2022-08-27 | End: 2022-08-27

## 2022-08-27 RX ORDER — ACETAMINOPHEN 500 MG
975 TABLET ORAL ONCE
Refills: 0 | Status: COMPLETED | OUTPATIENT
Start: 2022-08-27 | End: 2022-08-27

## 2022-08-27 RX ADMIN — Medication 975 MILLIGRAM(S): at 19:39

## 2022-08-27 RX ADMIN — MORPHINE SULFATE 4 MILLIGRAM(S): 50 CAPSULE, EXTENDED RELEASE ORAL at 21:55

## 2022-08-27 NOTE — ED PROVIDER NOTE - PATIENT PORTAL LINK FT
You can access the FollowMyHealth Patient Portal offered by [facility] by registering at the following website: [email or web address]. By joining [employer]’s FollowMyHealth portal, you will also be able to view your health information using other applications (apps) compatible with our system.

## 2022-08-27 NOTE — ED PROVIDER NOTE - PHYSICAL EXAMINATION
Vitals: WNL  Gen: AAOx3, NAD, sitting uncomfortably in stretcher  Head: ncat, perrla, eomi b/l  Neck: supple, no lymphadenopathy, no midline deviation  Heart: rrr, no m/r/g  Lungs: CTA b/l, no rales/ronchi/wheezes  Abd: soft, nontender, non-distended, no rebound or guarding  Ext: no clubbing/cyanosis/edema, gross tibial bowing noted, ttp, mild swelling at mid RLE tibial area, RLE sensation intact throughout, normal color, normal muscle strength albeit limited by pain, normal cap refill   Neuro: sensation and muscle strength intact b/l, steady gait

## 2022-08-27 NOTE — ED PROVIDER NOTE - CARE PLAN
1 Principal Discharge DX:	Pain of right lower extremity   Principal Discharge DX:	Pain of right lower extremity  Secondary Diagnosis:	Tibial fracture

## 2022-08-27 NOTE — ED ADULT NURSE NOTE - OBJECTIVE STATEMENT
Patient is alert and oriented x4. As per patient's report, he was getting transferred from wheelchair to bed and his leg bumped into "something". Denies falling or head trauma. Right arm contracted. Active ROM on left arm. Bilateral lower extremities swollen.

## 2022-08-27 NOTE — ED PROVIDER NOTE - PROGRESS NOTE DETAILS
Results reported to patient-- R tib/fib fracture on XR, splinted by ortho  Pt. reports feeling better after meds, [facility] X[facility]  pt. agrees to f/u with primary care outpt. asap, ortho referral given   pt. understands to return to ED if symptoms worsen; will d/c

## 2022-08-27 NOTE — ED ADULT NURSE NOTE - NSIMPLEMENTINTERV_GEN_ALL_ED
Implemented All Fall with Harm Risk Interventions:  [place] to call system. Call bell, personal items and telephone within reach. Instruct patient to call for assistance. Room bathroom lighting operational. Non-slip footwear when patient is off stretcher. Physically safe environment: no spills, clutter or unnecessary equipment. Stretcher in lowest position, wheels locked, appropriate side rails in place. Provide visual cue, wrist band, yellow gown, etc. Monitor gait and stability. Monitor for mental status changes and reorient to person, place, and time. Review medications for side effects contributing to fall risk. Reinforce activity limits and safety measures with patient and family. Provide visual clues: red socks.

## 2022-08-27 NOTE — ED PROVIDER NOTE - NSICDXPASTSURGICALHX_GEN_ALL_CORE_FT
Chief Complaint: covid    History: Patient endorses good appetite. No new complaints, still having      MEDICATIONS  (STANDING):  amLODIPine   Tablet 10 milliGRAM(s) Oral daily  atorvastatin 40 milliGRAM(s) Oral at bedtime  dextrose 5%. 1000 milliLiter(s) (50 mL/Hr) IV Continuous <Continuous>  dextrose 50% Injectable 12.5 Gram(s) IV Push once  dextrose 50% Injectable 25 Gram(s) IV Push once  dextrose 50% Injectable 25 Gram(s) IV Push once  enoxaparin Injectable 40 milliGRAM(s) SubCutaneous daily  fluconAZOLE   Tablet 200 milliGRAM(s) Oral daily  gabapentin 300 milliGRAM(s) Oral at bedtime  insulin glargine Injectable (LANTUS) 15 Unit(s) SubCutaneous at bedtime  insulin lispro (HumaLOG) corrective regimen sliding scale   SubCutaneous three times a day before meals  insulin lispro (HumaLOG) corrective regimen sliding scale   SubCutaneous at bedtime  insulin lispro Injectable (HumaLOG) 8 Unit(s) SubCutaneous three times a day before meals  lacosamide 150 milliGRAM(s) Oral two times a day  multivitamin/minerals 1 Tablet(s) Oral daily  nystatin    Suspension 284517 Unit(s) Oral four times a day  pantoprazole    Tablet 40 milliGRAM(s) Oral before breakfast  phenytoin   Capsule 300 milliGRAM(s) Oral at bedtime  predniSONE   Tablet 15 milliGRAM(s) Oral daily    MEDICATIONS  (PRN):  acetaminophen   Tablet .. 650 milliGRAM(s) Oral every 6 hours PRN Temp greater or equal to 38C (100.4F), Mild Pain (1 - 3), Moderate Pain (4 - 6)  ALBUTerol    90 MICROgram(s) HFA Inhaler 1 Puff(s) Inhalation every 4 hours PRN Shortness of Breath and/or Wheezing  dextrose 40% Gel 15 Gram(s) Oral once PRN Blood Glucose LESS THAN 70 milliGRAM(s)/deciliter  glucagon  Injectable 1 milliGRAM(s) IntraMuscular once PRN Glucose LESS THAN 70 milligrams/deciliter  ondansetron Injectable 4 milliGRAM(s) IV Push every 6 hours PRN Nausea and/or Vomiting      Allergies    No Known Allergies    Intolerances        ROS: All other systems reviewed and negative    PHYSICAL EXAM:  VITALS: T(C): 37.2 (04-23-20 @ 08:45)  T(F): 98.9 (04-23-20 @ 08:45), Max: 99.7 (04-23-20 @ 04:32)  HR: 115 (04-23-20 @ 08:45) (82 - 115)  BP: 127/87 (04-23-20 @ 08:45) (109/70 - 130/85)  RR:  (20 - 25)  SpO2:  (94% - 98%)  Wt(kg): --      POCT Blood Glucose.: 185 mg/dL (04-23-20 @ 08:20)H8+2 Pred 15  POCT Blood Glucose.: 148 mg/dL (04-23-20 @ 00:32)  POCT Blood Glucose.: 89 mg/dL (04-22-20 @ 21:06)L15  POCT Blood Glucose.: 197 mg/dL (04-22-20 @ 17:03)H8+2  POCT Blood Glucose.: 392 mg/dL (04-22-20 @ 11:47)H3+5  POCT Blood Glucose.: 290 mg/dL (04-22-20 @ 08:02)H3+3 Prednisone 15 mg   POCT Blood Glucose.: 275 mg/dL (04-22-20 @ 00:23)  POCT Blood Glucose.: 71 mg/dL (04-21-20 @ 20:37)L10  POCT Blood Glucose.: 218 mg/dL (04-21-20 @ 16:41)H3+2    POCT Blood Glucose.: 126 mg/dL (04-21-20 @ 12:01) Hum 3  POCT Blood Glucose.: 132 mg/dL (04-21-20 @ 08:24) Hum 3    POCT Blood Glucose.: 97 mg/dL (04-20-20 @ 20:58) Lantus 10  POCT Blood Glucose.: 249 mg/dL (04-20-20 @ 16:59) Hum 3+2  POCT Blood Glucose.: 179 mg/dL (04-20-20 @ 13:04) Hum 3+1  POCT Blood Glucose.: 196 mg/dL (04-20-20 @ 09:14) Hum 8+1  POCT Blood Glucose.: 107 mg/dL (04-20-20 @ 05:46) Prednisone 15      04-22    138  |  101  |  19  ----------------------------<  158<H>  5.4<H>   |  25  |  1.24    EGFR if : 60  EGFR if non : 52<L>    Ca    9.1      04-22    TPro  7.5  /  Alb  3.1<L>  /  TBili  0.2  /  DBili  x   /  AST  15  /  ALT  <5<L>  /  AlkPhos  65  04-22          Thyroid Function Tests: PAST SURGICAL HISTORY:  S/P hip replacement Bilateral, Left ORIF and Rt. Hip Replacement     Chief Complaint: covid    History: Spoke with patient [phone]. Patient endorses better appetite. No new complaints, cough is improving.     MEDICATIONS  (STANDING):  amLODIPine   Tablet 10 milliGRAM(s) Oral daily  atorvastatin 40 milliGRAM(s) Oral at bedtime  dextrose 5%. 1000 milliLiter(s) (50 mL/Hr) IV Continuous <Continuous>  dextrose 50% Injectable 12.5 Gram(s) IV Push once  dextrose 50% Injectable 25 Gram(s) IV Push once  dextrose 50% Injectable 25 Gram(s) IV Push once  enoxaparin Injectable 40 milliGRAM(s) SubCutaneous daily  fluconAZOLE   Tablet 200 milliGRAM(s) Oral daily  gabapentin 300 milliGRAM(s) Oral at bedtime  insulin glargine Injectable (LANTUS) 15 Unit(s) SubCutaneous at bedtime  insulin lispro (HumaLOG) corrective regimen sliding scale   SubCutaneous three times a day before meals  insulin lispro (HumaLOG) corrective regimen sliding scale   SubCutaneous at bedtime  insulin lispro Injectable (HumaLOG) 8 Unit(s) SubCutaneous three times a day before meals  lacosamide 150 milliGRAM(s) Oral two times a day  multivitamin/minerals 1 Tablet(s) Oral daily  nystatin    Suspension 346072 Unit(s) Oral four times a day  pantoprazole    Tablet 40 milliGRAM(s) Oral before breakfast  phenytoin   Capsule 300 milliGRAM(s) Oral at bedtime  predniSONE   Tablet 15 milliGRAM(s) Oral daily    MEDICATIONS  (PRN):  acetaminophen   Tablet .. 650 milliGRAM(s) Oral every 6 hours PRN Temp greater or equal to 38C (100.4F), Mild Pain (1 - 3), Moderate Pain (4 - 6)  ALBUTerol    90 MICROgram(s) HFA Inhaler 1 Puff(s) Inhalation every 4 hours PRN Shortness of Breath and/or Wheezing  dextrose 40% Gel 15 Gram(s) Oral once PRN Blood Glucose LESS THAN 70 milliGRAM(s)/deciliter  glucagon  Injectable 1 milliGRAM(s) IntraMuscular once PRN Glucose LESS THAN 70 milligrams/deciliter  ondansetron Injectable 4 milliGRAM(s) IV Push every 6 hours PRN Nausea and/or Vomiting      Allergies    No Known Allergies    Intolerances        ROS: All other systems reviewed and negative    PHYSICAL EXAM:  VITALS: T(C): 37.2 (04-23-20 @ 08:45)  T(F): 98.9 (04-23-20 @ 08:45), Max: 99.7 (04-23-20 @ 04:32)  HR: 115 (04-23-20 @ 08:45) (82 - 115)  BP: 127/87 (04-23-20 @ 08:45) (109/70 - 130/85)  RR:  (20 - 25)  SpO2:  (94% - 98%)  Wt(kg): --      POCT Blood Glucose.: 185 mg/dL (04-23-20 @ 08:20)H8+2 Pred 15  POCT Blood Glucose.: 148 mg/dL (04-23-20 @ 00:32)  POCT Blood Glucose.: 89 mg/dL (04-22-20 @ 21:06)L15  POCT Blood Glucose.: 197 mg/dL (04-22-20 @ 17:03)H8+2  POCT Blood Glucose.: 392 mg/dL (04-22-20 @ 11:47)H3+5  POCT Blood Glucose.: 290 mg/dL (04-22-20 @ 08:02)H3+3 Prednisone 15 mg   POCT Blood Glucose.: 275 mg/dL (04-22-20 @ 00:23)  POCT Blood Glucose.: 71 mg/dL (04-21-20 @ 20:37)L10  POCT Blood Glucose.: 218 mg/dL (04-21-20 @ 16:41)H3+2    POCT Blood Glucose.: 126 mg/dL (04-21-20 @ 12:01) Hum 3  POCT Blood Glucose.: 132 mg/dL (04-21-20 @ 08:24) Hum 3    POCT Blood Glucose.: 97 mg/dL (04-20-20 @ 20:58) Lantus 10  POCT Blood Glucose.: 249 mg/dL (04-20-20 @ 16:59) Hum 3+2  POCT Blood Glucose.: 179 mg/dL (04-20-20 @ 13:04) Hum 3+1  POCT Blood Glucose.: 196 mg/dL (04-20-20 @ 09:14) Hum 8+1  POCT Blood Glucose.: 107 mg/dL (04-20-20 @ 05:46) Prednisone 15      04-22    138  |  101  |  19  ----------------------------<  158<H>  5.4<H>   |  25  |  1.24    EGFR if : 60  EGFR if non : 52<L>    Ca    9.1      04-22    TPro  7.5  /  Alb  3.1<L>  /  TBili  0.2  /  DBili  x   /  AST  15  /  ALT  <5<L>  /  AlkPhos  65  04-22          Thyroid Function Tests:

## 2022-08-27 NOTE — ED PROVIDER NOTE - OBJECTIVE STATEMENT
86 yo M s/p fall in [facility].  Pt. was reportedly attempting to get up and tripped falling down on RLE.  Pt. has bony deformity at mid anterior tibia, acute localized pain.  No other complaints or injury.  Pt. was taken to ER thereafter.   ROS: negative for fever, cough, headache, chest pain, shortness of breath, abd pain, nausea, vomiting, diarrhea, rash, paresthesia, and weakness--all other systems reviewed are negative.   PMH: Afib on coumadin, HTN, BPH; Meds: See EMR for list; SH: Denies smoking/drinking/drug use 88 yo M s/p fall in [facility].  Pt. was reportedly attempting to get up and tripped falling down on RLE.  Pt. has bony deformity at mid anterior tibia, acute localized pain.  No other complaints or injury.  Pt. was taken to ER thereafter.   ROS: negative for fever, cough, headache, chest pain, shortness of breath, abd pain, nausea, vomiting, diarrhea, rash, paresthesia, and weakness--all other systems reviewed are negative.   PMH: Afib on coumadin, HTN, BPH; Meds: See EMR for list; SH: Denies smoking/drinking/drug use  Daughter: Aleksandra West: [phone]

## 2022-08-27 NOTE — ED ADULT NURSE NOTE - ED STAT RN HANDOFF DETAILS 2
Discharge completed for Yeni RN.  instructions provided and verbalizes understanding of follow up care. Educational material provided. pt's family and [facility] RNs at bedside

## 2022-08-27 NOTE — ED PROVIDER NOTE - CLINICAL SUMMARY MEDICAL DECISION MAKING FREE TEXT BOX
86 yo M with likely fracture of R tib/fib, pt. ambulatory with assistance at baseline   -XR R knee/tib/fib/ankle, tylenol for moderate pain  -f/u results, reeval

## 2022-08-27 NOTE — ED ADULT TRIAGE NOTE - CHIEF COMPLAINT QUOTE
staff states pt fell and hit his rt knee on the floor . positive deformity noted on the rt lower leg  h/o UTI, pneumonia, atrial fib, copd

## 2022-08-28 VITALS
OXYGEN SATURATION: 96 % | RESPIRATION RATE: 16 BRPM | TEMPERATURE: 98 F | DIASTOLIC BLOOD PRESSURE: 74 MMHG | SYSTOLIC BLOOD PRESSURE: 127 MMHG | HEART RATE: 75 BPM

## 2022-08-28 LAB — BLD GP AB SCN SERPL QL: SIGNIFICANT CHANGE UP

## 2022-08-28 NOTE — CONSULT NOTE ADULT - SUBJECTIVE AND OBJECTIVE BOX
87y Male minimal home ambulator with a walker presents c/o RLE pain sp mechanical fall. Patient was admitted to [facility] in july for UTI, subsequently developed COVID pneumonia. Patient was subsequently discharged to [facility] for rehab. Per report from [facility] and ED attending, patient was ambulating at [facility] today when, while under supervision, had a fall during transfer to the bed. Patient Denies HS/LOC. Denies numbness/tingling. No other pain/injuries. Denies fevers/chills. Of note, patients Daughter is at bedside who reports that the patient is AAOx3 at baseline with no hx of dementia. At present, patient is acutely confused, disoriented, and poor historian. Patients daughter reports patient had L hip fracture fixed at [facility] >20yrs ago. Patient also has hx of R hemiarthroplasty however daughter patient do not remember when/where it was performed.     HEALTH ISSUES - PROBLEM Dx:        MEDICATIONS  (STANDING):    Allergies    chocolate (Unknown)  No Known Drug Allergies  peanuts (Anaphylaxis)    Intolerances      Imaging: XR demonstrates R/L ankle fracture                        15.1   11.84 )-----------( 208      ( 27 Aug 2022 22:01 )             44.4     27 Aug 2022 22:01    135    |  105    |  27     ----------------------------<  112    3.7     |  26     |  1.17     Ca    9.1        27 Aug 2022 22:01    TPro  6.6    /  Alb  3.5    /  TBili  1.3    /  DBili  x      /  AST  41     /  ALT  57     /  AlkPhos  58     27 Aug 2022 22:01    PT/INR - ( 27 Aug 2022 22:01 )   PT: 17.5 sec;   INR: 1.45 ratio         PTT - ( 27 Aug 2022 22:01 )  PTT:29.7 sec  Vital Signs Last 24 Hrs  T(C): 36.4 (08-28-22 @ 00:19), Max: 36.6 (08-27-22 @ 17:46)  T(F): 97.5 (08-28-22 @ 00:19), Max: 97.8 (08-27-22 @ 17:46)  HR: 75 (08-28-22 @ 00:19) (75 - 85)  BP: 127/74 (08-28-22 @ 00:19) (110/73 - 127/74)  BP(mean): --  RR: 16 (08-28-22 @ 00:19) (13 - 18)  SpO2: 96% (08-28-22 @ 00:19) (95% - 97%)    Physical Exam  Gen: Nad  R LE:   Skin intact  Mild deformity over distal tibia   +TTP over distal 3rd tibial shaft and lateral malleolus, no bony ttp elsewhere  Patient unable to move ankle/toes 2/2 pain  dp pulse intact  negative log roll  unable to SLR 2/2 pain   compartments soft/compressive  extremity warm/well perfused.   No knee tenderness or swelling.    Secondary Survey:   No TTP over bony prominences, SILT, palpable pulses, full/painless A/PROM, compartments soft. No TTP over spinous processes or paraspinal muscles at C/T/L spine. No palpable step off. No other injuries or complaints.     Procedure: Pt and pt's daughter/HCP gave verbal consent to procedure. Risks including pain from procedure, and benefits to relieve pressure on vessels, soft tissue, nerves, were discussed. After this pt received morphine IV per the ED. Then closed reduction was performed. RLE Placed in well padded long leg trilam splint. Post procedure imaging obtained. Post procedure exam unchanged, NV intact, patient still unable to move toes, similar to preoperative exam, SILT distally.     A/P: 87y Male with R distal third tibia and fibula fracture. possible nondisplaced posterior malleoli fracture appreciated on CT scan as well.     Pain control  NWB R LE in splint, keep c/d/I, cane/crutches/walker as needed  Ice/elevation  Si/sx compartment syndrome discussed with patient, told to return to ED if exhibit any  Patient will need follow up xrays in 1 week to determine continued nonoperative management versus surgical fixation  Possible need for surgical intervention in future discussed, all questions answered  Follow up with Dr. Ching within 1 week, call office for appointment  Continue DVT prophylaxis - home coumadin   Ortho stable

## 2022-09-03 ENCOUNTER — OUTPATIENT (OUTPATIENT)
Dept: OUTPATIENT SERVICES | Facility: HOSPITAL | Age: 87
LOS: 1 days | Discharge: HOME | End: 2022-09-03

## 2022-09-03 DIAGNOSIS — M96.671 FRACTURE OF TIBIA OR FIBULA FOLLOWING INSERTION OF ORTHOPEDIC IMPLANT, JOINT PROSTHESIS, OR BONE PLATE, RIGHT LEG: ICD-10-CM

## 2022-09-03 PROCEDURE — 73590 X-RAY EXAM OF LOWER LEG: CPT | Mod: 26,RT

## 2022-09-11 ENCOUNTER — OUTPATIENT (OUTPATIENT)
Dept: OUTPATIENT SERVICES | Facility: HOSPITAL | Age: 87
LOS: 1 days | Discharge: HOME | End: 2022-09-11

## 2022-09-11 DIAGNOSIS — R91.8 OTHER NONSPECIFIC ABNORMAL FINDING OF LUNG FIELD: ICD-10-CM

## 2022-09-11 PROCEDURE — 73590 X-RAY EXAM OF LOWER LEG: CPT | Mod: 26,RT

## 2022-09-25 ENCOUNTER — OUTPATIENT (OUTPATIENT)
Dept: OUTPATIENT SERVICES | Facility: HOSPITAL | Age: 87
LOS: 1 days | Discharge: HOME | End: 2022-09-25

## 2022-09-25 DIAGNOSIS — M25.571 PAIN IN RIGHT ANKLE AND JOINTS OF RIGHT FOOT: ICD-10-CM

## 2022-09-25 DIAGNOSIS — S82.201A UNSPECIFIED FRACTURE OF SHAFT OF RIGHT TIBIA, INITIAL ENCOUNTER FOR CLOSED FRACTURE: ICD-10-CM

## 2022-09-25 PROCEDURE — 73600 X-RAY EXAM OF ANKLE: CPT | Mod: 26,RT

## 2022-09-25 PROCEDURE — 73590 X-RAY EXAM OF LOWER LEG: CPT | Mod: 26,RT

## 2022-09-30 ENCOUNTER — INPATIENT (INPATIENT)
Facility: HOSPITAL | Age: 87
LOS: 3 days | Discharge: NURSING, REHAB OR LONG-TERM CARE | End: 2022-10-04
Attending: INTERNAL MEDICINE | Admitting: INTERNAL MEDICINE

## 2022-09-30 VITALS
HEART RATE: 82 BPM | OXYGEN SATURATION: 93 % | WEIGHT: 160.06 LBS | RESPIRATION RATE: 20 BRPM | HEIGHT: 70 IN | TEMPERATURE: 98 F | SYSTOLIC BLOOD PRESSURE: 133 MMHG | DIASTOLIC BLOOD PRESSURE: 77 MMHG

## 2022-09-30 DIAGNOSIS — N40.0 BENIGN PROSTATIC HYPERPLASIA WITHOUT LOWER URINARY TRACT SYMPTOMS: ICD-10-CM

## 2022-09-30 DIAGNOSIS — R09.89 OTHER SPECIFIED SYMPTOMS AND SIGNS INVOLVING THE CIRCULATORY AND RESPIRATORY SYSTEMS: ICD-10-CM

## 2022-09-30 DIAGNOSIS — R79.89 OTHER SPECIFIED ABNORMAL FINDINGS OF BLOOD CHEMISTRY: ICD-10-CM

## 2022-09-30 DIAGNOSIS — I48.0 PAROXYSMAL ATRIAL FIBRILLATION: ICD-10-CM

## 2022-09-30 DIAGNOSIS — I10 ESSENTIAL (PRIMARY) HYPERTENSION: ICD-10-CM

## 2022-09-30 LAB
ALBUMIN SERPL ELPH-MCNC: 3.3 G/DL — SIGNIFICANT CHANGE UP (ref 3.3–5)
ALP SERPL-CCNC: 83 U/L — SIGNIFICANT CHANGE UP (ref 40–120)
ALT FLD-CCNC: 66 U/L — SIGNIFICANT CHANGE UP (ref 12–78)
AMMONIA BLD-MCNC: 30 UMOL/L — SIGNIFICANT CHANGE UP (ref 11–32)
ANION GAP SERPL CALC-SCNC: 8 MMOL/L — SIGNIFICANT CHANGE UP (ref 5–17)
APPEARANCE UR: ABNORMAL
APPEARANCE UR: CLEAR — SIGNIFICANT CHANGE UP
APTT BLD: 41.1 SEC — HIGH (ref 27.5–35.5)
AST SERPL-CCNC: 41 U/L — HIGH (ref 15–37)
BACTERIA # UR AUTO: ABNORMAL
BACTERIA # UR AUTO: ABNORMAL
BASOPHILS # BLD AUTO: 0.06 K/UL — SIGNIFICANT CHANGE UP (ref 0–0.2)
BASOPHILS NFR BLD AUTO: 1 % — SIGNIFICANT CHANGE UP (ref 0–2)
BILIRUB SERPL-MCNC: 1.3 MG/DL — HIGH (ref 0.2–1.2)
BILIRUB UR-MCNC: NEGATIVE — SIGNIFICANT CHANGE UP
BILIRUB UR-MCNC: NEGATIVE — SIGNIFICANT CHANGE UP
BUN SERPL-MCNC: 19 MG/DL — SIGNIFICANT CHANGE UP (ref 7–23)
CALCIUM SERPL-MCNC: 8.9 MG/DL — SIGNIFICANT CHANGE UP (ref 8.5–10.1)
CHLORIDE SERPL-SCNC: 103 MMOL/L — SIGNIFICANT CHANGE UP (ref 96–108)
CO2 SERPL-SCNC: 27 MMOL/L — SIGNIFICANT CHANGE UP (ref 22–31)
COLOR SPEC: YELLOW — SIGNIFICANT CHANGE UP
COLOR SPEC: YELLOW — SIGNIFICANT CHANGE UP
CREAT SERPL-MCNC: 0.94 MG/DL — SIGNIFICANT CHANGE UP (ref 0.5–1.3)
DIFF PNL FLD: ABNORMAL
DIFF PNL FLD: ABNORMAL
EGFR: 79 ML/MIN/1.73M2 — SIGNIFICANT CHANGE UP
EOSINOPHIL # BLD AUTO: 0.22 K/UL — SIGNIFICANT CHANGE UP (ref 0–0.5)
EOSINOPHIL NFR BLD AUTO: 3.7 % — SIGNIFICANT CHANGE UP (ref 0–6)
EPI CELLS # UR: SIGNIFICANT CHANGE UP
EPI CELLS # UR: SIGNIFICANT CHANGE UP
FLUAV AG NPH QL: SIGNIFICANT CHANGE UP
FLUBV AG NPH QL: SIGNIFICANT CHANGE UP
GLUCOSE BLDC GLUCOMTR-MCNC: 110 MG/DL — HIGH (ref 70–99)
GLUCOSE SERPL-MCNC: 116 MG/DL — HIGH (ref 70–99)
GLUCOSE UR QL: NEGATIVE MG/DL — SIGNIFICANT CHANGE UP
GLUCOSE UR QL: NEGATIVE MG/DL — SIGNIFICANT CHANGE UP
HCT VFR BLD CALC: 46.3 % — SIGNIFICANT CHANGE UP (ref 39–50)
HGB BLD-MCNC: 15.8 G/DL — SIGNIFICANT CHANGE UP (ref 13–17)
IMM GRANULOCYTES NFR BLD AUTO: 0.3 % — SIGNIFICANT CHANGE UP (ref 0–0.9)
INR BLD: 2.71 RATIO — HIGH (ref 0.88–1.16)
KETONES UR-MCNC: ABNORMAL
KETONES UR-MCNC: NEGATIVE — SIGNIFICANT CHANGE UP
LACTATE SERPL-SCNC: 0.8 MMOL/L — SIGNIFICANT CHANGE UP (ref 0.7–2)
LACTATE SERPL-SCNC: 2.7 MMOL/L — HIGH (ref 0.7–2)
LACTATE SERPL-SCNC: 3.4 MMOL/L — HIGH (ref 0.7–2)
LEUKOCYTE ESTERASE UR-ACNC: ABNORMAL
LEUKOCYTE ESTERASE UR-ACNC: ABNORMAL
LYMPHOCYTES # BLD AUTO: 1.22 K/UL — SIGNIFICANT CHANGE UP (ref 1–3.3)
LYMPHOCYTES # BLD AUTO: 20.6 % — SIGNIFICANT CHANGE UP (ref 13–44)
MCHC RBC-ENTMCNC: 34.1 G/DL — SIGNIFICANT CHANGE UP (ref 32–36)
MCHC RBC-ENTMCNC: 34.8 PG — HIGH (ref 27–34)
MCV RBC AUTO: 102 FL — HIGH (ref 80–100)
MONOCYTES # BLD AUTO: 0.39 K/UL — SIGNIFICANT CHANGE UP (ref 0–0.9)
MONOCYTES NFR BLD AUTO: 6.6 % — SIGNIFICANT CHANGE UP (ref 2–14)
NEUTROPHILS # BLD AUTO: 4.01 K/UL — SIGNIFICANT CHANGE UP (ref 1.8–7.4)
NEUTROPHILS NFR BLD AUTO: 67.8 % — SIGNIFICANT CHANGE UP (ref 43–77)
NITRITE UR-MCNC: POSITIVE
NITRITE UR-MCNC: POSITIVE
NRBC # BLD: 0 /100 WBCS — SIGNIFICANT CHANGE UP (ref 0–0)
PH UR: 7 — SIGNIFICANT CHANGE UP (ref 5–8)
PH UR: 7 — SIGNIFICANT CHANGE UP (ref 5–8)
PLATELET # BLD AUTO: 200 K/UL — SIGNIFICANT CHANGE UP (ref 150–400)
POTASSIUM SERPL-MCNC: 3.3 MMOL/L — LOW (ref 3.5–5.3)
POTASSIUM SERPL-SCNC: 3.3 MMOL/L — LOW (ref 3.5–5.3)
PROT SERPL-MCNC: 6.2 GM/DL — SIGNIFICANT CHANGE UP (ref 6–8.3)
PROT UR-MCNC: 100 MG/DL
PROT UR-MCNC: 100 MG/DL
PROTHROM AB SERPL-ACNC: 32.6 SEC — HIGH (ref 10.5–13.4)
RBC # BLD: 4.54 M/UL — SIGNIFICANT CHANGE UP (ref 4.2–5.8)
RBC # FLD: 12.5 % — SIGNIFICANT CHANGE UP (ref 10.3–14.5)
RBC CASTS # UR COMP ASSIST: ABNORMAL /HPF (ref 0–4)
RBC CASTS # UR COMP ASSIST: NEGATIVE /HPF — SIGNIFICANT CHANGE UP (ref 0–4)
SARS-COV-2 RNA SPEC QL NAA+PROBE: SIGNIFICANT CHANGE UP
SODIUM SERPL-SCNC: 138 MMOL/L — SIGNIFICANT CHANGE UP (ref 135–145)
SP GR SPEC: 1 — LOW (ref 1.01–1.02)
SP GR SPEC: 1.01 — SIGNIFICANT CHANGE UP (ref 1.01–1.02)
TROPONIN I, HIGH SENSITIVITY RESULT: 17.2 NG/L — SIGNIFICANT CHANGE UP
UROBILINOGEN FLD QL: NEGATIVE MG/DL — SIGNIFICANT CHANGE UP
UROBILINOGEN FLD QL: NEGATIVE MG/DL — SIGNIFICANT CHANGE UP
WBC # BLD: 5.92 K/UL — SIGNIFICANT CHANGE UP (ref 3.8–10.5)
WBC # FLD AUTO: 5.92 K/UL — SIGNIFICANT CHANGE UP (ref 3.8–10.5)
WBC UR QL: >50
WBC UR QL: >50

## 2022-09-30 PROCEDURE — 70496 CT ANGIOGRAPHY HEAD: CPT | Mod: 26,MA

## 2022-09-30 PROCEDURE — 93010 ELECTROCARDIOGRAM REPORT: CPT

## 2022-09-30 PROCEDURE — 99223 1ST HOSP IP/OBS HIGH 75: CPT | Mod: FS

## 2022-09-30 PROCEDURE — 99223 1ST HOSP IP/OBS HIGH 75: CPT

## 2022-09-30 PROCEDURE — 70498 CT ANGIOGRAPHY NECK: CPT | Mod: 26,MA

## 2022-09-30 PROCEDURE — 0042T: CPT | Mod: MA

## 2022-09-30 PROCEDURE — 99291 CRITICAL CARE FIRST HOUR: CPT

## 2022-09-30 PROCEDURE — 70450 CT HEAD/BRAIN W/O DYE: CPT | Mod: 26,MA,59

## 2022-09-30 PROCEDURE — 71045 X-RAY EXAM CHEST 1 VIEW: CPT | Mod: 26

## 2022-09-30 RX ORDER — CEFTRIAXONE 500 MG/1
1000 INJECTION, POWDER, FOR SOLUTION INTRAMUSCULAR; INTRAVENOUS ONCE
Refills: 0 | Status: COMPLETED | OUTPATIENT
Start: 2022-09-30 | End: 2022-09-30

## 2022-09-30 RX ORDER — ALBUTEROL 90 UG/1
2 AEROSOL, METERED ORAL EVERY 6 HOURS
Refills: 0 | Status: DISCONTINUED | OUTPATIENT
Start: 2022-09-30 | End: 2022-10-04

## 2022-09-30 RX ORDER — TAMSULOSIN HYDROCHLORIDE 0.4 MG/1
0.4 CAPSULE ORAL AT BEDTIME
Refills: 0 | Status: DISCONTINUED | OUTPATIENT
Start: 2022-09-30 | End: 2022-10-04

## 2022-09-30 RX ORDER — FINASTERIDE 5 MG/1
5 TABLET, FILM COATED ORAL DAILY
Refills: 0 | Status: DISCONTINUED | OUTPATIENT
Start: 2022-09-30 | End: 2022-10-04

## 2022-09-30 RX ORDER — SODIUM CHLORIDE 9 MG/ML
500 INJECTION, SOLUTION INTRAVENOUS
Refills: 0 | Status: COMPLETED | OUTPATIENT
Start: 2022-09-30 | End: 2022-09-30

## 2022-09-30 RX ORDER — ATORVASTATIN CALCIUM 80 MG/1
80 TABLET, FILM COATED ORAL AT BEDTIME
Refills: 0 | Status: DISCONTINUED | OUTPATIENT
Start: 2022-09-30 | End: 2022-10-04

## 2022-09-30 RX ORDER — BUDESONIDE AND FORMOTEROL FUMARATE DIHYDRATE 160; 4.5 UG/1; UG/1
2 AEROSOL RESPIRATORY (INHALATION)
Refills: 0 | Status: DISCONTINUED | OUTPATIENT
Start: 2022-09-30 | End: 2022-10-04

## 2022-09-30 RX ORDER — METHENAMINE MANDELATE 1 G
1 TABLET ORAL
Qty: 0 | Refills: 0 | DISCHARGE

## 2022-09-30 RX ORDER — WARFARIN SODIUM 2.5 MG/1
1 TABLET ORAL
Qty: 0 | Refills: 0 | DISCHARGE

## 2022-09-30 RX ORDER — FUROSEMIDE 40 MG
20 TABLET ORAL DAILY
Refills: 0 | Status: DISCONTINUED | OUTPATIENT
Start: 2022-09-30 | End: 2022-09-30

## 2022-09-30 RX ORDER — PANTOPRAZOLE SODIUM 20 MG/1
40 TABLET, DELAYED RELEASE ORAL
Refills: 0 | Status: DISCONTINUED | OUTPATIENT
Start: 2022-09-30 | End: 2022-10-04

## 2022-09-30 RX ORDER — CEFTRIAXONE 500 MG/1
1000 INJECTION, POWDER, FOR SOLUTION INTRAMUSCULAR; INTRAVENOUS EVERY 24 HOURS
Refills: 0 | Status: COMPLETED | OUTPATIENT
Start: 2022-10-01 | End: 2022-10-02

## 2022-09-30 RX ORDER — SODIUM CHLORIDE 9 MG/ML
2200 INJECTION, SOLUTION INTRAVENOUS ONCE
Refills: 0 | Status: COMPLETED | OUTPATIENT
Start: 2022-09-30 | End: 2022-09-30

## 2022-09-30 RX ORDER — POTASSIUM CHLORIDE 20 MEQ
40 PACKET (EA) ORAL ONCE
Refills: 0 | Status: COMPLETED | OUTPATIENT
Start: 2022-09-30 | End: 2022-09-30

## 2022-09-30 RX ORDER — SENNA PLUS 8.6 MG/1
2 TABLET ORAL AT BEDTIME
Refills: 0 | Status: DISCONTINUED | OUTPATIENT
Start: 2022-09-30 | End: 2022-10-04

## 2022-09-30 RX ORDER — ASPIRIN/CALCIUM CARB/MAGNESIUM 324 MG
81 TABLET ORAL DAILY
Refills: 0 | Status: DISCONTINUED | OUTPATIENT
Start: 2022-09-30 | End: 2022-09-30

## 2022-09-30 RX ADMIN — Medication 40 MILLIEQUIVALENT(S): at 13:11

## 2022-09-30 RX ADMIN — SENNA PLUS 2 TABLET(S): 8.6 TABLET ORAL at 21:58

## 2022-09-30 RX ADMIN — ATORVASTATIN CALCIUM 80 MILLIGRAM(S): 80 TABLET, FILM COATED ORAL at 21:58

## 2022-09-30 RX ADMIN — CEFTRIAXONE 100 MILLIGRAM(S): 500 INJECTION, POWDER, FOR SOLUTION INTRAMUSCULAR; INTRAVENOUS at 12:10

## 2022-09-30 RX ADMIN — SODIUM CHLORIDE 2200 MILLILITER(S): 9 INJECTION, SOLUTION INTRAVENOUS at 12:09

## 2022-09-30 RX ADMIN — SODIUM CHLORIDE 1000 MILLILITER(S): 9 INJECTION, SOLUTION INTRAVENOUS at 16:21

## 2022-09-30 RX ADMIN — TAMSULOSIN HYDROCHLORIDE 0.4 MILLIGRAM(S): 0.4 CAPSULE ORAL at 21:58

## 2022-09-30 NOTE — H&P ADULT - HISTORY OF PRESENT ILLNESS
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 [facility] noted patient stopped talking and appear confused. Per ED team, questionable slurry speech which resolved completely.   Hemodynamically stable, afebrile sat well at RA. EKG with NSR, RBBB. No leukocytosis, plt 200,K 3.3, Cr 0.94, hstnt 17.2, lactate 3.4. UA appear dirty/contaminated ( colleted from [place]). CT brain with no acute pathology. CT perfusion with abnormal perfusion scan suggesting artifact vs small core infarct in left frontal lobe. No large vessel occlusion. CXR image reviewed, no focal consolidation    SH: no toxic habits

## 2022-09-30 NOTE — PATIENT PROFILE ADULT - BRAND OF COVID-19 VACCINATION
patient was vaccinated but does not remember the brand and the dates of vaccination patient was vaccinated but does not remember the brand and the dates of vaccination/Moderna dose 1 and 2

## 2022-09-30 NOTE — ED ADULT NURSE NOTE - CAS EDN DISCHARGE ASSESSMENT
Additional Notes: 2- Volumas $2000\\n1- Radiesse $750\\n1- Juvederm Ultra Plus $700 Alert and oriented to person, place and time

## 2022-09-30 NOTE — ED ADULT NURSE NOTE - NSICDXPASTMEDICALHX_GEN_ALL_CORE_FT
PAST MEDICAL HISTORY:  BPH (Benign Prostatic Hyperplasia)     Chronic atrial fibrillation     COPD exacerbation     Hypertension     Hypotension Postural, positive Tilt Table Test.    Syncope

## 2022-09-30 NOTE — ED ADULT NURSE NOTE - NSIMPLEMENTINTERV_GEN_ALL_ED
Implemented All Fall with Harm Risk Interventions:  [place] to call system. Call bell, personal items and telephone within reach. Instruct patient to call for assistance. Room bathroom lighting operational. Non-slip footwear when patient is off stretcher. Physically safe environment: no spills, clutter or unnecessary equipment. Stretcher in lowest position, wheels locked, appropriate side rails in place. Provide visual cue, wrist band, yellow gown, etc. Monitor gait and stability. Monitor for mental status changes and reorient to person, place, and time. Review medications for side effects contributing to fall risk. Reinforce activity limits and safety measures with patient and family. Provide visual clues: red socks.

## 2022-09-30 NOTE — ED PROVIDER NOTE - OBJECTIVE STATEMENT
87 years old male from next door [facility] rehab last seen ok was 900 am and noted pt is not responding and no talking 9:05 am Pt here is alert and oriented to person, time and place denies headache, dizziness, blurred visions, light sensitivities, focal/distal weakness or numbness, neck/back/hips pain, cough, sob, chest pain, nausea, vomiting, abd pain

## 2022-09-30 NOTE — CONSULT NOTE ADULT - SUBJECTIVE AND OBJECTIVE BOX
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 [place] noted patient stopped talking and appear confused. Per ED team, questionable slurry speech which resolved completely.   Hemodynamically stable, afebrile sat well at [facility]. EKG with NSR, RBBB. No leukocytosis, plt 200,K 3.3, Cr 0.94, hstnt 17.2, lactate 3.4. UA appear dirty/contaminated ( colleted from [facility]). CT brain with no acute pathology. CT perfusion with abnormal perfusion scan suggesting artifact vs small core infarct in left frontal lobe. No large vessel occlusion. CXR image reviewed, no focal consolidation    SH: no toxic habits     Review of Systems:  Review of Systems: All ROS were negative except transient AMS with questionable slurry speech   . . .   Home Medications:   * Patient Currently Takes Medications as of 02-Aug-2022 08:24 documented in Structured Notes  · 	finasteride 5 mg oral tablet: 1 tab(s) orally once a day  · 	furosemide 20 mg oral tablet: 1 tab(s) orally once a day  · 	pantoprazole 40 mg oral delayed release tablet: 1 tab(s) orally once a day (before a meal)  · 	senna leaf extract oral tablet: 2 tab(s) orally once a day (at bedtime)  · 	guaiFENesin 600 mg oral tablet, extended release: 1 tab(s) orally every 12 hours  · 	budesonide-formoterol 80 mcg-4.5 mcg/inh inhalation aerosol: 2 puff(s) inhaled 2 times a day  · 	albuterol 90 mcg/inh inhalation aerosol: 2 puff(s) inhaled every 6 hours  · 	warfarin 4 mg oral tablet: 1 tab(s) orally once a day  · 	tamsulosin 0.4 mg oral capsule: 1 cap(s) orally once a day (at bedtime)    Patient History:    Past Medical, Past Surgical, and Family History:  PAST MEDICAL HISTORY:  BPH (Benign Prostatic Hyperplasia)   Chronic atrial fibrillation   COPD exacerbation   Hypertension   Hypotension Postural, positive Tilt Table Test.  Syncope.     PAST SURGICAL HISTORY:  S/P hip replacement Bilateral, Left ORIF and Rt. Hip Replacement.    . . .    Social History:  · Substance use	No   . . .   · Has the patient used tobacco in the past 30 days?	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 [place] noted patient stopped talking and appear confused. Per ED team, questionable slurry speech which resolved completely.   Hemodynamically stable, afebrile sat well at RA. EKG with NSR, RBBB. No leukocytosis, plt 200,K 3.3, Cr 0.94, hstnt 17.2, lactate 3.4. UA appear dirty/contaminated ( colleted from [facility]). CT brain with no acute pathology. CT perfusion with abnormal perfusion scan suggesting artifact vs small core infarct in left frontal lobe. No large vessel occlusion. CXR image reviewed, no focal consolidation    SH: no toxic habits     Review of Systems:  Review of Systems: All ROS were negative except transient AMS with questionable slurry speech   . . .   Home Medications:   * Patient Currently Takes Medications as of 02-Aug-2022 08:24 documented in Structured Notes  · 	finasteride 5 mg oral tablet: 1 tab(s) orally once a day  · 	furosemide 20 mg oral tablet: 1 tab(s) orally once a day  · 	pantoprazole 40 mg oral delayed release tablet: 1 tab(s) orally once a day (before a meal)  · 	senna leaf extract oral tablet: 2 tab(s) orally once a day (at bedtime)  · 	guaiFENesin 600 mg oral tablet, extended release: 1 tab(s) orally every 12 hours  · 	budesonide-formoterol 80 mcg-4.5 mcg/inh inhalation aerosol: 2 puff(s) inhaled 2 times a day  · 	albuterol 90 mcg/inh inhalation aerosol: 2 puff(s) inhaled every 6 hours  · 	warfarin 4 mg oral tablet: 1 tab(s) orally once a day  · 	tamsulosin 0.4 mg oral capsule: 1 cap(s) orally once a day (at bedtime)    Patient History:    Past Medical, Past Surgical, and Family History:  PAST MEDICAL HISTORY:  BPH (Benign Prostatic Hyperplasia)   Chronic atrial fibrillation   COPD exacerbation   Hypertension   Hypotension Postural, positive Tilt Table Test.  Syncope.     PAST SURGICAL HISTORY:  S/P hip replacement Bilateral, Left ORIF and Rt. Hip Replacement.    . . .    Social History:  · Substance use	No   . . .   · Has the patient used tobacco in the past 30 days?	No"  <End of quote(s) from H&P>    Per radiology report of non-con head CT:  "COMPARISON:  CT maxillofacial 7/19/2022  CT head7/8/2013.    FINDINGS:    Chronic left cerebellar infarct. There is no acute intracranial   hemorrhage or mass effect. There are areas of hypodensity in the   bilateral hemispheric white matter suggesting white matter microvascular   ischemic change.There is cerebral volume loss.    There is no extraaxial fluid collection.    There is no displaced calvarial fracture. Status post bilateral   intraocular lens implants. The visualized portions of the paranasal   sinuses are well aerated. The mastoid air cells are well aerated.      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 Moderate bilateral   cavernous carotid artery calcifications. The bilateral posterior   communicating arteries are patent. Mild left ESME A2 segment stenosis.  The [place] and vertebrobasilar system are shows no other   evidence of signal stenosis, occlusion or saccular aneurysm dilation. No  evidence for arterial venous malformation. The left vertebral artery is   dominant.    CTA NECK:  Moderate proximal internal carotid artery calcifications. Mild left   carotid bulb calcifications. Right carotid bulb calcifications.  A left-sided aortic arch is demonstrated. There is normal relationship to   the great vessels. The common carotid arteries, internal carotid arteries   and vertebral arteries are normal in caliber. No evidence of stenosis,   occlusion or saccular aneurysm dilation.    CT PERFUSION:  Patient has only had less than 2 hours of symptoms may influence the CT   perfusion.    CBF<30% volume: 4 ml left frontal lobe  Tmax>6.0 s volume: 20 ml bilateral frontal lobes  Mismatch volume: 16 ml  Mismatch ratio: 5    IMPRESSION:    Abnormal perfusion scan suggesting artifact versus small core infarct in   left frontal lobe and bilateral areas of frontal lobe brain at risk.   Consider MRI for further evaluation. No large vessel occlusion."        EXAMINATION    Awake, alert.  Semi-recumbent on [facility].  Grossly normal comprehension, expression, prosody; mild dysarthria (dysarthria is longstanding, at baseline, per son who is present).  Gives date as Friday (correct) September 31, 1921.  He gives his correct date of birth, then again answers that current year is 1921.  After asking him what years comes after 1999 (he correctly answers 2000 and 2001) he then gives the current year as 2022.  Normal facial, lingual movements.   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 [place] noted patient stopped talking and appear confused. Per ED team, questionable slurry speech which resolved completely.   Hemodynamically stable, afebrile sat well at RA. EKG with NSR, RBBB. No leukocytosis, plt 200,K 3.3, Cr 0.94, hstnt 17.2, lactate 3.4. UA appear dirty/contaminated ( colleted from [facility]). CT brain with no acute pathology. CT perfusion with abnormal perfusion scan suggesting artifact vs small core infarct in left frontal lobe. No large vessel occlusion. CXR image reviewed, no focal consolidation    SH: no toxic habits     Review of Systems:  Review of Systems: All ROS were negative except transient AMS with questionable slurry speech   . . .   Home Medications:   * Patient Currently Takes Medications as of 02-Aug-2022 08:24 documented in Structured Notes  · 	finasteride 5 mg oral tablet: 1 tab(s) orally once a day  · 	furosemide 20 mg oral tablet: 1 tab(s) orally once a day  · 	pantoprazole 40 mg oral delayed release tablet: 1 tab(s) orally once a day (before a meal)  · 	senna leaf extract oral tablet: 2 tab(s) orally once a day (at bedtime)  · 	guaiFENesin 600 mg oral tablet, extended release: 1 tab(s) orally every 12 hours  · 	budesonide-formoterol 80 mcg-4.5 mcg/inh inhalation aerosol: 2 puff(s) inhaled 2 times a day  · 	albuterol 90 mcg/inh inhalation aerosol: 2 puff(s) inhaled every 6 hours  · 	warfarin 4 mg oral tablet: 1 tab(s) orally once a day  · 	tamsulosin 0.4 mg oral capsule: 1 cap(s) orally once a day (at bedtime)    Patient History:    Past Medical, Past Surgical, and Family History:  PAST MEDICAL HISTORY:  BPH (Benign Prostatic Hyperplasia)   Chronic atrial fibrillation   COPD exacerbation   Hypertension   Hypotension Postural, positive Tilt Table Test.  Syncope.     PAST SURGICAL HISTORY:  S/P hip replacement Bilateral, Left ORIF and Rt. Hip Replacement.    . . .    Social History:  · Substance use	No   . . .   · Has the patient used tobacco in the past 30 days?	No"  <End of quote(s) from H&P>    Per radiology report of non-con head CT:  "COMPARISON:  CT maxillofacial 7/19/2022  CT head7/8/2013.    FINDINGS:    Chronic left cerebellar infarct. There is no acute intracranial   hemorrhage or mass effect. There are areas of hypodensity in the   bilateral hemispheric white matter suggesting white matter microvascular   ischemic change.There is cerebral volume loss.    There is no extraaxial fluid collection.    There is no displaced calvarial fracture. Status post bilateral   intraocular lens implants. The visualized portions of the paranasal   sinuses are well aerated. The mastoid air cells are well aerated.      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 Moderate bilateral   cavernous carotid artery calcifications. The bilateral posterior   communicating arteries are patent. Mild left ESME A2 segment stenosis.  The [place] and vertebrobasilar system are shows no other   evidence of signal stenosis, occlusion or saccular aneurysm dilation. No  evidence for arterial venous malformation. The left vertebral artery is   dominant.    CTA NECK:  Moderate proximal internal carotid artery calcifications. Mild left   carotid bulb calcifications. Right carotid bulb calcifications.  A left-sided aortic arch is demonstrated. There is normal relationship to   the great vessels. The common carotid arteries, internal carotid arteries   and vertebral arteries are normal in caliber. No evidence of stenosis,   occlusion or saccular aneurysm dilation.    CT PERFUSION:  Patient has only had less than 2 hours of symptoms may influence the CT   perfusion.    CBF<30% volume: 4 ml left frontal lobe  Tmax>6.0 s volume: 20 ml bilateral frontal lobes  Mismatch volume: 16 ml  Mismatch ratio: 5    IMPRESSION:    Abnormal perfusion scan suggesting artifact versus small core infarct in   left frontal lobe and bilateral areas of frontal lobe brain at risk.   Consider MRI for further evaluation. No large vessel occlusion."        EXAMINATION    Awake, alert.  Semi-recumbent on [facility].  Grossly normal comprehension, expression, prosody; mild dysarthria (dysarthria is longstanding, at baseline, per son who is present).  Gives date as Friday (correct) September 31, 1921.  He gives his correct date of birth, then again answers that current year is 1922.  After asking him what years comes after 1999 (he correctly answers 2000 and 2001) he then gives the current year as 2022.  Normal facial, lingual movements.      R elbow in fixed flexion contracture; most R fingers in fixed flexion contractures.  No useful RUE motor function.   RLE in leg splint.   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 [place] noted patient stopped talking and appear confused. Per ED team, questionable slurry speech which resolved completely.   Hemodynamically stable, afebrile sat well at RA. EKG with NSR, RBBB. No leukocytosis, plt 200,K 3.3, Cr 0.94, hstnt 17.2, lactate 3.4. UA appear dirty/contaminated ( colleted from [facility]). CT brain with no acute pathology. CT perfusion with abnormal perfusion scan suggesting artifact vs small core infarct in left frontal lobe. No large vessel occlusion. CXR image reviewed, no focal consolidation    SH: no toxic habits     Review of Systems:  Review of Systems: All ROS were negative except transient AMS with questionable slurry speech   . . .   Home Medications:   * Patient Currently Takes Medications as of 02-Aug-2022 08:24 documented in Structured Notes  · 	finasteride 5 mg oral tablet: 1 tab(s) orally once a day  · 	furosemide 20 mg oral tablet: 1 tab(s) orally once a day  · 	pantoprazole 40 mg oral delayed release tablet: 1 tab(s) orally once a day (before a meal)  · 	senna leaf extract oral tablet: 2 tab(s) orally once a day (at bedtime)  · 	guaiFENesin 600 mg oral tablet, extended release: 1 tab(s) orally every 12 hours  · 	budesonide-formoterol 80 mcg-4.5 mcg/inh inhalation aerosol: 2 puff(s) inhaled 2 times a day  · 	albuterol 90 mcg/inh inhalation aerosol: 2 puff(s) inhaled every 6 hours  · 	warfarin 4 mg oral tablet: 1 tab(s) orally once a day  · 	tamsulosin 0.4 mg oral capsule: 1 cap(s) orally once a day (at bedtime)    Patient History:    Past Medical, Past Surgical, and Family History:  PAST MEDICAL HISTORY:  BPH (Benign Prostatic Hyperplasia)   Chronic atrial fibrillation   COPD exacerbation   Hypertension   Hypotension Postural, positive Tilt Table Test.  Syncope.     PAST SURGICAL HISTORY:  S/P hip replacement Bilateral, Left ORIF and Rt. Hip Replacement.    . . .    Social History:  · Substance use	No   . . .   · Has the patient used tobacco in the past 30 days?	No"  <End of quote(s) from H&P>    Pt has no recollection of this morning's episode at [facility].  Pt's son who arrived in ED about an hour afterwards found his father to be at his baseline level of functioning.      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      Per radiology report of non-con head CT:  "COMPARISON:  CT maxillofacial 7/19/2022  CT head7/8/2013.    FINDINGS:    Chronic left cerebellar infarct. There is no acute intracranial   hemorrhage or mass effect. There are areas of hypodensity in the   bilateral hemispheric white matter suggesting white matter microvascular   ischemic change.There is cerebral volume loss.    There is no extraaxial fluid collection.    There is no displaced calvarial fracture. Status post bilateral   intraocular lens implants. The visualized portions of the paranasal   sinuses are well aerated. The mastoid air cells are well aerated.      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 Moderate bilateral   cavernous carotid artery calcifications. The bilateral posterior   communicating arteries are patent. Mild left ESME A2 segment stenosis.  The [place] and vertebrobasilar system are shows no other   evidence of signal stenosis, occlusion or saccular aneurysm dilation. No  evidence for arterial venous malformation. The left vertebral artery is   dominant.    CTA NECK:  Moderate proximal internal carotid artery calcifications. Mild left   carotid bulb calcifications. Right carotid bulb calcifications.  A left-sided aortic arch is demonstrated. There is normal relationship to   the great vessels. The common carotid arteries, internal carotid arteries   and vertebral arteries are normal in caliber. No evidence of stenosis,   occlusion or saccular aneurysm dilation.    CT PERFUSION:  Patient has only had less than 2 hours of symptoms may influence the CT   perfusion.    CBF<30% volume: 4 ml left frontal lobe  Tmax>6.0 s volume: 20 ml bilateral frontal lobes  Mismatch volume: 16 ml  Mismatch ratio: 5    IMPRESSION:    Abnormal perfusion scan suggesting artifact versus small core infarct in   left frontal lobe and bilateral areas of frontal lobe brain at risk.   Consider MRI for further evaluation. No large vessel occlusion."        EXAMINATION    Awake, alert.  Semi-recumbent on [place].  Grossly normal comprehension, expression, prosody; mild dysarthria (dysarthria is longstanding, at baseline, per son who is present).  Gives date as Friday (correct) September 31, 1921.  He gives his correct date of birth, then again answers that current year is 1922.  After asking him what years comes after 1999 (he correctly answers 2000 and 2001) he then gives the current year as 2022.  Meiotic reactive pupils; S/P bilateral cataract surgery.  Confrontation visual fields grossly intact.  Can count fingers each eye at 8 foot distance.  Normal facial, lingual movements.      Slow Rubia LUE and LLE;  Moderate/moderate to marked weakness of the LUE/LLE on confrontation testing (consistent with chronic limited physical activity).  .      R elbow in fixed flexion contracture; most R fingers in fixed flexion contractures.  No useful RUE motor function.   RLE in long leg splint (cannot test movement at knee or ankle); barely wiggles toes (that protrude partially from splint).      Sensation appears to be grossly intact to unilateral P and LT stimuli.  Extinction to LT stimulation of face and UEs on DSS.       Reflex                           Right    Left   Comment    Biceps                  contracture     2+  Triceps                 contracture     2+  Patellar                      splint         2+  Gastroc                     splint         0  Plantar                       splint      flexor followed by withdrawal

## 2022-09-30 NOTE — ED PROVIDER NOTE - CRITICAL CARE ATTENDING CONTRIBUTION TO CARE
pt came in with alerted mental status pt was initially examined and sent to ct scan, pt returned from ct scan with improved NIH score, reviewed papers from the [facility] rehab pt is currently taking coumadin ct head no bleed cta of head/neck no occlusions.
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
## 2022-09-30 NOTE — ED ADULT NURSE NOTE - OBJECTIVE STATEMENT
pt presents to ed a&ox2 from [facility] , as per staff at facility pt was not responding to verbal stimuli as he normally does, on assessment pt follows commands and responds to verbal stimuli. pt performs task equal and bilaterally . Equal sensations. iv placed pt transported to CT.

## 2022-09-30 NOTE — H&P ADULT - NSHPPHYSICALEXAM_GEN_ALL_CORE
CONSTITUTIONAL: Well developed, well nourished, alert and cooperative, no acute distress  EYES: PERRL,  no scleral icterus  ENT: Mucosa moist, tongue normal.  NECK: Neck supple, trachea midline, non-tender  CARDIAC: Normal S1 and S2. Regular rate and rhythms. No murmurs.  No Pedal edema  LUNGS: Equal air entry both lungs. No rales, rhonchi, wheezing. Normal respiratory effort.   ABDOMEN: Soft, nondistended, nontender. No guarding or rebound tenderness. No hepatomegaly or splenomegaly. Bowel sound normal.   MUSCULOSKELETAL: Chronic contracted RUE, RLE splint in place for fracture  NEUROLOGICAL: No gross sensory deficits. Chronic contracted RUE. Unable to move RLE due to fracture/pain. Left sided motor 5/5  SKIN: no lesions or eruptions. Normal turgor  PSYCHIATRIC: A&O x 3, appropriate mood and affect. Normal insight and judgement.

## 2022-09-30 NOTE — ED PROVIDER NOTE - NSICDXPASTMEDICALHX_GEN_ALL_CORE_FT
PAST MEDICAL HISTORY:  BPH (Benign Prostatic Hyperplasia)     Hypertension     Hypotension Postural, positive Tilt Table Test.    Syncope      PAST MEDICAL HISTORY:  BPH (Benign Prostatic Hyperplasia)     Chronic atrial fibrillation     COPD exacerbation     Hypertension     Hypotension Postural, positive Tilt Table Test.    Syncope

## 2022-09-30 NOTE — H&P ADULT - PROBLEM SELECTOR PLAN 2
lactate 3.4, ? due to dehydration  Received 2.2L bolus and ceftriaxone 1g in ED  Repeat lactate- ordered  Follow up blood culture result  UA-? contamination collected from old Hurt. Repeat UA after Hurt exchanged. Completed treatment for prostatitis on 8/15/2022. Afebrile, no leukocytosis, non toxic appearing  Monitor off antibiotics for now lactate 3.4, ? due to dehydration  Received 2.2L bolus and ceftriaxone 1g in ED  Repeat lactate- ordered  Follow up blood culture result  UA-? contamination collected from old Hurt. Repeat UA after Hurt exchanged. Completed treatment for prostatitis on 8/15/2022. Afebrile, no leukocytosis, non toxic appearing  Monitor off antibiotics for now    Repeat UA still appear dirty, given elevated lactate, will continue ceftriaxone pending culture data. Prior urine culture with Ecoli sensitive to ceftriaxone lactate 3.4, ? due to dehydration  Received 2.2L bolus and ceftriaxone 1g in ED  Repeat lactate- ordered  Follow up blood culture result  UA-? contamination collected from old Hurt. Repeat UA after Hurt exchanged. Completed treatment for prostatitis on 8/15/2022  Monitor off antibiotics for now  Repeat UA still appear dirty, given elevated lactate, will continue ceftriaxone pending culture data. Prior urine culture with Ecoli sensitive to ceftriaxone. Hope to limit antibiotics use as more culture data available
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
## 2022-09-30 NOTE — ED ADULT NURSE NOTE - NS ED NURSE PATIENT LEFT UNIT TIME
Updated pts , Evan Medel at this time. Evan Medel would like to know PT/OT recommendations when seen. 20:19

## 2022-09-30 NOTE — PATIENT PROFILE ADULT - FALL HARM RISK - HARM RISK INTERVENTIONS
Assistance with ambulation/Assistance OOB with selected safe patient handling equipment/Communicate Risk of Fall with Harm to all staff/Discuss with provider need for PT consult/Monitor gait and stability/Reinforce activity limits and safety measures with patient and family/Tailored Fall Risk Interventions/Visual Cue: Yellow wristband and red socks/Bed in lowest position, wheels locked, appropriate side rails in place/Call bell, personal items and telephone in reach/Instruct patient to call for assistance before getting out of bed or chair/Non-slip footwear when patient is out of bed/[place] to call system/Physically safe environment - no spills, clutter or unnecessary equipment/Purposeful Proactive Rounding/Room/bathroom lighting operational, light cord in reach

## 2022-09-30 NOTE — CONSULT NOTE ADULT - ASSESSMENT
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    Episode of being mute and apparently confused, this morning at [facility], self-limited, lasting no more than about an hour (was it for much less?).  Clinically not a stroke, in the absence of new neurologic deficits.  TIA? Cardiac arrhythmia? Hypotensive episode (there IS e Hx of positional hypotension).      RECOMMENDATIONS    Cardiac telemetry.      Coumadin per INR.    NO ANTIPLATELET AGENTS!!!   I have D/C'ed ASA.                   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    Episode of being mute and apparently confused, this morning at [facility], self-limited, lasting no more than about an hour (was it for much less?).  Clinically not a stroke, in the absence of new neurologic deficits.  TIA? Cardiac arrhythmia? Hypotensive episode? (There IS e Hx of positional hypotension.)      RECOMMENDATIONS    Cardiac telemetry.      Coumadin per INR.    MR brain without contrast    NO ANTIPLATELET AGENTS!!!   I have D/C'ed ASA.  There is no neurologic indication for combined anticoagulant and antiplatelet therapy.  The combination presents a risk of ICH in excess of any potential stroke risk reduction.  The combination may be given for non-neurologic (e.g., cardiac) indications.

## 2022-09-30 NOTE — ED ADULT TRIAGE NOTE - CHIEF COMPLAINT QUOTE
Stopped talking in [facility] x 10 Min ago, arrived to triage able to arouse after much verbal stimuli, Code Stroke Called at triage

## 2022-09-30 NOTE — H&P ADULT - PROBLEM SELECTOR PLAN 1
Present to ED with transient AMS with questionable slurry speech which started before ED arrival  Resolved   Not TPA candidate as patient is on coumadin  CT brain with no acute pathology. CT perfusion with abnormal perfusion scan suggesting artifact vs small core infarct in left frontal lobe. No large vessel occlusion  MRI pain  Aspirin, high intensity statin, permissive HTN  MRI brain, if positive, will get ECHO  Neuro check, telemetry monitoring  ED consulted neurology  PT/OT consult

## 2022-09-30 NOTE — ED PROVIDER NOTE - CONSTITUTIONAL, MLM
normal... Well appearing, awake, alert, oriented to person, place, time/situation and in no apparent distress. Speaking in clear sentences no nasal flaring no shoulders retractions no diaphoresis

## 2022-09-30 NOTE — ED ADULT NURSE REASSESSMENT NOTE - NS ED NURSE REASSESS COMMENT FT1
pt resting on stretcher wife at bedside, currently eating NAD , awaiting transport to  . Will continue to monitor.
Rec'd report from [facility]. Pt resting in bed, NAD noted. ABCs intact, remains on continuous tele. Pt is A&O x 4, swallow screen passed. 225 ml out of feliciano. Denies needs at this time. Updated as to plan of care.
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
## 2022-10-01 LAB
A1C WITH ESTIMATED AVERAGE GLUCOSE RESULT: 5.4 % — SIGNIFICANT CHANGE UP (ref 4–5.6)
ALBUMIN SERPL ELPH-MCNC: 2.8 G/DL — LOW (ref 3.3–5)
ALP SERPL-CCNC: 69 U/L — SIGNIFICANT CHANGE UP (ref 40–120)
ALT FLD-CCNC: 45 U/L — SIGNIFICANT CHANGE UP (ref 12–78)
ANION GAP SERPL CALC-SCNC: 7 MMOL/L — SIGNIFICANT CHANGE UP (ref 5–17)
AST SERPL-CCNC: 30 U/L — SIGNIFICANT CHANGE UP (ref 15–37)
BILIRUB SERPL-MCNC: 1.7 MG/DL — HIGH (ref 0.2–1.2)
BUN SERPL-MCNC: 17 MG/DL — SIGNIFICANT CHANGE UP (ref 7–23)
CALCIUM SERPL-MCNC: 8.5 MG/DL — SIGNIFICANT CHANGE UP (ref 8.5–10.1)
CHLORIDE SERPL-SCNC: 105 MMOL/L — SIGNIFICANT CHANGE UP (ref 96–108)
CHOLEST SERPL-MCNC: 128 MG/DL — SIGNIFICANT CHANGE UP
CO2 SERPL-SCNC: 27 MMOL/L — SIGNIFICANT CHANGE UP (ref 22–31)
CREAT SERPL-MCNC: 0.82 MG/DL — SIGNIFICANT CHANGE UP (ref 0.5–1.3)
EGFR: 86 ML/MIN/1.73M2 — SIGNIFICANT CHANGE UP
ESTIMATED AVERAGE GLUCOSE: 108 MG/DL — SIGNIFICANT CHANGE UP (ref 68–114)
GLUCOSE SERPL-MCNC: 111 MG/DL — HIGH (ref 70–99)
HCT VFR BLD CALC: 45.3 % — SIGNIFICANT CHANGE UP (ref 39–50)
HDLC SERPL-MCNC: 36 MG/DL — LOW
HGB BLD-MCNC: 15.3 G/DL — SIGNIFICANT CHANGE UP (ref 13–17)
INR BLD: 2.27 RATIO — HIGH (ref 0.88–1.16)
LIPID PNL WITH DIRECT LDL SERPL: 74 MG/DL — SIGNIFICANT CHANGE UP
MAGNESIUM SERPL-MCNC: 1.7 MG/DL — SIGNIFICANT CHANGE UP (ref 1.6–2.6)
MCHC RBC-ENTMCNC: 33.8 G/DL — SIGNIFICANT CHANGE UP (ref 32–36)
MCHC RBC-ENTMCNC: 34 PG — SIGNIFICANT CHANGE UP (ref 27–34)
MCV RBC AUTO: 100.7 FL — HIGH (ref 80–100)
NON HDL CHOLESTEROL: 92 MG/DL — SIGNIFICANT CHANGE UP
NRBC # BLD: 0 /100 WBCS — SIGNIFICANT CHANGE UP (ref 0–0)
PHOSPHATE SERPL-MCNC: 2.2 MG/DL — LOW (ref 2.5–4.5)
PLATELET # BLD AUTO: 170 K/UL — SIGNIFICANT CHANGE UP (ref 150–400)
POTASSIUM SERPL-MCNC: 3.4 MMOL/L — LOW (ref 3.5–5.3)
POTASSIUM SERPL-SCNC: 3.4 MMOL/L — LOW (ref 3.5–5.3)
PROT SERPL-MCNC: 4.9 GM/DL — LOW (ref 6–8.3)
PROTHROM AB SERPL-ACNC: 27.5 SEC — HIGH (ref 10.5–13.4)
RBC # BLD: 4.5 M/UL — SIGNIFICANT CHANGE UP (ref 4.2–5.8)
RBC # FLD: 12.4 % — SIGNIFICANT CHANGE UP (ref 10.3–14.5)
SODIUM SERPL-SCNC: 139 MMOL/L — SIGNIFICANT CHANGE UP (ref 135–145)
TRIGL SERPL-MCNC: 93 MG/DL — SIGNIFICANT CHANGE UP
WBC # BLD: 6.56 K/UL — SIGNIFICANT CHANGE UP (ref 3.8–10.5)
WBC # FLD AUTO: 6.56 K/UL — SIGNIFICANT CHANGE UP (ref 3.8–10.5)

## 2022-10-01 PROCEDURE — 99233 SBSQ HOSP IP/OBS HIGH 50: CPT

## 2022-10-01 RX ORDER — SODIUM CHLORIDE 9 MG/ML
500 INJECTION, SOLUTION INTRAVENOUS ONCE
Refills: 0 | Status: COMPLETED | OUTPATIENT
Start: 2022-10-01 | End: 2022-10-01

## 2022-10-01 RX ORDER — SODIUM,POTASSIUM PHOSPHATES 278-250MG
1 POWDER IN PACKET (EA) ORAL THREE TIMES A DAY
Refills: 0 | Status: COMPLETED | OUTPATIENT
Start: 2022-10-01 | End: 2022-10-02

## 2022-10-01 RX ORDER — CIPROFLOXACIN HCL 0.3 %
1 DROPS OPHTHALMIC (EYE)
Refills: 0 | Status: COMPLETED | OUTPATIENT
Start: 2022-10-01 | End: 2022-10-02

## 2022-10-01 RX ORDER — WARFARIN SODIUM 2.5 MG/1
5 TABLET ORAL ONCE
Refills: 0 | Status: COMPLETED | OUTPATIENT
Start: 2022-10-01 | End: 2022-10-01

## 2022-10-01 RX ADMIN — FINASTERIDE 5 MILLIGRAM(S): 5 TABLET, FILM COATED ORAL at 21:58

## 2022-10-01 RX ADMIN — Medication 1 TABLET(S): at 17:21

## 2022-10-01 RX ADMIN — ATORVASTATIN CALCIUM 80 MILLIGRAM(S): 80 TABLET, FILM COATED ORAL at 21:57

## 2022-10-01 RX ADMIN — Medication 1 TABLET(S): at 21:58

## 2022-10-01 RX ADMIN — BUDESONIDE AND FORMOTEROL FUMARATE DIHYDRATE 2 PUFF(S): 160; 4.5 AEROSOL RESPIRATORY (INHALATION) at 17:22

## 2022-10-01 RX ADMIN — Medication 1 DROP(S): at 05:42

## 2022-10-01 RX ADMIN — CEFTRIAXONE 100 MILLIGRAM(S): 500 INJECTION, POWDER, FOR SOLUTION INTRAMUSCULAR; INTRAVENOUS at 14:31

## 2022-10-01 RX ADMIN — Medication 1 DROP(S): at 17:21

## 2022-10-01 RX ADMIN — Medication 600 MILLIGRAM(S): at 05:40

## 2022-10-01 RX ADMIN — PANTOPRAZOLE SODIUM 40 MILLIGRAM(S): 20 TABLET, DELAYED RELEASE ORAL at 06:17

## 2022-10-01 RX ADMIN — Medication 600 MILLIGRAM(S): at 17:20

## 2022-10-01 RX ADMIN — SODIUM CHLORIDE 500 MILLILITER(S): 9 INJECTION, SOLUTION INTRAVENOUS at 02:55

## 2022-10-01 RX ADMIN — BUDESONIDE AND FORMOTEROL FUMARATE DIHYDRATE 2 PUFF(S): 160; 4.5 AEROSOL RESPIRATORY (INHALATION) at 05:43

## 2022-10-01 RX ADMIN — TAMSULOSIN HYDROCHLORIDE 0.4 MILLIGRAM(S): 0.4 CAPSULE ORAL at 21:58

## 2022-10-01 RX ADMIN — WARFARIN SODIUM 5 MILLIGRAM(S): 2.5 TABLET ORAL at 21:57

## 2022-10-01 RX ADMIN — SENNA PLUS 2 TABLET(S): 8.6 TABLET ORAL at 21:57

## 2022-10-01 NOTE — OCCUPATIONAL THERAPY INITIAL EVALUATION ADULT - ADDITIONAL COMMENTS
Pt presenting from [facility]. Prior to [facility], Per previous PT eval, pt reporting that he lives in a house with no steps to enter, ramp access. Pt uses a platform walker, has a regular bed and a "special" chair, not identified as a recliner. Pt has an aide come 1x week for showering. Pt ambulates only household distances, MDs come to his home. Pt with no falls in last 3-6 months, no recent PT.
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
## 2022-10-01 NOTE — OCCUPATIONAL THERAPY INITIAL EVALUATION ADULT - RANGE OF MOTION EXAMINATION, LOWER EXTREMITY
RLE AROM hip flexion/abduction/adduction grossly WFL; RLE AROM distally to hip grossly impaired due to leg in splint./Left LE Active ROM was WFL (within functional limits)
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
## 2022-10-01 NOTE — PHYSICAL THERAPY INITIAL EVALUATION ADULT - ACTIVE RANGE OF MOTION EXAMINATION, REHAB EVAL
except R UE flexion contracture, and unable to formally assess R LE secondary to splint/bilateral upper extremity Active ROM was WFL (within functional limits)/bilateral  lower extremity Active ROM was WFL (within functional limits)

## 2022-10-01 NOTE — OCCUPATIONAL THERAPY INITIAL EVALUATION ADULT - BED MOBILITY TRAINING, PT EVAL
Patient will be able to perform bed mobility tasks independently, using least restrictive device, within 6-8 weeks.

## 2022-10-01 NOTE — OCCUPATIONAL THERAPY INITIAL EVALUATION ADULT - RLE MMT, REHAB EVAL
Grossly equal to or greater then 3/5 hip strength; Grossly less then 3/5 knee flexion strength; DNT ankle strength due to ankle in trilam splint.

## 2022-10-01 NOTE — OCCUPATIONAL THERAPY INITIAL EVALUATION ADULT - GENERAL OBSERVATIONS, REHAB EVAL
Pt was encountered supine in bed; NAD, Hep lock +, portable telemetry +, RLE in trilam splint ace wrapped clean, dry and intact, RLE NWB (as per activty status orders), [place] +, AXOX4, followed commands, cooperative, RUE in flexion contraction (Due to chronic condition); pt had no c/o pain. PT Theresahen present.

## 2022-10-01 NOTE — OCCUPATIONAL THERAPY INITIAL EVALUATION ADULT - STRENGTHENING, PT EVAL
Pt will increase LUE/LLE/RLE strength to 5/5 to improve functional strength needed to engage in functional tasks by 8 weeks

## 2022-10-01 NOTE — PROGRESS NOTE ADULT - SUBJECTIVE AND OBJECTIVE BOX
Patient was seen and examined at bedside. Patient reports pain is well controlled. Patient denies f/c/n/v/cp/sob. Patient has no acute orthopaedic complaints at this time.    Vital Signs Last 24 Hrs  T(C): 36.5 (01 Oct 2022 16:32), Max: 36.7 (30 Sep 2022 23:50)  T(F): 97.7 (01 Oct 2022 16:32), Max: 98 (30 Sep 2022 23:50)  HR: 68 (01 Oct 2022 16:32) (68 - 90)  BP: 121/73 (01 Oct 2022 16:32) (94/60 - 121/73)  BP(mean): --  RR: 18 (01 Oct 2022 16:32) (18 - 20)  SpO2: 94% (01 Oct 2022 16:32) (93% - 99%)    Parameters below as of 01 Oct 2022 16:32  Patient On (Oxygen Delivery Method): room air        Exam:  General: NAD    RLE:  Dressings c/d/i   Motor: +EHL/FHL/TA/GSc  SILT: Peraza/Sa/SPN/DPN  +2 DP/PT Pulses  Compartments soft and compressible  No pain on dorsiflexion    A/p:   Pt is a 87y Male w R distal Tib Fib fx treated non op    - Pain control PRN  - Incentive spirometery  - Post op abx  - DVT ppx: SCDs, hold chemical DVT ppx at this time   - Dispo per PT  - Plan discussed with patient, who is in agreement w the above  - Plan discussed with attending, who is in agreement w the above       Patient was seen and examined at bedside. Pt followed at [facility] until admission, please see paper charts for prior orthopaedic notes. Patient reports pain is well controlled. Patient denies f/c/n/v/cp/sob. Patient has no acute orthopaedic complaints at this time.    Vital Signs Last 24 Hrs  T(C): 36.5 (01 Oct 2022 16:32), Max: 36.7 (30 Sep 2022 23:50)  T(F): 97.7 (01 Oct 2022 16:32), Max: 98 (30 Sep 2022 23:50)  HR: 68 (01 Oct 2022 16:32) (68 - 90)  BP: 121/73 (01 Oct 2022 16:32) (94/60 - 121/73)  BP(mean): --  RR: 18 (01 Oct 2022 16:32) (18 - 20)  SpO2: 94% (01 Oct 2022 16:32) (93% - 99%)    Parameters below as of 01 Oct 2022 16:32  Patient On (Oxygen Delivery Method): room air        Exam:  General: NAD    RLE:  Dressings c/d/i   Motor: +EHL/FHL/TA/GSc  SILT: Peraza/Sa/SPN/DPN  +2 DP/PT Pulses  Compartments soft and compressible  No pain on dorsiflexion    A/p:   Pt is a 87y Male w R distal Tib Fib fx treated non op    - Pain control PRN  - Incentive spirometery  - Post op abx  - DVT ppx: SCDs, hold chemical DVT ppx at this time   - Dispo per PT, pt transferred from [facility]  - Plan discussed with patient, who is in agreement w the above  - Plan discussed with attending, who is in agreement w the above

## 2022-10-01 NOTE — OCCUPATIONAL THERAPY INITIAL EVALUATION ADULT - BALANCE TRAINING, PT EVAL
Patient will be able to increase static and dynamic sitting/standing by 1/2 grade in order to participate in self care tasks and functional mobility/transfers within 6-8 weeks

## 2022-10-01 NOTE — PHYSICAL THERAPY INITIAL EVALUATION ADULT - MANUAL MUSCLE TESTING RESULTS, REHAB EVAL
except unable to formally assess R UE due to flexion contracture, L LE grossly 3+/5, R LE grossly 3-/5/no strength deficits were identified

## 2022-10-01 NOTE — OCCUPATIONAL THERAPY INITIAL EVALUATION ADULT - RANGE OF MOTION EXAMINATION, UPPER EXTREMITY
DNT RUE ROM due to arm in flexion contracture state (Chronic condition)./Left UE Active ROM was WFL (within functional limits)

## 2022-10-01 NOTE — PROGRESS NOTE ADULT - NSPROGADDITIONALINFOA_GEN_ALL_CORE
d/w pt and wife     MRI brain pending  final U Cx pending  likely dc back to [facility] on Monday.

## 2022-10-02 DIAGNOSIS — T14.8XXA OTHER INJURY OF UNSPECIFIED BODY REGION, INITIAL ENCOUNTER: ICD-10-CM

## 2022-10-02 LAB
-  AMIKACIN: SIGNIFICANT CHANGE UP
-  AMOXICILLIN/CLAVULANIC ACID: SIGNIFICANT CHANGE UP
-  AMPICILLIN/SULBACTAM: SIGNIFICANT CHANGE UP
-  AMPICILLIN: SIGNIFICANT CHANGE UP
-  AZTREONAM: SIGNIFICANT CHANGE UP
-  CEFAZOLIN: SIGNIFICANT CHANGE UP
-  CEFEPIME: SIGNIFICANT CHANGE UP
-  CEFTRIAXONE: SIGNIFICANT CHANGE UP
-  CIPROFLOXACIN: SIGNIFICANT CHANGE UP
-  ERTAPENEM: SIGNIFICANT CHANGE UP
-  GENTAMICIN: SIGNIFICANT CHANGE UP
-  IMIPENEM: SIGNIFICANT CHANGE UP
-  LEVOFLOXACIN: SIGNIFICANT CHANGE UP
-  MEROPENEM: SIGNIFICANT CHANGE UP
-  NITROFURANTOIN: SIGNIFICANT CHANGE UP
-  PIPERACILLIN/TAZOBACTAM: SIGNIFICANT CHANGE UP
-  TIGECYCLINE: SIGNIFICANT CHANGE UP
-  TOBRAMYCIN: SIGNIFICANT CHANGE UP
-  TRIMETHOPRIM/SULFAMETHOXAZOLE: SIGNIFICANT CHANGE UP
ANION GAP SERPL CALC-SCNC: 8 MMOL/L — SIGNIFICANT CHANGE UP (ref 5–17)
BUN SERPL-MCNC: 19 MG/DL — SIGNIFICANT CHANGE UP (ref 7–23)
CALCIUM SERPL-MCNC: 8.4 MG/DL — LOW (ref 8.5–10.1)
CHLORIDE SERPL-SCNC: 105 MMOL/L — SIGNIFICANT CHANGE UP (ref 96–108)
CO2 SERPL-SCNC: 25 MMOL/L — SIGNIFICANT CHANGE UP (ref 22–31)
CREAT SERPL-MCNC: 0.93 MG/DL — SIGNIFICANT CHANGE UP (ref 0.5–1.3)
CULTURE RESULTS: SIGNIFICANT CHANGE UP
EGFR: 80 ML/MIN/1.73M2 — SIGNIFICANT CHANGE UP
GLUCOSE SERPL-MCNC: 107 MG/DL — HIGH (ref 70–99)
HCT VFR BLD CALC: 41.4 % — SIGNIFICANT CHANGE UP (ref 39–50)
HGB BLD-MCNC: 14.3 G/DL — SIGNIFICANT CHANGE UP (ref 13–17)
INR BLD: 2.08 RATIO — HIGH (ref 0.88–1.16)
MCHC RBC-ENTMCNC: 34.3 PG — HIGH (ref 27–34)
MCHC RBC-ENTMCNC: 34.5 G/DL — SIGNIFICANT CHANGE UP (ref 32–36)
MCV RBC AUTO: 99.3 FL — SIGNIFICANT CHANGE UP (ref 80–100)
METHOD TYPE: SIGNIFICANT CHANGE UP
NRBC # BLD: 0 /100 WBCS — SIGNIFICANT CHANGE UP (ref 0–0)
ORGANISM # SPEC MICROSCOPIC CNT: SIGNIFICANT CHANGE UP
ORGANISM # SPEC MICROSCOPIC CNT: SIGNIFICANT CHANGE UP
PLATELET # BLD AUTO: 189 K/UL — SIGNIFICANT CHANGE UP (ref 150–400)
POTASSIUM SERPL-MCNC: 3.5 MMOL/L — SIGNIFICANT CHANGE UP (ref 3.5–5.3)
POTASSIUM SERPL-SCNC: 3.5 MMOL/L — SIGNIFICANT CHANGE UP (ref 3.5–5.3)
PROTHROM AB SERPL-ACNC: 25.2 SEC — HIGH (ref 10.5–13.4)
RBC # BLD: 4.17 M/UL — LOW (ref 4.2–5.8)
RBC # FLD: 12.6 % — SIGNIFICANT CHANGE UP (ref 10.3–14.5)
SODIUM SERPL-SCNC: 138 MMOL/L — SIGNIFICANT CHANGE UP (ref 135–145)
SPECIMEN SOURCE: SIGNIFICANT CHANGE UP
WBC # BLD: 6.66 K/UL — SIGNIFICANT CHANGE UP (ref 3.8–10.5)
WBC # FLD AUTO: 6.66 K/UL — SIGNIFICANT CHANGE UP (ref 3.8–10.5)

## 2022-10-02 PROCEDURE — 70551 MRI BRAIN STEM W/O DYE: CPT | Mod: 26

## 2022-10-02 PROCEDURE — 99233 SBSQ HOSP IP/OBS HIGH 50: CPT

## 2022-10-02 RX ORDER — WARFARIN SODIUM 2.5 MG/1
5 TABLET ORAL ONCE
Refills: 0 | Status: COMPLETED | OUTPATIENT
Start: 2022-10-02 | End: 2022-10-02

## 2022-10-02 RX ADMIN — Medication 1 DROP(S): at 18:29

## 2022-10-02 RX ADMIN — SENNA PLUS 2 TABLET(S): 8.6 TABLET ORAL at 21:59

## 2022-10-02 RX ADMIN — CEFTRIAXONE 100 MILLIGRAM(S): 500 INJECTION, POWDER, FOR SOLUTION INTRAMUSCULAR; INTRAVENOUS at 13:14

## 2022-10-02 RX ADMIN — Medication 600 MILLIGRAM(S): at 06:07

## 2022-10-02 RX ADMIN — TAMSULOSIN HYDROCHLORIDE 0.4 MILLIGRAM(S): 0.4 CAPSULE ORAL at 21:59

## 2022-10-02 RX ADMIN — BUDESONIDE AND FORMOTEROL FUMARATE DIHYDRATE 2 PUFF(S): 160; 4.5 AEROSOL RESPIRATORY (INHALATION) at 06:07

## 2022-10-02 RX ADMIN — ATORVASTATIN CALCIUM 80 MILLIGRAM(S): 80 TABLET, FILM COATED ORAL at 21:59

## 2022-10-02 RX ADMIN — Medication 1 DROP(S): at 06:11

## 2022-10-02 RX ADMIN — BUDESONIDE AND FORMOTEROL FUMARATE DIHYDRATE 2 PUFF(S): 160; 4.5 AEROSOL RESPIRATORY (INHALATION) at 18:29

## 2022-10-02 RX ADMIN — FINASTERIDE 5 MILLIGRAM(S): 5 TABLET, FILM COATED ORAL at 13:13

## 2022-10-02 RX ADMIN — Medication 1 TABLET(S): at 06:07

## 2022-10-02 RX ADMIN — Medication 600 MILLIGRAM(S): at 18:28

## 2022-10-02 RX ADMIN — PANTOPRAZOLE SODIUM 40 MILLIGRAM(S): 20 TABLET, DELAYED RELEASE ORAL at 06:07

## 2022-10-02 RX ADMIN — WARFARIN SODIUM 5 MILLIGRAM(S): 2.5 TABLET ORAL at 22:01

## 2022-10-02 NOTE — PHYSICAL THERAPY INITIAL EVALUATION ADULT - CRITERIA FOR SKILLED THERAPEUTIC INTERVENTIONS
impairments found/functional limitations in following categories/risk reduction/prevention
impairments found/functional limitations in following categories/risk reduction/prevention/rehab potential/therapy frequency/predicted duration of therapy intervention/anticipated discharge recommendation

## 2022-10-02 NOTE — PHYSICAL THERAPY INITIAL EVALUATION ADULT - GENERAL OBSERVATIONS, REHAB EVAL
Chart (EMR) reviewed. Pt seen for PT follow up and wound care assessment. Received supine c HOB elevated, NAD. +cardiac monitor donned, +heplock, +feliciano cath, +trilam splint c ace wrap to RLE.
Pt found semi supine in bed in NAD, +hep lock, +R LE splint, R UE flexion contracture, +feliciano cath, agreeable to PT Eval.
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
## 2022-10-02 NOTE — PHYSICAL THERAPY INITIAL EVALUATION ADULT - ADDITIONAL COMMENTS
See Initial PT Evaluation notes.
Pt presenting from [facility]. Prior to [facility], Per previous PT eval, pt reporting that he lives in a house with no steps to enter, ramp access. Pt uses a platform walker, has a regular bed and a "special" chair, not identified as a recliner. Pt has an aide come 1x week for showering. Pt ambulates only household distances, MDs come to his home. Pt with no falls in last 3-6 months, no recent PT.
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
## 2022-10-02 NOTE — PHYSICAL THERAPY INITIAL EVALUATION ADULT - MODALITIES TREATMENT COMMENTS
Pt presents c pressure injury to sacrum c minimal exudates, intact periwound, and no odor. Pt also presents c blanchable redness to left heel.

## 2022-10-03 LAB
-  AMIKACIN: SIGNIFICANT CHANGE UP
-  AMOXICILLIN/CLAVULANIC ACID: SIGNIFICANT CHANGE UP
-  AMPICILLIN/SULBACTAM: SIGNIFICANT CHANGE UP
-  AMPICILLIN: SIGNIFICANT CHANGE UP
-  AZTREONAM: SIGNIFICANT CHANGE UP
-  CEFAZOLIN: SIGNIFICANT CHANGE UP
-  CEFEPIME: SIGNIFICANT CHANGE UP
-  CEFTRIAXONE: SIGNIFICANT CHANGE UP
-  CIPROFLOXACIN: SIGNIFICANT CHANGE UP
-  ERTAPENEM: SIGNIFICANT CHANGE UP
-  GENTAMICIN: SIGNIFICANT CHANGE UP
-  IMIPENEM: SIGNIFICANT CHANGE UP
-  LEVOFLOXACIN: SIGNIFICANT CHANGE UP
-  MEROPENEM: SIGNIFICANT CHANGE UP
-  NITROFURANTOIN: SIGNIFICANT CHANGE UP
-  PIPERACILLIN/TAZOBACTAM: SIGNIFICANT CHANGE UP
-  TIGECYCLINE: SIGNIFICANT CHANGE UP
-  TOBRAMYCIN: SIGNIFICANT CHANGE UP
-  TRIMETHOPRIM/SULFAMETHOXAZOLE: SIGNIFICANT CHANGE UP
CULTURE RESULTS: SIGNIFICANT CHANGE UP
INR BLD: 2.02 RATIO — HIGH (ref 0.88–1.16)
METHOD TYPE: SIGNIFICANT CHANGE UP
ORGANISM # SPEC MICROSCOPIC CNT: SIGNIFICANT CHANGE UP
ORGANISM # SPEC MICROSCOPIC CNT: SIGNIFICANT CHANGE UP
PROTHROM AB SERPL-ACNC: 24.4 SEC — HIGH (ref 10.5–13.4)
SPECIMEN SOURCE: SIGNIFICANT CHANGE UP

## 2022-10-03 PROCEDURE — 99233 SBSQ HOSP IP/OBS HIGH 50: CPT

## 2022-10-03 RX ORDER — WARFARIN SODIUM 2.5 MG/1
5 TABLET ORAL ONCE
Refills: 0 | Status: COMPLETED | OUTPATIENT
Start: 2022-10-03 | End: 2022-10-03

## 2022-10-03 RX ADMIN — Medication 600 MILLIGRAM(S): at 05:30

## 2022-10-03 RX ADMIN — ATORVASTATIN CALCIUM 80 MILLIGRAM(S): 80 TABLET, FILM COATED ORAL at 21:41

## 2022-10-03 RX ADMIN — PANTOPRAZOLE SODIUM 40 MILLIGRAM(S): 20 TABLET, DELAYED RELEASE ORAL at 05:30

## 2022-10-03 RX ADMIN — BUDESONIDE AND FORMOTEROL FUMARATE DIHYDRATE 2 PUFF(S): 160; 4.5 AEROSOL RESPIRATORY (INHALATION) at 18:15

## 2022-10-03 RX ADMIN — TAMSULOSIN HYDROCHLORIDE 0.4 MILLIGRAM(S): 0.4 CAPSULE ORAL at 21:40

## 2022-10-03 RX ADMIN — Medication 600 MILLIGRAM(S): at 18:15

## 2022-10-03 RX ADMIN — FINASTERIDE 5 MILLIGRAM(S): 5 TABLET, FILM COATED ORAL at 11:14

## 2022-10-03 RX ADMIN — WARFARIN SODIUM 5 MILLIGRAM(S): 2.5 TABLET ORAL at 21:40

## 2022-10-03 RX ADMIN — SENNA PLUS 2 TABLET(S): 8.6 TABLET ORAL at 21:41

## 2022-10-03 RX ADMIN — BUDESONIDE AND FORMOTEROL FUMARATE DIHYDRATE 2 PUFF(S): 160; 4.5 AEROSOL RESPIRATORY (INHALATION) at 05:30
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
## 2022-10-03 NOTE — DIETITIAN INITIAL EVALUATION ADULT - OTHER CALCULATIONS
Ht (cm): 177.8  Wt (kg): 85.2 (9-30-22)  BMI:27 kg/m2    IBW: 75.2 kg    %IBW: 113%       UBW:              %UBW:  Ht (cm): 177.8  Wt (kg): 85.2 (9-30-22)  BMI:27 kg/m2    IBW: 75.2 kg    %IBW: 113%       UBW: 90.1kg      %UBW: 95%

## 2022-10-03 NOTE — PROGRESS NOTE ADULT - PROBLEM SELECTOR PLAN 2
sec to UTI- sepsis- complicated UTI in setting of chronic feliciano-   normalized s/p IVF   U Cx + for e. coli- fu final cx  Follow up blood culture result  pt w/  chronic feliciano- pt Completed treatment for prostatitis on 8/15/2022  IV CTX pending final UCx
Mildly elevated, normalized s/p IVF   U Cx + for ESBL E coli  Blood clx neg  S/P 3 days Ceftriaxone, + Urine clx likely colonization, doubt UTI, denies any symptoms, his mental status back to baseline without appropriate abx.  Will hold off further Abx  pt w/ chronic Hurt- pt Completed treatment for prostatitis on 8/15/2022
Mildly elevated, normalized s/p IVF   U Cx + for ESBL E coli  Blood clx neg  S/P 3 days Ceftriaxone, + Urine clx likely colonization, doubt UTI, denies any symptoms, his mental status back to baseline without appropriate abx.  Will hold off further Abx  pt w/ chronic Hurt- pt Completed treatment for prostatitis on 8/15/2022

## 2022-10-03 NOTE — PROGRESS NOTE ADULT - PROBLEM SELECTOR PLAN 4
Not on BP med  BP on lower side  monitor off anti HTNves
Not on BP med  BP on lower side  As per daughter he has always had low BP and syncopal episodes
Not on BP med  BP on lower side  As per daughter he has always had low BP and syncopal episodes

## 2022-10-03 NOTE — SWALLOW BEDSIDE ASSESSMENT ADULT - SWALLOW EVAL: DIAGNOSIS
Oropharyngeal swallow mechanism is WFL; per neuro clinically not a stroke; communication skills for exam are WFL

## 2022-10-03 NOTE — DIETITIAN INITIAL EVALUATION ADULT - ETIOLOGY
Physiological causes due to pressure ulcer Increase demand for protein in the context of pressure injury

## 2022-10-03 NOTE — PROGRESS NOTE ADULT - PROBLEM SELECTOR PROBLEM 1
Suspected cerebrovascular accident (CVA)

## 2022-10-03 NOTE — SWALLOW BEDSIDE ASSESSMENT ADULT - SLP PERTINENT HISTORY OF CURRENT PROBLEM
suspect CVA  [facility] noted patient stopped talking and appear confused. Per ED team, questionable slurry speech which resolved completely. suspect CVA; [facility] noted patient stopped talking and appeared confused. Per ED team, questionable slurry speech which resolved completely

## 2022-10-03 NOTE — PROGRESS NOTE ADULT - PROBLEM SELECTOR PLAN 1
Present to ED with transient AMS with questionable slurry speech which started before ED arrival  Resolved   Not TPA candidate as patient is on coumadin  CT brain with no acute pathology. CT perfusion with abnormal perfusion scan suggesting artifact vs small core infarct in left frontal lobe. No large vessel occlusion  MR brain neg  seen by neuro-> unlikely cva, could be TIA vs sec to uti?  c/w coumadin, stop ASA to avoid bleeding risk  per neuro  Statin
Present to ED with transient AMS with questionable slurry speech which started before ED arrival  Resolved   Not TPA candidate as patient is on coumadin  CT brain with no acute pathology. CT perfusion with abnormal perfusion scan suggesting artifact vs small core infarct in left frontal lobe. No large vessel occlusion  seen by neuro-> unlikely cva, could be TIA vs sec to uti/ sepsis  check MRI Brain and c/w coumadin, stop ASA to avoid bleeding risk  per neuro  Statin
Present to ED with transient AMS with questionable slurry speech which started before ED arrival  Resolved   Not TPA candidate as patient is on coumadin  CT brain with no acute pathology. CT perfusion with abnormal perfusion scan suggesting artifact vs small core infarct in left frontal lobe. No large vessel occlusion  MR brain neg  seen by neuro-> unlikely cva, could be TIA vs sec to uti?  c/w coumadin, stop ASA to avoid bleeding risk  per neuro  Statin
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
## 2022-10-03 NOTE — SWALLOW BEDSIDE ASSESSMENT ADULT - SPECIFY REASON(S)
Clinical Swallow Evaluation ; SLP eval for dysphagia screening ordered; PANCHITO Leung reports no swallow problems to report Clinical Swallow Evaluation ; SLP eval ordered for dysphagia screening ; PANCHITO Leung with no swallow problems to report

## 2022-10-03 NOTE — DIETITIAN INITIAL EVALUATION ADULT - REASON INDICATOR FOR ASSESSMENT
Pt was assessed due to a consult for pressure ulcer Pt was assessed due for a pressure ulcer consult Pt was assessed for a pressure ulcer consult Pt was seen for a pressure ulcer consult

## 2022-10-03 NOTE — SWALLOW BEDSIDE ASSESSMENT ADULT - SLP GENERAL OBSERVATIONS
alert and oriented x 4; remained verbally interactive and aware of eating/swallowing context; making requests regarding room set up

## 2022-10-03 NOTE — DIETITIAN INITIAL EVALUATION ADULT - OTHER INFO
Pt reports living with his wife; pt was transferred from [facility] PTA; pt's wife & daughter does the food shopping & cooking for him.  Pt reports having a fairly good appetite PTA & during LOS; does not experience any Nausea, Vomiting, Constipation, Diarrhea or Chewing/ Swallowing difficulty.  Pt seems to be forgetful; does not remember his UBW; pt's wt from previous admission 90.1kg (7-27-22).  Pt consents to receiving Ensure Enlive x1/day (provides 350 kcal, 20 g protein)   Pt is informed about prioritizing protein to expedite his wound healing.  Pt made aware that RD remains available. Pt reports living with his wife; pt was transferred from [facility] PTA; pt's wife & daughter does the food shopping & cooking for him.  Pt reports having a fairly good appetite PTA & during LOS; does not experience any Nausea, Vomiting, Constipation, Diarrhea or Chewing/ Swallowing difficulty.  Pt seems to be forgetful; does not remember his UBW; pt's wt from previous admission 90.1kg (7-27-22).  Pt was told to prioritize proteins to expedite his wound healing. Pt consents to receiving Ensure Enlive x1/day (provides 350 kcal, 20 g protein)   Pt made aware that RD remains available. Pt reports living with his wife; pt's wife & daughter does the food shopping & cooking for him at home.  Pt was transferred from [facility] where he was receiving a DASH diet with thin liquids along with supplements LPS 1x/day (provides 100 kcal, 15 g protein),  Ensure Plus 1x/day (provides 350 kcal, 13 g protein).  Pt reports having a fairly good appetite PTA & during LOS; does not experience any Nausea, Vomiting, Constipation, Diarrhea or Chewing/ Swallowing difficulty. Noted pending swallow evaluation.  Pt seems to be forgetful; does not remember his UBW; pt's wt from previous admission 90.1kg (7-27-22). Wt loss is noted below.  Discussed importance of protein consumption to aide in wound healing. Pt consents to receiving Ensure Enlive x1/day (provides 350 kcal, 20 g protein)   Pt made aware that RD remains available.

## 2022-10-03 NOTE — DIETITIAN INITIAL EVALUATION ADULT - PERTINENT MEDS FT
MEDICATIONS  (STANDING):  atorvastatin  budesonide    finasteride   guaifenesin  pantoprazole     senna  tamsulosin        MEDICATIONS  (STANDING):  atorvastatin  guaifenesin  pantoprazole     senna

## 2022-10-03 NOTE — SWALLOW BEDSIDE ASSESSMENT ADULT - SWALLOW EVAL: PATIENT/FAMILY GOALS STATEMENT
pt stated " if it's too hard I don't eat it" ; reported he eats soft fish and chicken and denies swallow problems at throat level

## 2022-10-03 NOTE — PROGRESS NOTE ADULT - PROBLEM SELECTOR PLAN 6
R distal Tib Fib fx treated non op  PT is being followed by ortho  PT eval : plan to return to [facility]
R distal Tib Fib fx treated non op  PT is being followed by ortho  PT eval : plan to return to [facility]

## 2022-10-03 NOTE — DIETITIAN INITIAL EVALUATION ADULT - PERTINENT LABORATORY DATA
10-02    138  |  105  |  19  ----------------------------<  107<H>  3.5   |  25  |  0.93    Ca    8.4<L>      02 Oct 2022 05:43    A1C with Estimated Average Glucose Result: 5.4 % (10-01-22 @ 06:51)  A1C with Estimated Average Glucose Result: 5.8 % (07-24-22 @ 07:30)

## 2022-10-03 NOTE — PROGRESS NOTE ADULT - PROBLEM SELECTOR PLAN 5
Afib on coumadin, INR therapeutic  Monitor daily INR  Daily coumadin per INR

## 2022-10-03 NOTE — PROGRESS NOTE ADULT - PROBLEM SELECTOR PLAN 3
continue flomax and finasteride  has chronic Hurt
continue flomax and finasteride  has chronic feliciano
continue flomax and finasteride  has chronic Hurt

## 2022-10-04 ENCOUNTER — TRANSCRIPTION ENCOUNTER (OUTPATIENT)
Age: 87
End: 2022-10-04

## 2022-10-04 VITALS
TEMPERATURE: 98 F | OXYGEN SATURATION: 94 % | DIASTOLIC BLOOD PRESSURE: 69 MMHG | SYSTOLIC BLOOD PRESSURE: 117 MMHG | RESPIRATION RATE: 18 BRPM | HEART RATE: 71 BPM

## 2022-10-04 LAB
ALBUMIN SERPL ELPH-MCNC: 2.8 G/DL — LOW (ref 3.3–5)
ALP SERPL-CCNC: 69 U/L — SIGNIFICANT CHANGE UP (ref 40–120)
ALT FLD-CCNC: 46 U/L — SIGNIFICANT CHANGE UP (ref 12–78)
ANION GAP SERPL CALC-SCNC: 6 MMOL/L — SIGNIFICANT CHANGE UP (ref 5–17)
AST SERPL-CCNC: 35 U/L — SIGNIFICANT CHANGE UP (ref 15–37)
BILIRUB SERPL-MCNC: 0.7 MG/DL — SIGNIFICANT CHANGE UP (ref 0.2–1.2)
BUN SERPL-MCNC: 16 MG/DL — SIGNIFICANT CHANGE UP (ref 7–23)
CALCIUM SERPL-MCNC: 8.6 MG/DL — SIGNIFICANT CHANGE UP (ref 8.5–10.1)
CHLORIDE SERPL-SCNC: 108 MMOL/L — SIGNIFICANT CHANGE UP (ref 96–108)
CO2 SERPL-SCNC: 25 MMOL/L — SIGNIFICANT CHANGE UP (ref 22–31)
CREAT SERPL-MCNC: 0.76 MG/DL — SIGNIFICANT CHANGE UP (ref 0.5–1.3)
EGFR: 88 ML/MIN/1.73M2 — SIGNIFICANT CHANGE UP
FLUAV AG NPH QL: SIGNIFICANT CHANGE UP
FLUBV AG NPH QL: SIGNIFICANT CHANGE UP
GLUCOSE SERPL-MCNC: 112 MG/DL — HIGH (ref 70–99)
HCT VFR BLD CALC: 42.6 % — SIGNIFICANT CHANGE UP (ref 39–50)
HGB BLD-MCNC: 14.5 G/DL — SIGNIFICANT CHANGE UP (ref 13–17)
INR BLD: 2.09 RATIO — HIGH (ref 0.88–1.16)
MCHC RBC-ENTMCNC: 34 G/DL — SIGNIFICANT CHANGE UP (ref 32–36)
MCHC RBC-ENTMCNC: 34.3 PG — HIGH (ref 27–34)
MCV RBC AUTO: 100.7 FL — HIGH (ref 80–100)
NRBC # BLD: 0 /100 WBCS — SIGNIFICANT CHANGE UP (ref 0–0)
PLATELET # BLD AUTO: 172 K/UL — SIGNIFICANT CHANGE UP (ref 150–400)
POTASSIUM SERPL-MCNC: 3.7 MMOL/L — SIGNIFICANT CHANGE UP (ref 3.5–5.3)
POTASSIUM SERPL-SCNC: 3.7 MMOL/L — SIGNIFICANT CHANGE UP (ref 3.5–5.3)
PROT SERPL-MCNC: 5.1 GM/DL — LOW (ref 6–8.3)
PROTHROM AB SERPL-ACNC: 25 SEC — HIGH (ref 10.5–13.4)
RBC # BLD: 4.23 M/UL — SIGNIFICANT CHANGE UP (ref 4.2–5.8)
RBC # FLD: 12.5 % — SIGNIFICANT CHANGE UP (ref 10.3–14.5)
SARS-COV-2 RNA SPEC QL NAA+PROBE: SIGNIFICANT CHANGE UP
SODIUM SERPL-SCNC: 139 MMOL/L — SIGNIFICANT CHANGE UP (ref 135–145)
WBC # BLD: 6.1 K/UL — SIGNIFICANT CHANGE UP (ref 3.8–10.5)
WBC # FLD AUTO: 6.1 K/UL — SIGNIFICANT CHANGE UP (ref 3.8–10.5)

## 2022-10-04 PROCEDURE — 99239 HOSP IP/OBS DSCHRG MGMT >30: CPT

## 2022-10-04 RX ORDER — TAMSULOSIN HYDROCHLORIDE 0.4 MG/1
1 CAPSULE ORAL
Qty: 0 | Refills: 0 | DISCHARGE
Start: 2022-10-04

## 2022-10-04 RX ORDER — ATORVASTATIN CALCIUM 80 MG/1
1 TABLET, FILM COATED ORAL
Qty: 30 | Refills: 0
Start: 2022-10-04 | End: 2022-11-02

## 2022-10-04 RX ORDER — SENNA PLUS 8.6 MG/1
2 TABLET ORAL
Qty: 0 | Refills: 0 | DISCHARGE
Start: 2022-10-04

## 2022-10-04 RX ORDER — AMLODIPINE BESYLATE 2.5 MG/1
1 TABLET ORAL
Qty: 0 | Refills: 0 | DISCHARGE

## 2022-10-04 RX ADMIN — FINASTERIDE 5 MILLIGRAM(S): 5 TABLET, FILM COATED ORAL at 11:39

## 2022-10-04 RX ADMIN — BUDESONIDE AND FORMOTEROL FUMARATE DIHYDRATE 2 PUFF(S): 160; 4.5 AEROSOL RESPIRATORY (INHALATION) at 06:20

## 2022-10-04 RX ADMIN — PANTOPRAZOLE SODIUM 40 MILLIGRAM(S): 20 TABLET, DELAYED RELEASE ORAL at 06:20
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
## 2022-10-04 NOTE — DISCHARGE NOTE PROVIDER - NSDCCPCAREPLAN_GEN_ALL_CORE_FT
PRINCIPAL DISCHARGE DIAGNOSIS  Diagnosis: Altered mental status  Assessment and Plan of Treatment: AMS:  CVA ruled out  Present to ED with transient AMS with questionable slurry speech which started before ED arrival  Resolved, back to baseline  seen by neuro  Likely etiology TI A vs transient hypotension vs medication and dehydration   CT brain with no acute pathology. CT perfusion with abnormal perfusion scan suggesting artifact vs small core infarct in left frontal lobe. No large vessel occlusion  MR brain neg  c/w coumadin, stopped ASA to avoid bleeding risk  per neuro  Statin.  Elevated lactic acid level.   2/ 2 dehydration   Mildly elevated, normalized s/p IVF   U Cx + for ESBL E coli  Blood clx neg  S/P 3 days Ceftriaxone, + Urine clx likely colonization, doubt UTI, denies any symptoms, his mental status back to baseline without appropriate abx.  Will hold off further Abx  pt w/recent Hurt- pt Completed treatment for prostatitis on 8/15/2022.  TOV should be done in rehab as Hurt is recently placed. if continues to retain, outpt urology eval  BPH  continue flomax and finasteride  pt w/recent Hurt- pt Completed treatment for prostatitis on 8/15/2022.  TOV should be done in rehab as Hurt is recently placed. if continues to retain, outpt urology eval  Benign essential HTN.   Not on BP med  Intermittent hypotension  As per daughter he has always had intermittent low BP  Paroxysmal atrial fibrillation.   Afib on coumadin, INR therapeutic  Monitor daily INR  Daily coumadin per INR.  Fracture.   R distal Tib Fib fx treated non op  PT is being followed by ortho  PT eval : plan to return to [facility].      SECONDARY DISCHARGE DIAGNOSES  Diagnosis: Acute UTI  Assessment and Plan of Treatment:

## 2022-10-04 NOTE — DISCHARGE NOTE PROVIDER - CARE PROVIDER_API CALL
[facility],   Phone: (   )    -  Fax: (   )    -  Follow Up Time:     primary orthopedic team,   Phone: (   )    -  Fax: (   )    -  Follow Up Time:

## 2022-10-04 NOTE — DISCHARGE NOTE NURSING/CASE MANAGEMENT/SOCIAL WORK - NSDCPEFALRISK_GEN_ALL_CORE
For information on Fall & Injury Prevention, visit: [email or web address] OR  [email or web address] OR  https://www.cdc.gov/steadi/patient.html

## 2022-10-04 NOTE — DISCHARGE NOTE PROVIDER - PROVIDER TOKENS
FREE:[LAST:[pcp],PHONE:[(   )    -],FAX:[(   )    -]],FREE:[LAST:[primary orthopedic team],PHONE:[(   )    -],FAX:[(   )    -]]

## 2022-10-04 NOTE — DISCHARGE NOTE NURSING/CASE MANAGEMENT/SOCIAL WORK - PATIENT PORTAL LINK FT
You can access the FollowMyHealth Patient Portal offered by [facility] by registering at the following website: [email or web address]. By joining [employer]’s FollowMyHealth portal, you will also be able to view your health information using other applications (apps) compatible with our system.

## 2022-10-04 NOTE — DISCHARGE NOTE PROVIDER - NSDCMRMEDTOKEN_GEN_ALL_CORE_FT
albuterol 90 mcg/inh inhalation aerosol: 2 puff(s) inhaled every 6 hours  budesonide-formoterol 80 mcg-4.5 mcg/inh inhalation aerosol: 2 puff(s) inhaled 2 times a day  finasteride 5 mg oral tablet: 1 tab(s) orally once a day  methenamine hippurate 1 g oral tablet: 1 tab(s) orally 2 times a day  pantoprazole 40 mg oral delayed release tablet: 1 tab(s) orally once a day (before a meal)  senna leaf extract oral tablet: 2 tab(s) orally once a day (at bedtime)  tamsulosin 0.4 mg oral capsule: 1 cap(s) orally once a day (at bedtime)  warfarin 5 mg oral tablet: 1 tab(s) orally once a day

## 2022-10-04 NOTE — DISCHARGE NOTE NURSING/CASE MANAGEMENT/SOCIAL WORK - BRAND OF COVID-19 VACCINATION
patient was vaccinated but does not remember the brand and the dates of vaccination/Moderna dose 1 and 2

## 2022-10-05 LAB
CULTURE RESULTS: SIGNIFICANT CHANGE UP
CULTURE RESULTS: SIGNIFICANT CHANGE UP
SPECIMEN SOURCE: SIGNIFICANT CHANGE UP
SPECIMEN SOURCE: SIGNIFICANT CHANGE UP

## 2022-10-09 ENCOUNTER — EMERGENCY (EMERGENCY)
Facility: HOSPITAL | Age: 87
LOS: 0 days | Discharge: HOME | End: 2022-10-09
Attending: STUDENT IN AN ORGANIZED HEALTH CARE EDUCATION/TRAINING PROGRAM

## 2022-10-09 VITALS
OXYGEN SATURATION: 95 % | WEIGHT: 194.45 LBS | HEART RATE: 62 BPM | TEMPERATURE: 98 F | DIASTOLIC BLOOD PRESSURE: 76 MMHG | RESPIRATION RATE: 18 BRPM | HEIGHT: 70 IN | SYSTOLIC BLOOD PRESSURE: 148 MMHG

## 2022-10-09 VITALS
OXYGEN SATURATION: 97 % | HEART RATE: 71 BPM | TEMPERATURE: 98 F | RESPIRATION RATE: 18 BRPM | DIASTOLIC BLOOD PRESSURE: 82 MMHG | SYSTOLIC BLOOD PRESSURE: 122 MMHG

## 2022-10-09 DIAGNOSIS — R29.898 OTHER SYMPTOMS AND SIGNS INVOLVING THE MUSCULOSKELETAL SYSTEM: ICD-10-CM

## 2022-10-09 DIAGNOSIS — Z91.041 RADIOGRAPHIC DYE ALLERGY STATUS: ICD-10-CM

## 2022-10-09 DIAGNOSIS — E86.0 DEHYDRATION: ICD-10-CM

## 2022-10-09 DIAGNOSIS — N40.0 BENIGN PROSTATIC HYPERPLASIA WITHOUT LOWER URINARY TRACT SYMPTOMS: ICD-10-CM

## 2022-10-09 DIAGNOSIS — Z91.010 ALLERGY TO PEANUTS: ICD-10-CM

## 2022-10-09 DIAGNOSIS — Z20.822 CONTACT WITH AND (SUSPECTED) EXPOSURE TO COVID-19: ICD-10-CM

## 2022-10-09 DIAGNOSIS — Z79.01 LONG TERM (CURRENT) USE OF ANTICOAGULANTS: ICD-10-CM

## 2022-10-09 DIAGNOSIS — Z91.018 ALLERGY TO OTHER FOODS: ICD-10-CM

## 2022-10-09 DIAGNOSIS — Z87.39 PERSONAL HISTORY OF OTHER DISEASES OF THE MUSCULOSKELETAL SYSTEM AND CONNECTIVE TISSUE: ICD-10-CM

## 2022-10-09 DIAGNOSIS — I48.91 UNSPECIFIED ATRIAL FIBRILLATION: ICD-10-CM

## 2022-10-09 PROBLEM — J44.1 CHRONIC OBSTRUCTIVE PULMONARY DISEASE WITH (ACUTE) EXACERBATION: Chronic | Status: ACTIVE | Noted: 2022-09-30

## 2022-10-09 PROBLEM — I48.20 CHRONIC ATRIAL FIBRILLATION, UNSPECIFIED: Chronic | Status: ACTIVE | Noted: 2022-09-30

## 2022-10-09 LAB
ALBUMIN SERPL ELPH-MCNC: 3.1 G/DL — LOW (ref 3.3–5)
ALP SERPL-CCNC: 74 U/L — SIGNIFICANT CHANGE UP (ref 40–120)
ALT FLD-CCNC: 67 U/L — SIGNIFICANT CHANGE UP (ref 12–78)
ANION GAP SERPL CALC-SCNC: 8 MMOL/L — SIGNIFICANT CHANGE UP (ref 5–17)
AST SERPL-CCNC: 40 U/L — HIGH (ref 15–37)
BASOPHILS # BLD AUTO: 0.04 K/UL — SIGNIFICANT CHANGE UP (ref 0–0.2)
BASOPHILS NFR BLD AUTO: 0.6 % — SIGNIFICANT CHANGE UP (ref 0–2)
BILIRUB SERPL-MCNC: 0.9 MG/DL — SIGNIFICANT CHANGE UP (ref 0.2–1.2)
BUN SERPL-MCNC: 18 MG/DL — SIGNIFICANT CHANGE UP (ref 7–23)
CALCIUM SERPL-MCNC: 9 MG/DL — SIGNIFICANT CHANGE UP (ref 8.5–10.1)
CHLORIDE SERPL-SCNC: 109 MMOL/L — HIGH (ref 96–108)
CO2 SERPL-SCNC: 22 MMOL/L — SIGNIFICANT CHANGE UP (ref 22–31)
CREAT SERPL-MCNC: 0.82 MG/DL — SIGNIFICANT CHANGE UP (ref 0.5–1.3)
EGFR: 86 ML/MIN/1.73M2 — SIGNIFICANT CHANGE UP
EOSINOPHIL # BLD AUTO: 0.33 K/UL — SIGNIFICANT CHANGE UP (ref 0–0.5)
EOSINOPHIL NFR BLD AUTO: 4.6 % — SIGNIFICANT CHANGE UP (ref 0–6)
FLUAV AG NPH QL: SIGNIFICANT CHANGE UP
FLUBV AG NPH QL: SIGNIFICANT CHANGE UP
GLUCOSE BLDC GLUCOMTR-MCNC: 109 MG/DL — HIGH (ref 70–99)
GLUCOSE SERPL-MCNC: 124 MG/DL — HIGH (ref 70–99)
HCT VFR BLD CALC: 44.5 % — SIGNIFICANT CHANGE UP (ref 39–50)
HGB BLD-MCNC: 15.7 G/DL — SIGNIFICANT CHANGE UP (ref 13–17)
IMM GRANULOCYTES NFR BLD AUTO: 0.4 % — SIGNIFICANT CHANGE UP (ref 0–0.9)
INR BLD: 2.15 RATIO — HIGH (ref 0.88–1.16)
LYMPHOCYTES # BLD AUTO: 1.32 K/UL — SIGNIFICANT CHANGE UP (ref 1–3.3)
LYMPHOCYTES # BLD AUTO: 18.6 % — SIGNIFICANT CHANGE UP (ref 13–44)
MCHC RBC-ENTMCNC: 34.7 PG — HIGH (ref 27–34)
MCHC RBC-ENTMCNC: 35.3 G/DL — SIGNIFICANT CHANGE UP (ref 32–36)
MCV RBC AUTO: 98.5 FL — SIGNIFICANT CHANGE UP (ref 80–100)
MONOCYTES # BLD AUTO: 0.43 K/UL — SIGNIFICANT CHANGE UP (ref 0–0.9)
MONOCYTES NFR BLD AUTO: 6 % — SIGNIFICANT CHANGE UP (ref 2–14)
NEUTROPHILS # BLD AUTO: 4.96 K/UL — SIGNIFICANT CHANGE UP (ref 1.8–7.4)
NEUTROPHILS NFR BLD AUTO: 69.8 % — SIGNIFICANT CHANGE UP (ref 43–77)
NRBC # BLD: 0 /100 WBCS — SIGNIFICANT CHANGE UP (ref 0–0)
PLATELET # BLD AUTO: 179 K/UL — SIGNIFICANT CHANGE UP (ref 150–400)
POTASSIUM SERPL-MCNC: 4.1 MMOL/L — SIGNIFICANT CHANGE UP (ref 3.5–5.3)
POTASSIUM SERPL-SCNC: 4.1 MMOL/L — SIGNIFICANT CHANGE UP (ref 3.5–5.3)
PROT SERPL-MCNC: 5.8 GM/DL — LOW (ref 6–8.3)
PROTHROM AB SERPL-ACNC: 26 SEC — HIGH (ref 10.5–13.4)
RBC # BLD: 4.52 M/UL — SIGNIFICANT CHANGE UP (ref 4.2–5.8)
RBC # FLD: 12.5 % — SIGNIFICANT CHANGE UP (ref 10.3–14.5)
SARS-COV-2 RNA SPEC QL NAA+PROBE: SIGNIFICANT CHANGE UP
SODIUM SERPL-SCNC: 139 MMOL/L — SIGNIFICANT CHANGE UP (ref 135–145)
WBC # BLD: 7.11 K/UL — SIGNIFICANT CHANGE UP (ref 3.8–10.5)
WBC # FLD AUTO: 7.11 K/UL — SIGNIFICANT CHANGE UP (ref 3.8–10.5)

## 2022-10-09 PROCEDURE — 73590 X-RAY EXAM OF LOWER LEG: CPT | Mod: 26,RT

## 2022-10-09 PROCEDURE — 99284 EMERGENCY DEPT VISIT MOD MDM: CPT

## 2022-10-09 PROCEDURE — 70450 CT HEAD/BRAIN W/O DYE: CPT | Mod: 26,MA

## 2022-10-09 RX ORDER — SODIUM CHLORIDE 9 MG/ML
1000 INJECTION INTRAMUSCULAR; INTRAVENOUS; SUBCUTANEOUS ONCE
Refills: 0 | Status: COMPLETED | OUTPATIENT
Start: 2022-10-09 | End: 2022-10-09

## 2022-10-09 RX ADMIN — SODIUM CHLORIDE 1000 MILLILITER(S): 9 INJECTION INTRAMUSCULAR; INTRAVENOUS; SUBCUTANEOUS at 15:44

## 2022-10-09 RX ADMIN — SODIUM CHLORIDE 2000 MILLILITER(S): 9 INJECTION INTRAMUSCULAR; INTRAVENOUS; SUBCUTANEOUS at 10:21

## 2022-10-09 NOTE — ED ADULT NURSE REASSESSMENT NOTE - NS ED NURSE REASSESS COMMENT FT1
Pt left unit in stable condition with LPN Jessica back to [facility] room 119W in stable condition.

## 2022-10-09 NOTE — ED PROVIDER NOTE - CLINICAL SUMMARY MEDICAL DECISION MAKING FREE TEXT BOX
Patient without acute medical complaints, at his baseline, with reported baseline neuro deficits, denies any new numbness or weakness.  No indications the patient had a stroke or neurologic event, likely metabolic versus dehydration, will give IV fluid bolus check electrolytes, patient with dry mucous membranes on exam, will check lytes  screening for  ? Change in mentation with CT head and if negative and similar to previous CT can be discharged back to Ortho.

## 2022-10-09 NOTE — ED PROVIDER NOTE - NS ED ROS FT
Constitutional: See HPI.  Eyes: No visual changes, eye pain or discharge. No Photophobia  ENMT: No hearing changes, pain, discharge or infections. No neck pain or stiffness. No limited ROM  Cardiac: No SOB or edema. No chest pain with exertion.  Respiratory: No cough or respiratory distress.   GI: No nausea, vomiting, diarrhea or abdominal pain.  : No dysuria, frequency or burning. No Discharge  MS: No myalgia, muscle weakness, joint pain or back pain.  Neuro: No headache or weakness. No LOC.  Skin: No skin rash.  Except as documented in the HPI, all other systems are negative.

## 2022-10-09 NOTE — ED ADULT NURSE NOTE - ED STAT RN HANDOFF DETAILS
Assuming patient's care for coverage. Report received from RN and daughter informed during rounding at bedside. Assessment available on [facility]. Will continue to monitor. daughter requesting tray for patient and only beef available, kitchen called for chicken tray. daughter states that she will call kitchen herself, this nurse informed patient that  baltazar already called the kitchen.

## 2022-10-09 NOTE — ED PROVIDER NOTE - PATIENT PORTAL LINK FT
You can access the FollowMyHealth Patient Portal offered by [facility] by registering at the following website: [email or web address]. By joining [employer]’s FollowMyHealth portal, you will also be able to view your health information using other applications (apps) compatible with our system.

## 2022-10-09 NOTE — ED ADULT TRIAGE NOTE - CHIEF COMPLAINT QUOTE
pt from [facility], staff noticed patient not verbally responding at 0830. pt last known normal at 0730, during morning vital signs. pt recently Dx: uti x 1 week ago. Pt a&O x 3, verbal at baseline. feliciano in place. stroke eval done. no stroke code called. pt from [facility], staff noticed patient not verbally responding at 0830. pt last known normal at 0730, during morning vital signs. pt recently Dx: uti x 1 week ago. Pt a&O x 3, verbal at baseline. feliciano in place. stroke eval done by Dr. Jay. no stroke code called.

## 2022-10-09 NOTE — ED ADULT NURSE NOTE - OBJECTIVE STATEMENT
Patient is an 87 yr old for [facility] Patient is an 87 yr old for [facility], was sent over due to change in mental status. Pt does have hx of UTI x1 wk. On arrival pt noted A&OX3 able to answer name, birth date, day of the week and month also knew where he was. Pt was here 1 wk ago for same reason. Unable to get IV access, MD aware.

## 2022-10-09 NOTE — ED PROVIDER NOTE - OBJECTIVE STATEMENT
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  Patient at time of triage was alert and orientated to self place and time x3, denies any acute complaints of headache nausea vomiting chest pain shortness of breath or abdominal pain fever chills or other complaints.  Patient is back to baseline, patient reports chronic right upper extremity weakness and contracted state due to accident, had recent admission with negative MRI CVA was ruled out during the admission, CTA with no significant occlusions requiring intervention on recent admission.

## 2022-10-09 NOTE — ED ADULT NURSE NOTE - CHPI ED NUR SYMPTOMS NEG
no blurred vision/no change in level of consciousness/no confusion/no dizziness/no fever/no numbness

## 2022-10-09 NOTE — ED PROVIDER NOTE - NSFOLLOWUPINSTRUCTIONS_ED_ALL_ED_FT
PLEASE CONSIDER MAINTENANCE FLUID AT [facility] TO PREVENT DEHYDRATION.       Dehydration    WHAT YOU NEED TO KNOW:    Dehydration is a condition that develops when your body does not have enough fluid. You may become dehydrated if you do not drink enough water or lose too much fluid. Fluid loss may also cause loss of electrolytes (minerals), such as sodium.    DISCHARGE INSTRUCTIONS:    Return to the emergency department if:     You have a seizure.      You are confused or cannot think clearly.      You are extremely sleepy, or another person cannot wake you.       You become dizzy or faint when you stand.      You are not able to urinate.      You have trouble breathing.      You have a fast or irregular heartbeat.      Your hands or feet are cold, or your face is pale.     Contact your healthcare provider if:     You have trouble drinking liquids because you are vomiting.      Your symptoms get worse.      You have a fever.       You feel very weak or tired.      You have questions or concerns about your condition or care.    Follow up with your healthcare provider as directed: Write down your questions so you remember to ask them during your visits.     Prevent or manage dehydration:     Drink liquids as directed. Liquids that contain water, sugar, and minerals can help your body hold in fluid and help prevent dehydration. Drink liquids throughout the day, not just when you feel thirsty. Men should drink about 3 liters (13 eight-ounce cups) of liquid each day. Women should drink about 2 liters (9 eight-ounce cups) of liquid each day. Drink even more liquid if you will be outdoors, in the sun for a long time, or exercising.       Stay cool. Limit the time you spend outdoors during the hottest part of the day. Dress in lightweight clothes.       Keep track of how often you urinate. If you urinate less than usual or your urine is darker, drink more liquids.         © Copyright [employer] 2019 All illustrations and images included in CareNotes are the copyrighted property of [employer] Inc[employer] or Angella Joy.

## 2022-10-09 NOTE — ED ADULT NURSE REASSESSMENT NOTE - NS ED NURSE REASSESS COMMENT FT1
covering RN, patient's daughter asked for update on father's status, as per dr chung, patient needs to be observed in the ED beacause his initial complaint was r/o TIA. patient daughter states that there are too many stories going on because I was told that my father does not have a TIA. this nurse explained that she is correct, the patient does not have a tia but due to his symptoms that the patient presented to the ED for, the patient will still need to be observed in the ED

## 2022-10-09 NOTE — ED ADULT NURSE NOTE - CHIEF COMPLAINT QUOTE
pt from [facility], staff noticed patient not verbally responding at 0830. pt last known normal at 0730, during morning vital signs. pt recently Dx: uti x 1 week ago. Pt a&O x 3, verbal at baseline. feliciano in place. stroke eval done. no stroke code called.

## 2022-10-09 NOTE — ED ADULT NURSE NOTE - NSIMPLEMENTINTERV_GEN_ALL_ED
Implemented All Fall Risk Interventions:  [place] to call system. Call bell, personal items and telephone within reach. Instruct patient to call for assistance. Room bathroom lighting operational. Non-slip footwear when patient is off stretcher. Physically safe environment: no spills, clutter or unnecessary equipment. Stretcher in lowest position, wheels locked, appropriate side rails in place. Provide visual cue, wrist band, yellow gown, etc. Monitor gait and stability. Monitor for mental status changes and reorient to person, place, and time. Review medications for side effects contributing to fall risk. Reinforce activity limits and safety measures with patient and family.

## 2022-10-13 DIAGNOSIS — Z79.01 LONG TERM (CURRENT) USE OF ANTICOAGULANTS: ICD-10-CM

## 2022-10-13 DIAGNOSIS — I95.89 OTHER HYPOTENSION: ICD-10-CM

## 2022-10-13 DIAGNOSIS — I10 ESSENTIAL (PRIMARY) HYPERTENSION: ICD-10-CM

## 2022-10-13 DIAGNOSIS — G45.9 TRANSIENT CEREBRAL ISCHEMIC ATTACK, UNSPECIFIED: ICD-10-CM

## 2022-10-13 DIAGNOSIS — B96.20 UNSPECIFIED ESCHERICHIA COLI [E. COLI] AS THE CAUSE OF DISEASES CLASSIFIED ELSEWHERE: ICD-10-CM

## 2022-10-13 DIAGNOSIS — N39.0 URINARY TRACT INFECTION, SITE NOT SPECIFIED: ICD-10-CM

## 2022-10-13 DIAGNOSIS — I48.0 PAROXYSMAL ATRIAL FIBRILLATION: ICD-10-CM

## 2022-10-13 DIAGNOSIS — R41.82 ALTERED MENTAL STATUS, UNSPECIFIED: ICD-10-CM

## 2022-10-13 DIAGNOSIS — Z79.899 OTHER LONG TERM (CURRENT) DRUG THERAPY: ICD-10-CM

## 2022-10-13 DIAGNOSIS — Z96.643 PRESENCE OF ARTIFICIAL HIP JOINT, BILATERAL: ICD-10-CM

## 2022-10-13 DIAGNOSIS — E86.0 DEHYDRATION: ICD-10-CM

## 2022-10-13 DIAGNOSIS — N40.0 BENIGN PROSTATIC HYPERPLASIA WITHOUT LOWER URINARY TRACT SYMPTOMS: ICD-10-CM

## 2022-10-13 DIAGNOSIS — J44.9 CHRONIC OBSTRUCTIVE PULMONARY DISEASE, UNSPECIFIED: ICD-10-CM

## 2022-10-24 ENCOUNTER — OUTPATIENT (OUTPATIENT)
Dept: OUTPATIENT SERVICES | Facility: HOSPITAL | Age: 87
LOS: 1 days | Discharge: HOME | End: 2022-10-24

## 2022-10-24 DIAGNOSIS — S82.241A DISPLACED SPIRAL FRACTURE OF SHAFT OF RIGHT TIBIA, INITIAL ENCOUNTER FOR CLOSED FRACTURE: ICD-10-CM

## 2022-10-24 PROCEDURE — 73590 X-RAY EXAM OF LOWER LEG: CPT | Mod: 26,RT

## 2022-11-02 NOTE — ED ADULT NURSE NOTE - IS THE PATIENT ABLE TO BE SCREENED?
Yes
Additional Notes (Optional): Recommended seeing vascular surgeon.
Hide Include Location In Plan Question?: No
Detail Level: Zone
Detail Level: Detailed

## 2022-11-06 ENCOUNTER — OUTPATIENT (OUTPATIENT)
Dept: OUTPATIENT SERVICES | Facility: HOSPITAL | Age: 87
LOS: 1 days | Discharge: HOME | End: 2022-11-06

## 2022-11-06 DIAGNOSIS — R07.9 CHEST PAIN, UNSPECIFIED: ICD-10-CM

## 2022-11-06 PROCEDURE — 71045 X-RAY EXAM CHEST 1 VIEW: CPT | Mod: 26

## 2022-11-07 ENCOUNTER — OUTPATIENT (OUTPATIENT)
Dept: OUTPATIENT SERVICES | Facility: HOSPITAL | Age: 87
LOS: 1 days | Discharge: HOME | End: 2022-11-07

## 2022-11-07 DIAGNOSIS — S82.401A UNSPECIFIED FRACTURE OF SHAFT OF RIGHT FIBULA, INITIAL ENCOUNTER FOR CLOSED FRACTURE: ICD-10-CM

## 2022-11-07 PROCEDURE — 73590 X-RAY EXAM OF LOWER LEG: CPT | Mod: 26,RT

## 2022-11-24 ENCOUNTER — OUTPATIENT (OUTPATIENT)
Dept: OUTPATIENT SERVICES | Facility: HOSPITAL | Age: 87
LOS: 1 days | Discharge: HOME | End: 2022-11-24

## 2022-11-24 DIAGNOSIS — S82.241D DISPLACED SPIRAL FRACTURE OF SHAFT OF RIGHT TIBIA, SUBSEQUENT ENCOUNTER FOR CLOSED FRACTURE WITH ROUTINE HEALING: ICD-10-CM

## 2022-11-24 PROCEDURE — 73590 X-RAY EXAM OF LOWER LEG: CPT | Mod: 26,RT

## 2022-12-16 ENCOUNTER — OUTPATIENT (OUTPATIENT)
Dept: OUTPATIENT SERVICES | Facility: HOSPITAL | Age: 87
LOS: 1 days | Discharge: HOME | End: 2022-12-16

## 2022-12-16 DIAGNOSIS — Z29.9 ENCOUNTER FOR PROPHYLACTIC MEASURES, UNSPECIFIED: ICD-10-CM

## 2022-12-16 PROCEDURE — 73590 X-RAY EXAM OF LOWER LEG: CPT | Mod: 26,RT

## 2023-01-01 NOTE — PATIENT PROFILE ADULT. - PAIN, CHRONIC: FACTORS THAT AGGRAVATE, PROFILE
This note was copied from the mother's chart.  Lactation consultant to patient room to assess breastfeeding.     Infant STS on mom's chest and not waking for feeding at this time after circumcision.     Reviewed benefit of skin to skin prior to feeding to waken and ready for feeding, importance of feeding baby on early hunger cues, and breastfeeding 8-12 times in 24 hours for adequate infant nutrition and hydration as well as for building an optimal milk supply. Instructed on importance of optimal infant positioning for deep, comfortable latch and effective milk transfer.     Rosalba states that she is shaping/sandwiching her nipple and areola through out feeding to keep infant deeply latched. She states baby is actively sucking and swallowing during feedings, and her breasts are starting to feel warm and heavy. She is supplementing donor milk and pumping after breastfeeding due to risks for delayed lactation. States infant is not taking full 20 ml donor milk bottle after breast feeding and has spit up small amounts after bottle feeding. Encouraged her to supplement using paced bottle feeding and in smallest amounts baby seems satisfied with. Instructed to pump after bottle feeding, but as she begins to eliminate supplements she can discontinue pumping gradually as well.    Reviewed online and outpatient lactation resources for breastfeeding help after discharge.     Answered questions and gave encouragement.    
This writer met with Rosalba for a brief visit.  She states she has a small blood blister on her left nipple and the nipple appears compressed, as she just took infant off the breast.  Encouraged to use reverse pressure softening prior to latching.  Encouraged to place one more pillow behind her back so infant can start the football hold with his nose at Rosalba's nipple.  Then infant will reach up to latch.  Suggest warm, moist compresses to sore nipples after breastfeeding.  Suggest starting to pump, as Rosalba is at risk for delayed lactogenesis:Prime, long labor,, large blood loss.  She is willing to pump.  Encouraged rest, as able.   supportive and helpful.     
This writer stopped in to see Rosalba to offer lactation services.  She reports infant latched well and was actively sucking and swallowing for 30 minutes and is now content.  She will call for lactation assistance, prn.  She is pumping after breastfeeding to help build and protect her milk supply.    
activity

## 2023-01-24 ENCOUNTER — OUTPATIENT (OUTPATIENT)
Dept: OUTPATIENT SERVICES | Facility: HOSPITAL | Age: 88
LOS: 1 days | Discharge: HOME | End: 2023-01-24
Payer: MEDICARE

## 2023-01-24 DIAGNOSIS — I82.401 ACUTE EMBOLISM AND THROMBOSIS OF UNSPECIFIED DEEP VEINS OF RIGHT LOWER EXTREMITY: ICD-10-CM

## 2023-01-24 PROCEDURE — 93971 EXTREMITY STUDY: CPT | Mod: 26,RT

## 2023-07-28 NOTE — H&P ADULT - PROBLEM SELECTOR PROBLEM 2
I called patient and personally reviewed results. We will do a course of bactrim DS BID x 14 days and compounded nasal steroid spray and antibiotic irrigation. Will consider endoscopic sinus surgery if no improvement. Pneumonia

## 2024-04-26 NOTE — PATIENT PROFILE ADULT. - FUNCTIONAL SCREEN CURRENT LEVEL: TOILETING, MLM
Pt had this medication filled at [facility] on 4/10 by Misael Ferreira.  She states that the last bottle she has said it was last refilled on March 24 at [facility]. I am going to call both pharmacy's to get the correct fill dates.  She states she took her last dose on 4/24.    walker/(1) assistive equipment

## 2024-09-18 NOTE — ED PROVIDER NOTE - ENMT NEGATIVE STATEMENT, MLM
Ambulatory
Ears: no ear pain and no hearing problems. Nose: no nasal congestion and no nasal drainage. Mouth/Throat: no dysphagia, no hoarseness and no throat pain. Neck: no lumps, no pain, no stiffness and no swollen glands.

## 2025-03-31 NOTE — CONSULT NOTE ADULT - SUBJECTIVE AND OBJECTIVE BOX
Full note to follow  Suspect UTI- sx for 3-4 days PTA with dysuria, frequency but no hesitancy/dribbling/sense of retention.  Has radiographic findings that need to be followed to resolution (CT reviewed), but no signs/symptoms of pneumonia  Suspect blood cx is a procurement contaminant  Stop Azithro  F/U Cx  Continue ceftriaxone  I do not feel obligated to add an aminoglycoside dose give decrease in WBC, overall stability  Check Renal/Bladder US and PVR - creatiine higher that prior (albeit 2018)  Left message for Dr. Haque  D/W patient  Thank you for the courtesy of this referral.  Srini Teague MD  Attending Physician  [facility]  Division of Infectious Diseases  [phone]  -----------------  [facility]  [phone]    SRINI DUFFY  87y, Male  [id number]    HPI--      PMH/PSH--  Syncope    Hypotension    BPH (Benign Prostatic Hyperplasia)    Hypertension    S/P hip replacement        Allergies--  chocolate (Unknown)  No Known Drug Allergies  peanuts (Anaphylaxis)      Medications--  Antibiotics: azithromycin  IVPB 500 milliGRAM(s) IV Intermittent <User Schedule>  cefTRIAXone   IVPB 1000 milliGRAM(s) IV Intermittent <User Schedule>    Immunologic:   Other: acetaminophen     Tablet .. PRN  amLODIPine   Tablet  finasteride  furosemide    Tablet  loratadine  pantoprazole    Tablet    Antimicrobials last 90 days per EMR: MEDICATIONS  (STANDING):  azithromycin  IVPB   255 mL/Hr IV Intermittent (07-19-22 @ 20:54)    azithromycin  IVPB   255 mL/Hr IV Intermittent (07-20-22 @ 21:01)    cefTRIAXone   IVPB   100 mL/Hr IV Intermittent (07-19-22 @ 20:54)    cefTRIAXone   IVPB   100 mL/Hr IV Intermittent (07-20-22 @ 20:27)        Social History--  EtOH: denies   Tobacco: denies   Drug Use: denies     Family/Marital History--  No pertinent family history in first degree relatives    FH: HTN (hypertension)          Travel/Environmental/Occupational History:      Review of Systems:  A >=10-point review of systems was obtained.     Pertinent positives and negatives--  Constitutional: No fevers. No Chills. No Rigors.   Eyes:  ENMT:  Cardiovascular: No chest pain. No palpitations.  Respiratory: No shortness of breath. No cough.  Gastrointestinal: No nausea or vomiting. No diarrhea or constipation.   Genitourinary:  Musculoskeletal:  Skin:  Neurologic:  Psychiatric: Pleasant. Appropriate affect.  Endocrine:  Heme/Lymphatic:  Allergy/Immunologic:    Review of systems otherwise negative except as previously noted.    Physical Exam--  Vital Signs: T(F): 97.5 (07-21-22 @ 12:07), Max: 98.1 (07-21-22 @ 05:34)  HR: 80 (07-21-22 @ 12:07)  BP: 111/66 (07-21-22 @ 12:07)  RR: 18 (07-21-22 @ 12:07)  SpO2: 93% (07-21-22 @ 12:07)  Wt(kg): --  General: Nontoxic-appearing Male in no acute distress. Unkempt.   HEENT: AT/NC. PERRL. EOMI. Anicteric. Conjunctiva pink and moist. Oropharynx clear. Dentition fair.  Neck: Not rigid. No sense of mass.  Nodes: None palpable.  Lungs: Clear bilaterally without rales, wheezing or rhonchi  Heart: Regular rate and rhythm. No Murmur. No rub. No gallop. No palpable thrill.  Abdomen: Bowel sounds present and normoactive. Soft. Nondistended. Nontender. No sense of mass. No organomegaly.  Back: No spinal tenderness. No costovertebral angle tenderness.   Extremities: No cyanosis or clubbing. 1+ LE edema. Deformity RUE.   Skin: Warm. Dry. Good turgor. No rash. No vasculitic stigmata.  Psychiatric: Appropriate affect and mood for situation.         Laboratory & Imaging Data--  CBC                        14.1   7.71  )-----------( 178      ( 21 Jul 2022 06:15 )             41.4       Chemistries  07-21    138  |  102  |  21  ----------------------------<  102<H>  3.6   |  26  |  1.06    Ca    8.7      21 Jul 2022 06:15  Phos  2.6     07-21  Mg     2.1     07-21    TPro  5.9<L>  /  Alb  2.9<L>  /  TBili  1.4<H>  /  DBili  x   /  AST  23  /  ALT  24  /  AlkPhos  58  07-21      Culture Data    Culture - Blood (collected 19 Jul 2022 19:30)  Source: .Blood Blood  Preliminary Report (21 Jul 2022 02:02):    No growth to date.    Culture - Blood (collected 19 Jul 2022 19:30)  Source: .Blood Blood  Gram Stain (prelim) (21 Jul 2022 01:19):    Growth in anaerobic bottle: Gram Positive Cocci in Pairs and Chains  Preliminary Report (21 Jul 2022 01:19):    Growth in anaerobic bottle: Gram Positive Cocci in Pairs and Chains    ***Blood Panel PCR results on this specimen are available    approximately 3 hours after the Gram stain result.***    Gram stain, PCR, and/or culture results may not always    correspond due to difference in methodologies.    ************************************************************    This PCR assay was performed by multiplex PCR. This    Assay tests for 66 bacterial and resistance gene targets.    Please refer to the [facility] test directory    at [email or web address] for details.  Organism: Blood Culture PCR (21 Jul 2022 06:17)  Organism: Blood Culture PCR (21 Jul 2022 06:17)         Full note to follow  Suspect UTI- sx for 3-4 days PTA with dysuria, frequency but no hesitancy/dribbling/sense of retention.  Has radiographic findings that need to be followed to resolution (CT reviewed), but no signs/symptoms of pneumonia  Suspect blood cx is a procurement contaminant  Stop Azithro  F/U Cx  Continue ceftriaxone  I do not feel obligated to add an aminoglycoside dose give decrease in WBC, overall stability  Check Renal/Bladder US and PVR - creatiine higher that prior (albeit 2018)  Left message for Dr. Haque  D/W patient  Thank you for the courtesy of this referral.  Srini Teague MD  Attending Physician  [facility]  Division of Infectious Diseases  [phone]  -----------------  [facility]  Division of Infectious Diseases  [phone]    SRINI DUFFY  87y, Male  [phone]    HPI--  This is an 87-year-old man with medical history significant for bilateral total hip replacements many years ago, high blood pressure, benign prostatic hypertrophy, who was noted to have escalating leukocytosis on outpatient laboratory data.  Unclear why this lab it was being done on a regular basis.  He was admitted for concern of pneumonia seen on CT scan of the chest.  However the patient has no respiratory symptoms whatsoever.  He has felt weak and tired at times.  He also had dysuria for 3 to 4 days prior to admission, that it does not appear was mentions to the admitting team's.  He denies any hematuria.  He denies any similar episodes.  He does have frequency.  There is no sense of incomplete emptying of his bladder, difficulty initiating urinary stream, or difficulty terminating his urine.  He has no abdominal discomfort.  Has no back pain.    Here CT scan of the chest as above revealed a upper lobe infiltrate (personally reviewed).  Legionella is negative.  Urine culture is pending.  He has a positive blood culture for coagulase-negative Staphylococcus.      PMH/PSH--  Syncope    Hypotension    BPH (Benign Prostatic Hyperplasia)    Hypertension    S/P hip replacement        Allergies--  chocolate (Unknown)  No Known Drug Allergies  peanuts (Anaphylaxis)      Medications--  Antibiotics: azithromycin  IVPB 500 milliGRAM(s) IV Intermittent <User Schedule>  cefTRIAXone   IVPB 1000 milliGRAM(s) IV Intermittent <User Schedule>    Immunologic:   Other: acetaminophen     Tablet .. PRN  amLODIPine   Tablet  finasteride  furosemide    Tablet  loratadine  pantoprazole    Tablet    Antimicrobials last 90 days per EMR: MEDICATIONS  (STANDING):  azithromycin  IVPB   255 mL/Hr IV Intermittent (07-19-22 @ 20:54)    azithromycin  IVPB   255 mL/Hr IV Intermittent (07-20-22 @ 21:01)    cefTRIAXone   IVPB   100 mL/Hr IV Intermittent (07-19-22 @ 20:54)    cefTRIAXone   IVPB   100 mL/Hr IV Intermittent (07-20-22 @ 20:27)        Social History--  EtOH: denies   Tobacco: denies   Drug Use: denies     Family/Marital History--  No pertinent family history in first degree relatives    FH: HTN (hypertension)    Review of Systems:  A >=10-point review of systems was obtained.   Review of systems otherwise negative except as previously noted.    Physical Exam--  Vital Signs: T(F): 97.5 (07-21-22 @ 12:07), Max: 98.1 (07-21-22 @ 05:34)  HR: 80 (07-21-22 @ 12:07)  BP: 111/66 (07-21-22 @ 12:07)  RR: 18 (07-21-22 @ 12:07)  SpO2: 93% (07-21-22 @ 12:07)  Wt(kg): --  General: Nontoxic-appearing Male in no acute distress. Unkempt.   HEENT: AT/NC. PERRL. EOMI. Anicteric. Conjunctiva pink and moist. Oropharynx clear. Dentition fair.  Neck: Not rigid. No sense of mass.  Nodes: None palpable.  Lungs: Clear bilaterally without rales, wheezing or rhonchi  Heart: Regular rate and rhythm. No Murmur. No rub. No gallop. No palpable thrill.  Abdomen: Bowel sounds present and normoactive. Soft. Nondistended. Nontender. No sense of mass. No organomegaly.  Back: No spinal tenderness. No costovertebral angle tenderness.   Extremities: No cyanosis or clubbing. 1+ LE edema. Deformity RUE.   Skin: Warm. Dry. Good turgor. No rash. No vasculitic stigmata.  Psychiatric: Appropriate affect and mood for situation.         Laboratory & Imaging Data--  CBC                        14.1   7.71  )-----------( 178      ( 21 Jul 2022 06:15 )             41.4       Chemistries  07-21    138  |  102  |  21  ----------------------------<  102<H>  3.6   |  26  |  1.06    Ca    8.7      21 Jul 2022 06:15  Phos  2.6     07-21  Mg     2.1     07-21    TPro  5.9<L>  /  Alb  2.9<L>  /  TBili  1.4<H>  /  DBili  x   /  AST  23  /  ALT  24  /  AlkPhos  58  07-21      Culture Data    Culture - Blood (collected 19 Jul 2022 19:30)  Source: .Blood Blood  Preliminary Report (21 Jul 2022 02:02):    No growth to date.    Culture - Blood (collected 19 Jul 2022 19:30)  Source: .Blood Blood  Gram Stain (prelim) (21 Jul 2022 01:19):    Growth in anaerobic bottle: Gram Positive Cocci in Pairs and Chains  Preliminary Report (21 Jul 2022 01:19):    Growth in anaerobic bottle: Gram Positive Cocci in Pairs and Chains    ***Blood Panel PCR results on this specimen are available    approximately 3 hours after the Gram stain result.***    Gram stain, PCR, and/or culture results may not always    correspond due to difference in methodologies.    ************************************************************    This PCR assay was performed by multiplex PCR. This    Assay tests for 66 bacterial and resistance gene targets.    Please refer to the [facility] test directory    at [email or web address] for details.  Organism: Blood Culture PCR (21 Jul 2022 06:17)  Organism: Blood Culture PCR (21 Jul 2022 06:17)    < from: CT Chest w/ IV Cont (07.19.22 @ 23:38) >    ACC: [id number] EXAM:  CT CHEST IC                          PROCEDURE DATE:  07/19/2022          INTERPRETATION:  CLINICAL INFORMATION: Pneumonia    COMPARISON: None.    CONTRAST/COMPLICATIONS:  IV Contrast: IV contrast documented in associated exam  Oral Contrast: 12/31/2017  Complications: None reported at time of study completion    PROCEDURE:  CT of the Chest was performed.  Sagittal and coronal reformats were performed.    FINDINGS:    LUNGS AND AIRWAYS: There is a stable 7 x 5 mm nodule of the right lower   lobe. Given its long-term stability, this likely to be benign.    Atelectatic changes are seen posteriorly. Patchy density of the right   upper lobe suspicious for pneumonia. Linear atelectasis or scarring is   seen bilaterally.  PLEURA: No pleural effusion.  MEDIASTINUM AND RANDOLPH: Nonenlarged mediastinal lymph nodes appreciated.  VESSELS: The ascending aorta is dilated measuring 4.1 cm. The main   pulmonary artery is within normal limits. Calcifications of the aorta and   proximal branch vessels are noted.  HEART: The heart size is normal. Coronary and valvular calcifications are   appreciated. There is no significant pericardial effusion.  CHEST WALL AND LOWER NECK: Within normal limits.  VISUALIZED UPPER ABDOMEN: 1.4 cm lobulated cyst of the right hepatic lobe   is noted layering density within the gallbladder likely related to   sludge. Several left-sided renal cysts are noted.  BONES: Degenerative changes of the osseous structures are appreciated.   Old left posteriorrib fractures are noted. There is a curvature of the   spine.    IMPRESSION:  Focus of right upper lobe density suspicious for pneumonia.    Ascending aortic dilatation of 4.1 cm.  --- End of Report ---  ALLISON DUMONT MD; Attending Radiologist  This document has been electronically signed. Jul 20 2022 12:41AM  < end of copied text >    < from: CT Maxillofacial w/ IV Cont (07.19.22 @ 23:38) >  INTERPRETATION:  CT MAXILLOFACIAL BONES WITH CONTRAST    CLINICAL STATEMENT: 87 years old. Male. dental infection.    TECHNIQUE: CT scan of the maxillofacial bones was performed with   intravenous contrast. Sagittal and coronal reformations were made. 90 cc   Omnipaque 350 was administered.    COMPARISON: None available.    FINDINGS:  No evidence of abscess. The parotid and submandibular glands are   unremarkable..    The orbital walls are intact. The retrobulbar fat is preserved. The   globes, extraocular muscles and optic nerves are symmetric and   unremarkable bilaterally.    There is no evidence of facial bone fracture.    The paranasal sinuses and mastoid air cells are well aerated.    IMPRESSION:  No evidence of abscess.    < end of copied text >     CASE MANAGEMENT [phone]/[id number]

## 2025-05-04 ENCOUNTER — INPATIENT (INPATIENT)
Facility: HOSPITAL | Age: 89
LOS: 9 days | Discharge: HOSPICE | End: 2025-05-14
Attending: STUDENT IN AN ORGANIZED HEALTH CARE EDUCATION/TRAINING PROGRAM | Admitting: STUDENT IN AN ORGANIZED HEALTH CARE EDUCATION/TRAINING PROGRAM
Payer: MEDICARE

## 2025-05-04 VITALS
HEIGHT: 65 IN | WEIGHT: 169.98 LBS | TEMPERATURE: 96 F | RESPIRATION RATE: 28 BRPM | HEART RATE: 92 BPM | SYSTOLIC BLOOD PRESSURE: 89 MMHG | OXYGEN SATURATION: 92 % | DIASTOLIC BLOOD PRESSURE: 64 MMHG

## 2025-05-04 LAB
ALBUMIN SERPL ELPH-MCNC: 3.3 G/DL — SIGNIFICANT CHANGE UP (ref 3.3–5)
ALP SERPL-CCNC: 119 U/L — SIGNIFICANT CHANGE UP (ref 40–120)
ALT FLD-CCNC: 60 U/L — SIGNIFICANT CHANGE UP (ref 12–78)
ANION GAP SERPL CALC-SCNC: 8 MMOL/L — SIGNIFICANT CHANGE UP (ref 5–17)
ANISOCYTOSIS BLD QL: SIGNIFICANT CHANGE UP
APPEARANCE UR: ABNORMAL
AST SERPL-CCNC: 72 U/L — HIGH (ref 15–37)
BACTERIA # UR AUTO: ABNORMAL /HPF
BASE EXCESS BLDV CALC-SCNC: -3.6 MMOL/L — LOW (ref -2–3)
BASOPHILS # BLD AUTO: 0 K/UL — SIGNIFICANT CHANGE UP (ref 0–0.2)
BASOPHILS NFR BLD AUTO: 0 % — SIGNIFICANT CHANGE UP (ref 0–2)
BILIRUB SERPL-MCNC: 2.5 MG/DL — HIGH (ref 0.2–1.2)
BILIRUB UR-MCNC: ABNORMAL
BLOOD GAS COMMENTS, VENOUS: SIGNIFICANT CHANGE UP
BUN SERPL-MCNC: 61 MG/DL — HIGH (ref 7–23)
CALCIUM SERPL-MCNC: 9.2 MG/DL — SIGNIFICANT CHANGE UP (ref 8.5–10.1)
CHLORIDE BLDV-SCNC: 107 MMOL/L — SIGNIFICANT CHANGE UP (ref 98–107)
CHLORIDE SERPL-SCNC: 111 MMOL/L — HIGH (ref 96–108)
CO2 BLDV-SCNC: 26 MMOL/L — SIGNIFICANT CHANGE UP (ref 22–26)
CO2 SERPL-SCNC: 24 MMOL/L — SIGNIFICANT CHANGE UP (ref 22–31)
COLOR SPEC: ABNORMAL
CREAT SERPL-MCNC: 2.35 MG/DL — HIGH (ref 0.5–1.3)
DIFF PNL FLD: ABNORMAL
EGFR: 26 ML/MIN/1.73M2 — LOW
EGFR: 26 ML/MIN/1.73M2 — LOW
EOSINOPHIL # BLD AUTO: 0 K/UL — SIGNIFICANT CHANGE UP (ref 0–0.5)
EOSINOPHIL NFR BLD AUTO: 0 % — SIGNIFICANT CHANGE UP (ref 0–6)
FLUAV AG NPH QL: SIGNIFICANT CHANGE UP
FLUBV AG NPH QL: SIGNIFICANT CHANGE UP
GAS PNL BLDV: 138 MMOL/L — SIGNIFICANT CHANGE UP (ref 136–145)
GAS PNL BLDV: SIGNIFICANT CHANGE UP
GAS PNL BLDV: SIGNIFICANT CHANGE UP
GLUCOSE BLDV-MCNC: 72 MG/DL — SIGNIFICANT CHANGE UP (ref 65–95)
GLUCOSE SERPL-MCNC: 73 MG/DL — SIGNIFICANT CHANGE UP (ref 70–99)
GLUCOSE UR QL: NEGATIVE MG/DL — SIGNIFICANT CHANGE UP
HCO3 BLDV-SCNC: 24 MMOL/L — SIGNIFICANT CHANGE UP (ref 22–28)
HCT VFR BLD CALC: 51.2 % — HIGH (ref 39–50)
HCT VFR BLDA CALC: 53 % — HIGH (ref 37–47)
HGB BLD CALC-MCNC: 17.8 G/DL — HIGH (ref 12.6–17.4)
HGB BLD-MCNC: 17 G/DL — SIGNIFICANT CHANGE UP (ref 13–17)
HOROWITZ INDEX BLDV+IHG-RTO: 100 — SIGNIFICANT CHANGE UP
INR BLD: 7.58 RATIO — CRITICAL HIGH (ref 0.85–1.16)
KETONES UR-MCNC: NEGATIVE MG/DL — SIGNIFICANT CHANGE UP
LACTATE BLDV-MCNC: 2.9 MMOL/L — HIGH (ref 0.56–1.39)
LACTATE SERPL-SCNC: 2.9 MMOL/L — HIGH (ref 0.7–2)
LEUKOCYTE ESTERASE UR-ACNC: ABNORMAL
LG PLATELETS BLD QL AUTO: SIGNIFICANT CHANGE UP
LYMPHOCYTES # BLD AUTO: 0.44 K/UL — LOW (ref 1–3.3)
LYMPHOCYTES # BLD AUTO: 2 % — LOW (ref 13–44)
MACROCYTES BLD QL: SIGNIFICANT CHANGE UP
MANUAL SMEAR VERIFICATION: SIGNIFICANT CHANGE UP
MCHC RBC-ENTMCNC: 33.2 G/DL — SIGNIFICANT CHANGE UP (ref 32–36)
MCHC RBC-ENTMCNC: 34.3 PG — HIGH (ref 27–34)
MCV RBC AUTO: 103.2 FL — HIGH (ref 80–100)
MONOCYTES # BLD AUTO: 0.66 K/UL — SIGNIFICANT CHANGE UP (ref 0–0.9)
MONOCYTES NFR BLD AUTO: 3 % — SIGNIFICANT CHANGE UP (ref 2–14)
NEUTROPHILS # BLD AUTO: 20.14 K/UL — HIGH (ref 1.8–7.4)
NEUTROPHILS NFR BLD AUTO: 90 % — HIGH (ref 43–77)
NEUTS BAND # BLD: 2 % — SIGNIFICANT CHANGE UP (ref 0–8)
NEUTS BAND NFR BLD: 2 % — SIGNIFICANT CHANGE UP (ref 0–8)
NITRITE UR-MCNC: POSITIVE
NRBC # BLD: 0 /100 WBCS — SIGNIFICANT CHANGE UP (ref 0–0)
NRBC BLD AUTO-RTO: SIGNIFICANT CHANGE UP /100 WBCS (ref 0–0)
NRBC BLD-RTO: 0 /100 WBCS — SIGNIFICANT CHANGE UP (ref 0–0)
OVALOCYTES BLD QL SMEAR: SLIGHT — SIGNIFICANT CHANGE UP
PCO2 BLDV: 51 MMHG — SIGNIFICANT CHANGE UP (ref 42–55)
PH BLDV: 7.28 — LOW (ref 7.32–7.43)
PH UR: 5 — SIGNIFICANT CHANGE UP (ref 5–8)
PLAT MORPH BLD: NORMAL — SIGNIFICANT CHANGE UP
PLATELET # BLD AUTO: 80 K/UL — LOW (ref 150–400)
PLATELET COUNT - ESTIMATE: ABNORMAL
PO2 BLDV: 27 MMHG — SIGNIFICANT CHANGE UP (ref 25–45)
POTASSIUM BLDV-SCNC: 4.4 MMOL/L — SIGNIFICANT CHANGE UP (ref 3.5–5.1)
POTASSIUM SERPL-MCNC: 4 MMOL/L — SIGNIFICANT CHANGE UP (ref 3.5–5.3)
POTASSIUM SERPL-SCNC: 4 MMOL/L — SIGNIFICANT CHANGE UP (ref 3.5–5.3)
PROT SERPL-MCNC: 6.2 GM/DL — SIGNIFICANT CHANGE UP (ref 6–8.3)
PROT UR-MCNC: 100 MG/DL
PROTHROM AB SERPL-ACNC: 84 SEC — HIGH (ref 9.9–13.4)
RBC # BLD: 4.96 M/UL — SIGNIFICANT CHANGE UP (ref 4.2–5.8)
RBC # FLD: 14.6 % — HIGH (ref 10.3–14.5)
RBC BLD AUTO: SIGNIFICANT CHANGE UP
RBC CASTS # UR COMP ASSIST: 15 /HPF — HIGH (ref 0–4)
RSV RNA NPH QL NAA+NON-PROBE: SIGNIFICANT CHANGE UP
SAO2 % BLDV: 43.3 % — LOW (ref 94–98)
SARS-COV-2 RNA SPEC QL NAA+PROBE: SIGNIFICANT CHANGE UP
SODIUM SERPL-SCNC: 143 MMOL/L — SIGNIFICANT CHANGE UP (ref 135–145)
SOURCE RESPIRATORY: SIGNIFICANT CHANGE UP
SP GR SPEC: 1.02 — SIGNIFICANT CHANGE UP (ref 1–1.03)
TROPONIN I, HIGH SENSITIVITY RESULT: 175.5 NG/L — HIGH
UROBILINOGEN FLD QL: 0.2 MG/DL — SIGNIFICANT CHANGE UP (ref 0.2–1)
VARIANT LYMPHS # BLD: 3 % — SIGNIFICANT CHANGE UP (ref 0–6)
VARIANT LYMPHS NFR BLD MANUAL: 3 % — SIGNIFICANT CHANGE UP (ref 0–6)
WBC # BLD: 21.89 K/UL — HIGH (ref 3.8–10.5)
WBC # FLD AUTO: 21.89 K/UL — HIGH (ref 3.8–10.5)
WBC UR QL: ABNORMAL /HPF (ref 0–5)

## 2025-05-04 PROCEDURE — 93010 ELECTROCARDIOGRAM REPORT: CPT

## 2025-05-04 PROCEDURE — 71045 X-RAY EXAM CHEST 1 VIEW: CPT | Mod: 26

## 2025-05-04 PROCEDURE — 99223 1ST HOSP IP/OBS HIGH 75: CPT

## 2025-05-04 PROCEDURE — 99291 CRITICAL CARE FIRST HOUR: CPT

## 2025-05-04 RX ORDER — IPRATROPIUM BROMIDE AND ALBUTEROL SULFATE .5; 2.5 MG/3ML; MG/3ML
3 SOLUTION RESPIRATORY (INHALATION) EVERY 6 HOURS
Refills: 0 | Status: DISCONTINUED | OUTPATIENT
Start: 2025-05-04 | End: 2025-05-09

## 2025-05-04 RX ORDER — MIDODRINE HYDROCHLORIDE 5 MG/1
10 TABLET ORAL THREE TIMES A DAY
Refills: 0 | Status: DISCONTINUED | OUTPATIENT
Start: 2025-05-04 | End: 2025-05-04

## 2025-05-04 RX ORDER — PIPERACILLIN-TAZO-DEXTROSE,ISO 2.25G/50ML
3.38 IV SOLUTION, PIGGYBACK PREMIX FROZEN(ML) INTRAVENOUS EVERY 8 HOURS
Refills: 0 | Status: DISCONTINUED | OUTPATIENT
Start: 2025-05-04 | End: 2025-05-04

## 2025-05-04 RX ORDER — FINASTERIDE 1 MG/1
5 TABLET, FILM COATED ORAL DAILY
Refills: 0 | Status: DISCONTINUED | OUTPATIENT
Start: 2025-05-04 | End: 2025-05-13

## 2025-05-04 RX ORDER — IPRATROPIUM BROMIDE AND ALBUTEROL SULFATE .5; 2.5 MG/3ML; MG/3ML
3 SOLUTION RESPIRATORY (INHALATION) EVERY 6 HOURS
Refills: 0 | Status: DISCONTINUED | OUTPATIENT
Start: 2025-05-04 | End: 2025-05-04

## 2025-05-04 RX ORDER — TAMSULOSIN HYDROCHLORIDE 0.4 MG/1
0.8 CAPSULE ORAL AT BEDTIME
Refills: 0 | Status: DISCONTINUED | OUTPATIENT
Start: 2025-05-04 | End: 2025-05-13

## 2025-05-04 RX ORDER — MIDODRINE HYDROCHLORIDE 5 MG/1
10 TABLET ORAL THREE TIMES A DAY
Refills: 0 | Status: DISCONTINUED | OUTPATIENT
Start: 2025-05-04 | End: 2025-05-06

## 2025-05-04 RX ORDER — VANCOMYCIN HCL IN 5 % DEXTROSE 1.5G/250ML
1000 PLASTIC BAG, INJECTION (ML) INTRAVENOUS ONCE
Refills: 0 | Status: COMPLETED | OUTPATIENT
Start: 2025-05-04 | End: 2025-05-04

## 2025-05-04 RX ORDER — AZITHROMYCIN 250 MG
500 CAPSULE ORAL ONCE
Refills: 0 | Status: COMPLETED | OUTPATIENT
Start: 2025-05-04 | End: 2025-05-04

## 2025-05-04 RX ORDER — CEFEPIME 2 G/20ML
2000 INJECTION, POWDER, FOR SOLUTION INTRAVENOUS ONCE
Refills: 0 | Status: COMPLETED | OUTPATIENT
Start: 2025-05-04 | End: 2025-05-04

## 2025-05-04 RX ORDER — PIPERACILLIN-TAZO-DEXTROSE,ISO 2.25G/50ML
3.38 IV SOLUTION, PIGGYBACK PREMIX FROZEN(ML) INTRAVENOUS EVERY 12 HOURS
Refills: 0 | Status: DISCONTINUED | OUTPATIENT
Start: 2025-05-04 | End: 2025-05-05

## 2025-05-04 RX ADMIN — Medication 500 MILLILITER(S): at 17:42

## 2025-05-04 RX ADMIN — Medication 125 MILLILITER(S): at 21:49

## 2025-05-04 RX ADMIN — Medication 5 MILLIGRAM(S): at 20:36

## 2025-05-04 RX ADMIN — Medication 255 MILLIGRAM(S): at 18:57

## 2025-05-04 RX ADMIN — TAMSULOSIN HYDROCHLORIDE 0.8 MILLIGRAM(S): 0.4 CAPSULE ORAL at 21:06

## 2025-05-04 RX ADMIN — Medication 40 MILLIGRAM(S): at 21:06

## 2025-05-04 RX ADMIN — CEFEPIME 100 MILLIGRAM(S): 2 INJECTION, POWDER, FOR SOLUTION INTRAVENOUS at 17:40

## 2025-05-04 RX ADMIN — FINASTERIDE 5 MILLIGRAM(S): 1 TABLET, FILM COATED ORAL at 21:06

## 2025-05-04 RX ADMIN — Medication 500 MILLILITER(S): at 20:36

## 2025-05-04 RX ADMIN — Medication 1000 MILLIGRAM(S): at 19:14

## 2025-05-04 RX ADMIN — Medication 250 MILLIGRAM(S): at 17:41

## 2025-05-04 RX ADMIN — Medication 1350 MILLILITER(S): at 17:41

## 2025-05-04 RX ADMIN — CEFEPIME 2000 MILLIGRAM(S): 2 INJECTION, POWDER, FOR SOLUTION INTRAVENOUS at 19:14

## 2025-05-04 RX ADMIN — MIDODRINE HYDROCHLORIDE 10 MILLIGRAM(S): 5 TABLET ORAL at 20:36

## 2025-05-04 RX ADMIN — Medication 25 GRAM(S): at 21:49

## 2025-05-04 RX ADMIN — Medication 500 MILLILITER(S): at 19:14

## 2025-05-04 NOTE — ED PROVIDER NOTE - PHYSICAL EXAMINATION
On Physical Exam:  General: Elderly ill-appearing right arm contracted against torso  HEENT: PERRL, Dry mucous membranes with copious secretions in oropharynx (suctioned out)  Neck: no neck tenderness, no nuchal rigidity, No JVD  Cardiac: Irregular  Lungs: Diffusely decreased in left side  Abdomen: soft nontender/nondistended  Skin: Small unstageable sacral decubitus ulcer approximately 5 cm  Extremities: no peripheral edema, no gross deformities

## 2025-05-04 NOTE — ED ADULT NURSE REASSESSMENT NOTE - NS ED NURSE REASSESS COMMENT FT1
Notified Simone Villa that patient remains hypotensive after medication administration, with maintenance fluids currently infusing. Dr. Nichols responds that patient is ok to go to floor, and that he ordered a consult for palliative care.

## 2025-05-04 NOTE — ED ADULT TRIAGE NOTE - CHIEF COMPLAINT QUOTE
From Home unable to arouse, Hypoxic 85% on RA placed on non rebreather, IV 20g left hand n/s infusing, bilateral lung crackles unable to answer questions H/O UTI with po meds last week, Bed bound Dr summoned to bedside and [facility] called for

## 2025-05-04 NOTE — ED ADULT NURSE NOTE - NSFALLRISKINTERV_ED_ALL_ED
Assistance OOB with selected safe patient handling equipment if applicable/Assistance with ambulation/Communicate fall risk and risk factors to all staff, patient, and family/Monitor gait and stability/Provide visual cue: yellow wristband, yellow gown, etc/Reinforce activity limits and safety measures with patient and family/Call bell, personal items and telephone in reach/Instruct patient to call for assistance before getting out of bed/chair/stretcher/Non-slip footwear applied when patient is off stretcher/[place] to call system/Physically safe environment - no spills, clutter or unnecessary equipment/Purposeful Proactive Rounding/Room/bathroom lighting operational, light cord in reach

## 2025-05-04 NOTE — H&P ADULT - NSHPLABSRESULTS_GEN_ALL_CORE
LABS:                        17.0   21.89 )-----------( 80       ( 04 May 2025 16:20 )             51.2     05-04    143  |  111[H]  |  61[H]  ----------------------------<  73  4.0   |  24  |  2.35[H]    Ca    9.2      04 May 2025 17:24    TPro  6.2  /  Alb  3.3  /  TBili  2.5[H]  /  DBili  x   /  AST  72[H]  /  ALT  60  /  AlkPhos  119  05-04    PT/INR - ( 04 May 2025 17:24 )   PT: 84.0 sec;   INR: 7.58 ratio           Urinalysis Basic - ( 04 May 2025 17:24 )    Color: x / Appearance: x / SG: x / pH: x  Gluc: 73 mg/dL / Ketone: x  / Bili: x / Urobili: x   Blood: x / Protein: x / Nitrite: x   Leuk Esterase: x / RBC: x / WBC x   Sq Epi: x / Non Sq Epi: x / Bacteria: x          RADIOLOGY & ADDITIONAL TESTS:

## 2025-05-04 NOTE — ED ADULT NURSE NOTE - OBJECTIVE STATEMENT
Patient brought in by ambulance from home due to family unable to arouse, As per EMS pt Hypoxic 85% on RA placed on non rebreather, IV 20g left hand n/s infusing, Noted with bilateral lung crackles. Pt noted with retacl temp of 96.5F Placed on [place]. hx: UTI with po meds last week, Bed bound.  Basline pt A&OX3.

## 2025-05-04 NOTE — H&P ADULT - NSHPPHYSICALEXAM_GEN_ALL_CORE
PHYSICAL EXAMINATION:  Vital Signs Last 24 Hrs  T(C): 35.8 (04 May 2025 18:30), Max: 36.1 (04 May 2025 17:30)  T(F): 96.5 (04 May 2025 18:30), Max: 97 (04 May 2025 17:30)  HR: 82 (04 May 2025 18:30) (82 - 96)  BP: 106/55 (04 May 2025 18:30) (68/- - 106/55)  BP(mean): 55 (04 May 2025 17:30) (55 - 55)  RR: 20 (04 May 2025 18:30) (20 - 28)  SpO2: 98% (04 May 2025 18:30) (92% - 98%)    Parameters below as of 04 May 2025 18:30  Patient On (Oxygen Delivery Method): nasal cannula w/ humidification  O2 Flow (L/min): 15    CAPILLARY BLOOD GLUCOSE          GENERAL: NAD,   ENMT: mucous membranes moist  NECK: supple, No JVD  CHEST/LUNG: clear to auscultation bilaterally; no rales, rhonchi, or wheezing b/l  HEART: normal S1, S2  ABDOMEN: BS+, soft, ND, NT   EXTREMITIES:  pulses palpable; no clubbing, cyanosis, or edema b/l LEs  NEURO: awake, alert, interactive; moves all extremities PHYSICAL EXAMINATION:  Vital Signs Last 24 Hrs  T(C): 35.8 (04 May 2025 18:30), Max: 36.1 (04 May 2025 17:30)  T(F): 96.5 (04 May 2025 18:30), Max: 97 (04 May 2025 17:30)  HR: 82 (04 May 2025 18:30) (82 - 96)  BP: 106/55 (04 May 2025 18:30) (68/- - 106/55)  BP(mean): 55 (04 May 2025 17:30) (55 - 55)  RR: 20 (04 May 2025 18:30) (20 - 28)  SpO2: 98% (04 May 2025 18:30) (92% - 98%)    Parameters below as of 04 May 2025 18:30  Patient On (Oxygen Delivery Method): nasal cannula w/ humidification  O2 Flow (L/min): 15    CAPILLARY BLOOD GLUCOSE          GENERAL: NAD, seen in ER, son at bedside, hypoxemic but better on high-flow, no wheeze.  Hurt in place, urine dark and cloudy.   ENMT: mucous membranes moist  NECK: supple, No JVD  CHEST/LUNG: clear to auscultation bilaterally; no rales, rhonchi, or wheezing b/l  HEART: normal S1, S2  ABDOMEN: BS+, soft, ND, NT   EXTREMITIES:  pulses palpable; no clubbing, cyanosis, or edema b/l LEs  NEURO: awake, alert, interactive; moves all extremities

## 2025-05-04 NOTE — H&P ADULT - HISTORY OF PRESENT ILLNESS
89-year-old male PMH chronic A-fib, HTN, HLD, possible dementia prior injury and contracture of the right arm who presents today hypoxia altered mental status/confusion over past several days. Per family found to be hypoxic by EMS.  NKDA per report.  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 Hurt placed in ER. Per family found to be hypoxic by EMS.  NKDA per report.  Full DNR in effect.

## 2025-05-04 NOTE — ED ADULT NURSE REASSESSMENT NOTE - NS ED NURSE REASSESS COMMENT FT1
Notified Dr. Nichols that patient is hypotensive. Dr. Nichols came to bedside, awaiting orders. Cm maintained.

## 2025-05-04 NOTE — ED PROVIDER NOTE - CLINICAL SUMMARY MEDICAL DECISION MAKING FREE TEXT BOX
Attending note (Boby): 89-year-old male history A-fib HTN HLD possible dementia prior injury and contracture of the right arm who presents today hypoxia altered mental status/confusion over past several days per family found to be hypoxic by EMS.  NKDA per report.  Improving on high flow nasal cannula.  Obtained emergent chest x-ray showing consolidation and effusion in left lung concern for pneumonia.  Started on cefepime and vancomycin initially added azithromycin.  Further discussion with patient's son is DNR/DNI, MOLST form completed and placed in chart.  Obtain sepsis labs notable for leukocytosis 22 mild elevation in lactate 2.9 mild HERMILA with creatinine 2.3.  Patient improving with IV fluids and will plan for admission to floor.

## 2025-05-04 NOTE — ED ADULT NURSE NOTE - ED STAT RN HANDOFF DETAILS
Received report from PANCHITO Hernandez. Identified pt and updated on plan of care. Pt is a&ox0-1. Safety and fall precautions in place.

## 2025-05-04 NOTE — ED ADULT NURSE REASSESSMENT NOTE - NS ED NURSE REASSESS COMMENT FT1
Midodrine given and IV fluids infusing as ordered by Dr. Nichols. Vs as per flowsheets. Cm maintained.

## 2025-05-04 NOTE — ED PROVIDER NOTE - OBJECTIVE STATEMENT
The patient is a 35y Female complaining of head injury.
Attending note (Boby): 89-year-old male history A-fib HTN HLD possible dementia prior injury and contracture of the right arm who presents today hypoxia altered mental status/confusion over past several days per family found to be hypoxic by EMS.  NKDA per report.

## 2025-05-04 NOTE — H&P ADULT - ASSESSMENT
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 Hurt placed in ER. Per family found to be hypoxic by EMS.  NKDA per report.  Full DNR in effect.       Plan:  Admit to medicine for hypotension, hypoxemia and UTI. Started IVF Normal Saline 125/h, added Midodrine 10 mg tid for BP support.     Added Zosyn renally dosed for likely LLL CAP and UTI.      Tolerates regular diet at home, will change to puree, aspiration precautions. Palliative Care requested, needs GOC discussions.     Flu panel negative, creatinine 2.35 likely HERMILA from sepsis, follow repeat in AM. Lactate elevated 2.9 from sepsis, follow repeat in AM.     INR elevated on arrival 7.58, gave vitamin K 5 mg po x 1, follow repeat in AM.        Continue home meds: Advair bid, Proscar 5 mg/day, Flomax .8 mg/day and Protonix 40 mg/day.      Needs GOC discussion in AM with family and Palliative Care.   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 Hurt placed in ER. Per family found to be hypoxic by EMS.  NKDA per report.  Full DNR in effect.       Plan:  Admit to medicine for hypotension, hypoxemia and UTI. Started IVF Normal Saline 125/h, added Midodrine 10 mg tid for BP support.     Added Zosyn renally dosed for likely LLL CAP and UTI.      Tolerates regular diet at home, will change to puree, aspiration precautions. Palliative Care requested, needs GOC discussions.     Flu panel negative, creatinine 2.35 likely HERMILA from sepsis, follow repeat in AM. No prior available for comparison. Lactate elevated 2.9 from sepsis, follow repeat in AM.     INR elevated on arrival 7.58, gave vitamin K 5 mg po x 1, follow repeat in AM.        Continue home meds: Advair bid, Proscar 5 mg/day, Flomax .8 mg/day and Protonix 40 mg/day.      Needs GOC discussion in AM with family and Palliative Care.   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 Hurt placed in ER. Per family found to be hypoxic by EMS.  NKDA per report.  Full DNR in effect.       Plan:  Admit to tele for hypotension, hypoxemia and UTI. Started IVF Normal Saline 125/h, added Midodrine 10 mg tid for BP support.     Added Zosyn renally dosed for likely LLL CAP and UTI.  Added high-flow in ER 50 lpm/50 % oxygen.       Tolerates regular diet at home, will change to puree, aspiration precautions. Palliative Care requested, needs GOC discussions.     Flu panel negative, creatinine 2.35 likely HERMILA from sepsis, follow repeat in AM. No prior available for comparison. Lactate elevated 2.9 from sepsis, follow repeat in AM.     INR elevated on arrival 7.58, gave vitamin K 5 mg po x 1, follow repeat in AM.        Continue home meds: Advair bid, Proscar 5 mg/day, Flomax .8 mg/day and Protonix 40 mg/day.      Needs GOC discussion in AM with family and Palliative Care.

## 2025-05-05 DIAGNOSIS — J96.01 ACUTE RESPIRATORY FAILURE WITH HYPOXIA: ICD-10-CM

## 2025-05-05 DIAGNOSIS — N17.9 ACUTE KIDNEY FAILURE, UNSPECIFIED: ICD-10-CM

## 2025-05-05 DIAGNOSIS — G93.41 METABOLIC ENCEPHALOPATHY: ICD-10-CM

## 2025-05-05 DIAGNOSIS — R53.2 FUNCTIONAL QUADRIPLEGIA: ICD-10-CM

## 2025-05-05 DIAGNOSIS — Z51.5 ENCOUNTER FOR PALLIATIVE CARE: ICD-10-CM

## 2025-05-05 DIAGNOSIS — I48.91 UNSPECIFIED ATRIAL FIBRILLATION: ICD-10-CM

## 2025-05-05 DIAGNOSIS — A41.9 SEPSIS, UNSPECIFIED ORGANISM: ICD-10-CM

## 2025-05-05 LAB
ALBUMIN SERPL ELPH-MCNC: 2.5 G/DL — LOW (ref 3.3–5)
ALP SERPL-CCNC: 85 U/L — SIGNIFICANT CHANGE UP (ref 40–120)
ALT FLD-CCNC: 44 U/L — SIGNIFICANT CHANGE UP (ref 12–78)
ANION GAP SERPL CALC-SCNC: 11 MMOL/L — SIGNIFICANT CHANGE UP (ref 5–17)
ANION GAP SERPL CALC-SCNC: 6 MMOL/L — SIGNIFICANT CHANGE UP (ref 5–17)
AST SERPL-CCNC: 57 U/L — HIGH (ref 15–37)
BASOPHILS # BLD AUTO: 0.07 K/UL — SIGNIFICANT CHANGE UP (ref 0–0.2)
BASOPHILS # BLD AUTO: 0.09 K/UL — SIGNIFICANT CHANGE UP (ref 0–0.2)
BASOPHILS NFR BLD AUTO: 0.5 % — SIGNIFICANT CHANGE UP (ref 0–2)
BASOPHILS NFR BLD AUTO: 0.6 % — SIGNIFICANT CHANGE UP (ref 0–2)
BILIRUB SERPL-MCNC: 2.5 MG/DL — HIGH (ref 0.2–1.2)
BLD GP AB SCN SERPL QL: SIGNIFICANT CHANGE UP
BUN SERPL-MCNC: 57 MG/DL — HIGH (ref 7–23)
BUN SERPL-MCNC: 60 MG/DL — HIGH (ref 7–23)
CALCIUM SERPL-MCNC: 7.1 MG/DL — LOW (ref 8.5–10.1)
CALCIUM SERPL-MCNC: 8 MG/DL — LOW (ref 8.5–10.1)
CHLORIDE SERPL-SCNC: 115 MMOL/L — HIGH (ref 96–108)
CHLORIDE SERPL-SCNC: 117 MMOL/L — HIGH (ref 96–108)
CO2 SERPL-SCNC: 18 MMOL/L — LOW (ref 22–31)
CO2 SERPL-SCNC: 22 MMOL/L — SIGNIFICANT CHANGE UP (ref 22–31)
CORTIS AM PEAK SERPL-MCNC: 62.7 UG/DL — HIGH (ref 6–18.4)
CREAT SERPL-MCNC: 2.08 MG/DL — HIGH (ref 0.5–1.3)
CREAT SERPL-MCNC: 2.35 MG/DL — HIGH (ref 0.5–1.3)
D DIMER BLD IA.RAPID-MCNC: 631 NG/ML DDU — HIGH
EGFR: 26 ML/MIN/1.73M2 — LOW
EGFR: 26 ML/MIN/1.73M2 — LOW
EGFR: 30 ML/MIN/1.73M2 — LOW
EGFR: 30 ML/MIN/1.73M2 — LOW
EOSINOPHIL # BLD AUTO: 0.02 K/UL — SIGNIFICANT CHANGE UP (ref 0–0.5)
EOSINOPHIL # BLD AUTO: 0.02 K/UL — SIGNIFICANT CHANGE UP (ref 0–0.5)
EOSINOPHIL NFR BLD AUTO: 0.1 % — SIGNIFICANT CHANGE UP (ref 0–6)
EOSINOPHIL NFR BLD AUTO: 0.1 % — SIGNIFICANT CHANGE UP (ref 0–6)
FIBRINOGEN PPP-MCNC: 62 MG/DL — CRITICAL LOW (ref 200–480)
GLUCOSE SERPL-MCNC: 86 MG/DL — SIGNIFICANT CHANGE UP (ref 70–99)
GLUCOSE SERPL-MCNC: 91 MG/DL — SIGNIFICANT CHANGE UP (ref 70–99)
HCT VFR BLD CALC: 42.7 % — SIGNIFICANT CHANGE UP (ref 39–50)
HCT VFR BLD CALC: 44.2 % — SIGNIFICANT CHANGE UP (ref 39–50)
HGB BLD-MCNC: 14.5 G/DL — SIGNIFICANT CHANGE UP (ref 13–17)
HGB BLD-MCNC: 14.8 G/DL — SIGNIFICANT CHANGE UP (ref 13–17)
IMM GRANULOCYTES NFR BLD AUTO: 0.4 % — SIGNIFICANT CHANGE UP (ref 0–0.9)
IMM GRANULOCYTES NFR BLD AUTO: 0.5 % — SIGNIFICANT CHANGE UP (ref 0–0.9)
LACTATE SERPL-SCNC: 3.6 MMOL/L — HIGH (ref 0.7–2)
LYMPHOCYTES # BLD AUTO: 0.2 K/UL — LOW (ref 1–3.3)
LYMPHOCYTES # BLD AUTO: 0.24 K/UL — LOW (ref 1–3.3)
LYMPHOCYTES # BLD AUTO: 1.3 % — LOW (ref 13–44)
LYMPHOCYTES # BLD AUTO: 1.8 % — LOW (ref 13–44)
MAGNESIUM SERPL-MCNC: 1.9 MG/DL — SIGNIFICANT CHANGE UP (ref 1.6–2.6)
MCHC RBC-ENTMCNC: 33.5 G/DL — SIGNIFICANT CHANGE UP (ref 32–36)
MCHC RBC-ENTMCNC: 33.9 PG — SIGNIFICANT CHANGE UP (ref 27–34)
MCHC RBC-ENTMCNC: 34 G/DL — SIGNIFICANT CHANGE UP (ref 32–36)
MCHC RBC-ENTMCNC: 34.6 PG — HIGH (ref 27–34)
MCV RBC AUTO: 101.1 FL — HIGH (ref 80–100)
MCV RBC AUTO: 101.9 FL — HIGH (ref 80–100)
MONOCYTES # BLD AUTO: 0.32 K/UL — SIGNIFICANT CHANGE UP (ref 0–0.9)
MONOCYTES # BLD AUTO: 0.43 K/UL — SIGNIFICANT CHANGE UP (ref 0–0.9)
MONOCYTES NFR BLD AUTO: 2.3 % — SIGNIFICANT CHANGE UP (ref 2–14)
MONOCYTES NFR BLD AUTO: 2.8 % — SIGNIFICANT CHANGE UP (ref 2–14)
NEUTROPHILS # BLD AUTO: 12.95 K/UL — HIGH (ref 1.8–7.4)
NEUTROPHILS # BLD AUTO: 14.53 K/UL — HIGH (ref 1.8–7.4)
NEUTROPHILS NFR BLD AUTO: 94.7 % — HIGH (ref 43–77)
NEUTROPHILS NFR BLD AUTO: 94.9 % — HIGH (ref 43–77)
NRBC BLD AUTO-RTO: 0 /100 WBCS — SIGNIFICANT CHANGE UP (ref 0–0)
NRBC BLD AUTO-RTO: 0 /100 WBCS — SIGNIFICANT CHANGE UP (ref 0–0)
PLATELET # BLD AUTO: 71 K/UL — LOW (ref 150–400)
PLATELET # BLD AUTO: 73 K/UL — LOW (ref 150–400)
POTASSIUM SERPL-MCNC: 3.3 MMOL/L — LOW (ref 3.5–5.3)
POTASSIUM SERPL-MCNC: 3.5 MMOL/L — SIGNIFICANT CHANGE UP (ref 3.5–5.3)
POTASSIUM SERPL-SCNC: 3.3 MMOL/L — LOW (ref 3.5–5.3)
POTASSIUM SERPL-SCNC: 3.5 MMOL/L — SIGNIFICANT CHANGE UP (ref 3.5–5.3)
PROT SERPL-MCNC: 4.8 GM/DL — LOW (ref 6–8.3)
RBC # BLD: 4.19 M/UL — LOW (ref 4.2–5.8)
RBC # BLD: 4.37 M/UL — SIGNIFICANT CHANGE UP (ref 4.2–5.8)
RBC # FLD: 14.8 % — HIGH (ref 10.3–14.5)
RBC # FLD: 14.8 % — HIGH (ref 10.3–14.5)
SODIUM SERPL-SCNC: 144 MMOL/L — SIGNIFICANT CHANGE UP (ref 135–145)
SODIUM SERPL-SCNC: 145 MMOL/L — SIGNIFICANT CHANGE UP (ref 135–145)
TROPONIN I, HIGH SENSITIVITY RESULT: 196 NG/L — HIGH
WBC # BLD: 13.65 K/UL — HIGH (ref 3.8–10.5)
WBC # BLD: 15.34 K/UL — HIGH (ref 3.8–10.5)
WBC # FLD AUTO: 13.65 K/UL — HIGH (ref 3.8–10.5)
WBC # FLD AUTO: 15.34 K/UL — HIGH (ref 3.8–10.5)

## 2025-05-05 PROCEDURE — 76604 US EXAM CHEST: CPT | Mod: 26

## 2025-05-05 PROCEDURE — 93308 TTE F-UP OR LMTD: CPT | Mod: 26

## 2025-05-05 PROCEDURE — 99223 1ST HOSP IP/OBS HIGH 75: CPT | Mod: FS

## 2025-05-05 PROCEDURE — 99291 CRITICAL CARE FIRST HOUR: CPT | Mod: 25

## 2025-05-05 RX ORDER — MEROPENEM 1 G/30ML
INJECTION INTRAVENOUS
Refills: 0 | Status: DISCONTINUED | OUTPATIENT
Start: 2025-05-05 | End: 2025-05-07

## 2025-05-05 RX ORDER — MEROPENEM 1 G/30ML
1000 INJECTION INTRAVENOUS ONCE
Refills: 0 | Status: COMPLETED | OUTPATIENT
Start: 2025-05-05 | End: 2025-05-05

## 2025-05-05 RX ORDER — MEROPENEM 1 G/30ML
1000 INJECTION INTRAVENOUS EVERY 12 HOURS
Refills: 0 | Status: DISCONTINUED | OUTPATIENT
Start: 2025-05-05 | End: 2025-05-07

## 2025-05-05 RX ORDER — ALBUMIN (HUMAN) 12.5 G/50ML
100 INJECTION, SOLUTION INTRAVENOUS ONCE
Refills: 0 | Status: COMPLETED | OUTPATIENT
Start: 2025-05-05 | End: 2025-05-05

## 2025-05-05 RX ORDER — NOREPINEPHRINE BITARTRATE 8 MG
0.05 SOLUTION INTRAVENOUS
Qty: 16 | Refills: 0 | Status: DISCONTINUED | OUTPATIENT
Start: 2025-05-05 | End: 2025-05-07

## 2025-05-05 RX ORDER — NOREPINEPHRINE BITARTRATE 8 MG
0.05 SOLUTION INTRAVENOUS
Qty: 8 | Refills: 0 | Status: DISCONTINUED | OUTPATIENT
Start: 2025-05-05 | End: 2025-05-05

## 2025-05-05 RX ORDER — SODIUM CHLORIDE 9 G/1000ML
1000 INJECTION, SOLUTION INTRAVENOUS ONCE
Refills: 0 | Status: COMPLETED | OUTPATIENT
Start: 2025-05-05 | End: 2025-05-05

## 2025-05-05 RX ADMIN — Medication 1 APPLICATION(S): at 03:15

## 2025-05-05 RX ADMIN — MIDODRINE HYDROCHLORIDE 10 MILLIGRAM(S): 5 TABLET ORAL at 11:23

## 2025-05-05 RX ADMIN — IPRATROPIUM BROMIDE AND ALBUTEROL SULFATE 3 MILLILITER(S): .5; 2.5 SOLUTION RESPIRATORY (INHALATION) at 23:20

## 2025-05-05 RX ADMIN — NOREPINEPHRINE BITARTRATE 3.61 MICROGRAM(S)/KG/MIN: 8 SOLUTION at 07:48

## 2025-05-05 RX ADMIN — IPRATROPIUM BROMIDE AND ALBUTEROL SULFATE 3 MILLILITER(S): .5; 2.5 SOLUTION RESPIRATORY (INHALATION) at 11:31

## 2025-05-05 RX ADMIN — MEROPENEM 100 MILLIGRAM(S): 1 INJECTION INTRAVENOUS at 04:57

## 2025-05-05 RX ADMIN — IPRATROPIUM BROMIDE AND ALBUTEROL SULFATE 3 MILLILITER(S): .5; 2.5 SOLUTION RESPIRATORY (INHALATION) at 01:32

## 2025-05-05 RX ADMIN — NOREPINEPHRINE BITARTRATE 3.61 MICROGRAM(S)/KG/MIN: 8 SOLUTION at 04:25

## 2025-05-05 RX ADMIN — SODIUM CHLORIDE 1000 MILLILITER(S): 9 INJECTION, SOLUTION INTRAVENOUS at 04:40

## 2025-05-05 RX ADMIN — MIDODRINE HYDROCHLORIDE 10 MILLIGRAM(S): 5 TABLET ORAL at 17:20

## 2025-05-05 RX ADMIN — ALBUMIN (HUMAN) 50 MILLILITER(S): 12.5 INJECTION, SOLUTION INTRAVENOUS at 11:52

## 2025-05-05 RX ADMIN — MEROPENEM 100 MILLIGRAM(S): 1 INJECTION INTRAVENOUS at 17:21

## 2025-05-05 RX ADMIN — IPRATROPIUM BROMIDE AND ALBUTEROL SULFATE 3 MILLILITER(S): .5; 2.5 SOLUTION RESPIRATORY (INHALATION) at 17:22

## 2025-05-05 RX ADMIN — Medication 500 MILLILITER(S): at 02:28

## 2025-05-05 RX ADMIN — IPRATROPIUM BROMIDE AND ALBUTEROL SULFATE 3 MILLILITER(S): .5; 2.5 SOLUTION RESPIRATORY (INHALATION) at 05:30

## 2025-05-05 RX ADMIN — FINASTERIDE 5 MILLIGRAM(S): 1 TABLET, FILM COATED ORAL at 11:23

## 2025-05-05 NOTE — CONSULT NOTE ADULT - CONSULT REASON
dementia, GI bleed, septic shock, respiratory failure, GOC septic shock, respiratory failure, functional quad, GOC

## 2025-05-05 NOTE — CONSULT NOTE ADULT - PROBLEM SELECTOR RECOMMENDATION 4
has been bedbound since approx 2020 when he suffered a broken leg  dependent for care  turn and position q 2 hours    family has 4 hours 3 days/week of private hire [facility] creatinine elevated, prior labs from 2023 creatinine was normal at that time  septic shock  low urine output   avoid nephrotoxic agents

## 2025-05-05 NOTE — CHART NOTE - NSCHARTNOTEFT_GEN_A_CORE
Medicine PA Note    Blood Transfusion Consent     Patient is a 89y old  Male who presents with a chief complaint of SOB, hypoxemia, LLL CAP and UTI. (04 May 2025 19:14)                          14.5   13.65 )-----------( 73       ( 05 May 2025 02:20 )             42.7     Dr Haque discussed with daughter Aleksandra Erickson [phone]   regarding the need for blood transfusion. Risk and benefits discussed. Risks including fever, chills/rigors, high or low blood pressure, respiratory distress (wheezing/hypoxia), urticaria/rash/edema, nausea, pain, bleeding, darkened urine, lower back pain, severe allergic reaction and death was discussed. Verbalizes the understanding and consent obtained. Witnessed by staff.

## 2025-05-05 NOTE — CONSULT NOTE ADULT - PROBLEM SELECTOR RECOMMENDATION 3
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

## 2025-05-05 NOTE — PATIENT PROFILE ADULT - MONEY FOR FOOD
Anesthesia ROS/Med Hx        Cardiovascular Review:    Exercise tolerance: good      Anesthesia Plan     ASA Status: 2  emergent  Anesthesia Type: MAC  Induction: Intravenous  Maintenance: TIVA  Reviewed: Lab Results, Pre-Induction Reassessment, Patient Summary, Beta Blocker Status, NPO Status, Allergies, Consultations, Medications, Problem List and Past Med History  The proposed anesthetic plan, including its risks and benefits, have been discussed with the Patient - along with the risks and benefits of alternatives. Questions were encouraged and answered and the patient and/or representative agrees to proceed.   Blood Products: Not Anticipated      Physical Exam  Mallampati: I  cardiovascular exam normal  pulmonary exam normal  pale
no

## 2025-05-05 NOTE — CONSULT NOTE ADULT - PROBLEM SELECTOR PROBLEM 1
Acute respiratory failure with hypoxia - Hx of Marquis's disease managed with Solucortef pump, adrenal crisis likely precipitated by URI  - On hydrocortisone 50 mg IV q8H x 3 doses per endo recs, plan to taper to 50 mg IV q12H based on clinical improvement. Assess patient's symptoms daily to plan taper.  - Hold fludrocortisone while on hydrocortisone; can resume fludrocortisone 0.1 mg at 6am/0.05 mg at 5pm once hydrocortisone dose is tapered down to lower than 50 mg/day  - Hold DHEAS 25mg daily while inpatient, resume on disharge  - Follows with Dr. Rodrigues. Endocrinology recs appreciated. - Hx of Marquis's disease managed with Solucortef pump, adrenal crisis likely precipitated by URI  - On hydrocortisone 50 mg IV q8H x 3 doses per intially, had planned to taper however patient clinically looked worse today so will continue 50q8 and tomorrow transition to 50 q12  - Hold fludrocortisone while on hydrocortisone; can resume fludrocortisone 0.1 mg at 6am/0.05 mg at 5pm once hydrocortisone dose is tapered down to lower than 50 mg/day  - Hold DHEAS 25mg daily while inpatient, resume on disharge  - Follows with Dr. Rodrigues. Endocrinology recs appreciated.

## 2025-05-05 NOTE — CONSULT NOTE ADULT - PROBLEM SELECTOR RECOMMENDATION 6
has been bedbound since approx 2020 when he suffered a broken leg  dependent for care  turn and position q 2 hours    family has 4 hours 3 days/week of private hire [facility]

## 2025-05-05 NOTE — PATIENT PROFILE ADULT - AGENT'S NAME
Johnna Maher is a 37 year old  at 29w1d presenting for routine prenatal care. She is doing well without concerns.   Reports active fetal movement. Denies loss of fluid, vaginal bleeding, contractions.      Objective:  /78 (BP Location: Right arm, Patient Position: Sitting, Cuff Size: Adult Large)   Pulse 69   Wt 97.6 kg (215 lb 3.2 oz)   LMP 2023   SpO2 94%   BMI 35.42 kg/m    WDWN, NAD  Non-labored breathing  Abdomen soft, nttp  FH: 29 cm  FHT: 150s      A/P: Johnna Maher is a 37 year old  at 29w1d presenting for routine ob visit.       ICD-10-CM    1. Antepartum multigravida of advanced maternal age  O09.529       2. Uterine leiomyoma, unspecified location  D25.9       3. Anemia complicating pregnancy, first trimester  O99.011 ferrous sulfate (FEROSUL) 325 (65 Fe) MG tablet            -Continue daily prenatal vitamin  -GCT Normal,   Hemoglobin   Date Value Ref Range Status   2023 10.4 (L) 11.7 - 15.7 g/dL Final   2020 11.7 11.7 - 15.7 g/dL Final      -TDaP recommended and given today  -O POS; Rhogam not indicated  -Kick counts discussed  -Follow up in 2 weeks for routine OB visit  -PTL, bleeding, return precautions reviewed    Juanjose Ji DO, FACOG   
Sanjeev West (SON) and Aleksandra Tejada (Daughter)

## 2025-05-05 NOTE — CHART NOTE - NSCHARTNOTEFT_GEN_A_CORE
:  SVEN Osborne dr    Indication:  SHOCK    PROCEDURE:  [ x] LIMITED ECHO  [x ] LIMITED CHEST  [ ] LIMITED RETROPERITONEAL  [ ] LIMITED ABDOMINAL  [ ] LIMITED DVT  [ ] NEEDLE GUIDANCE VASCULAR  [ ] NEEDLE GUIDANCE THORACENTESIS  [ ] NEEDLE GUIDANCE PARACENTESIS  [ ] NEEDLE GUIDANCE PERICARDIOCENTESIS  [ ] OTHER    FINDINGS:  Chest: A-line predominant large simple effusion that wraps anteriorly and simple moderate R sized effusion     ECHO: LV>RV with hyperdynamic LV w/ no wall motion abnormalities, nor septal bowing/flattening  No pericardial effusion  IVC: small    INTERPRETATION:  mod to large bilat simple effusions L>R  hyperdynamic LV      images uploaded to [employer]

## 2025-05-05 NOTE — CONSULT NOTE ADULT - PROBLEM SELECTOR RECOMMENDATION 5
See GOC conversation above.  Patient with [facility] on file with DNR/I as well as HCP.   Family aware of clinical issues thus far and said they were updated by ICU team today.  Patient's status is tenuous given HFNC and pressor support.  Palliative to follow.     Discussed with ICU team rate elevated  INR 7.58, s/p vitamin K, h/h stable  LFTs normal range

## 2025-05-05 NOTE — PATIENT PROFILE ADULT - FALL HARM RISK - HARM RISK INTERVENTIONS
Assistance with ambulation/Assistance OOB with selected safe patient handling equipment/Communicate Risk of Fall with Harm to all staff/Discuss with provider need for PT consult/Monitor gait and stability/Reinforce activity limits and safety measures with patient and family/Tailored Fall Risk Interventions/Visual Cue: Yellow wristband and red socks/Bed in lowest position, wheels locked, appropriate side rails in place/Call bell, personal items and telephone in reach/Instruct patient to call for assistance before getting out of bed or chair/Non-slip footwear when patient is out of bed/[place] to call system/Physically safe environment - no spills, clutter or unnecessary equipment/Purposeful Proactive Rounding/Room/bathroom lighting operational, light cord in reach

## 2025-05-05 NOTE — PROGRESS NOTE ADULT - ASSESSMENT
89M PMHx possible dementia, A-fib on coumadin, HTN, HLD, chronic contracted R arm, bedbound here for    # SOB  # Hypoxia  # Septic shock  # UTI  # PNA   # GIB  # AMS     Neuro: Dementia. protecting airway. HOB > 30. aspiration precautions.  CV: septic shock., NSTEMI. vasopressor titration  Pulm: Nebs. HFNC. DNR/DNI. abx for pulmonary coverage.  GI: Trend CBC. Protonix BID. NPO for now. no further bleeding episodes noted today so far.  Renal: Strict I/O's. HERMILA.  ID: Abx for UTI septic shock. concomitant pulmonary coverage.  dispo: ICU 89M PMHx possible dementia, A-fib on coumadin, HTN, HLD, chronic contracted R arm, bedbound here for    # SOB  # Hypoxia  # Septic shock  # UTI  # PNA   # GIB  # AMS     Neuro: Dementia. protecting airway. HOB > 30. aspiration precautions.  CV: septic shock., NSTEMI. vasopressor titration  Pulm: Nebs. HFNC. DNR/DNI. abx for pulmonary coverage.  GI: Trend CBC. Protonix BID. NPO for now. no further bleeding episodes noted today so far.  Renal: Strict I/O's. HERMILA.  ID: Abx for UTI septic shock. concomitant pulmonary coverage.  Heme: Elevated INR. on coumadin outpt. Elevated creatinine. no active bleeding. Would trend coags.   dispo: ICU

## 2025-05-05 NOTE — CONSULT NOTE ADULT - SUBJECTIVE AND OBJECTIVE BOX
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 Hurt placed in ER. Per family found to be hypoxic by EMS.  NKDA per report.  Full DNR in effect.  (04 May 2025 19:14)    PERTINENT PM/SXH:   Syncope    Hypotension    BPH (Benign Prostatic Hyperplasia)    Hypertension    COPD exacerbation    Chronic atrial fibrillation      S/P hip replacement      FAMILY HISTORY:  FH: HTN (hypertension)      ITEMS NOT CHECKED ARE NOT PRESENT    SOCIAL HISTORY:   Significant other/partner: yes  [ ]  Children: yes [ ]  Church/Spirituality: Taoist   Substance hx:  [ ]   Tobacco hx:  [ ]   Alcohol hx: [ ]   Home Opioid hx:  [ ] I-Stop Reference No:  Living Situation: [ ]Home  [ ]Long term care  [ ]Rehab [ ]Other    ADVANCE DIRECTIVES:    DNR  MOLST  [ x]  Living Will  [ ]   DECISION MAKER(s):  [ x] Health Care Proxy(s)  [ ] Surrogate(s)  [ ] Guardian           Name(s): Phone Number(s): Lux West (son and primary HCA) [phone]/ Aleksandra West Mallory (daughter and alternate HCA) [phone]    BASELINE (I)ADL(s) (prior to admission):  [place]: [ ]Total  [ ] Moderate [ ]Dependent    Allergies    peanuts (Anaphylaxis)  chocolate (Unknown)  iodine (Hives)    Intolerances    MEDICATIONS  (STANDING):  albuterol/ipratropium for Nebulization 3 milliLiter(s) Nebulizer every 6 hours  chlorhexidine 2% Cloths 1 Application(s) Topical <User Schedule>  finasteride 5 milliGRAM(s) Oral daily  fluticasone propionate/ salmeterol 250-50 MICROgram(s) Diskus 1 Dose(s) Inhalation two times a day  meropenem  IVPB      meropenem  IVPB 1000 milliGRAM(s) IV Intermittent every 12 hours  midodrine. 10 milliGRAM(s) Oral three times a day  norepinephrine Infusion 0.05 MICROgram(s)/kG/Min (3.61 mL/Hr) IV Continuous <Continuous>  pantoprazole    Tablet 40 milliGRAM(s) Oral before breakfast  tamsulosin 0.8 milliGRAM(s) Oral at bedtime    MEDICATIONS  (PRN):  sodium chloride 0.9% lock flush 10 milliLiter(s) IV Push every 1 hour PRN Pre/post blood products, medications, blood draw, and to maintain line patency    PRESENT SYMPTOMS: [ ]Unable to obtain due to poor mentation   Source if other than patient:  [ ]Family   [ ]Team     Pain: [ ] yes [ ] no  QOL impact -   Location -                    Aggravating factors -  Quality -  Radiation -  Timing-  Severity (0-10 scale):  Minimal acceptable level (0-10 scale):     PAIN AD Score:     [email or web address] (press ctrl +  left click to view)    Dyspnea:                           [ ]Mild [ ]Moderate [ ]Severe  Anxiety:                             [ ]Mild [ ]Moderate [ ]Severe  Fatigue:                             [ ]Mild [ ]Moderate [ ]Severe  Nausea:                             [ ]Mild [ ]Moderate [ ]Severe  Loss of appetite:              [ ]Mild [ ]Moderate [ ]Severe  Constipation:                    [ ]Mild [ ]Moderate [ ]Severe    Other Symptoms:  [ ]All other review of systems negative     Karnofsky Performance Score/Palliative Performance Status Version 2:         %    http://npcrc.org/files/news/palliative_performance_scale_ppsv2.pdf  PHYSICAL EXAM:  Vital Signs Last 24 Hrs  T(C): 35.8 (05 May 2025 12:00), Max: 36.1 (04 May 2025 17:30)  T(F): 96.4 (05 May 2025 12:00), Max: 97 (04 May 2025 17:30)  HR: 128 (05 May 2025 12:14) (65 - 128)  BP: 103/69 (05 May 2025 07:50) (60/38 - 106/55)  BP(mean): 79 (05 May 2025 07:50) (46 - 79)  RR: 23 (05 May 2025 12:14) (16 - 28)  SpO2: 92% (05 May 2025 12:14) (86% - 100%)    Parameters below as of 05 May 2025 12:07  Patient On (Oxygen Delivery Method): nasal cannula, high flow  O2 Flow (L/min): 50  O2 Concentration (%): 100 I&O's Summary    04 May 2025 07:01  -  05 May 2025 07:00  --------------------------------------------------------  IN: 2510.4 mL / OUT: 0 mL / NET: 2510.4 mL    05 May 2025 07:01  -  05 May 2025 13:08  --------------------------------------------------------  IN: 27 mL / OUT: 30 mL / NET: -3 mL    GENERAL:  [ ]Alert  [ ]Oriented x   [ ]Lethargic  [ ]Cachexia  [ ]Unarousable  [ ]Verbal  [ ]Non-Verbal  Behavioral:   [ ] Anxiety  [ ] Delirium [ ] Agitation [ ] Other  HEENT:  [ ]Normal   [ ]Dry mouth   [ ]ET Tube/Trach  [ ]Oral lesions  PULMONARY:   [ ]Clear [ ]Tachypnea  [ ]Audible excessive secretions   [ ]Rhonchi        [ ]Right [ ]Left [ ]Bilateral  [ ]Crackles        [ ]Right [ ]Left [ ]Bilateral  [ ]Wheezing     [ ]Right [ ]Left [ ]Bilateral  CARDIOVASCULAR:    [ ]Regular [ ]Irregular [x ]Tachy  [ ]Cesar [ ]Murmur [ ]Other  GASTROINTESTINAL:  [ ]Soft  [ ]Distended   [ ]+BS  [ ]Non tender [ ]Tender  [ ]PEG [ ]OGT/ NGT  Last BM: 5/5/25 GENITOURINARY:  [ ]Normal [ ] Incontinent   [ ]Oliguria/Anuria   [ x]Hurt  MUSCULOSKELETAL:   [ ]Normal   [ ]Weakness  [ ]Bed/Wheelchair bound [ ]Edema  NEUROLOGIC:   [ ]No focal deficits  [ ] Cognitive impairment  [ ] Dysphagia [ ]Dysarthria [ ] Paresis [ ]Other   SKIN:   [ ]Normal   [ x]Pressure ulcer(s) right heels suspected DTI, left heel stage I, sacrum stage III and right buttock suspected DTI per flow sheet [ ]Rash    CRITICAL CARE:  [ ] Shock Present  [ ]Septic [ ]Cardiogenic [ ]Neurologic [ ]Hypovolemic  [ ]  Vasopressors [ ]  Inotropes   [ ] Respiratory failure present [ ] mechanical ventilation [ ] non-invasive ventilatory support [ ] High flow  [ ] Acute  [ ] Chronic [ ] Hypoxic  [ ] Hypercarbic [ ] Other  [ ] Other organ failure     LABS:                        14.8   15.34 )-----------( 71       ( 05 May 2025 04:20 )             44.2   05-05    144  |  115[H]  |  60[H]  ----------------------------<  91  3.5   |  18[L]  |  2.35[H]    Ca    8.0[L]      05 May 2025 04:20  Mg     1.9     05-05    TPro  4.8[L]  /  Alb  2.5[L]  /  TBili  2.5[H]  /  DBili  0.4[H]  /  AST  57[H]  /  ALT  44  /  AlkPhos  85  05-05  PT/INR - ( 04 May 2025 17:24 )   PT: 84.0 sec;   INR: 7.58 ratio      Urinalysis with Rflx Culture (05.04.25 @ 16:04)    Urine Appearance: Turbid   Color: Orange   Specific Gravity: 1.017   pH Urine: 5.0   Protein, Urine: 100 mg/dL   Glucose Qualitative, Urine: Negative mg/dL   Ketone - Urine: Negative mg/dL   Blood, Urine: Small   Bilirubin: Small   Urobilinogen: 0.2 mg/dL   Leukocyte Esterase Concentration: Large   Nitrite: Positive    RADIOLOGY & ADDITIONAL STUDIES:  < from: Xray Chest 1 View- PORTABLE-Urgent (05.04.25 @ 16:15) >  IMPRESSION:  Moderate LEFT effusion and/or lower zone airspace consolidation obscuring   diaphragm contour.  RIGHT lung parenchyma clear.  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 Hurt placed in ER. Per family found to be hypoxic by EMS.  NKDA per report.  Full DNR in effect.  (04 May 2025 19:14)    PERTINENT PM/SXH:   Syncope    Hypotension    BPH (Benign Prostatic Hyperplasia)    Hypertension    COPD exacerbation    Chronic atrial fibrillation      S/P hip replacement      FAMILY HISTORY:  FH: HTN (hypertension)      ITEMS NOT CHECKED ARE NOT PRESENT    SOCIAL HISTORY:   Significant other/partner: yes  [ ]  Children: yes [ ]  Yazidi/Spirituality: Gnosticism   Substance hx:  [ ]   Tobacco hx:  [ ]   Alcohol hx: [ ]   Home Opioid hx:  [ ] I-Stop Reference No:  Living Situation: [ ]Home  [ ]Long term care  [ ]Rehab [ ]Other    ADVANCE DIRECTIVES:    DNR  MOLST  [ x]  Living Will  [ ]   DECISION MAKER(s):  [ x] Health Care Proxy(s)  [ ] Surrogate(s)  [ ] Guardian           Name(s): Phone Number(s): Lux West (son and primary HCA) [phone]/ Aleksandra West Mallory (daughter and alternate HCA) [phone]    BASELINE (I)ADL(s) (prior to admission):  [place]: [ ]Total  [ ] Moderate [ ]Dependent    Allergies    peanuts (Anaphylaxis)  chocolate (Unknown)  iodine (Hives)    Intolerances    MEDICATIONS  (STANDING):  albuterol/ipratropium for Nebulization 3 milliLiter(s) Nebulizer every 6 hours  chlorhexidine 2% Cloths 1 Application(s) Topical <User Schedule>  finasteride 5 milliGRAM(s) Oral daily  fluticasone propionate/ salmeterol 250-50 MICROgram(s) Diskus 1 Dose(s) Inhalation two times a day  meropenem  IVPB      meropenem  IVPB 1000 milliGRAM(s) IV Intermittent every 12 hours  midodrine. 10 milliGRAM(s) Oral three times a day  norepinephrine Infusion 0.05 MICROgram(s)/kG/Min (3.61 mL/Hr) IV Continuous <Continuous>  pantoprazole    Tablet 40 milliGRAM(s) Oral before breakfast  tamsulosin 0.8 milliGRAM(s) Oral at bedtime    MEDICATIONS  (PRN):  sodium chloride 0.9% lock flush 10 milliLiter(s) IV Push every 1 hour PRN Pre/post blood products, medications, blood draw, and to maintain line patency    PRESENT SYMPTOMS: [ ]Unable to obtain due to poor mentation   Source if other than patient:  [ ]Family   [ ]Team     Pain: [ ] yes [ ] no  QOL impact -   Location -                    Aggravating factors -  Quality -  Radiation -  Timing-  Severity (0-10 scale):  Minimal acceptable level (0-10 scale):     PAIN AD Score:     [email or web address] (press ctrl +  left click to view)    Dyspnea:                           [ ]Mild [ ]Moderate [ ]Severe  Anxiety:                             [ ]Mild [ ]Moderate [ ]Severe  Fatigue:                             [ ]Mild [ ]Moderate [ ]Severe  Nausea:                             [ ]Mild [ ]Moderate [ ]Severe  Loss of appetite:              [ ]Mild [ ]Moderate [ ]Severe  Constipation:                    [ ]Mild [ ]Moderate [ ]Severe    Other Symptoms:  [ ]All other review of systems negative     Karnofsky Performance Score/Palliative Performance Status Version 2:         %    http://npcrc.org/files/news/palliative_performance_scale_ppsv2.pdf  PHYSICAL EXAM:  Vital Signs Last 24 Hrs  T(C): 35.8 (05 May 2025 12:00), Max: 36.1 (04 May 2025 17:30)  T(F): 96.4 (05 May 2025 12:00), Max: 97 (04 May 2025 17:30)  HR: 128 (05 May 2025 12:14) (65 - 128)  BP: 103/69 (05 May 2025 07:50) (60/38 - 106/55)  BP(mean): 79 (05 May 2025 07:50) (46 - 79)  RR: 23 (05 May 2025 12:14) (16 - 28)  SpO2: 92% (05 May 2025 12:14) (86% - 100%)    Parameters below as of 05 May 2025 12:07  Patient On (Oxygen Delivery Method): nasal cannula, high flow  O2 Flow (L/min): 50  O2 Concentration (%): 100 I&O's Summary    04 May 2025 07:01  -  05 May 2025 07:00  --------------------------------------------------------  IN: 2510.4 mL / OUT: 0 mL / NET: 2510.4 mL    05 May 2025 07:01  -  05 May 2025 13:08  --------------------------------------------------------  IN: 27 mL / OUT: 30 mL / NET: -3 mL    GENERAL:  [ ]Alert  [ ]Oriented x   [ ]Lethargic  [ ]Cachexia  [ ]Unarousable  [ ]Verbal  [ ]Non-Verbal  Behavioral:   [ ] Anxiety  [ ] Delirium [ ] Agitation [ ] Other  HEENT:  [ ]Normal   [ ]Dry mouth   [ ]ET Tube/Trach  [ ]Oral lesions  PULMONARY:   [ ]Clear [ ]Tachypnea  [ ]Audible excessive secretions   [ ]Rhonchi        [ ]Right [ ]Left [ ]Bilateral  [ ]Crackles        [ ]Right [ ]Left [ ]Bilateral  [ ]Wheezing     [ ]Right [ ]Left [ ]Bilateral  CARDIOVASCULAR:    [ ]Regular [ ]Irregular [x ]Tachy  [ ]Cesar [ ]Murmur [ ]Other  GASTROINTESTINAL:  [ ]Soft  [ ]Distended   [ ]+BS  [ ]Non tender [ ]Tender  [ ]PEG [ ]OGT/ NGT  Last BM: 5/5/25 GENITOURINARY:  [ ]Normal [ ] Incontinent   [ ]Oliguria/Anuria   [ x]Hurt  MUSCULOSKELETAL:   [ ]Normal   [ ]Weakness  [ ]Bed/Wheelchair bound [ ]Edema  NEUROLOGIC:   [ ]No focal deficits  [ ] Cognitive impairment  [ ] Dysphagia [ ]Dysarthria [ ] Paresis [ ]Other   SKIN:   [ ]Normal   [ x]Pressure ulcer(s) right heels suspected DTI, left heel stage I, sacrum stage III and right buttock suspected DTI per flow sheet [ ]Rash    CRITICAL CARE:  [ ] Shock Present  [ ]Septic [ ]Cardiogenic [ ]Neurologic [ ]Hypovolemic  [ ]  Vasopressors [ ]  Inotropes   [ ] Respiratory failure present [ ] mechanical ventilation [ ] non-invasive ventilatory support [ ] High flow  [ ] Acute  [ ] Chronic [ ] Hypoxic  [ ] Hypercarbic [ ] Other  [ ] Other organ failure     LABS:                        14.8   15.34 )-----------( 71       ( 05 May 2025 04:20 )             44.2   05-05    144  |  115[H]  |  60[H]  ----------------------------<  91  3.5   |  18[L]  |  2.35[H]    Ca    8.0[L]      05 May 2025 04:20  Mg     1.9     05-05    TPro  4.8[L]  /  Alb  2.5[L]  /  TBili  2.5[H]  /  DBili  0.4[H]  /  AST  57[H]  /  ALT  44  /  AlkPhos  85  05-05  PT/INR - ( 04 May 2025 17:24 )   PT: 84.0 sec;   INR: 7.58 ratio      Urinalysis with Rflx Culture (05.04.25 @ 16:04)    Urine Appearance: Turbid   Color: Orange   Specific Gravity: 1.017   pH Urine: 5.0   Protein, Urine: 100 mg/dL   Glucose Qualitative, Urine: Negative mg/dL   Ketone - Urine: Negative mg/dL   Blood, Urine: Small   Bilirubin: Small   Urobilinogen: 0.2 mg/dL   Leukocyte Esterase Concentration: Large   Nitrite: Positive    Prothrombin Time and INR, Plasma (05.04.25 @ 17:24)    Prothrombin Time, Plasma: 84.0 sec   INR: 7.58: TYPE:(C=Critical, N=Notification, A=Abnormal) c  TESTS: INR  DATE/TIME CALLED: _05/04/2025 18:06:06 EDT  CALLED TO: Genia REED rn  READ BACK (2 Patient Identifiers)(Y/N): _y  READ BACK VALUES (Y/N): _y  CALLED BY: leonardo conn  Recommended targets/ranges for therapeutic INR:  2.0-3.0 Deep vein thrombosis, pulmonary embolism, atrial fibrillation  2.0-3.0 Mechanical aortic valve, antiphospholipid syndrome with previous  arterial or venous thromboembolism  2.5-3.5 Mechanical mitral valve, double mechanical valve (aortic and  mitral positions, high risk valves)  Note: Chest 2012 Feb;141(2 Suppl):7S-47S  Routine coagulation results should be interpreted with caution when  taking Factor Xa inhibitors or direct thrombin inhibitors; blood sampling  prior to drug intake is recommended. ratio    RADIOLOGY & ADDITIONAL STUDIES:  < from: Xray Chest 1 View- PORTABLE-Urgent (05.04.25 @ 16:15) >  IMPRESSION:  Moderate LEFT effusion and/or lower zone airspace consolidation obscuring   diaphragm contour.  RIGHT lung parenchyma clear.  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 Hurt placed in ER. Per family found to be hypoxic by EMS.  NKDA per report.  Full DNR in effect.  (04 May 2025 19:14)    PERTINENT PM/SXH:   Syncope    Hypotension    BPH (Benign Prostatic Hyperplasia)    Hypertension    COPD exacerbation    Chronic atrial fibrillation      S/P hip replacement      FAMILY HISTORY:  FH: HTN (hypertension)      ITEMS NOT CHECKED ARE NOT PRESENT    SOCIAL HISTORY:   Significant other/partner: yes  [ ]  Children: yes [ ]  Hindu/Spirituality: Buddhist   Substance hx:  [ ]   Tobacco hx:  [ ]   Alcohol hx: [ ]   Home Opioid hx:  [ ] I-Stop Reference No:  Reference #: [id number]  Living Situation: [ ]Home  [ ]Long term care  [ ]Rehab [ ]Other    ADVANCE DIRECTIVES:    DNR  MOLST  [ x]  Living Will  [ ]   DECISION MAKER(s):  [ x] Health Care Proxy(s)  [ ] Surrogate(s)  [ ] Guardian           Name(s): Phone Number(s): Lux West (son and primary HCA) [phone]/ Aleksandra West Mallory (daughter and alternate HCA) [phone]    BASELINE (I)ADL(s) (prior to admission):  [place]: [ ]Total  [ ] Moderate [ ]Dependent    Allergies    peanuts (Anaphylaxis)  chocolate (Unknown)  iodine (Hives)    Intolerances    MEDICATIONS  (STANDING):  albuterol/ipratropium for Nebulization 3 milliLiter(s) Nebulizer every 6 hours  chlorhexidine 2% Cloths 1 Application(s) Topical <User Schedule>  finasteride 5 milliGRAM(s) Oral daily  fluticasone propionate/ salmeterol 250-50 MICROgram(s) Diskus 1 Dose(s) Inhalation two times a day  meropenem  IVPB      meropenem  IVPB 1000 milliGRAM(s) IV Intermittent every 12 hours  midodrine. 10 milliGRAM(s) Oral three times a day  norepinephrine Infusion 0.05 MICROgram(s)/kG/Min (3.61 mL/Hr) IV Continuous <Continuous>  pantoprazole    Tablet 40 milliGRAM(s) Oral before breakfast  tamsulosin 0.8 milliGRAM(s) Oral at bedtime    MEDICATIONS  (PRN):  sodium chloride 0.9% lock flush 10 milliLiter(s) IV Push every 1 hour PRN Pre/post blood products, medications, blood draw, and to maintain line patency    PRESENT SYMPTOMS: [ ]Unable to obtain due to poor mentation   Source if other than patient:  [ ]Family   [ ]Team     Pain: [ ] yes [ ] no  QOL impact -   Location -                    Aggravating factors -  Quality -  Radiation -  Timing-  Severity (0-10 scale):  Minimal acceptable level (0-10 scale):     PAIN AD Score:     [email or web address] (press ctrl +  left click to view)    Dyspnea:                           [ ]Mild [ ]Moderate [ ]Severe  Anxiety:                             [ ]Mild [ ]Moderate [ ]Severe  Fatigue:                             [ ]Mild [ ]Moderate [ ]Severe  Nausea:                             [ ]Mild [ ]Moderate [ ]Severe  Loss of appetite:              [ ]Mild [ ]Moderate [ ]Severe  Constipation:                    [ ]Mild [ ]Moderate [ ]Severe    Other Symptoms:  [ ]All other review of systems negative     Karnofsky Performance Score/Palliative Performance Status Version 2:         %    http://npcrc.org/files/news/palliative_performance_scale_ppsv2.pdf  PHYSICAL EXAM:  Vital Signs Last 24 Hrs  T(C): 35.8 (05 May 2025 12:00), Max: 36.1 (04 May 2025 17:30)  T(F): 96.4 (05 May 2025 12:00), Max: 97 (04 May 2025 17:30)  HR: 128 (05 May 2025 12:14) (65 - 128)  BP: 103/69 (05 May 2025 07:50) (60/38 - 106/55)  BP(mean): 79 (05 May 2025 07:50) (46 - 79)  RR: 23 (05 May 2025 12:14) (16 - 28)  SpO2: 92% (05 May 2025 12:14) (86% - 100%)    Parameters below as of 05 May 2025 12:07  Patient On (Oxygen Delivery Method): nasal cannula, high flow  O2 Flow (L/min): 50  O2 Concentration (%): 100 I&O's Summary    04 May 2025 07:01  -  05 May 2025 07:00  --------------------------------------------------------  IN: 2510.4 mL / OUT: 0 mL / NET: 2510.4 mL    05 May 2025 07:01  -  05 May 2025 13:08  --------------------------------------------------------  IN: 27 mL / OUT: 30 mL / NET: -3 mL    GENERAL:  [ ]Alert  [ ]Oriented x   [ ]Lethargic  [ ]Cachexia  [ ]Unarousable  [ ]Verbal  [ ]Non-Verbal  Behavioral:   [ ] Anxiety  [ ] Delirium [ ] Agitation [ ] Other  HEENT:  [ ]Normal   [ ]Dry mouth   [ ]ET Tube/Trach  [ ]Oral lesions  PULMONARY:   [ ]Clear [ ]Tachypnea  [ ]Audible excessive secretions   [ ]Rhonchi        [ ]Right [ ]Left [ ]Bilateral  [ ]Crackles        [ ]Right [ ]Left [ ]Bilateral  [ ]Wheezing     [ ]Right [ ]Left [ ]Bilateral  CARDIOVASCULAR:    [ ]Regular [ ]Irregular [x ]Tachy  [ ]Cesar [ ]Murmur [ ]Other  GASTROINTESTINAL:  [ ]Soft  [ ]Distended   [ ]+BS  [ ]Non tender [ ]Tender  [ ]PEG [ ]OGT/ NGT  Last BM: 5/5/25 GENITOURINARY:  [ ]Normal [ ] Incontinent   [ ]Oliguria/Anuria   [ x]Hurt  MUSCULOSKELETAL:   [ ]Normal   [ ]Weakness  [ ]Bed/Wheelchair bound [ ]Edema  NEUROLOGIC:   [ ]No focal deficits  [ ] Cognitive impairment  [ ] Dysphagia [ ]Dysarthria [ ] Paresis [ ]Other   SKIN:   [ ]Normal   [ x]Pressure ulcer(s) right heels suspected DTI, left heel stage I, sacrum stage III and right buttock suspected DTI per flow sheet [ ]Rash    CRITICAL CARE:  [ ] Shock Present  [ ]Septic [ ]Cardiogenic [ ]Neurologic [ ]Hypovolemic  [ ]  Vasopressors [ ]  Inotropes   [ ] Respiratory failure present [ ] mechanical ventilation [ ] non-invasive ventilatory support [ ] High flow  [ ] Acute  [ ] Chronic [ ] Hypoxic  [ ] Hypercarbic [ ] Other  [ ] Other organ failure     LABS:                        14.8   15.34 )-----------( 71       ( 05 May 2025 04:20 )             44.2   05-05    144  |  115[H]  |  60[H]  ----------------------------<  91  3.5   |  18[L]  |  2.35[H]    Ca    8.0[L]      05 May 2025 04:20  Mg     1.9     05-05    TPro  4.8[L]  /  Alb  2.5[L]  /  TBili  2.5[H]  /  DBili  0.4[H]  /  AST  57[H]  /  ALT  44  /  AlkPhos  85  05-05  PT/INR - ( 04 May 2025 17:24 )   PT: 84.0 sec;   INR: 7.58 ratio      Urinalysis with Rflx Culture (05.04.25 @ 16:04)    Urine Appearance: Turbid   Color: Orange   Specific Gravity: 1.017   pH Urine: 5.0   Protein, Urine: 100 mg/dL   Glucose Qualitative, Urine: Negative mg/dL   Ketone - Urine: Negative mg/dL   Blood, Urine: Small   Bilirubin: Small   Urobilinogen: 0.2 mg/dL   Leukocyte Esterase Concentration: Large   Nitrite: Positive    Prothrombin Time and INR, Plasma (05.04.25 @ 17:24)    Prothrombin Time, Plasma: 84.0 sec   INR: 7.58: TYPE:(C=Critical, N=Notification, A=Abnormal) c  TESTS: INR  DATE/TIME CALLED: _05/04/2025 18:06:06 EDT  CALLED TO: Genia REED rn  READ BACK (2 Patient Identifiers)(Y/N): _y  READ BACK VALUES (Y/N): _y  CALLED BY: leonardo conn  Recommended targets/ranges for therapeutic INR:  2.0-3.0 Deep vein thrombosis, pulmonary embolism, atrial fibrillation  2.0-3.0 Mechanical aortic valve, antiphospholipid syndrome with previous  arterial or venous thromboembolism  2.5-3.5 Mechanical mitral valve, double mechanical valve (aortic and  mitral positions, high risk valves)  Note: Chest 2012 Feb;141(2 Suppl):7S-47S  Routine coagulation results should be interpreted with caution when  taking Factor Xa inhibitors or direct thrombin inhibitors; blood sampling  prior to drug intake is recommended. ratio    RADIOLOGY & ADDITIONAL STUDIES:  < from: Xray Chest 1 View- PORTABLE-Urgent (05.04.25 @ 16:15) >  IMPRESSION:  Moderate LEFT effusion and/or lower zone airspace consolidation obscuring   diaphragm contour.  RIGHT lung parenchyma clear.  --- End of Report ---  < end of copied text >    PROTEIN CALORIE MALNUTRITION PRESENT: [ ] Yes [ ] No  [ ] PPSV2 < or = to 30% [ ] significant weight loss  [ ] poor nutritional intake [ ] catabolic state [ ] anasarca     Artificial Nutrition [ ]     REFERRALS:   [ ]Chaplaincy  [ ] Hospice  [ ]Child Life  [ ]Social Work  [ ]Case management [ ]Holistic Therapy     Goals of Care Document:

## 2025-05-05 NOTE — CONSULT NOTE ADULT - NS ATTEND AMEND GEN_ALL_CORE FT
89 y M hx of HTN, Afib, nonambulatory since leg injury 2020 under ICU care for AMS, septic shock, acute hypoxic resp failure 2/2 PNA, UTI, on pressor support, HFNC.  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 MOLST on file for DNR/DNI. GOC discussion as above, son is primary HCP, daughter is alternate. Family wishes to continue w conservative med mgmt, may consider hospice/comfort care if continues to decline. Palliative will follow.

## 2025-05-05 NOTE — CONSULT NOTE ADULT - PROBLEM SELECTOR RECOMMENDATION 7
See GOC conversation above.  Patient with MOLST on file with DNR/I as well as HCP.   Family aware of clinical issues thus far and said they were updated by ICU team today.  Patient's status is tenuous given HFNC and pressor support.  Palliative to follow.     Discussed with ICU team Dutasteride Pregnancy And Lactation Text: This medication is absolutely contraindicated in women, especially during pregnancy and breast feeding. Feminization of male fetuses is possible if taking while pregnant.

## 2025-05-05 NOTE — CONSULT NOTE ADULT - CONVERSATION DETAILS
Spoke with patient's son, Lux and his wife in person.  Introduced self and palliative medicine as well as our role in the care team.  Patient lives at home with his wife, has 4 hours 3 days a week of HHA privately hired.  He has been bedbound since approx 2020 when he suffered a broken leg in rehab.  Patient is normally very alert and oriented and conversant so his current mental status is much different than baseline.      Patient developed UTI around Easter had been on abx and was doing a bit better but then developed a cough.  They are hoping for clinical improvement, but said they are going to see how he is doing and are open to the idea of hospice if patient is not improving.       HCP on file listing [facility] [phone] as primary HCA followed by Aleksandra Tejada [phone] as alternate HCA.      [facility] on file with DNR/I/trial NIV/determine the use of feeding tube/use IVF and abx and no HD.

## 2025-05-05 NOTE — CONSULT NOTE ADULT - PROBLEM SELECTOR RECOMMENDATION 2
on norepi and midodrine, on meropenem  hypothermic   HERMILA, monitor urine output   urine with large leukocytes and positive for nitrates, urine and blood cultures drawn, follow culture data  patient's son reports patient had UTI was on abx around Easter time

## 2025-05-05 NOTE — CONSULT NOTE ADULT - SUBJECTIVE AND OBJECTIVE BOX
Patient is a 89y old  Male who presents with a chief complaint of SOB, hypoxemia, LLL CAP and UTI. (04 May 2025 19:14)      BRIEF HOSPITAL COURSE:   89M PMHx possible dementia, A-fib on coumadin, HTN, HLD, chronic contracted R arm, bedbound, presented to the ER on 5/4 w/ AMS, hypoxia. Patient was admitted to tele. Received call from medicine PA, patient with GIB, BP 60/40.        PAST MEDICAL & SURGICAL HISTORY:  Syncope      Hypotension  Postural, positive Tilt Table Test.      BPH (Benign Prostatic Hyperplasia)      Hypertension      COPD exacerbation      Chronic atrial fibrillation      S/P hip replacement  Bilateral, Left ORIF and Rt. Hip Replacement        Allergies    peanuts (Anaphylaxis)  chocolate (Unknown)  iodine (Hives)    Intolerances      FAMILY HISTORY:  FH: HTN (hypertension)          Medications:  meropenem  IVPB      meropenem  IVPB 1000 milliGRAM(s) IV Intermittent every 12 hours    midodrine. 10 milliGRAM(s) Oral three times a day  norepinephrine Infusion 0.05 MICROgram(s)/kG/Min IV Continuous <Continuous>    albuterol/ipratropium for Nebulization 3 milliLiter(s) Nebulizer every 6 hours  fluticasone propionate/ salmeterol 250-50 MICROgram(s) Diskus 1 Dose(s) Inhalation two times a day          pantoprazole    Tablet 40 milliGRAM(s) Oral before breakfast    tamsulosin 0.8 milliGRAM(s) Oral at bedtime    finasteride 5 milliGRAM(s) Oral daily    phytonadione  IVPB 5 milliGRAM(s) IV Intermittent once  sodium chloride 0.9% lock flush 10 milliLiter(s) IV Push every 1 hour PRN      chlorhexidine 2% Cloths 1 Application(s) Topical <User Schedule>            ICU Vital Signs Last 24 Hrs  T(C): 35.5 (05 May 2025 00:30), Max: 36.1 (04 May 2025 17:30)  T(F): 95.9 (05 May 2025 00:30), Max: 97 (04 May 2025 17:30)  HR: 95 (05 May 2025 03:10) (65 - 96)  BP: 75/57 (05 May 2025 00:30) (68/- - 106/55)  BP(mean): 65 (05 May 2025 00:30) (55 - 70)  ABP: --  ABP(mean): --  RR: 26 (05 May 2025 03:10) (16 - 28)  SpO2: 97% (05 May 2025 03:10) (90% - 98%)    O2 Parameters below as of 05 May 2025 03:10  Patient On (Oxygen Delivery Method): nasal cannula, high flow  O2 Flow (L/min): 50  O2 Concentration (%): 100      Vital Signs Last 24 Hrs  T(C): 35.5 (05 May 2025 00:30), Max: 36.1 (04 May 2025 17:30)  T(F): 95.9 (05 May 2025 00:30), Max: 97 (04 May 2025 17:30)  HR: 95 (05 May 2025 03:10) (65 - 96)  BP: 75/57 (05 May 2025 00:30) (68/- - 106/55)  BP(mean): 65 (05 May 2025 00:30) (55 - 70)  RR: 26 (05 May 2025 03:10) (16 - 28)  SpO2: 97% (05 May 2025 03:10) (90% - 98%)    Parameters below as of 05 May 2025 03:10  Patient On (Oxygen Delivery Method): nasal cannula, high flow  O2 Flow (L/min): 50  O2 Concentration (%): 100        I&O's Detail        LABS:                        14.8   15.34 )-----------( x        ( 05 May 2025 04:20 )             44.2     05-05    144  |  115[H]  |  60[H]  ----------------------------<  91  3.5   |  18[L]  |  2.35[H]    Ca    8.0[L]      05 May 2025 04:20  Mg     1.9     05-05    TPro  4.8[L]  /  Alb  2.5[L]  /  TBili  2.5[H]  /  DBili  0.4[H]  /  AST  57[H]  /  ALT  44  /  AlkPhos  85  05-05          CAPILLARY BLOOD GLUCOSE        PT/INR - ( 04 May 2025 17:24 )   PT: 84.0 sec;   INR: 7.58 ratio           Urinalysis Basic - ( 05 May 2025 04:20 )    Color: x / Appearance: x / SG: x / pH: x  Gluc: 91 mg/dL / Ketone: x  / Bili: x / Urobili: x   Blood: x / Protein: x / Nitrite: x   Leuk Esterase: x / RBC: x / WBC x   Sq Epi: x / Non Sq Epi: x / Bacteria: x      CULTURES:

## 2025-05-05 NOTE — CHART NOTE - NSCHARTNOTEFT_GEN_A_CORE
called to patient bedside for report of bright red blood per rectum   patient on coumadin with INR 7.5   Hypotensive to 60/40   Discussed plan of care with son and daughter Aleksandra Erickson [phone]  Health Care proxy restarted DNR/DNI BUT agreed to pressors if needed, Blood transfusion, and ICU care if warranted

## 2025-05-05 NOTE — PROGRESS NOTE ADULT - SUBJECTIVE AND OBJECTIVE BOX
INTERVAL HPI/OVERNIGHT EVENTS:   HPI:  89-year-old male PMH chronic A-fib, HTN, HLD, likely dementia, bedbound non-ambulatory at home, had prior injury and contracture of the right arm who presents today hypoxia altered mental status/confusion over past several days from home. Gonzalez placed in ER. Per family found to be hypoxic by EMS.  NKDA per report.  Full DNR in effect.  (04 May 2025 19:14)      CENTRAL LINE: [ ] YES [ ] NO  LOCATION:   DATE INSERTED:  REMOVE: [ ] YES [ ] NO  EXPLAIN:    GONZALEZ: [ ] YES [ ] NO    DATE INSERTED:  REMOVE:  [ ] YES [ ] NO  EXPLAIN:    A-LINE:  [ ] YES [ ] NO  LOCATION:   DATE INSERTED:  REMOVE:  [ ] YES [ ] NO  EXPLAIN:    PAST MEDICAL & SURGICAL HISTORY:  Syncope      Hypotension  Postural, positive Tilt Table Test.      BPH (Benign Prostatic Hyperplasia)      Hypertension      COPD exacerbation      Chronic atrial fibrillation      S/P hip replacement  Bilateral, Left ORIF and Rt. Hip Replacement          REVIEW OF SYSTEMS:    Negative ROS aside from HPI/Interval events above.    ICU Vital Signs Last 24 Hrs  T(C): 34.8 (05 May 2025 10:32), Max: 36.1 (04 May 2025 17:30)  T(F): 94.6 (05 May 2025 10:32), Max: 97 (04 May 2025 17:30)  HR: 98 (05 May 2025 10:00) (65 - 111)  BP: 103/69 (05 May 2025 07:50) (60/38 - 106/55)  BP(mean): 79 (05 May 2025 07:50) (46 - 79)  ABP: 102/49 (05 May 2025 10:00) (81/34 - 127/48)  ABP(mean): 69 (05 May 2025 10:00) (48 - 76)  RR: 19 (05 May 2025 10:00) (16 - 28)  SpO2: 100% (05 May 2025 10:00) (86% - 100%)    O2 Parameters below as of 05 May 2025 08:11  Patient On (Oxygen Delivery Method): nasal cannula, high flow  O2 Flow (L/min): 50  O2 Concentration (%): 100            I&O's Detail    04 May 2025 07:01  -  05 May 2025 07:00  --------------------------------------------------------  IN:    IV PiggyBack: 50 mL    Lactated Ringers Bolus: 1000 mL    Norepinephrine: 14.4 mL    Plasma: 271 mL    PRBCs (Packed Red Blood Cells): 300 mL    sodium chloride 0.9%: 375 mL    Sodium Chloride 0.9% Bolus: 500 mL  Total IN: 2510.4 mL    OUT:  Total OUT: 0 mL    Total NET: 2510.4 mL      05 May 2025 07:01  -  05 May 2025 11:27  --------------------------------------------------------  IN:    Norepinephrine: 10.8 mL  Total IN: 10.8 mL    OUT:    Indwelling Catheter - Urethral (mL): 30 mL  Total OUT: 30 mL    Total NET: -19.2 mL        CAPILLARY BLOOD GLUCOSE        PHYSICAL EXAM:    GENERAL: NAD.   ENMT: mucous membranes moist  NECK: supple, No JVD  CHEST/LUNG: clear to auscultation bilaterally; no rales, rhonchi, or wheezing b/l  HEART: normal S1, S2  ABDOMEN: BS+, soft, ND, NT   EXTREMITIES:  pulses palpable; no clubbing, cyanosis, or edema b/l LEs. right arm contraction.  NEURO: awake, alert, interactive; moves all extremities      LABS:                        14.8   15.34 )-----------( 71       ( 05 May 2025 04:20 )             44.2      05-05    144  |  115[H]  |  60[H]  ----------------------------<  91  3.5   |  18[L]  |  2.35[H]    Ca    8.0[L]      05 May 2025 04:20  Mg     1.9     05-05    TPro  4.8[L]  /  Alb  2.5[L]  /  TBili  2.5[H]  /  DBili  0.4[H]  /  AST  57[H]  /  ALT  44  /  AlkPhos  85  05-05    PT/INR - ( 04 May 2025 17:24 )   PT: 84.0 sec;   INR: 7.58 ratio           Urinalysis Basic - ( 05 May 2025 04:20 )    Color: x / Appearance: x / SG: x / pH: x  Gluc: 91 mg/dL / Ketone: x  / Bili: x / Urobili: x   Blood: x / Protein: x / Nitrite: x   Leuk Esterase: x / RBC: x / WBC x   Sq Epi: x / Non Sq Epi: x / Bacteria: x

## 2025-05-05 NOTE — CONSULT NOTE ADULT - ASSESSMENT
89M PMHx possible dementia, A-fib on coumadin, HTN, HLD, chronic contracted R arm, bedbound, presented to the ER on 5/4 w/ AMS, hypoxia. Patient was admitted to tele. Received call from medicine PA, patient with GIB, BP 60/40.  # SOB  # Hypoxia  # Septic shock  # UTI  # PNA   # GIB  # AMS     Plan:  - Responded emergently to bedside, patient BP 60/40. Given phenylephrine IVP  - Patient had GIB on floor. INR 7.5. Was given vitamin K  - Placed 18g IV and ordered blood   - On arrival in CCU, 18G IV accidentally removed.   - Placed central line and A-line, ordered levophed. Actively titrating to maintain MAP >65  - Patient's urine in feliciano bag appears contaminated with feculent material. Prior cultures with ESBL E. Coli. Discontinued zosyn, ordered Meropenem   - On HFNC  - Warming blanket while hypothermic  - CXR with left sided infiltrate. Ordered CT chest, CT abd/pelvis   - Patient's son at bedside reports patient is DNR/DNI, is okay with vasopressors and blood products.     Critical Care Time (EXCLUSIVE of any non bundled procedures) : 90 minutes were spent assessing the patient's presenting problems of acute illness that pose a high probability of life threatening  deterioration or end organ damage / dysfunction.  Medical decision making includes initiation / continuation of plan or care review data/ labwork/ radiographic study, direct patient care at bedside, discussions with consultants regarding care, evaluation and interpretation of vital signs, any necessary ventilator management, NIV or BIPAP changes or initiation, discussions with multidisciplinary team,  am or pm rounds, discussions of goals of care with patient and family, all non-inclusive of procedures.    Discussed with eICU attending Dr. Baron. Will discuss with ICU team         Date of entry of this note is equal to the date of services rendered

## 2025-05-05 NOTE — CONSULT NOTE ADULT - ASSESSMENT
89 year old male PMH of HTN, HLD, suspected dementia presented to the ED for AMS, and hypoexmia.  Patient admitted to general medical floor now in ICU on pressor support and HFNC.  Palliative consulted for assistance with GOC. 89 year old male PMH of HTN, HLD, ?dementia presented to the ED for AMS, and hypoexmia.  Patient admitted to general medical floor now in ICU on pressor support and HFNC.  Palliative consulted for assistance with GOC.

## 2025-05-05 NOTE — PROCEDURE NOTE - ADDITIONAL PROCEDURE DETAILS
Procedure performed independent of critical care time.     20G arterial line inserted into right femoral artery under dynamic ultrasound guidance. Sutured in place. Secured with tegaderm.     Dx:   Septic shock  UTI  GIB     Date of entry is equal to date of service.
Procedure performed independent of critical care time.     TLC inserted in R femoral vein. Difficulty dilating, guidewire kinked. Angiocath passed over wire. New guidewire floated through angiocath. TLC then inserted without issue.     Dx:   Shock  UTI  GIB    Date of entry is equal to date of service.

## 2025-05-06 LAB
ALBUMIN SERPL ELPH-MCNC: 2.6 G/DL — LOW (ref 3.3–5)
ALP SERPL-CCNC: 68 U/L — SIGNIFICANT CHANGE UP (ref 40–120)
ALT FLD-CCNC: 37 U/L — SIGNIFICANT CHANGE UP (ref 12–78)
ANION GAP SERPL CALC-SCNC: 10 MMOL/L — SIGNIFICANT CHANGE UP (ref 5–17)
APTT BLD: 36.7 SEC — SIGNIFICANT CHANGE UP (ref 26.1–36.8)
APTT BLD: 81.1 SEC — HIGH (ref 26.1–36.8)
APTT BLD: >200 SEC — CRITICAL HIGH (ref 26.1–36.8)
AST SERPL-CCNC: 50 U/L — HIGH (ref 15–37)
BASOPHILS # BLD AUTO: 0.12 K/UL — SIGNIFICANT CHANGE UP (ref 0–0.2)
BASOPHILS NFR BLD AUTO: 0.6 % — SIGNIFICANT CHANGE UP (ref 0–2)
BILIRUB SERPL-MCNC: 2.6 MG/DL — HIGH (ref 0.2–1.2)
BUN SERPL-MCNC: 68 MG/DL — HIGH (ref 7–23)
CALCIUM SERPL-MCNC: 8.2 MG/DL — LOW (ref 8.5–10.1)
CHLORIDE SERPL-SCNC: 117 MMOL/L — HIGH (ref 96–108)
CO2 SERPL-SCNC: 20 MMOL/L — LOW (ref 22–31)
CREAT SERPL-MCNC: 2.23 MG/DL — HIGH (ref 0.5–1.3)
EGFR: 27 ML/MIN/1.73M2 — LOW
EGFR: 27 ML/MIN/1.73M2 — LOW
EOSINOPHIL # BLD AUTO: 0.01 K/UL — SIGNIFICANT CHANGE UP (ref 0–0.5)
EOSINOPHIL NFR BLD AUTO: 0 % — SIGNIFICANT CHANGE UP (ref 0–6)
GLUCOSE SERPL-MCNC: 113 MG/DL — HIGH (ref 70–99)
HCT VFR BLD CALC: 40.2 % — SIGNIFICANT CHANGE UP (ref 39–50)
HCT VFR BLD CALC: 40.4 % — SIGNIFICANT CHANGE UP (ref 39–50)
HCT VFR BLD CALC: 41.4 % — SIGNIFICANT CHANGE UP (ref 39–50)
HGB BLD-MCNC: 14.1 G/DL — SIGNIFICANT CHANGE UP (ref 13–17)
HGB BLD-MCNC: 14.2 G/DL — SIGNIFICANT CHANGE UP (ref 13–17)
HGB BLD-MCNC: 14.2 G/DL — SIGNIFICANT CHANGE UP (ref 13–17)
IMM GRANULOCYTES NFR BLD AUTO: 1 % — HIGH (ref 0–0.9)
INR BLD: 1.44 RATIO — HIGH (ref 0.85–1.16)
LACTATE SERPL-SCNC: 1.9 MMOL/L — SIGNIFICANT CHANGE UP (ref 0.7–2)
LYMPHOCYTES # BLD AUTO: 0.3 K/UL — LOW (ref 1–3.3)
LYMPHOCYTES # BLD AUTO: 1.5 % — LOW (ref 13–44)
MAGNESIUM SERPL-MCNC: 1.8 MG/DL — SIGNIFICANT CHANGE UP (ref 1.6–2.6)
MCHC RBC-ENTMCNC: 33.1 PG — SIGNIFICANT CHANGE UP (ref 27–34)
MCHC RBC-ENTMCNC: 34.1 G/DL — SIGNIFICANT CHANGE UP (ref 32–36)
MCHC RBC-ENTMCNC: 34.1 PG — HIGH (ref 27–34)
MCHC RBC-ENTMCNC: 34.1 PG — HIGH (ref 27–34)
MCHC RBC-ENTMCNC: 35.1 G/DL — SIGNIFICANT CHANGE UP (ref 32–36)
MCHC RBC-ENTMCNC: 35.3 G/DL — SIGNIFICANT CHANGE UP (ref 32–36)
MCV RBC AUTO: 96.6 FL — SIGNIFICANT CHANGE UP (ref 80–100)
MCV RBC AUTO: 96.9 FL — SIGNIFICANT CHANGE UP (ref 80–100)
MCV RBC AUTO: 97.2 FL — SIGNIFICANT CHANGE UP (ref 80–100)
MONOCYTES # BLD AUTO: 1.08 K/UL — HIGH (ref 0–0.9)
MONOCYTES NFR BLD AUTO: 5.3 % — SIGNIFICANT CHANGE UP (ref 2–14)
NEUTROPHILS # BLD AUTO: 18.74 K/UL — HIGH (ref 1.8–7.4)
NEUTROPHILS NFR BLD AUTO: 91.6 % — HIGH (ref 43–77)
NRBC BLD AUTO-RTO: 0 /100 WBCS — SIGNIFICANT CHANGE UP (ref 0–0)
PHOSPHATE SERPL-MCNC: 3.2 MG/DL — SIGNIFICANT CHANGE UP (ref 2.5–4.5)
PLATELET # BLD AUTO: 71 K/UL — LOW (ref 150–400)
PLATELET # BLD AUTO: 73 K/UL — LOW (ref 150–400)
PLATELET # BLD AUTO: 78 K/UL — LOW (ref 150–400)
POTASSIUM SERPL-MCNC: 3 MMOL/L — LOW (ref 3.5–5.3)
POTASSIUM SERPL-SCNC: 3 MMOL/L — LOW (ref 3.5–5.3)
PROT SERPL-MCNC: 4.9 GM/DL — LOW (ref 6–8.3)
PROTHROM AB SERPL-ACNC: 16.6 SEC — HIGH (ref 9.9–13.4)
RBC # BLD: 4.16 M/UL — LOW (ref 4.2–5.8)
RBC # BLD: 4.17 M/UL — LOW (ref 4.2–5.8)
RBC # BLD: 4.26 M/UL — SIGNIFICANT CHANGE UP (ref 4.2–5.8)
RBC # FLD: 16.5 % — HIGH (ref 10.3–14.5)
RBC # FLD: 16.7 % — HIGH (ref 10.3–14.5)
RBC # FLD: 16.8 % — HIGH (ref 10.3–14.5)
SODIUM SERPL-SCNC: 147 MMOL/L — HIGH (ref 135–145)
WBC # BLD: 19.61 K/UL — HIGH (ref 3.8–10.5)
WBC # BLD: 20.46 K/UL — HIGH (ref 3.8–10.5)
WBC # BLD: 20.6 K/UL — HIGH (ref 3.8–10.5)
WBC # FLD AUTO: 19.61 K/UL — HIGH (ref 3.8–10.5)
WBC # FLD AUTO: 20.46 K/UL — HIGH (ref 3.8–10.5)
WBC # FLD AUTO: 20.6 K/UL — HIGH (ref 3.8–10.5)

## 2025-05-06 PROCEDURE — 99291 CRITICAL CARE FIRST HOUR: CPT

## 2025-05-06 RX ORDER — HEPARIN SODIUM 1000 [USP'U]/ML
800 INJECTION INTRAVENOUS; SUBCUTANEOUS
Qty: 25000 | Refills: 0 | Status: DISCONTINUED | OUTPATIENT
Start: 2025-05-06 | End: 2025-05-07

## 2025-05-06 RX ORDER — MAGNESIUM SULFATE 500 MG/ML
2 SYRINGE (ML) INJECTION ONCE
Refills: 0 | Status: COMPLETED | OUTPATIENT
Start: 2025-05-06 | End: 2025-05-06

## 2025-05-06 RX ORDER — HEPARIN SODIUM 1000 [USP'U]/ML
4000 INJECTION INTRAVENOUS; SUBCUTANEOUS ONCE
Refills: 0 | Status: COMPLETED | OUTPATIENT
Start: 2025-05-06 | End: 2025-05-06

## 2025-05-06 RX ORDER — HEPARIN SODIUM 1000 [USP'U]/ML
4500 INJECTION INTRAVENOUS; SUBCUTANEOUS EVERY 6 HOURS
Refills: 0 | Status: DISCONTINUED | OUTPATIENT
Start: 2025-05-06 | End: 2025-05-06

## 2025-05-06 RX ORDER — MIDODRINE HYDROCHLORIDE 5 MG/1
20 TABLET ORAL THREE TIMES A DAY
Refills: 0 | Status: DISCONTINUED | OUTPATIENT
Start: 2025-05-06 | End: 2025-05-09

## 2025-05-06 RX ORDER — HEPARIN SODIUM 1000 [USP'U]/ML
2000 INJECTION INTRAVENOUS; SUBCUTANEOUS EVERY 6 HOURS
Refills: 0 | Status: DISCONTINUED | OUTPATIENT
Start: 2025-05-06 | End: 2025-05-06

## 2025-05-06 RX ORDER — NYSTATIN 100000 [USP'U]/G
1 CREAM TOPICAL
Refills: 0 | Status: DISCONTINUED | OUTPATIENT
Start: 2025-05-06 | End: 2025-05-14

## 2025-05-06 RX ORDER — HEPARIN SODIUM 1000 [USP'U]/ML
INJECTION INTRAVENOUS; SUBCUTANEOUS
Qty: 25000 | Refills: 0 | Status: DISCONTINUED | OUTPATIENT
Start: 2025-05-06 | End: 2025-05-06

## 2025-05-06 RX ADMIN — IPRATROPIUM BROMIDE AND ALBUTEROL SULFATE 3 MILLILITER(S): .5; 2.5 SOLUTION RESPIRATORY (INHALATION) at 05:06

## 2025-05-06 RX ADMIN — Medication 25 GRAM(S): at 06:14

## 2025-05-06 RX ADMIN — FINASTERIDE 5 MILLIGRAM(S): 1 TABLET, FILM COATED ORAL at 12:40

## 2025-05-06 RX ADMIN — NOREPINEPHRINE BITARTRATE 3.61 MICROGRAM(S)/KG/MIN: 8 SOLUTION at 11:06

## 2025-05-06 RX ADMIN — HEPARIN SODIUM 8 UNIT(S)/HR: 1000 INJECTION INTRAVENOUS; SUBCUTANEOUS at 22:38

## 2025-05-06 RX ADMIN — MEROPENEM 100 MILLIGRAM(S): 1 INJECTION INTRAVENOUS at 06:14

## 2025-05-06 RX ADMIN — HEPARIN SODIUM 8 UNIT(S)/HR: 1000 INJECTION INTRAVENOUS; SUBCUTANEOUS at 22:34

## 2025-05-06 RX ADMIN — Medication 100 MILLIEQUIVALENT(S): at 08:19

## 2025-05-06 RX ADMIN — NYSTATIN 1 APPLICATION(S): 100000 CREAM TOPICAL at 17:04

## 2025-05-06 RX ADMIN — IPRATROPIUM BROMIDE AND ALBUTEROL SULFATE 3 MILLILITER(S): .5; 2.5 SOLUTION RESPIRATORY (INHALATION) at 11:29

## 2025-05-06 RX ADMIN — NOREPINEPHRINE BITARTRATE 3.61 MICROGRAM(S)/KG/MIN: 8 SOLUTION at 19:24

## 2025-05-06 RX ADMIN — Medication 1 APPLICATION(S): at 06:15

## 2025-05-06 RX ADMIN — Medication 40 MILLIGRAM(S): at 17:04

## 2025-05-06 RX ADMIN — Medication 40 MILLIGRAM(S): at 06:13

## 2025-05-06 RX ADMIN — MIDODRINE HYDROCHLORIDE 10 MILLIGRAM(S): 5 TABLET ORAL at 06:12

## 2025-05-06 RX ADMIN — IPRATROPIUM BROMIDE AND ALBUTEROL SULFATE 3 MILLILITER(S): .5; 2.5 SOLUTION RESPIRATORY (INHALATION) at 17:15

## 2025-05-06 RX ADMIN — HEPARIN SODIUM 4000 UNIT(S): 1000 INJECTION INTRAVENOUS; SUBCUTANEOUS at 22:34

## 2025-05-06 RX ADMIN — MIDODRINE HYDROCHLORIDE 20 MILLIGRAM(S): 5 TABLET ORAL at 17:04

## 2025-05-06 RX ADMIN — Medication 100 MILLIEQUIVALENT(S): at 07:26

## 2025-05-06 RX ADMIN — Medication 100 MILLIEQUIVALENT(S): at 06:15

## 2025-05-06 RX ADMIN — MEROPENEM 100 MILLIGRAM(S): 1 INJECTION INTRAVENOUS at 17:03

## 2025-05-06 RX ADMIN — HEPARIN SODIUM 1100 UNIT(S)/HR: 1000 INJECTION INTRAVENOUS; SUBCUTANEOUS at 11:05

## 2025-05-06 RX ADMIN — NOREPINEPHRINE BITARTRATE 3.61 MICROGRAM(S)/KG/MIN: 8 SOLUTION at 07:25

## 2025-05-06 RX ADMIN — MIDODRINE HYDROCHLORIDE 20 MILLIGRAM(S): 5 TABLET ORAL at 12:40

## 2025-05-06 NOTE — PROGRESS NOTE ADULT - SUBJECTIVE AND OBJECTIVE BOX
INTERVAL HPI/OVERNIGHT EVENTS:   HPI:  89-year-old male PMH chronic A-fib, HTN, HLD, likely dementia, bedbound non-ambulatory at home, had prior injury and contracture of the right arm who presents today hypoxia altered mental status/confusion over past several days from home. Gonzalez placed in ER. Per family found to be hypoxic by EMS.  NKDA per report.  Full DNR in effect.  (04 May 2025 19:14)      CENTRAL LINE: [ ] YES [ ] NO  LOCATION:   DATE INSERTED:  REMOVE: [ ] YES [ ] NO  EXPLAIN:    GONZALEZ: [ ] YES [ ] NO    DATE INSERTED:  REMOVE:  [ ] YES [ ] NO  EXPLAIN:    A-LINE:  [ ] YES [ ] NO  LOCATION:   DATE INSERTED:  REMOVE:  [ ] YES [ ] NO  EXPLAIN:    PAST MEDICAL & SURGICAL HISTORY:  Syncope      Hypotension  Postural, positive Tilt Table Test.      BPH (Benign Prostatic Hyperplasia)      Hypertension      COPD exacerbation      Chronic atrial fibrillation      S/P hip replacement  Bilateral, Left ORIF and Rt. Hip Replacement          REVIEW OF SYSTEMS:    Negative ROS aside from HPI/Interval events above.    ICU Vital Signs Last 24 Hrs  T(C): 36.9 (06 May 2025 07:26), Max: 37.3 (06 May 2025 00:41)  T(F): 98.4 (06 May 2025 07:26), Max: 99.1 (06 May 2025 00:41)  HR: 108 (06 May 2025 08:30) (90 - 135)  BP: 79/46 (06 May 2025 07:30) (65/35 - 87/52)  BP(mean): 57 (06 May 2025 07:30) (43 - 65)  ABP: 120/47 (06 May 2025 08:30) (60/36 - 174/79)  ABP(mean): 72 (06 May 2025 08:30) (43 - 114)  RR: 22 (06 May 2025 08:30) (17 - 29)  SpO2: 98% (06 May 2025 08:30) (92% - 100%)    O2 Parameters below as of 06 May 2025 08:09  Patient On (Oxygen Delivery Method): nasal cannula, high flow  O2 Flow (L/min): 50  O2 Concentration (%): 80            I&O's Detail    05 May 2025 07:01  -  06 May 2025 07:00  --------------------------------------------------------  IN:    Albumin 25%  -  50 mL: 100 mL    IV PiggyBack: 400 mL    Norepinephrine: 180.4 mL  Total IN: 680.4 mL    OUT:    Indwelling Catheter - Urethral (mL): 400 mL  Total OUT: 400 mL    Total NET: 280.4 mL      06 May 2025 07:01  -  06 May 2025 10:48  --------------------------------------------------------  IN:    IV PiggyBack: 100 mL    Norepinephrine: 12.9 mL  Total IN: 112.9 mL    OUT:    Indwelling Catheter - Urethral (mL): 100 mL  Total OUT: 100 mL    Total NET: 12.9 mL      CAPILLARY BLOOD GLUCOSE      PHYSICAL EXAM:    GENERAL: NAD.   ENMT: mucous membranes moist  NECK: supple, No JVD  CHEST/LUNG: clear to auscultation bilaterally; no rales, rhonchi, or wheezing b/l  HEART: normal S1, S2  ABDOMEN: BS+, soft, ND, NT   EXTREMITIES:  pulses palpable; no clubbing, cyanosis, or edema b/l LEs. right arm contraction.  NEURO: awake, alert, interactive; moves all extremities      LABS:                        14.1   20.46 )-----------( 73       ( 06 May 2025 03:42 )             41.4      05-06    147[H]  |  117[H]  |  68[H]  ----------------------------<  113[H]  3.0[L]   |  20[L]  |  2.23[H]    Ca    8.2[L]      06 May 2025 03:42  Phos  3.2     05-06  Mg     1.8     05-06    TPro  4.9[L]  /  Alb  2.6[L]  /  TBili  2.6[H]  /  DBili  x   /  AST  50[H]  /  ALT  37  /  AlkPhos  68  05-06    PT/INR - ( 05 May 2025 03:42 )   PT: 16.6 sec;   INR: 1.44 ratio         PTT - ( 06 May 2025 10:14 )  PTT:36.7 sec  Urinalysis Basic - ( 06 May 2025 03:42 )    Color: x / Appearance: x / SG: x / pH: x  Gluc: 113 mg/dL / Ketone: x  / Bili: x / Urobili: x   Blood: x / Protein: x / Nitrite: x   Leuk Esterase: x / RBC: x / WBC x   Sq Epi: x / Non Sq Epi: x / Bacteria: x      Culture Results:   No growth at 24 hours (05-04 @ 16:32)  Culture Results:   No growth at 24 hours (05-04 @ 16:20)  Culture Results:   >100,000 CFU/ml Escherichia coli (05-04 @ 16:04)

## 2025-05-06 NOTE — PROGRESS NOTE ADULT - SUBJECTIVE AND OBJECTIVE BOX
Date of service  is equal to the date of entry    Patient is a 89y old  Male who presents with a chief complaint of SOB, hypoxemia, LLL CAP and UTI. (05 May 2025 13:07)    PAST MEDICAL & SURGICAL HISTORY:  Syncope      Hypotension  Postural, positive Tilt Table Test.      BPH (Benign Prostatic Hyperplasia)      Hypertension      COPD exacerbation      Chronic atrial fibrillation      S/P hip replacement  Bilateral, Left ORIF and Rt. Hip Replacement        DONALDO DUFFY   89y    Male    BRIEF HOSPITAL COURSE:    Review of Systems:                       All other ROS are negative.    Allergies    peanuts (Anaphylaxis)  chocolate (Unknown)  iodine (Hives)    Intolerances      Weight (kg): 59.8 (05-05-25 @ 03:21)    ICU Vital Signs Last 24 Hrs  T(C): 36.7 (05 May 2025 20:30), Max: 36.7 (05 May 2025 20:30)  T(F): 98.1 (05 May 2025 20:30), Max: 98.1 (05 May 2025 20:30)  HR: 121 (05 May 2025 23:20) (65 - 131)  BP: 103/69 (05 May 2025 07:55) (60/38 - 103/69)  BP(mean): 79 (05 May 2025 07:55) (46 - 79)  ABP: 118/48 (05 May 2025 22:30) (81/34 - 129/55)  ABP(mean): 72 (05 May 2025 22:30) (48 - 82)  RR: 18 (05 May 2025 22:30) (17 - 29)  SpO2: 97% (05 May 2025 23:20) (86% - 100%)    O2 Parameters below as of 05 May 2025 23:20  Patient On (Oxygen Delivery Method): nasal cannula, high flow          Physical Examination:    General:     HEENT:     PULM:     CVS:     ABD:     EXT:     SKIN:     Neuro:          LABS:                        14.8   15.34 )-----------( 71       ( 05 May 2025 04:20 )             44.2     05-05    144  |  115[H]  |  60[H]  ----------------------------<  91  3.5   |  18[L]  |  2.35[H]    Ca    8.0[L]      05 May 2025 04:20  Mg     1.9     05-05    TPro  4.8[L]  /  Alb  2.5[L]  /  TBili  2.5[H]  /  DBili  0.4[H]  /  AST  57[H]  /  ALT  44  /  AlkPhos  85  05-05          CAPILLARY BLOOD GLUCOSE        PT/INR - ( 04 May 2025 17:24 )   PT: 84.0 sec;   INR: 7.58 ratio           Urinalysis Basic - ( 05 May 2025 04:20 )    Color: x / Appearance: x / SG: x / pH: x  Gluc: 91 mg/dL / Ketone: x  / Bili: x / Urobili: x   Blood: x / Protein: x / Nitrite: x   Leuk Esterase: x / RBC: x / WBC x   Sq Epi: x / Non Sq Epi: x / Bacteria: x      CULTURES:  Culture Results:   >100,000 CFU/ml Escherichia coli (05-04 @ 16:04)      Medications:  MEDICATIONS  (STANDING):  albuterol/ipratropium for Nebulization 3 milliLiter(s) Nebulizer every 6 hours  chlorhexidine 2% Cloths 1 Application(s) Topical <User Schedule>  finasteride 5 milliGRAM(s) Oral daily  fluticasone propionate/ salmeterol 250-50 MICROgram(s) Diskus 1 Dose(s) Inhalation two times a day  meropenem  IVPB      meropenem  IVPB 1000 milliGRAM(s) IV Intermittent every 12 hours  midodrine. 10 milliGRAM(s) Oral three times a day  norepinephrine Infusion 0.05 MICROgram(s)/kG/Min (3.61 mL/Hr) IV Continuous <Continuous>  pantoprazole    Tablet 40 milliGRAM(s) Oral before breakfast  tamsulosin 0.8 milliGRAM(s) Oral at bedtime    MEDICATIONS  (PRN):  sodium chloride 0.9% lock flush 10 milliLiter(s) IV Push every 1 hour PRN Pre/post blood products, medications, blood draw, and to maintain line patency        05-04 @ 07:01  -  05-05 @ 07:00  --------------------------------------------------------  IN: 2510.4 mL / OUT: 0 mL / NET: 2510.4 mL    05-05 @ 07:01  -  05-06 @ 00:15  --------------------------------------------------------  IN: 253 mL / OUT: 100 mL / NET: 153 mL        RADIOLOGY/IMAGING/ECHO    < from: Xray Chest 1 View- PORTABLE-Urgent (05.04.25 @ 16:15) >    IMPRESSION:  Moderate LEFT effusion and/or lower zone airspace consolidation obscuring   diaphragm contour.  RIGHT lung parenchyma clear.      < end of copied text >      Assessment/Plan:    89M hx dementia, bedbound A-fib on warfarin HTN, HLD,    Admit from home 5/4 with septic shock with lactic acidosis HERMILA due to L sided PNA         DNR/I       CRITICAL CARE TIME SPENT:    37 minutes assessing presenting problems of acute illness, which pose high probability of life threatening deterioration or end organ damage/dysfunction, as well as medical decision making including initiating plan of care, reviewing data, reviewing radiologic exams, discussing with multidisciplinary team,  discussing goals of care with patient/family, and writing this note.  Non-inclusive of procedures performed.  The date of service for this encounter is the entered date.         Date of service  is equal to the date of entry    Patient is a 89y old  Male who presents with a chief complaint of SOB, hypoxemia, LLL CAP and UTI. (05 May 2025 13:07)    PAST MEDICAL & SURGICAL HISTORY:  Syncope      Hypotension  Postural, positive Tilt Table Test.      BPH (Benign Prostatic Hyperplasia)      Hypertension      COPD exacerbation      Chronic atrial fibrillation      S/P hip replacement  Bilateral, Left ORIF and Rt. Hip Replacement        DONALDO DUFFY   89y    Male    BRIEF HOSPITAL COURSE:    Review of Systems:                       All other ROS are negative.    Allergies    peanuts (Anaphylaxis)  chocolate (Unknown)  iodine (Hives)    Intolerances      Weight (kg): 59.8 (05-05-25 @ 03:21)    ICU Vital Signs Last 24 Hrs  T(C): 36.7 (05 May 2025 20:30), Max: 36.7 (05 May 2025 20:30)  T(F): 98.1 (05 May 2025 20:30), Max: 98.1 (05 May 2025 20:30)  HR: 121 (05 May 2025 23:20) (65 - 131)  BP: 103/69 (05 May 2025 07:55) (60/38 - 103/69)  BP(mean): 79 (05 May 2025 07:55) (46 - 79)  ABP: 118/48 (05 May 2025 22:30) (81/34 - 129/55)  ABP(mean): 72 (05 May 2025 22:30) (48 - 82)  RR: 18 (05 May 2025 22:30) (17 - 29)  SpO2: 97% (05 May 2025 23:20) (86% - 100%)    O2 Parameters below as of 05 May 2025 23:20  Patient On (Oxygen Delivery Method): nasal cannula, high flow          Physical Examination:    General: Lethargic on HFNC     HEENT:  no JVD    PULM: bilateral BS diminished on the right    CVS: s1 s2 irreg     ABD: soft     EXT: contracted     SKIN: warm    Neuro: opens eyes no commands lethargic.            LABS:                        14.8   15.34 )-----------( 71       ( 05 May 2025 04:20 )             44.2     05-05    144  |  115[H]  |  60[H]  ----------------------------<  91  3.5   |  18[L]  |  2.35[H]    Ca    8.0[L]      05 May 2025 04:20  Mg     1.9     05-05    TPro  4.8[L]  /  Alb  2.5[L]  /  TBili  2.5[H]  /  DBili  0.4[H]  /  AST  57[H]  /  ALT  44  /  AlkPhos  85  05-05          CAPILLARY BLOOD GLUCOSE        PT/INR - ( 04 May 2025 17:24 )   PT: 84.0 sec;   INR: 7.58 ratio           Urinalysis Basic - ( 05 May 2025 04:20 )    Color: x / Appearance: x / SG: x / pH: x  Gluc: 91 mg/dL / Ketone: x  / Bili: x / Urobili: x   Blood: x / Protein: x / Nitrite: x   Leuk Esterase: x / RBC: x / WBC x   Sq Epi: x / Non Sq Epi: x / Bacteria: x      CULTURES:  Culture Results:   >100,000 CFU/ml Escherichia coli (05-04 @ 16:04)      Medications:  MEDICATIONS  (STANDING):  albuterol/ipratropium for Nebulization 3 milliLiter(s) Nebulizer every 6 hours  chlorhexidine 2% Cloths 1 Application(s) Topical <User Schedule>  finasteride 5 milliGRAM(s) Oral daily  fluticasone propionate/ salmeterol 250-50 MICROgram(s) Diskus 1 Dose(s) Inhalation two times a day  meropenem  IVPB      meropenem  IVPB 1000 milliGRAM(s) IV Intermittent every 12 hours  midodrine. 10 milliGRAM(s) Oral three times a day  norepinephrine Infusion 0.05 MICROgram(s)/kG/Min (3.61 mL/Hr) IV Continuous <Continuous>  pantoprazole    Tablet 40 milliGRAM(s) Oral before breakfast  tamsulosin 0.8 milliGRAM(s) Oral at bedtime    MEDICATIONS  (PRN):  sodium chloride 0.9% lock flush 10 milliLiter(s) IV Push every 1 hour PRN Pre/post blood products, medications, blood draw, and to maintain line patency        05-04 @ 07:01  -  05-05 @ 07:00  --------------------------------------------------------  IN: 2510.4 mL / OUT: 0 mL / NET: 2510.4 mL    05-05 @ 07:01  -  05-06 @ 00:15  --------------------------------------------------------  IN: 253 mL / OUT: 100 mL / NET: 153 mL        RADIOLOGY/IMAGING/ECHO    < from: Xray Chest 1 View- PORTABLE-Urgent (05.04.25 @ 16:15) >    IMPRESSION:  Moderate LEFT effusion and/or lower zone airspace consolidation obscuring   diaphragm contour.  RIGHT lung parenchyma clear.      < end of copied text >      Assessment/Plan:    89M hx dementia, bedbound A-fib on warfarin HTN, HLD, COPD     Admit from home 5/4 with septic shock with lactic acidosis HERMILA  due to L sided PNA/ecoli UTI  thrombocytopenia , INR>7  Low fibrinogen DIC features   Type 1 resp failure a fib with RVR     HD unstable on pressor.        Neuro at baseline MS  Cor Septic shock  titrate norepi for a MAP 65   A fib with RVR switch to a phenylephrine infusion if HR too fast.  Proamatine added for vascular tone   Pulm 100% HFNC 50L/min WOB OK.    GI  NPO for now  Renal HERMILA from baseline due to septic ATN   Heme  DIC criteria.  Coagulopathy.  Hold coumadin for now watch plts   Had low grade GIB stable now   ID  Meropenum  hx ESBL e coli       DNR/I       CRITICAL CARE TIME SPENT:    37 minutes assessing presenting problems of acute illness, which pose high probability of life threatening deterioration or end organ damage/dysfunction, as well as medical decision making including initiating plan of care, reviewing data, reviewing radiologic exams, discussing with multidisciplinary team,  discussing goals of care with patient/family, and writing this note.  Non-inclusive of procedures performed.  The date of service for this encounter is the entered date.

## 2025-05-06 NOTE — PROGRESS NOTE ADULT - ASSESSMENT
89M PMHx possible dementia, A-fib on coumadin, HTN, HLD, chronic contracted R arm, bedbound here for    # SOB  # Hypoxia  # Septic shock  # UTI  # PNA   # GIB  # AMS     Neuro: Dementia. protecting airway. HOB > 30. aspiration precautions.  CV: septic shock., NSTEMI. vasopressor titration  Pulm: Nebs. HFNC. DNR/DNI. abx for pulmonary coverage.  GI: Trend CBC. Protonix BID. NPO for now. May need NGT for supplemental feeding.  Renal: Strict I/O's. HERMILA.  ID: Abx for UTI septic shock. concomitant pulmonary coverage.  Heme: INR normalized. H/H stable. no bleeding source. restart heparin gtt.  dispo: ICU. Will need to address nutritional concerns with family. Poor PO intake currently. May be related to underlying septic shock. Need to discuss with family regarding wish for PEG or not. Previous conversations with fmaily by our team seems to be geared/interested towards hospice care.

## 2025-05-06 NOTE — PROVIDER CONTACT NOTE (CHANGE IN STATUS NOTIFICATION) - SITUATION
Patient given TNK at 14:49. As per report, patient's NIH stroke score was 3. Now patient's score is 5.

## 2025-05-06 NOTE — PROVIDER CONTACT NOTE (CHANGE IN STATUS NOTIFICATION) - ACTION/TREATMENT ORDERED:
Discussed with Dr. Lawson that patient's mentation fluctuates and instructed to monitor for another hour.

## 2025-05-07 LAB
-  AMOXICILLIN/CLAVULANIC ACID: SIGNIFICANT CHANGE UP
-  AMPICILLIN/SULBACTAM: SIGNIFICANT CHANGE UP
-  AMPICILLIN: SIGNIFICANT CHANGE UP
-  AZTREONAM: SIGNIFICANT CHANGE UP
-  CEFAZOLIN: SIGNIFICANT CHANGE UP
-  CEFEPIME: SIGNIFICANT CHANGE UP
-  CEFOXITIN: SIGNIFICANT CHANGE UP
-  CEFTRIAXONE: SIGNIFICANT CHANGE UP
-  CEFUROXIME: SIGNIFICANT CHANGE UP
-  CIPROFLOXACIN: SIGNIFICANT CHANGE UP
-  ERTAPENEM: SIGNIFICANT CHANGE UP
-  GENTAMICIN: SIGNIFICANT CHANGE UP
-  IMIPENEM: SIGNIFICANT CHANGE UP
-  LEVOFLOXACIN: SIGNIFICANT CHANGE UP
-  MEROPENEM: SIGNIFICANT CHANGE UP
-  NITROFURANTOIN: SIGNIFICANT CHANGE UP
-  PIPERACILLIN/TAZOBACTAM: SIGNIFICANT CHANGE UP
-  TIGECYCLINE: SIGNIFICANT CHANGE UP
-  TOBRAMYCIN: SIGNIFICANT CHANGE UP
-  TRIMETHOPRIM/SULFAMETHOXAZOLE: SIGNIFICANT CHANGE UP
ALBUMIN SERPL ELPH-MCNC: 2.3 G/DL — LOW (ref 3.3–5)
ALP SERPL-CCNC: 70 U/L — SIGNIFICANT CHANGE UP (ref 40–120)
ALT FLD-CCNC: 41 U/L — SIGNIFICANT CHANGE UP (ref 12–78)
ANION GAP SERPL CALC-SCNC: 8 MMOL/L — SIGNIFICANT CHANGE UP (ref 5–17)
APTT BLD: 177.9 SEC — CRITICAL HIGH (ref 26.1–36.8)
APTT BLD: 34.8 SEC — SIGNIFICANT CHANGE UP (ref 26.1–36.8)
APTT BLD: 54.9 SEC — HIGH (ref 26.1–36.8)
AST SERPL-CCNC: 49 U/L — HIGH (ref 15–37)
BASOPHILS # BLD AUTO: 0.09 K/UL — SIGNIFICANT CHANGE UP (ref 0–0.2)
BASOPHILS NFR BLD AUTO: 0.6 % — SIGNIFICANT CHANGE UP (ref 0–2)
BILIRUB SERPL-MCNC: 2.2 MG/DL — HIGH (ref 0.2–1.2)
BUN SERPL-MCNC: 65 MG/DL — HIGH (ref 7–23)
CALCIUM SERPL-MCNC: 8.1 MG/DL — LOW (ref 8.5–10.1)
CHLORIDE SERPL-SCNC: 121 MMOL/L — HIGH (ref 96–108)
CO2 SERPL-SCNC: 21 MMOL/L — LOW (ref 22–31)
CREAT SERPL-MCNC: 1.8 MG/DL — HIGH (ref 0.5–1.3)
CULTURE RESULTS: ABNORMAL
EGFR: 36 ML/MIN/1.73M2 — LOW
EGFR: 36 ML/MIN/1.73M2 — LOW
EOSINOPHIL # BLD AUTO: 0.13 K/UL — SIGNIFICANT CHANGE UP (ref 0–0.5)
EOSINOPHIL NFR BLD AUTO: 0.8 % — SIGNIFICANT CHANGE UP (ref 0–6)
FIBRINOGEN PPP-MCNC: 298 MG/DL — SIGNIFICANT CHANGE UP (ref 200–480)
GLUCOSE SERPL-MCNC: 111 MG/DL — HIGH (ref 70–99)
HCT VFR BLD CALC: 39.8 % — SIGNIFICANT CHANGE UP (ref 39–50)
HGB BLD-MCNC: 14 G/DL — SIGNIFICANT CHANGE UP (ref 13–17)
IMM GRANULOCYTES NFR BLD AUTO: 1 % — HIGH (ref 0–0.9)
LYMPHOCYTES # BLD AUTO: 0.46 K/UL — LOW (ref 1–3.3)
LYMPHOCYTES # BLD AUTO: 2.8 % — LOW (ref 13–44)
MAGNESIUM SERPL-MCNC: 2.3 MG/DL — SIGNIFICANT CHANGE UP (ref 1.6–2.6)
MCHC RBC-ENTMCNC: 34.1 PG — HIGH (ref 27–34)
MCHC RBC-ENTMCNC: 35.2 G/DL — SIGNIFICANT CHANGE UP (ref 32–36)
MCV RBC AUTO: 96.8 FL — SIGNIFICANT CHANGE UP (ref 80–100)
METHOD TYPE: SIGNIFICANT CHANGE UP
MONOCYTES # BLD AUTO: 0.73 K/UL — SIGNIFICANT CHANGE UP (ref 0–0.9)
MONOCYTES NFR BLD AUTO: 4.5 % — SIGNIFICANT CHANGE UP (ref 2–14)
NEUTROPHILS # BLD AUTO: 14.72 K/UL — HIGH (ref 1.8–7.4)
NEUTROPHILS NFR BLD AUTO: 90.3 % — HIGH (ref 43–77)
NRBC BLD AUTO-RTO: 0 /100 WBCS — SIGNIFICANT CHANGE UP (ref 0–0)
ORGANISM # SPEC MICROSCOPIC CNT: ABNORMAL
ORGANISM # SPEC MICROSCOPIC CNT: SIGNIFICANT CHANGE UP
PHOSPHATE SERPL-MCNC: 2.5 MG/DL — SIGNIFICANT CHANGE UP (ref 2.5–4.5)
PLATELET # BLD AUTO: 72 K/UL — LOW (ref 150–400)
POTASSIUM SERPL-MCNC: 3.3 MMOL/L — LOW (ref 3.5–5.3)
POTASSIUM SERPL-SCNC: 3.3 MMOL/L — LOW (ref 3.5–5.3)
PROT SERPL-MCNC: 4.9 GM/DL — LOW (ref 6–8.3)
RBC # BLD: 4.11 M/UL — LOW (ref 4.2–5.8)
RBC # FLD: 16.8 % — HIGH (ref 10.3–14.5)
SODIUM SERPL-SCNC: 150 MMOL/L — HIGH (ref 135–145)
SPECIMEN SOURCE: SIGNIFICANT CHANGE UP
WBC # BLD: 16.29 K/UL — HIGH (ref 3.8–10.5)
WBC # FLD AUTO: 16.29 K/UL — HIGH (ref 3.8–10.5)

## 2025-05-07 PROCEDURE — 99497 ADVNCD CARE PLAN 30 MIN: CPT

## 2025-05-07 PROCEDURE — 99291 CRITICAL CARE FIRST HOUR: CPT

## 2025-05-07 RX ORDER — NOREPINEPHRINE BITARTRATE 8 MG
0.05 SOLUTION INTRAVENOUS
Qty: 8 | Refills: 0 | Status: DISCONTINUED | OUTPATIENT
Start: 2025-05-07 | End: 2025-05-08

## 2025-05-07 RX ORDER — SODIUM CHLORIDE 9 G/1000ML
1000 INJECTION, SOLUTION INTRAVENOUS
Refills: 0 | Status: DISCONTINUED | OUTPATIENT
Start: 2025-05-07 | End: 2025-05-09

## 2025-05-07 RX ORDER — HEPARIN SODIUM 1000 [USP'U]/ML
600 INJECTION INTRAVENOUS; SUBCUTANEOUS
Qty: 25000 | Refills: 0 | Status: DISCONTINUED | OUTPATIENT
Start: 2025-05-07 | End: 2025-05-09

## 2025-05-07 RX ORDER — CEFTRIAXONE 500 MG/1
1000 INJECTION, POWDER, FOR SOLUTION INTRAMUSCULAR; INTRAVENOUS EVERY 24 HOURS
Refills: 0 | Status: COMPLETED | OUTPATIENT
Start: 2025-05-07 | End: 2025-05-11

## 2025-05-07 RX ADMIN — IPRATROPIUM BROMIDE AND ALBUTEROL SULFATE 3 MILLILITER(S): .5; 2.5 SOLUTION RESPIRATORY (INHALATION) at 17:07

## 2025-05-07 RX ADMIN — NOREPINEPHRINE BITARTRATE 5.61 MICROGRAM(S)/KG/MIN: 8 SOLUTION at 10:33

## 2025-05-07 RX ADMIN — CEFTRIAXONE 100 MILLIGRAM(S): 500 INJECTION, POWDER, FOR SOLUTION INTRAMUSCULAR; INTRAVENOUS at 17:32

## 2025-05-07 RX ADMIN — HEPARIN SODIUM 7 UNIT(S)/HR: 1000 INJECTION INTRAVENOUS; SUBCUTANEOUS at 21:06

## 2025-05-07 RX ADMIN — Medication 1 DOSE(S): at 17:33

## 2025-05-07 RX ADMIN — MIDODRINE HYDROCHLORIDE 20 MILLIGRAM(S): 5 TABLET ORAL at 11:17

## 2025-05-07 RX ADMIN — SODIUM CHLORIDE 75 MILLILITER(S): 9 INJECTION, SOLUTION INTRAVENOUS at 23:42

## 2025-05-07 RX ADMIN — SODIUM CHLORIDE 75 MILLILITER(S): 9 INJECTION, SOLUTION INTRAVENOUS at 10:35

## 2025-05-07 RX ADMIN — IPRATROPIUM BROMIDE AND ALBUTEROL SULFATE 3 MILLILITER(S): .5; 2.5 SOLUTION RESPIRATORY (INHALATION) at 00:22

## 2025-05-07 RX ADMIN — IPRATROPIUM BROMIDE AND ALBUTEROL SULFATE 3 MILLILITER(S): .5; 2.5 SOLUTION RESPIRATORY (INHALATION) at 23:47

## 2025-05-07 RX ADMIN — NOREPINEPHRINE BITARTRATE 5.61 MICROGRAM(S)/KG/MIN: 8 SOLUTION at 05:59

## 2025-05-07 RX ADMIN — IPRATROPIUM BROMIDE AND ALBUTEROL SULFATE 3 MILLILITER(S): .5; 2.5 SOLUTION RESPIRATORY (INHALATION) at 12:07

## 2025-05-07 RX ADMIN — NOREPINEPHRINE BITARTRATE 5.61 MICROGRAM(S)/KG/MIN: 8 SOLUTION at 19:12

## 2025-05-07 RX ADMIN — NYSTATIN 1 APPLICATION(S): 100000 CREAM TOPICAL at 06:28

## 2025-05-07 RX ADMIN — Medication 40 MILLIGRAM(S): at 17:32

## 2025-05-07 RX ADMIN — MEROPENEM 100 MILLIGRAM(S): 1 INJECTION INTRAVENOUS at 06:35

## 2025-05-07 RX ADMIN — IPRATROPIUM BROMIDE AND ALBUTEROL SULFATE 3 MILLILITER(S): .5; 2.5 SOLUTION RESPIRATORY (INHALATION) at 05:48

## 2025-05-07 RX ADMIN — HEPARIN SODIUM 6 UNIT(S)/HR: 1000 INJECTION INTRAVENOUS; SUBCUTANEOUS at 13:11

## 2025-05-07 RX ADMIN — MIDODRINE HYDROCHLORIDE 20 MILLIGRAM(S): 5 TABLET ORAL at 06:35

## 2025-05-07 RX ADMIN — FINASTERIDE 5 MILLIGRAM(S): 1 TABLET, FILM COATED ORAL at 11:17

## 2025-05-07 RX ADMIN — HEPARIN SODIUM 6 UNIT(S)/HR: 1000 INJECTION INTRAVENOUS; SUBCUTANEOUS at 19:07

## 2025-05-07 RX ADMIN — MIDODRINE HYDROCHLORIDE 20 MILLIGRAM(S): 5 TABLET ORAL at 17:32

## 2025-05-07 RX ADMIN — Medication 40 MILLIGRAM(S): at 06:28

## 2025-05-07 RX ADMIN — Medication 1 APPLICATION(S): at 06:28

## 2025-05-07 RX ADMIN — NYSTATIN 1 APPLICATION(S): 100000 CREAM TOPICAL at 17:33

## 2025-05-07 NOTE — DIETITIAN INITIAL EVALUATION ADULT - PERTINENT MEDS FT
MEDICATIONS  (STANDING):  albuterol/ipratropium for Nebulization 3 milliLiter(s) Nebulizer every 6 hours  cefTRIAXone   IVPB 1000 milliGRAM(s) IV Intermittent every 24 hours  chlorhexidine 2% Cloths 1 Application(s) Topical <User Schedule>  dextrose 5%. 1000 milliLiter(s) (75 mL/Hr) IV Continuous <Continuous>  finasteride 5 milliGRAM(s) Oral daily  fluticasone propionate/ salmeterol 250-50 MICROgram(s) Diskus 1 Dose(s) Inhalation two times a day  heparin  Infusion 600 Unit(s)/Hr (6 mL/Hr) IV Continuous <Continuous>  midodrine. 20 milliGRAM(s) Oral three times a day  norepinephrine Infusion 0.05 MICROgram(s)/kG/Min (5.61 mL/Hr) IV Continuous <Continuous>  nystatin Powder 1 Application(s) Topical two times a day  pantoprazole  Injectable 40 milliGRAM(s) IV Push every 12 hours  tamsulosin 0.8 milliGRAM(s) Oral at bedtime    MEDICATIONS  (PRN):

## 2025-05-07 NOTE — DIETITIAN INITIAL EVALUATION ADULT - ORAL INTAKE PTA/DIET HISTORY
Pt noted c confusion, on high flow O2, consumed 100% of lunch meal when seen, didn't eat much breakfast per RN.  Pt is tolerating puree consistency c thin liquids per RN.  Pt's wife came into visit pt., per wife, pt was eating regular foods until 2 days PTA, ate 3 meals /day, liked his raisin bran and cornflakes for breakfast.   Pt c food allergy to nuts and chocolate which was reported allergy ever since she knew him, wife is unsure of the reaction.   Pt was health conscious and was intentionally cutting back on his food intake due to debility/being in bed.

## 2025-05-07 NOTE — DIETITIAN INITIAL EVALUATION ADULT - NS FNS REASON FOR WEIGHT CHANG
-3 Per previous adm RD notes:  10/03/22, wt. 85.2 kg/187.7 LBS(09/30/22)  07/27/22, 90.1 kg/198.6 LBS(07/26/22).  Per current adm wt. 59.8 kg( 05/05/25), wt. loss of 30.3 kg/~68 LBS is noted in <3 years, however pt appears >59.8 kg, therefore ? current wt.  Previous height: 5'10"  Current height 5'5"?  Per wife, pt is 5'11", current BMI<19, pt does not appear to be underweight c BMI<19, ? wt. accuracy.

## 2025-05-07 NOTE — DIETITIAN INITIAL EVALUATION ADULT - PERTINENT LABORATORY DATA
05-07    150[H]  |  121[H]  |  65[H]  ----------------------------<  111[H]  3.3[L]   |  21[L]  |  1.80[H]    Ca    8.1[L]      07 May 2025 04:30  Phos  2.5     05-07  Mg     2.3     05-07    TPro  4.9[L]  /  Alb  2.3[L]  /  TBili  2.2[H]  /  DBili  x   /  AST  49[H]  /  ALT  41  /  AlkPhos  70  05-07

## 2025-05-07 NOTE — DIETITIAN INITIAL EVALUATION ADULT - NSFNSPHYEXAMSKINFT_GEN_A_CORE
Pressure ulcer x 4  1. 05/05, right heel; suspected deep tissue injury   2. 05/05, left heel; stage I  3. 05/05, sacrum; stage III  4. 05/05, right buttock; suspected deep tissue injury

## 2025-05-07 NOTE — PROGRESS NOTE ADULT - SUBJECTIVE AND OBJECTIVE BOX
INTERVAL HPI/OVERNIGHT EVENTS:   HPI:  89-year-old male PMH chronic A-fib, HTN, HLD, likely dementia, bedbound non-ambulatory at home, had prior injury and contracture of the right arm who presents today hypoxia altered mental status/confusion over past several days from home. Gonzalez placed in ER. Per family found to be hypoxic by EMS.  NKDA per report.  Full DNR in effect.  (04 May 2025 19:14)      CENTRAL LINE: [ ] YES [ ] NO  LOCATION:   DATE INSERTED:  REMOVE: [ ] YES [ ] NO  EXPLAIN:    GONZALEZ: [ ] YES [ ] NO    DATE INSERTED:  REMOVE:  [ ] YES [ ] NO  EXPLAIN:    A-LINE:  [ ] YES [ ] NO  LOCATION:   DATE INSERTED:  REMOVE:  [ ] YES [ ] NO  EXPLAIN:    PAST MEDICAL & SURGICAL HISTORY:  Syncope      Hypotension  Postural, positive Tilt Table Test.      BPH (Benign Prostatic Hyperplasia)      Hypertension      COPD exacerbation      Chronic atrial fibrillation      S/P hip replacement  Bilateral, Left ORIF and Rt. Hip Replacement          REVIEW OF SYSTEMS:    Negative ROS aside from HPI/Interval events above.    ICU Vital Signs Last 24 Hrs  T(C): 36.5 (07 May 2025 06:00), Max: 36.6 (06 May 2025 12:00)  T(F): 97.7 (07 May 2025 06:00), Max: 97.9 (06 May 2025 12:00)  HR: 91 (07 May 2025 08:30) (68 - 120)  BP: 93/53 (07 May 2025 07:00) (89/59 - 118/55)  BP(mean): 65 (07 May 2025 07:00) (65 - 71)  ABP: 116/47 (07 May 2025 05:00) (88/42 - 135/53)  ABP(mean): 74 (07 May 2025 05:00) (56 - 85)  RR: 15 (07 May 2025 08:30) (11 - 24)  SpO2: 98% (07 May 2025 08:30) (97% - 100%)    O2 Parameters below as of 07 May 2025 08:30  Patient On (Oxygen Delivery Method): nasal cannula, high flow  O2 Flow (L/min): 40  O2 Concentration (%): 40            I&O's Detail    06 May 2025 07:01  -  07 May 2025 07:00  --------------------------------------------------------  IN:    Heparin: 64 mL    Heparin Infusion: 66 mL    IV PiggyBack: 200 mL    Norepinephrine: 98.2 mL    Norepinephrine: 6.7 mL    Oral Fluid: 150 mL  Total IN: 584.9 mL    OUT:    Indwelling Catheter - Urethral (mL): 845 mL  Total OUT: 845 mL    Total NET: -260.1 mL      CAPILLARY BLOOD GLUCOSE      PHYSICAL EXAM:    GENERAL: NAD.   ENMT: mucous membranes moist  NECK: supple, No JVD  CHEST/LUNG: clear to auscultation bilaterally; no rales, rhonchi, or wheezing b/l  HEART: normal S1, S2  ABDOMEN: BS+, soft, ND, NT   EXTREMITIES:  pulses palpable; no clubbing, cyanosis, or edema b/l LEs. right arm contraction.  NEURO: awake, alert, interactive; moves all extremities      LABS:                        14.0   16.29 )-----------( 72       ( 07 May 2025 04:30 )             39.8      05-07    150[H]  |  121[H]  |  65[H]  ----------------------------<  111[H]  3.3[L]   |  21[L]  |  1.80[H]    Ca    8.1[L]      07 May 2025 04:30  Phos  2.5     05-07  Mg     2.3     05-07    TPro  4.9[L]  /  Alb  2.3[L]  /  TBili  2.2[H]  /  DBili  x   /  AST  49[H]  /  ALT  41  /  AlkPhos  70  05-07    PTT - ( 07 May 2025 04:30 )  PTT:177.9 sec  Urinalysis Basic - ( 07 May 2025 04:30 )    Color: x / Appearance: x / SG: x / pH: x  Gluc: 111 mg/dL / Ketone: x  / Bili: x / Urobili: x   Blood: x / Protein: x / Nitrite: x   Leuk Esterase: x / RBC: x / WBC x   Sq Epi: x / Non Sq Epi: x / Bacteria: x      Culture Results:   No growth at 48 Hours (05-04 @ 16:32)  Culture Results:   No growth at 48 Hours (05-04 @ 16:20)  Culture Results:   >100,000 CFU/ml Escherichia coli (05-04 @ 16:04)

## 2025-05-07 NOTE — DIETITIAN INITIAL EVALUATION ADULT - OTHER INFO
Per chart review, problem diagnosis include HERMILA.  Pt is DNR, DNR, for artificially administered fluids and nutrition, determine use or limitation if need arise noted.  Pt's family is interested in Hospice care.

## 2025-05-07 NOTE — PROGRESS NOTE ADULT - SUBJECTIVE AND OBJECTIVE BOX
R fem a line and TLC removed intact.    Pressure held x 10 min and observed for  another 5 min without hematoma or bleeding.

## 2025-05-07 NOTE — PROGRESS NOTE ADULT - ASSESSMENT
89M PMHx possible dementia, A-fib on coumadin, HTN, HLD, chronic contracted R arm, bedbound here for    # SOB  # Hypoxia  # Septic shock  # UTI  # PNA   # GIB  # AMS     Neuro: Dementia. protecting airway. HOB > 30. aspiration precautions.  CV: septic shock., NSTEMI. vasopressor titration  Pulm: Nebs. HFNC. DNR/DNI. abx for pulmonary coverage.  GI: Trend CBC. Protonix BID. Eating small puree portions.  Renal: Strict I/O's. HERMILA.  ID: Abx for UTI septic shock. concomitant pulmonary coverage. de-escalate following sensitivity.  Heme: INR normalized. H/H stable. no bleeding source. heparin gtt protocol.  dispo: ICU. Palliative following alongside. Family interested in Hospice care. Goal for optimization and hopefully move towards home hospice.

## 2025-05-08 LAB
ALBUMIN SERPL ELPH-MCNC: 2.2 G/DL — LOW (ref 3.3–5)
ALP SERPL-CCNC: 69 U/L — SIGNIFICANT CHANGE UP (ref 40–120)
ALT FLD-CCNC: 39 U/L — SIGNIFICANT CHANGE UP (ref 12–78)
ANION GAP SERPL CALC-SCNC: 7 MMOL/L — SIGNIFICANT CHANGE UP (ref 5–17)
APTT BLD: 57.3 SEC — HIGH (ref 26.1–36.8)
APTT BLD: 85 SEC — HIGH (ref 26.1–36.8)
APTT BLD: 89.5 SEC — HIGH (ref 26.1–36.8)
AST SERPL-CCNC: 40 U/L — HIGH (ref 15–37)
BASOPHILS # BLD AUTO: 0.04 K/UL — SIGNIFICANT CHANGE UP (ref 0–0.2)
BASOPHILS NFR BLD AUTO: 0.3 % — SIGNIFICANT CHANGE UP (ref 0–2)
BILIRUB SERPL-MCNC: 1.6 MG/DL — HIGH (ref 0.2–1.2)
BUN SERPL-MCNC: 60 MG/DL — HIGH (ref 7–23)
CALCIUM SERPL-MCNC: 8 MG/DL — LOW (ref 8.5–10.1)
CHLORIDE SERPL-SCNC: 118 MMOL/L — HIGH (ref 96–108)
CO2 SERPL-SCNC: 20 MMOL/L — LOW (ref 22–31)
CREAT SERPL-MCNC: 1.5 MG/DL — HIGH (ref 0.5–1.3)
EGFR: 44 ML/MIN/1.73M2 — LOW
EGFR: 44 ML/MIN/1.73M2 — LOW
EOSINOPHIL # BLD AUTO: 0.47 K/UL — SIGNIFICANT CHANGE UP (ref 0–0.5)
EOSINOPHIL NFR BLD AUTO: 3.4 % — SIGNIFICANT CHANGE UP (ref 0–6)
GLUCOSE SERPL-MCNC: 105 MG/DL — HIGH (ref 70–99)
HCT VFR BLD CALC: 37.8 % — LOW (ref 39–50)
HGB BLD-MCNC: 13.1 G/DL — SIGNIFICANT CHANGE UP (ref 13–17)
IMM GRANULOCYTES NFR BLD AUTO: 1.1 % — HIGH (ref 0–0.9)
LYMPHOCYTES # BLD AUTO: 0.61 K/UL — LOW (ref 1–3.3)
LYMPHOCYTES # BLD AUTO: 4.4 % — LOW (ref 13–44)
MAGNESIUM SERPL-MCNC: 2.2 MG/DL — SIGNIFICANT CHANGE UP (ref 1.6–2.6)
MCHC RBC-ENTMCNC: 33.8 PG — SIGNIFICANT CHANGE UP (ref 27–34)
MCHC RBC-ENTMCNC: 34.7 G/DL — SIGNIFICANT CHANGE UP (ref 32–36)
MCV RBC AUTO: 97.4 FL — SIGNIFICANT CHANGE UP (ref 80–100)
MONOCYTES # BLD AUTO: 0.65 K/UL — SIGNIFICANT CHANGE UP (ref 0–0.9)
MONOCYTES NFR BLD AUTO: 4.6 % — SIGNIFICANT CHANGE UP (ref 2–14)
NEUTROPHILS # BLD AUTO: 12.07 K/UL — HIGH (ref 1.8–7.4)
NEUTROPHILS NFR BLD AUTO: 86.2 % — HIGH (ref 43–77)
NRBC BLD AUTO-RTO: 0 /100 WBCS — SIGNIFICANT CHANGE UP (ref 0–0)
PHOSPHATE SERPL-MCNC: 2 MG/DL — LOW (ref 2.5–4.5)
PLATELET # BLD AUTO: 79 K/UL — LOW (ref 150–400)
POTASSIUM SERPL-MCNC: 3.3 MMOL/L — LOW (ref 3.5–5.3)
POTASSIUM SERPL-SCNC: 3.3 MMOL/L — LOW (ref 3.5–5.3)
PROT SERPL-MCNC: 4.6 GM/DL — LOW (ref 6–8.3)
RBC # BLD: 3.88 M/UL — LOW (ref 4.2–5.8)
RBC # FLD: 16.3 % — HIGH (ref 10.3–14.5)
SODIUM SERPL-SCNC: 145 MMOL/L — SIGNIFICANT CHANGE UP (ref 135–145)
WBC # BLD: 13.99 K/UL — HIGH (ref 3.8–10.5)
WBC # FLD AUTO: 13.99 K/UL — HIGH (ref 3.8–10.5)

## 2025-05-08 PROCEDURE — 99291 CRITICAL CARE FIRST HOUR: CPT

## 2025-05-08 RX ADMIN — Medication 1 DOSE(S): at 05:58

## 2025-05-08 RX ADMIN — Medication 40 MILLIGRAM(S): at 17:22

## 2025-05-08 RX ADMIN — HEPARIN SODIUM 7 UNIT(S)/HR: 1000 INJECTION INTRAVENOUS; SUBCUTANEOUS at 06:30

## 2025-05-08 RX ADMIN — Medication 5 MILLIGRAM(S): at 23:34

## 2025-05-08 RX ADMIN — HEPARIN SODIUM 7 UNIT(S)/HR: 1000 INJECTION INTRAVENOUS; SUBCUTANEOUS at 23:35

## 2025-05-08 RX ADMIN — IPRATROPIUM BROMIDE AND ALBUTEROL SULFATE 3 MILLILITER(S): .5; 2.5 SOLUTION RESPIRATORY (INHALATION) at 11:04

## 2025-05-08 RX ADMIN — Medication 100 MILLIEQUIVALENT(S): at 09:47

## 2025-05-08 RX ADMIN — MIDODRINE HYDROCHLORIDE 20 MILLIGRAM(S): 5 TABLET ORAL at 17:23

## 2025-05-08 RX ADMIN — Medication 4 MILLILITER(S): at 11:04

## 2025-05-08 RX ADMIN — NYSTATIN 1 APPLICATION(S): 100000 CREAM TOPICAL at 05:44

## 2025-05-08 RX ADMIN — NYSTATIN 1 APPLICATION(S): 100000 CREAM TOPICAL at 17:23

## 2025-05-08 RX ADMIN — MIDODRINE HYDROCHLORIDE 20 MILLIGRAM(S): 5 TABLET ORAL at 05:46

## 2025-05-08 RX ADMIN — Medication 1 APPLICATION(S): at 05:44

## 2025-05-08 RX ADMIN — HEPARIN SODIUM 7 UNIT(S)/HR: 1000 INJECTION INTRAVENOUS; SUBCUTANEOUS at 07:27

## 2025-05-08 RX ADMIN — FINASTERIDE 5 MILLIGRAM(S): 1 TABLET, FILM COATED ORAL at 11:43

## 2025-05-08 RX ADMIN — IPRATROPIUM BROMIDE AND ALBUTEROL SULFATE 3 MILLILITER(S): .5; 2.5 SOLUTION RESPIRATORY (INHALATION) at 05:59

## 2025-05-08 RX ADMIN — Medication 4 MILLILITER(S): at 17:49

## 2025-05-08 RX ADMIN — Medication 40 MILLIGRAM(S): at 05:46

## 2025-05-08 RX ADMIN — Medication 100 MILLIEQUIVALENT(S): at 10:32

## 2025-05-08 RX ADMIN — CEFTRIAXONE 100 MILLIGRAM(S): 500 INJECTION, POWDER, FOR SOLUTION INTRAMUSCULAR; INTRAVENOUS at 17:21

## 2025-05-08 RX ADMIN — HEPARIN SODIUM 7 UNIT(S)/HR: 1000 INJECTION INTRAVENOUS; SUBCUTANEOUS at 19:02

## 2025-05-08 RX ADMIN — Medication 100 MILLIEQUIVALENT(S): at 11:44

## 2025-05-08 RX ADMIN — IPRATROPIUM BROMIDE AND ALBUTEROL SULFATE 3 MILLILITER(S): .5; 2.5 SOLUTION RESPIRATORY (INHALATION) at 17:49

## 2025-05-08 RX ADMIN — MIDODRINE HYDROCHLORIDE 20 MILLIGRAM(S): 5 TABLET ORAL at 11:43

## 2025-05-08 NOTE — PHARMACOTHERAPY INTERVENTION NOTE - COMMENTS
Recommended to start heparin drip as patient has atrial fibrillation on warfarin which was recently reversed with Vitamin K. Due to warfarin reversal, parenteral anticoagulation is needed for at least 5 days and INR must be at goal for at least 2 days to resume warfarin monotherapy. 
Recommended to discontinue meropenem and start ceftriaxone for the treatment on UTI and PNA as the E.coli isolate identified in the urine culture is sensitive to ceftriaxone.
Recommended to restart warfarin for atrial fibrillation.   Due to vitamin K administration warfarin must be bridged with parenteral anticoagulants for at least 5 days with 2 consecutive therapeutic INR readings.

## 2025-05-08 NOTE — PROGRESS NOTE ADULT - ASSESSMENT
89M PMHx possible dementia, A-fib on coumadin, HTN, HLD, chronic contracted R arm, bedbound here for    # SOB  # Hypoxia  # Septic shock  # UTI  # PNA   # GIB  # AMS     Neuro: Dementia. protecting airway. HOB > 30. aspiration precautions.  CV: septic shock., NSTEMI. vasopressor off now.  Pulm: Nebs prn. abx for pulmonary coverage. titrated off HFNC to LFNC.  GI: Trend CBC. Protonix BID. Eating small puree portions. no PEG tube as per family wishes.  Renal: Strict I/O's. HERMILA.  ID: Abx for UTI septic shock. concomitant pulmonary coverage. de-escalated following sensitivity.  Heme: INR normalized. H/H stable. no bleeding source. heparin gtt protocol.  dispo: possible downgrade to continue disposition towards hospice care. Palliative following alongside. Family interested in Hospice care. Goal for optimization and hopefully move towards home hospice.

## 2025-05-08 NOTE — PROGRESS NOTE ADULT - SUBJECTIVE AND OBJECTIVE BOX
INTERVAL HPI/OVERNIGHT EVENTS:   HPI:  89-year-old male PMH chronic A-fib, HTN, HLD, likely dementia, bedbound non-ambulatory at home, had prior injury and contracture of the right arm who presents today hypoxia altered mental status/confusion over past several days from home. Gonzalez placed in ER. Per family found to be hypoxic by EMS.  NKDA per report.  Full DNR in effect.  (04 May 2025 19:14)      CENTRAL LINE: [ ] YES [ ] NO  LOCATION:   DATE INSERTED:  REMOVE: [ ] YES [ ] NO  EXPLAIN:    GONZALEZ: [ ] YES [ ] NO    DATE INSERTED:  REMOVE:  [ ] YES [ ] NO  EXPLAIN:    A-LINE:  [ ] YES [ ] NO  LOCATION:   DATE INSERTED:  REMOVE:  [ ] YES [ ] NO  EXPLAIN:    PAST MEDICAL & SURGICAL HISTORY:  Syncope      Hypotension  Postural, positive Tilt Table Test.      BPH (Benign Prostatic Hyperplasia)      Hypertension      COPD exacerbation      Chronic atrial fibrillation      S/P hip replacement  Bilateral, Left ORIF and Rt. Hip Replacement          REVIEW OF SYSTEMS:    Negative ROS aside from HPI/Interval events above.    ICU Vital Signs Last 24 Hrs  T(C): 36.6 (08 May 2025 07:15), Max: 36.6 (08 May 2025 07:15)  T(F): 97.9 (08 May 2025 07:15), Max: 97.9 (08 May 2025 07:15)  HR: 66 (08 May 2025 10:00) (61 - 87)  BP: 103/60 (08 May 2025 10:00) (89/48 - 123/57)  BP(mean): 72 (08 May 2025 10:00) (59 - 82)  ABP: --  ABP(mean): --  RR: 18 (08 May 2025 10:00) (12 - 23)  SpO2: 96% (08 May 2025 10:00) (94% - 100%)    O2 Parameters below as of 08 May 2025 07:15  Patient On (Oxygen Delivery Method): room air                I&O's Detail    07 May 2025 07:01  -  08 May 2025 07:00  --------------------------------------------------------  IN:    dextrose 5%: 1500 mL    Heparin: 117 mL    Norepinephrine: 43.5 mL  Total IN: 1660.5 mL    OUT:    Indwelling Catheter - Urethral (mL): 675 mL  Total OUT: 675 mL    Total NET: 985.5 mL      08 May 2025 07:01  -  08 May 2025 10:35  --------------------------------------------------------  IN:    dextrose 5%: 120 mL    Heparin: 21 mL    IV PiggyBack: 100 mL    Oral Fluid: 120 mL  Total IN: 361 mL    OUT:    Indwelling Catheter - Urethral (mL): 40 mL  Total OUT: 40 mL    Total NET: 321 mL      CAPILLARY BLOOD GLUCOSE      PHYSICAL EXAM:    GENERAL: NAD.   ENMT: mucous membranes moist  NECK: supple, No JVD  CHEST/LUNG: clear to auscultation bilaterally; no rales, rhonchi, or wheezing b/l  HEART: normal S1, S2  ABDOMEN: BS+, soft, ND, NT   EXTREMITIES:  pulses palpable; no clubbing, cyanosis, or edema b/l LEs. right arm contraction.  NEURO: awake, alert, interactive; moves all extremities      LABS:                        13.1   13.99 )-----------( 79       ( 08 May 2025 04:23 )             37.8      05-08    145  |  118[H]  |  60[H]  ----------------------------<  105[H]  3.3[L]   |  20[L]  |  1.50[H]    Ca    8.0[L]      08 May 2025 04:23  Phos  2.0     05-08  Mg     2.2     05-08    TPro  4.6[L]  /  Alb  2.2[L]  /  TBili  1.6[H]  /  DBili  x   /  AST  40[H]  /  ALT  39  /  AlkPhos  69  05-08    PTT - ( 08 May 2025 04:23 )  PTT:57.3 sec  Urinalysis Basic - ( 08 May 2025 04:23 )    Color: x / Appearance: x / SG: x / pH: x  Gluc: 105 mg/dL / Ketone: x  / Bili: x / Urobili: x   Blood: x / Protein: x / Nitrite: x   Leuk Esterase: x / RBC: x / WBC x   Sq Epi: x / Non Sq Epi: x / Bacteria: x

## 2025-05-09 LAB
ALBUMIN SERPL ELPH-MCNC: 2.3 G/DL — LOW (ref 3.3–5)
ALP SERPL-CCNC: 85 U/L — SIGNIFICANT CHANGE UP (ref 40–120)
ALT FLD-CCNC: 38 U/L — SIGNIFICANT CHANGE UP (ref 12–78)
ANION GAP SERPL CALC-SCNC: 8 MMOL/L — SIGNIFICANT CHANGE UP (ref 5–17)
APTT BLD: 88.2 SEC — HIGH (ref 26.1–36.8)
AST SERPL-CCNC: 39 U/L — HIGH (ref 15–37)
BILIRUB SERPL-MCNC: 1.4 MG/DL — HIGH (ref 0.2–1.2)
BUN SERPL-MCNC: 59 MG/DL — HIGH (ref 7–23)
CALCIUM SERPL-MCNC: 8.1 MG/DL — LOW (ref 8.5–10.1)
CHLORIDE SERPL-SCNC: 112 MMOL/L — HIGH (ref 96–108)
CO2 SERPL-SCNC: 20 MMOL/L — LOW (ref 22–31)
CREAT SERPL-MCNC: 1.64 MG/DL — HIGH (ref 0.5–1.3)
EGFR: 40 ML/MIN/1.73M2 — LOW
EGFR: 40 ML/MIN/1.73M2 — LOW
GLUCOSE SERPL-MCNC: 110 MG/DL — HIGH (ref 70–99)
HCT VFR BLD CALC: 40.2 % — SIGNIFICANT CHANGE UP (ref 39–50)
HGB BLD-MCNC: 13.9 G/DL — SIGNIFICANT CHANGE UP (ref 13–17)
INR BLD: 1.04 RATIO — SIGNIFICANT CHANGE UP (ref 0.85–1.16)
MAGNESIUM SERPL-MCNC: 1.9 MG/DL — SIGNIFICANT CHANGE UP (ref 1.6–2.6)
MCHC RBC-ENTMCNC: 34 PG — SIGNIFICANT CHANGE UP (ref 27–34)
MCHC RBC-ENTMCNC: 34.6 G/DL — SIGNIFICANT CHANGE UP (ref 32–36)
MCV RBC AUTO: 98.3 FL — SIGNIFICANT CHANGE UP (ref 80–100)
NRBC BLD AUTO-RTO: 0 /100 WBCS — SIGNIFICANT CHANGE UP (ref 0–0)
PHOSPHATE SERPL-MCNC: 2 MG/DL — LOW (ref 2.5–4.5)
PLATELET # BLD AUTO: 85 K/UL — LOW (ref 150–400)
POTASSIUM SERPL-MCNC: 3.9 MMOL/L — SIGNIFICANT CHANGE UP (ref 3.5–5.3)
POTASSIUM SERPL-SCNC: 3.9 MMOL/L — SIGNIFICANT CHANGE UP (ref 3.5–5.3)
PROT SERPL-MCNC: 5 GM/DL — LOW (ref 6–8.3)
PROTHROM AB SERPL-ACNC: 11.8 SEC — SIGNIFICANT CHANGE UP (ref 9.9–13.4)
RBC # BLD: 4.09 M/UL — LOW (ref 4.2–5.8)
RBC # FLD: 16.4 % — HIGH (ref 10.3–14.5)
SODIUM SERPL-SCNC: 140 MMOL/L — SIGNIFICANT CHANGE UP (ref 135–145)
WBC # BLD: 14.39 K/UL — HIGH (ref 3.8–10.5)
WBC # FLD AUTO: 14.39 K/UL — HIGH (ref 3.8–10.5)

## 2025-05-09 PROCEDURE — 74019 RADEX ABDOMEN 2 VIEWS: CPT | Mod: 26

## 2025-05-09 PROCEDURE — 99232 SBSQ HOSP IP/OBS MODERATE 35: CPT

## 2025-05-09 RX ORDER — APIXABAN 2.5 MG/1
2.5 TABLET, FILM COATED ORAL
Refills: 0 | Status: DISCONTINUED | OUTPATIENT
Start: 2025-05-09 | End: 2025-05-14

## 2025-05-09 RX ORDER — SOD PHOS DI, MONO/K PHOS MONO 250 MG
1 TABLET ORAL ONCE
Refills: 0 | Status: COMPLETED | OUTPATIENT
Start: 2025-05-09 | End: 2025-05-09

## 2025-05-09 RX ORDER — IPRATROPIUM BROMIDE AND ALBUTEROL SULFATE .5; 2.5 MG/3ML; MG/3ML
3 SOLUTION RESPIRATORY (INHALATION) EVERY 6 HOURS
Refills: 0 | Status: DISCONTINUED | OUTPATIENT
Start: 2025-05-09 | End: 2025-05-14

## 2025-05-09 RX ORDER — MIDODRINE HYDROCHLORIDE 5 MG/1
30 TABLET ORAL THREE TIMES A DAY
Refills: 0 | Status: DISCONTINUED | OUTPATIENT
Start: 2025-05-09 | End: 2025-05-13

## 2025-05-09 RX ORDER — APIXABAN 2.5 MG/1
2.5 TABLET, FILM COATED ORAL
Refills: 0 | Status: DISCONTINUED | OUTPATIENT
Start: 2025-05-09 | End: 2025-05-09

## 2025-05-09 RX ADMIN — SODIUM CHLORIDE 75 MILLILITER(S): 9 INJECTION, SOLUTION INTRAVENOUS at 02:00

## 2025-05-09 RX ADMIN — IPRATROPIUM BROMIDE AND ALBUTEROL SULFATE 3 MILLILITER(S): .5; 2.5 SOLUTION RESPIRATORY (INHALATION) at 11:06

## 2025-05-09 RX ADMIN — Medication 4 MILLILITER(S): at 00:15

## 2025-05-09 RX ADMIN — IPRATROPIUM BROMIDE AND ALBUTEROL SULFATE 3 MILLILITER(S): .5; 2.5 SOLUTION RESPIRATORY (INHALATION) at 00:15

## 2025-05-09 RX ADMIN — HEPARIN SODIUM 7 UNIT(S)/HR: 1000 INJECTION INTRAVENOUS; SUBCUTANEOUS at 07:18

## 2025-05-09 RX ADMIN — APIXABAN 2.5 MILLIGRAM(S): 2.5 TABLET, FILM COATED ORAL at 11:52

## 2025-05-09 RX ADMIN — MIDODRINE HYDROCHLORIDE 30 MILLIGRAM(S): 5 TABLET ORAL at 14:21

## 2025-05-09 RX ADMIN — NYSTATIN 1 APPLICATION(S): 100000 CREAM TOPICAL at 05:43

## 2025-05-09 RX ADMIN — Medication 1 DOSE(S): at 18:50

## 2025-05-09 RX ADMIN — MIDODRINE HYDROCHLORIDE 30 MILLIGRAM(S): 5 TABLET ORAL at 19:07

## 2025-05-09 RX ADMIN — IPRATROPIUM BROMIDE AND ALBUTEROL SULFATE 3 MILLILITER(S): .5; 2.5 SOLUTION RESPIRATORY (INHALATION) at 06:21

## 2025-05-09 RX ADMIN — CEFTRIAXONE 100 MILLIGRAM(S): 500 INJECTION, POWDER, FOR SOLUTION INTRAMUSCULAR; INTRAVENOUS at 19:07

## 2025-05-09 RX ADMIN — HEPARIN SODIUM 7 UNIT(S)/HR: 1000 INJECTION INTRAVENOUS; SUBCUTANEOUS at 05:01

## 2025-05-09 RX ADMIN — SODIUM CHLORIDE 75 MILLILITER(S): 9 INJECTION, SOLUTION INTRAVENOUS at 07:43

## 2025-05-09 RX ADMIN — NYSTATIN 1 APPLICATION(S): 100000 CREAM TOPICAL at 18:50

## 2025-05-09 RX ADMIN — TAMSULOSIN HYDROCHLORIDE 0.8 MILLIGRAM(S): 0.4 CAPSULE ORAL at 22:33

## 2025-05-09 RX ADMIN — APIXABAN 2.5 MILLIGRAM(S): 2.5 TABLET, FILM COATED ORAL at 22:33

## 2025-05-09 RX ADMIN — Medication 1 APPLICATION(S): at 05:43

## 2025-05-09 RX ADMIN — Medication 1 PACKET(S): at 06:56

## 2025-05-09 RX ADMIN — Medication 4 MILLILITER(S): at 06:21

## 2025-05-09 RX ADMIN — Medication 40 MILLIGRAM(S): at 05:40

## 2025-05-09 RX ADMIN — FINASTERIDE 5 MILLIGRAM(S): 1 TABLET, FILM COATED ORAL at 14:21

## 2025-05-09 RX ADMIN — MIDODRINE HYDROCHLORIDE 20 MILLIGRAM(S): 5 TABLET ORAL at 05:41

## 2025-05-09 RX ADMIN — Medication 4 MILLILITER(S): at 11:06

## 2025-05-09 NOTE — PROGRESS NOTE ADULT - SUBJECTIVE AND OBJECTIVE BOX
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 Hurt placed in ER. Per family found to be hypoxic by EMS.  NKDA per report.  Full DNR in effect.  (04 May 2025 19:14)      24 hr events: No acute events.     ## ROS:  [x] unable to obtain    ## Vitals  ICU Vital Signs Last 24 Hrs  T(C): 35.8 (09 May 2025 07:27), Max: 36.5 (08 May 2025 11:03)  T(F): 96.4 (09 May 2025 07:27), Max: 97.7 (08 May 2025 11:03)  HR: 78 (09 May 2025 07:30) (59 - 79)  BP: 94/54 (09 May 2025 07:00) (90/54 - 124/78)  BP(mean): 67 (09 May 2025 07:00) (65 - 93)  ABP: --  ABP(mean): --  RR: 30 (09 May 2025 07:30) (17 - 30)  SpO2: 93% (09 May 2025 07:30) (84% - 100%)    O2 Parameters below as of 09 May 2025 06:57  Patient On (Oxygen Delivery Method): nasal cannula, 4 LPM            ## Physical Exam:  Gen: Elderly male lying in bed, NAD  HEENT: NC/AT sclerae anicteric  Resp: No increased WOB, Diminished breath sounds at L base  CV: S1, S2  Abd: Soft, + BS  Ext: WWP  Neuro: Arousable, moves extremities    ## Vent Data      ## Labs:  Chem:  05-09    140  |  112[H]  |  59[H]  ----------------------------<  110[H]  3.9   |  20[L]  |  1.64[H]    Ca    8.1[L]      09 May 2025 04:00  Phos  2.0     05-09  Mg     1.9     05-09    TPro  5.0[L]  /  Alb  2.3[L]  /  TBili  1.4[H]  /  DBili  x   /  AST  39[H]  /  ALT  38  /  AlkPhos  85  05-09    LIVER FUNCTIONS - ( 09 May 2025 04:00 )  Alb: 2.3 g/dL / Pro: 5.0 gm/dL / ALK PHOS: 85 U/L / ALT: 38 U/L / AST: 39 U/L / GGT: x           CBC:                        13.9   14.39 )-----------( 85       ( 09 May 2025 04:00 )             40.2     Coags:  PT/INR - ( 09 May 2025 04:00 )   PT: 11.8 sec;   INR: 1.04 ratio         PTT - ( 09 May 2025 04:00 )  PTT:88.2 sec    Urinalysis Basic - ( 09 May 2025 04:00 )    Color: x / Appearance: x / SG: x / pH: x  Gluc: 110 mg/dL / Ketone: x  / Bili: x / Urobili: x   Blood: x / Protein: x / Nitrite: x   Leuk Esterase: x / RBC: x / WBC x   Sq Epi: x / Non Sq Epi: x / Bacteria: x        ## Cardiac        ## Blood Gas      #I/Os  I&O's Detail    08 May 2025 07:01  -  09 May 2025 07:00  --------------------------------------------------------  IN:    dextrose 5%: 1725 mL    Heparin: 161 mL    IV PiggyBack: 350 mL    Oral Fluid: 340 mL  Total IN: 2576 mL    OUT:    Indwelling Catheter - Urethral (mL): 355 mL  Total OUT: 355 mL    Total NET: 2221 mL      09 May 2025 07:01  -  09 May 2025 08:21  --------------------------------------------------------  IN:    dextrose 5%: 75 mL    Heparin: 7 mL  Total IN: 82 mL    OUT:  Total OUT: 0 mL    Total NET: 82 mL          ## Imaging:    ## Medications:  MEDICATIONS  (STANDING):  albuterol/ipratropium for Nebulization 3 milliLiter(s) Nebulizer every 6 hours  cefTRIAXone   IVPB 1000 milliGRAM(s) IV Intermittent every 24 hours  chlorhexidine 2% Cloths 1 Application(s) Topical <User Schedule>  finasteride 5 milliGRAM(s) Oral daily  fluticasone propionate/ salmeterol 250-50 MICROgram(s) Diskus 1 Dose(s) Inhalation two times a day  heparin  Infusion 600 Unit(s)/Hr (7 mL/Hr) IV Continuous <Continuous>  midodrine. 30 milliGRAM(s) Oral three times a day  nystatin Powder 1 Application(s) Topical two times a day  sodium chloride 3%  Inhalation 4 milliLiter(s) Inhalation every 6 hours  tamsulosin 0.8 milliGRAM(s) Oral at bedtime  warfarin 5 milliGRAM(s) Oral daily    MEDICATIONS  (PRN):

## 2025-05-09 NOTE — PROGRESS NOTE ADULT - ASSESSMENT
89 y/o M w/dementia and Afib w/RVR here for acute hypoxemic respiratory failure and hypotension likely secondary to severe sepsis with septic shock due to PNA and UTI. HERMILA likely ATN.     # Acute hypoxemic respiratory failure  # Hypotension  # Severe sepsis with septic shock  # PNA  # UTI  # HERMILA    - Wean supplemental O2 as tolerated goal O2 sat >= 90%  - Complete course of abx  - Trend Cr, avoid nehprotoxins  - Continue therapeutic AC, transition to warfarin  - DNR/DNI  - Downgrade to medicine 89 y/o M w/dementia and Afib w/RVR here for acute hypoxemic respiratory failure and hypotension likely secondary to severe sepsis with septic shock due to PNA and UTI. HERMILA likely ATN.     # Acute hypoxemic respiratory failure  # Hypotension  # Severe sepsis with septic shock  # PNA  # UTI  # HERMILA  # Afib    - Wean supplemental O2 as tolerated goal O2 sat >= 90%  - Complete course of abx  - Trend Cr, avoid nehprotoxins  - Continue therapeutic AC, transition to warfarin  - DNR/DNI  - Downgrade to medicine 89 y/o M w/dementia and Afib w/RVR here for acute hypoxemic respiratory failure and hypotension likely secondary to severe sepsis with septic shock due to PNA and UTI. HERMILA likely ATN.     # Acute hypoxemic respiratory failure  # Hypotension  # Severe sepsis with septic shock  # PNA  # UTI  # HERMILA  # Afib    - Wean supplemental O2 as tolerated goal O2 sat >= 90%  - Complete course of abx  - Trend Cr, avoid nehprotoxins  - Continue therapeutic AC, transition to DOAC  - DNR/DNI  - Downgrade to medicine

## 2025-05-09 NOTE — CHART NOTE - NSCHARTNOTEFT_GEN_A_CORE
MICU DOWN GRADE NOTE  Accepting MD: dr newton    Patient is a 89y old  Male who presents with a chief complaint of SOB, hypoxemia, LLL CAP and UTI. (09 May 2025 08:21)    HPI:  89M PMHx possible dementia, A-fib on coumadin, HTN, HLD, chronic contracted R arm, bedbound, presented to the ER on 5/4 w/ AMS, hypoxia. Patient was admitted to tele for tx of pna vs uti on iv abx. Received call from medicine PA, patient with GIB, BP 60/40 and worsening oxygen requirements. Pt upgraded to icu for vasopressor support. Pt continued on ceftriaxone for pna + ecoli uti. Pt was up to 100% hiflo NC from mucous plugging, bilat effusions and pna. Pt received vit K and FFP for coagulopathy. After discussion with family, they elected for limited medical interventions, dnr/i and will consider full comfort care if prognosis remains poor. Pt weaned off of Hiflo to regular nasal cannula and weaned off vasopressors. Pt HD stable for downgrade to floor.     INTERVAL HPI/OVERNIGHT EVENTS: no acute events overnight. remains HD stable. briging from heparin drip to coumadin for afib          MEDICATIONS:  albuterol/ipratropium for Nebulization 3 milliLiter(s) Nebulizer every 6 hours  cefTRIAXone   IVPB 1000 milliGRAM(s) IV Intermittent every 24 hours  chlorhexidine 2% Cloths 1 Application(s) Topical <User Schedule>  finasteride 5 milliGRAM(s) Oral daily  fluticasone propionate/ salmeterol 250-50 MICROgram(s) Diskus 1 Dose(s) Inhalation two times a day  heparin  Infusion 600 Unit(s)/Hr IV Continuous <Continuous>  midodrine. 30 milliGRAM(s) Oral three times a day  nystatin Powder 1 Application(s) Topical two times a day  sodium chloride 3%  Inhalation 4 milliLiter(s) Inhalation every 6 hours  tamsulosin 0.8 milliGRAM(s) Oral at bedtime  warfarin 5 milliGRAM(s) Oral daily      T(C): 35.8 (05-09-25 @ 07:27), Max: 36.5 (05-08-25 @ 11:03)  HR: 73 (05-09-25 @ 09:00) (59 - 79)  BP: 98/61 (05-09-25 @ 09:00) (90/54 - 124/78)  RR: 27 (05-09-25 @ 09:00) (17 - 30)  SpO2: 88% (05-09-25 @ 09:00) (84% - 100%)  Wt(kg): --Vital Signs Last 24 Hrs  T(C): 35.8 (09 May 2025 07:27), Max: 36.5 (08 May 2025 11:03)  T(F): 96.4 (09 May 2025 07:27), Max: 97.7 (08 May 2025 11:03)  HR: 73 (09 May 2025 09:00) (59 - 79)  BP: 98/61 (09 May 2025 09:00) (90/54 - 124/78)  BP(mean): 71 (09 May 2025 09:00) (65 - 93)  RR: 27 (09 May 2025 09:00) (17 - 30)  SpO2: 88% (09 May 2025 09:00) (84% - 100%)    Parameters below as of 09 May 2025 06:57  Patient On (Oxygen Delivery Method): nasal cannula, 4 LPM          Consultant(s) Notes Reviewed:  [x ] YES  [ ] NO  Care Discussed with Consultants/Other Providers [ x] YES  [ ] NO    LABS:                        13.9   14.39 )-----------( 85       ( 09 May 2025 04:00 )             40.2     05-09    140  |  112[H]  |  59[H]  ----------------------------<  110[H]  3.9   |  20[L]  |  1.64[H]    Ca    8.1[L]      09 May 2025 04:00  Phos  2.0     05-09  Mg     1.9     05-09    TPro  5.0[L]  /  Alb  2.3[L]  /  TBili  1.4[H]  /  DBili  x   /  AST  39[H]  /  ALT  38  /  AlkPhos  85  05-09    PT/INR - ( 09 May 2025 04:00 )   PT: 11.8 sec;   INR: 1.04 ratio         PTT - ( 09 May 2025 04:00 )  PTT:88.2 sec  Urinalysis Basic - ( 09 May 2025 04:00 )    Color: x / Appearance: x / SG: x / pH: x  Gluc: 110 mg/dL / Ketone: x  / Bili: x / Urobili: x   Blood: x / Protein: x / Nitrite: x   Leuk Esterase: x / RBC: x / WBC x   Sq Epi: x / Non Sq Epi: x / Bacteria: x      CAPILLARY BLOOD GLUCOSE            Urinalysis Basic - ( 09 May 2025 04:00 )    Color: x / Appearance: x / SG: x / pH: x  Gluc: 110 mg/dL / Ketone: x  / Bili: x / Urobili: x   Blood: x / Protein: x / Nitrite: x   Leuk Esterase: x / RBC: x / WBC x   Sq Epi: x / Non Sq Epi: x / Bacteria: x        RADIOLOGY & ADDITIONAL TESTS:    Imaging Personally Reviewed:  [x ] YES  [ ] NO    89 y/o M w/dementia and Afib w/RVR here for acute hypoxemic respiratory failure and hypotension likely secondary to severe sepsis with septic shock due to PNA and UTI. HERMILA likely ATN. Family elected for limited medical interventions. Pt weaned off vasopressors and Hiflo and now stable for downgrade to medicine.    To follow up:  - acute hypoxic resp failure from pna + mucous plugging. Now on nasal cannula, cont with airway clearance modalities   -  ecoli UTI. cont ceftraixone. bcx ngtd  - weaned off of vasopressors on midodrine. cont to titrate for MAP>65  - cont bridge from heparin drip to coumadin for afib and monitor for bleeding   -  coagulopathy with INR of 7 likely from sepsis on coumadin. Now improved after dose of FFP and Vit K. cont bridge to coumadin  - DNR/I with no feeding tube and limited medical interventions. f/u pall recs and cont GOC     d/w dr leon and MICU DOWN GRADE NOTE  Accepting MD: dr newton    Patient is a 89y old  Male who presents with a chief complaint of SOB, hypoxemia, LLL CAP and UTI. (09 May 2025 08:21)    HPI:  89M PMHx possible dementia, A-fib on coumadin, HTN, HLD, chronic contracted R arm, bedbound, presented to the ER on 5/4 w/ AMS, hypoxia. Patient was admitted to tele for tx of pna vs uti on iv abx. Received call from medicine PA, patient with GIB, BP 60/40 and worsening oxygen requirements. Pt upgraded to icu for vasopressor support. Pt continued on ceftriaxone for pna + ecoli uti. Pt was up to 100% hiflo NC from mucous plugging, bilat effusions and pna. Pt received vit K and FFP for coagulopathy. After discussion with family, they elected for limited medical interventions, dnr/i and will consider full comfort care if prognosis remains poor. Pt weaned off of Hiflo to regular nasal cannula and weaned off vasopressors. Pt HD stable for downgrade to floor.     INTERVAL HPI/OVERNIGHT EVENTS: no acute events overnight. remains HD stable. switched to doac for afib today.           MEDICATIONS:  albuterol/ipratropium for Nebulization 3 milliLiter(s) Nebulizer every 6 hours  cefTRIAXone   IVPB 1000 milliGRAM(s) IV Intermittent every 24 hours  chlorhexidine 2% Cloths 1 Application(s) Topical <User Schedule>  finasteride 5 milliGRAM(s) Oral daily  fluticasone propionate/ salmeterol 250-50 MICROgram(s) Diskus 1 Dose(s) Inhalation two times a day  heparin  Infusion 600 Unit(s)/Hr IV Continuous <Continuous>  midodrine. 30 milliGRAM(s) Oral three times a day  nystatin Powder 1 Application(s) Topical two times a day  sodium chloride 3%  Inhalation 4 milliLiter(s) Inhalation every 6 hours  tamsulosin 0.8 milliGRAM(s) Oral at bedtime  warfarin 5 milliGRAM(s) Oral daily      T(C): 35.8 (05-09-25 @ 07:27), Max: 36.5 (05-08-25 @ 11:03)  HR: 73 (05-09-25 @ 09:00) (59 - 79)  BP: 98/61 (05-09-25 @ 09:00) (90/54 - 124/78)  RR: 27 (05-09-25 @ 09:00) (17 - 30)  SpO2: 88% (05-09-25 @ 09:00) (84% - 100%)  Wt(kg): --Vital Signs Last 24 Hrs  T(C): 35.8 (09 May 2025 07:27), Max: 36.5 (08 May 2025 11:03)  T(F): 96.4 (09 May 2025 07:27), Max: 97.7 (08 May 2025 11:03)  HR: 73 (09 May 2025 09:00) (59 - 79)  BP: 98/61 (09 May 2025 09:00) (90/54 - 124/78)  BP(mean): 71 (09 May 2025 09:00) (65 - 93)  RR: 27 (09 May 2025 09:00) (17 - 30)  SpO2: 88% (09 May 2025 09:00) (84% - 100%)    Parameters below as of 09 May 2025 06:57  Patient On (Oxygen Delivery Method): nasal cannula, 4 LPM          Consultant(s) Notes Reviewed:  [x ] YES  [ ] NO  Care Discussed with Consultants/Other Providers [ x] YES  [ ] NO    LABS:                        13.9   14.39 )-----------( 85       ( 09 May 2025 04:00 )             40.2     05-09    140  |  112[H]  |  59[H]  ----------------------------<  110[H]  3.9   |  20[L]  |  1.64[H]    Ca    8.1[L]      09 May 2025 04:00  Phos  2.0     05-09  Mg     1.9     05-09    TPro  5.0[L]  /  Alb  2.3[L]  /  TBili  1.4[H]  /  DBili  x   /  AST  39[H]  /  ALT  38  /  AlkPhos  85  05-09    PT/INR - ( 09 May 2025 04:00 )   PT: 11.8 sec;   INR: 1.04 ratio         PTT - ( 09 May 2025 04:00 )  PTT:88.2 sec  Urinalysis Basic - ( 09 May 2025 04:00 )    Color: x / Appearance: x / SG: x / pH: x  Gluc: 110 mg/dL / Ketone: x  / Bili: x / Urobili: x   Blood: x / Protein: x / Nitrite: x   Leuk Esterase: x / RBC: x / WBC x   Sq Epi: x / Non Sq Epi: x / Bacteria: x      CAPILLARY BLOOD GLUCOSE            Urinalysis Basic - ( 09 May 2025 04:00 )    Color: x / Appearance: x / SG: x / pH: x  Gluc: 110 mg/dL / Ketone: x  / Bili: x / Urobili: x   Blood: x / Protein: x / Nitrite: x   Leuk Esterase: x / RBC: x / WBC x   Sq Epi: x / Non Sq Epi: x / Bacteria: x        RADIOLOGY & ADDITIONAL TESTS:    Imaging Personally Reviewed:  [x ] YES  [ ] NO    89 y/o M w/dementia and Afib w/RVR here for acute hypoxemic respiratory failure and hypotension likely secondary to severe sepsis with septic shock due to PNA and UTI. HERMILA likely ATN. Family elected for limited medical interventions. Pt weaned off vasopressors and Hiflo and now stable for downgrade to medicine.    To follow up:  - acute hypoxic resp failure from pna + mucous plugging. Now on nasal cannula, cont with airway clearance modalities   -  ecoli UTI. cont ceftraixone. bcx ngtd  - weaned off of vasopressors on midodrine. cont to titrate for MAP>65  - started on eliquis for afib. cont to monitor for bleeding   -  coagulopathy with INR of 7 likely from sepsis on coumadin. Now improved after dose of FFP and Vit K.   - DNR/I with no feeding tube and limited medical interventions     d/w dr leon and dr briceno MICU DOWN GRADE NOTE  Accepting MD: dr newton    Patient is a 89y old  Male who presents with a chief complaint of SOB, hypoxemia, LLL CAP and UTI. (09 May 2025 08:21)    HPI:  89M PMHx possible dementia, A-fib on coumadin, HTN, HLD, chronic contracted R arm, bedbound, presented to the ER on 5/4 w/ AMS, hypoxia. Patient was admitted to tele for tx of pna vs uti on iv abx. Received call from medicine PA, patient with GIB, BP 60/40 and worsening oxygen requirements. Pt upgraded to icu for vasopressor support. Pt continued on ceftriaxone for pna + ecoli uti. Pt was up to 100% hiflo NC from mucous plugging, bilat effusions and pna. Pt received vit K and FFP for coagulopathy. After discussion with family, they elected for limited medical interventions, dnr/i and will consider full comfort care if prognosis remains poor. Pt weaned off of Hiflo to regular nasal cannula and weaned off vasopressors. Pt HD stable for downgrade to floor.     INTERVAL HPI/OVERNIGHT EVENTS: no acute events overnight. remains HD stable. switched to doac for afib today.           MEDICATIONS:  albuterol/ipratropium for Nebulization 3 milliLiter(s) Nebulizer every 6 hours  cefTRIAXone   IVPB 1000 milliGRAM(s) IV Intermittent every 24 hours  chlorhexidine 2% Cloths 1 Application(s) Topical <User Schedule>  finasteride 5 milliGRAM(s) Oral daily  fluticasone propionate/ salmeterol 250-50 MICROgram(s) Diskus 1 Dose(s) Inhalation two times a day  heparin  Infusion 600 Unit(s)/Hr IV Continuous <Continuous>  midodrine. 30 milliGRAM(s) Oral three times a day  nystatin Powder 1 Application(s) Topical two times a day  sodium chloride 3%  Inhalation 4 milliLiter(s) Inhalation every 6 hours  tamsulosin 0.8 milliGRAM(s) Oral at bedtime  warfarin 5 milliGRAM(s) Oral daily      T(C): 35.8 (05-09-25 @ 07:27), Max: 36.5 (05-08-25 @ 11:03)  HR: 73 (05-09-25 @ 09:00) (59 - 79)  BP: 98/61 (05-09-25 @ 09:00) (90/54 - 124/78)  RR: 27 (05-09-25 @ 09:00) (17 - 30)  SpO2: 88% (05-09-25 @ 09:00) (84% - 100%)  Wt(kg): --Vital Signs Last 24 Hrs  T(C): 35.8 (09 May 2025 07:27), Max: 36.5 (08 May 2025 11:03)  T(F): 96.4 (09 May 2025 07:27), Max: 97.7 (08 May 2025 11:03)  HR: 73 (09 May 2025 09:00) (59 - 79)  BP: 98/61 (09 May 2025 09:00) (90/54 - 124/78)  BP(mean): 71 (09 May 2025 09:00) (65 - 93)  RR: 27 (09 May 2025 09:00) (17 - 30)  SpO2: 88% (09 May 2025 09:00) (84% - 100%)    Parameters below as of 09 May 2025 06:57  Patient On (Oxygen Delivery Method): nasal cannula, 4 LPM          Consultant(s) Notes Reviewed:  [x ] YES  [ ] NO  Care Discussed with Consultants/Other Providers [ x] YES  [ ] NO    LABS:                        13.9   14.39 )-----------( 85       ( 09 May 2025 04:00 )             40.2     05-09    140  |  112[H]  |  59[H]  ----------------------------<  110[H]  3.9   |  20[L]  |  1.64[H]    Ca    8.1[L]      09 May 2025 04:00  Phos  2.0     05-09  Mg     1.9     05-09    TPro  5.0[L]  /  Alb  2.3[L]  /  TBili  1.4[H]  /  DBili  x   /  AST  39[H]  /  ALT  38  /  AlkPhos  85  05-09    PT/INR - ( 09 May 2025 04:00 )   PT: 11.8 sec;   INR: 1.04 ratio         PTT - ( 09 May 2025 04:00 )  PTT:88.2 sec  Urinalysis Basic - ( 09 May 2025 04:00 )    Color: x / Appearance: x / SG: x / pH: x  Gluc: 110 mg/dL / Ketone: x  / Bili: x / Urobili: x   Blood: x / Protein: x / Nitrite: x   Leuk Esterase: x / RBC: x / WBC x   Sq Epi: x / Non Sq Epi: x / Bacteria: x      CAPILLARY BLOOD GLUCOSE            Urinalysis Basic - ( 09 May 2025 04:00 )    Color: x / Appearance: x / SG: x / pH: x  Gluc: 110 mg/dL / Ketone: x  / Bili: x / Urobili: x   Blood: x / Protein: x / Nitrite: x   Leuk Esterase: x / RBC: x / WBC x   Sq Epi: x / Non Sq Epi: x / Bacteria: x        RADIOLOGY & ADDITIONAL TESTS:    Imaging Personally Reviewed:  [x ] YES  [ ] NO    89 y/o M w/dementia and Afib w/RVR here for acute hypoxemic respiratory failure and hypotension likely secondary to severe sepsis with septic shock due to PNA and UTI. HERMILA likely ATN. Family elected for limited medical interventions. Pt weaned off vasopressors and Hiflo and now stable for downgrade to medicine.    To follow up:  - acute hypoxic resp failure from pna + mucous plugging. Now on nasal cannula, cont with airway clearance modalities   -  ecoli UTI. cont ceftraixone. bcx ngtd  - weaned off of vasopressors on midodrine. cont to titrate for MAP>65  - started on eliquis for afib. cont to monitor for bleeding   -  coagulopathy with INR of 7 likely from sepsis on coumadin. Now improved after dose of FFP and Vit K.   - puree diet with limited po intake  - f/u TOV after feliciano removal  - DNR/I with no feeding tube and limited medical interventions     d/w dr leon and dr briceno

## 2025-05-10 LAB
ALBUMIN SERPL ELPH-MCNC: 2 G/DL — LOW (ref 3.3–5)
ALP SERPL-CCNC: 99 U/L — SIGNIFICANT CHANGE UP (ref 40–120)
ALT FLD-CCNC: 30 U/L — SIGNIFICANT CHANGE UP (ref 12–78)
ANION GAP SERPL CALC-SCNC: 5 MMOL/L — SIGNIFICANT CHANGE UP (ref 5–17)
AST SERPL-CCNC: 28 U/L — SIGNIFICANT CHANGE UP (ref 15–37)
BASOPHILS # BLD AUTO: 0.05 K/UL — SIGNIFICANT CHANGE UP (ref 0–0.2)
BASOPHILS NFR BLD AUTO: 0.4 % — SIGNIFICANT CHANGE UP (ref 0–2)
BILIRUB SERPL-MCNC: 1 MG/DL — SIGNIFICANT CHANGE UP (ref 0.2–1.2)
BUN SERPL-MCNC: 56 MG/DL — HIGH (ref 7–23)
CALCIUM SERPL-MCNC: 8.1 MG/DL — LOW (ref 8.5–10.1)
CHLORIDE SERPL-SCNC: 112 MMOL/L — HIGH (ref 96–108)
CO2 SERPL-SCNC: 23 MMOL/L — SIGNIFICANT CHANGE UP (ref 22–31)
CREAT SERPL-MCNC: 1.39 MG/DL — HIGH (ref 0.5–1.3)
CULTURE RESULTS: SIGNIFICANT CHANGE UP
CULTURE RESULTS: SIGNIFICANT CHANGE UP
EGFR: 48 ML/MIN/1.73M2 — LOW
EGFR: 48 ML/MIN/1.73M2 — LOW
EOSINOPHIL # BLD AUTO: 0.57 K/UL — HIGH (ref 0–0.5)
EOSINOPHIL NFR BLD AUTO: 4.6 % — SIGNIFICANT CHANGE UP (ref 0–6)
GLUCOSE SERPL-MCNC: 91 MG/DL — SIGNIFICANT CHANGE UP (ref 70–99)
HCT VFR BLD CALC: 37.7 % — LOW (ref 39–50)
HGB BLD-MCNC: 13 G/DL — SIGNIFICANT CHANGE UP (ref 13–17)
IMM GRANULOCYTES NFR BLD AUTO: 0.7 % — SIGNIFICANT CHANGE UP (ref 0–0.9)
LYMPHOCYTES # BLD AUTO: 0.63 K/UL — LOW (ref 1–3.3)
LYMPHOCYTES # BLD AUTO: 5.1 % — LOW (ref 13–44)
MAGNESIUM SERPL-MCNC: 2.1 MG/DL — SIGNIFICANT CHANGE UP (ref 1.6–2.6)
MCHC RBC-ENTMCNC: 34.1 PG — HIGH (ref 27–34)
MCHC RBC-ENTMCNC: 34.5 G/DL — SIGNIFICANT CHANGE UP (ref 32–36)
MCV RBC AUTO: 99 FL — SIGNIFICANT CHANGE UP (ref 80–100)
MONOCYTES # BLD AUTO: 0.52 K/UL — SIGNIFICANT CHANGE UP (ref 0–0.9)
MONOCYTES NFR BLD AUTO: 4.2 % — SIGNIFICANT CHANGE UP (ref 2–14)
NEUTROPHILS # BLD AUTO: 10.54 K/UL — HIGH (ref 1.8–7.4)
NEUTROPHILS NFR BLD AUTO: 85 % — HIGH (ref 43–77)
NRBC BLD AUTO-RTO: 0 /100 WBCS — SIGNIFICANT CHANGE UP (ref 0–0)
PHOSPHATE SERPL-MCNC: 2.6 MG/DL — SIGNIFICANT CHANGE UP (ref 2.5–4.5)
PLATELET # BLD AUTO: 80 K/UL — LOW (ref 150–400)
POTASSIUM SERPL-MCNC: 4.2 MMOL/L — SIGNIFICANT CHANGE UP (ref 3.5–5.3)
POTASSIUM SERPL-SCNC: 4.2 MMOL/L — SIGNIFICANT CHANGE UP (ref 3.5–5.3)
PROT SERPL-MCNC: 4.7 GM/DL — LOW (ref 6–8.3)
RBC # BLD: 3.81 M/UL — LOW (ref 4.2–5.8)
RBC # FLD: 15.9 % — HIGH (ref 10.3–14.5)
SODIUM SERPL-SCNC: 140 MMOL/L — SIGNIFICANT CHANGE UP (ref 135–145)
SPECIMEN SOURCE: SIGNIFICANT CHANGE UP
SPECIMEN SOURCE: SIGNIFICANT CHANGE UP
WBC # BLD: 12.4 K/UL — HIGH (ref 3.8–10.5)
WBC # FLD AUTO: 12.4 K/UL — HIGH (ref 3.8–10.5)

## 2025-05-10 PROCEDURE — 99232 SBSQ HOSP IP/OBS MODERATE 35: CPT

## 2025-05-10 RX ORDER — SENNA 187 MG
2 TABLET ORAL AT BEDTIME
Refills: 0 | Status: DISCONTINUED | OUTPATIENT
Start: 2025-05-10 | End: 2025-05-14

## 2025-05-10 RX ORDER — SIMETHICONE 80 MG
80 TABLET,CHEWABLE ORAL EVERY 8 HOURS
Refills: 0 | Status: COMPLETED | OUTPATIENT
Start: 2025-05-10 | End: 2025-05-13

## 2025-05-10 RX ORDER — SCOPOLAMINE 1 MG/3D
1 PATCH, EXTENDED RELEASE TRANSDERMAL
Refills: 0 | Status: DISCONTINUED | OUTPATIENT
Start: 2025-05-10 | End: 2025-05-14

## 2025-05-10 RX ORDER — DEXTROMETHORPHAN HBR, GUAIFENESIN 200 MG/10ML
200 LIQUID ORAL EVERY 6 HOURS
Refills: 0 | Status: COMPLETED | OUTPATIENT
Start: 2025-05-10 | End: 2025-05-14

## 2025-05-10 RX ADMIN — MIDODRINE HYDROCHLORIDE 30 MILLIGRAM(S): 5 TABLET ORAL at 11:46

## 2025-05-10 RX ADMIN — SCOPOLAMINE 1 PATCH: 1 PATCH, EXTENDED RELEASE TRANSDERMAL at 19:30

## 2025-05-10 RX ADMIN — SCOPOLAMINE 1 PATCH: 1 PATCH, EXTENDED RELEASE TRANSDERMAL at 17:27

## 2025-05-10 RX ADMIN — Medication 80 MILLIGRAM(S): at 22:26

## 2025-05-10 RX ADMIN — Medication 1 APPLICATION(S): at 06:32

## 2025-05-10 RX ADMIN — APIXABAN 2.5 MILLIGRAM(S): 2.5 TABLET, FILM COATED ORAL at 23:09

## 2025-05-10 RX ADMIN — Medication 2 TABLET(S): at 22:26

## 2025-05-10 RX ADMIN — Medication 4 MILLILITER(S): at 18:34

## 2025-05-10 RX ADMIN — NYSTATIN 1 APPLICATION(S): 100000 CREAM TOPICAL at 06:31

## 2025-05-10 RX ADMIN — DEXTROMETHORPHAN HBR, GUAIFENESIN 200 MILLIGRAM(S): 200 LIQUID ORAL at 23:09

## 2025-05-10 RX ADMIN — CEFTRIAXONE 100 MILLIGRAM(S): 500 INJECTION, POWDER, FOR SOLUTION INTRAMUSCULAR; INTRAVENOUS at 17:26

## 2025-05-10 RX ADMIN — NYSTATIN 1 APPLICATION(S): 100000 CREAM TOPICAL at 17:27

## 2025-05-10 RX ADMIN — MIDODRINE HYDROCHLORIDE 30 MILLIGRAM(S): 5 TABLET ORAL at 17:26

## 2025-05-10 RX ADMIN — TAMSULOSIN HYDROCHLORIDE 0.8 MILLIGRAM(S): 0.4 CAPSULE ORAL at 22:26

## 2025-05-10 RX ADMIN — FINASTERIDE 5 MILLIGRAM(S): 1 TABLET, FILM COATED ORAL at 11:45

## 2025-05-10 RX ADMIN — DEXTROMETHORPHAN HBR, GUAIFENESIN 200 MILLIGRAM(S): 200 LIQUID ORAL at 17:26

## 2025-05-10 RX ADMIN — APIXABAN 2.5 MILLIGRAM(S): 2.5 TABLET, FILM COATED ORAL at 11:46

## 2025-05-10 RX ADMIN — Medication 80 MILLIGRAM(S): at 13:15

## 2025-05-10 NOTE — PROGRESS NOTE ADULT - SUBJECTIVE AND OBJECTIVE BOX
PROGRESS NOTE:     Patient is a 89y old  Male who presents with a chief complaint of SOB, hypoxemia, LLL CAP and UTI. (09 May 2025 08:21)        SUBJECTIVE & OBJECTIVE:   Pt seen and examined at bedside in AM    no overnight events.   no new complaints    ROS: unable to examine due to [x ] Encephalopathy  [ ] Advanced Dementia  [ ] Expressive Aphasia  [ ] Non-verbal patient     PHYSICAL EXAM:  T(C): 36.3 (05-10-25 @ 16:30), Max: 36.3 (05-09-25 @ 23:51)  HR: 61 (05-10-25 @ 18:34) (55 - 66)  BP: 102/62 (05-10-25 @ 16:30) (97/54 - 159/76)  RR: 18 (05-10-25 @ 16:30) (17 - 20)  SpO2: 92% (05-10-25 @ 18:34) (91% - 97%)  Wt(kg): --       GENERAL: NAD,  no increased WOB  HEAD:  Atraumatic, Normocephalic  EYES: EOMI, PERRLA, conjunctiva and sclera clear  ENMT: Moist mucous membranes  NECK: Supple, No JVD  NERVOUS SYSTEM:  lethargic   CHEST/LUNG: Clear to auscultation bilaterally; No rales, rhonchi, wheezing, or rubs  HEART: Regular rate and rhythm; No murmurs, rubs, or gallops  ABDOMEN: Soft, Nontender, Nondistended; Bowel sounds present  EXTREMITIES:  2+ Peripheral Pulses b/l, No clubbing, cyanosis, calf tenderness or edema b/l      LABS:                        13.0   12.40 )-----------( 80       ( 10 May 2025 07:30 )             37.7   05-10    140  |  112[H]  |  56[H]  ----------------------------<  91  4.2   |  23  |  1.39[H]    Ca    8.1[L]      10 May 2025 07:30  Phos  2.6     05-10  Mg     2.1     05-10    TPro  4.7[L]  /  Alb  2.0[L]  /  TBili  1.0  /  DBili  x   /  AST  28  /  ALT  30  /  AlkPhos  99  05-10         PTT - ( 09 May 2025 04:00 )  PTT:88.2 sec  Urinalysis Basic - ( 10 May 2025 07:30 )    Color: x / Appearance: x / SG: x / pH: x  Gluc: 91 mg/dL / Ketone: x  / Bili: x / Urobili: x   Blood: x / Protein: x / Nitrite: x   Leuk Esterase: x / RBC: x / WBC x   Sq Epi: x / Non Sq Epi: x / Bacteria: x      Magnesium: 2.1 mg/dL (05-10 @ 07:30)            RECENT CULTURES:    Blood Blood-Peripheral  05-04 @ 16:32   No growth at 5 days  --  --      Blood Blood-Peripheral  05-04 @ 16:20   No growth at 5 days  --  --      Catheterized  05-04 @ 16:04   >100,000 CFU/ml Escherichia coli  --  Escherichia coli            RADIOLOGY & ADDITIONAL TESTS:          Radiology reports read and imaging reviewed  :  [ x] YES  [ ] NO  (I am not a radiologist and therefore rely on Radiologist reports to facilitate with diagnosis and treatment plans)    Consultant(s) Notes Reviewed:  [x ] YES  [ ] NO    Care Discussed with Consultants/Other Providers [x ] YES  [ ] NO  Care plan and all findings were discussed in detail with patient.  All questions and concerns addressed

## 2025-05-10 NOTE — PROGRESS NOTE ADULT - ASSESSMENT
89 y/o M w/dementia and Afib w/RVR here for acute hypoxemic respiratory failure and hypotension likely secondary to severe sepsis with septic shock due to PNA and UTI. HERMILA likely ATN. Family elected for limited medical interventions. Pt weaned off vasopressors and Hiflo and now stable for downgrade to medicine 5/9        - acute hypoxic resp failure from pna + mucous plugging. Now on nasal cannula, cont with airway clearance modalities   -  ecoli UTI. cont ceftraixone. bcx ngtd  - weaned off of vasopressors on midodrine. cont to titrate for MAP>65  - started on eliquis for afib. cont to monitor for bleeding   -  coagulopathy with INR of 7 likely from sepsis on coumadin. Now improved after dose of FFP and Vit K.   - puree diet with limited po intake  - f/u TOV after feliciano removal  - DNR/I with no feeding tube and limited medical interventions

## 2025-05-11 PROCEDURE — 99232 SBSQ HOSP IP/OBS MODERATE 35: CPT

## 2025-05-11 RX ORDER — SODIUM CHLORIDE 9 G/1000ML
1000 INJECTION, SOLUTION INTRAVENOUS
Refills: 0 | Status: DISCONTINUED | OUTPATIENT
Start: 2025-05-11 | End: 2025-05-13

## 2025-05-11 RX ADMIN — DEXTROMETHORPHAN HBR, GUAIFENESIN 200 MILLIGRAM(S): 200 LIQUID ORAL at 18:03

## 2025-05-11 RX ADMIN — CEFTRIAXONE 100 MILLIGRAM(S): 500 INJECTION, POWDER, FOR SOLUTION INTRAMUSCULAR; INTRAVENOUS at 18:03

## 2025-05-11 RX ADMIN — APIXABAN 2.5 MILLIGRAM(S): 2.5 TABLET, FILM COATED ORAL at 11:39

## 2025-05-11 RX ADMIN — Medication 80 MILLIGRAM(S): at 21:15

## 2025-05-11 RX ADMIN — SCOPOLAMINE 1 PATCH: 1 PATCH, EXTENDED RELEASE TRANSDERMAL at 07:28

## 2025-05-11 RX ADMIN — Medication 1 APPLICATION(S): at 05:51

## 2025-05-11 RX ADMIN — IPRATROPIUM BROMIDE AND ALBUTEROL SULFATE 3 MILLILITER(S): .5; 2.5 SOLUTION RESPIRATORY (INHALATION) at 17:51

## 2025-05-11 RX ADMIN — MIDODRINE HYDROCHLORIDE 30 MILLIGRAM(S): 5 TABLET ORAL at 05:45

## 2025-05-11 RX ADMIN — Medication 80 MILLIGRAM(S): at 05:45

## 2025-05-11 RX ADMIN — IPRATROPIUM BROMIDE AND ALBUTEROL SULFATE 3 MILLILITER(S): .5; 2.5 SOLUTION RESPIRATORY (INHALATION) at 05:57

## 2025-05-11 RX ADMIN — MIDODRINE HYDROCHLORIDE 30 MILLIGRAM(S): 5 TABLET ORAL at 18:03

## 2025-05-11 RX ADMIN — NYSTATIN 1 APPLICATION(S): 100000 CREAM TOPICAL at 05:45

## 2025-05-11 RX ADMIN — SODIUM CHLORIDE 55 MILLILITER(S): 9 INJECTION, SOLUTION INTRAVENOUS at 11:39

## 2025-05-11 RX ADMIN — DEXTROMETHORPHAN HBR, GUAIFENESIN 200 MILLIGRAM(S): 200 LIQUID ORAL at 05:45

## 2025-05-11 RX ADMIN — Medication 80 MILLIGRAM(S): at 14:01

## 2025-05-11 RX ADMIN — TAMSULOSIN HYDROCHLORIDE 0.8 MILLIGRAM(S): 0.4 CAPSULE ORAL at 21:15

## 2025-05-11 RX ADMIN — NYSTATIN 1 APPLICATION(S): 100000 CREAM TOPICAL at 18:03

## 2025-05-11 RX ADMIN — FINASTERIDE 5 MILLIGRAM(S): 1 TABLET, FILM COATED ORAL at 11:38

## 2025-05-11 RX ADMIN — Medication 4 MILLILITER(S): at 05:57

## 2025-05-11 RX ADMIN — DEXTROMETHORPHAN HBR, GUAIFENESIN 200 MILLIGRAM(S): 200 LIQUID ORAL at 11:38

## 2025-05-11 RX ADMIN — Medication 4 MILLILITER(S): at 17:34

## 2025-05-11 RX ADMIN — MIDODRINE HYDROCHLORIDE 30 MILLIGRAM(S): 5 TABLET ORAL at 11:39

## 2025-05-11 RX ADMIN — Medication 2 TABLET(S): at 21:15

## 2025-05-11 NOTE — PROGRESS NOTE ADULT - SUBJECTIVE AND OBJECTIVE BOX
PROGRESS NOTE:     Patient is a 89y old  Male who presents with a chief complaint of SOB, hypoxemia, LLL CAP and UTI. (09 May 2025 08:21)        SUBJECTIVE & OBJECTIVE:   Pt seen and examined at bedside in AM    no overnight events.   no new complaints    more awake today, able to answer some questions   passed bedside dysphagia screen       poor historian     PHYSICAL EXAM:  Vital Signs Last 24 Hrs  T(C): 36.3 (11 May 2025 16:32), Max: 36.4 (11 May 2025 05:00)  T(F): 97.4 (11 May 2025 16:32), Max: 97.6 (11 May 2025 11:15)  HR: 59 (11 May 2025 17:42) (59 - 68)  BP: 108/68 (11 May 2025 16:32) (95/56 - 108/68)  BP(mean): --  RR: 17 (11 May 2025 16:32) (17 - 17)  SpO2: 95% (11 May 2025 17:42) (92% - 95%)    Parameters below as of 11 May 2025 17:42  Patient On (Oxygen Delivery Method): nasal cannula          GENERAL: NAD,  no increased WOB  HEAD:  Atraumatic, Normocephalic  EYES: EOMI, PERRLA, conjunctiva and sclera clear  ENMT: Moist mucous membranes  NECK: Supple, No JVD  NERVOUS SYSTEM:  AOx2, 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, moving other extremities   CHEST/LUNG: Clear to auscultation bilaterally; No rales, rhonchi, wheezing, or rubs  HEART: Regular rate and rhythm; No murmurs, rubs, or gallops  ABDOMEN: Soft, Nontender, Nondistended; Bowel sounds present  EXTREMITIES:  2+ Peripheral Pulses b/l, No clubbing, cyanosis, calf tenderness or edema b/l      LABS:                                   13.0   12.40 )-----------( 80       ( 10 May 2025 07:30 )             37.7   05-10    140  |  112[H]  |  56[H]  ----------------------------<  91  4.2   |  23  |  1.39[H]    Ca    8.1[L]      10 May 2025 07:30  Phos  2.6     05-10  Mg     2.1     05-10    TPro  4.7[L]  /  Alb  2.0[L]  /  TBili  1.0  /  DBili  x   /  AST  28  /  ALT  30  /  AlkPhos  99  05-10           PTT - ( 09 May 2025 04:00 )  PTT:88.2 sec  Urinalysis Basic - ( 10 May 2025 07:30 )    Color: x / Appearance: x / SG: x / pH: x  Gluc: 91 mg/dL / Ketone: x  / Bili: x / Urobili: x   Blood: x / Protein: x / Nitrite: x   Leuk Esterase: x / RBC: x / WBC x   Sq Epi: x / Non Sq Epi: x / Bacteria: x      Magnesium: 2.1 mg/dL (05-10 @ 07:30)            RECENT CULTURES:    Blood Blood-Peripheral  05-04 @ 16:32   No growth at 5 days  --  --      Blood Blood-Peripheral  05-04 @ 16:20   No growth at 5 days  --  --      Catheterized  05-04 @ 16:04   >100,000 CFU/ml Escherichia coli  --  Escherichia coli            RADIOLOGY & ADDITIONAL TESTS:          Radiology reports read and imaging reviewed  :  [ x] YES  [ ] NO  (I am not a radiologist and therefore rely on Radiologist reports to facilitate with diagnosis and treatment plans)    Consultant(s) Notes Reviewed:  [x ] YES  [ ] NO    Care Discussed with Consultants/Other Providers [x ] YES  [ ] NO  Care plan and all findings were discussed in detail with patient.  All questions and concerns addressed   PROGRESS NOTE:     Patient is a 89y old  Male who presents with a chief complaint of SOB, hypoxemia, LLL CAP and UTI. (09 May 2025 08:21)        SUBJECTIVE & OBJECTIVE:   Pt seen and examined at bedside in AM    no overnight events.   no new complaints        poor historian     PHYSICAL EXAM:  Vital Signs Last 24 Hrs  T(C): 36.2 (12 May 2025 11:37), Max: 36.4 (12 May 2025 05:01)  T(F): 97.2 (12 May 2025 11:37), Max: 97.5 (12 May 2025 05:01)  HR: 56 (12 May 2025 11:37) (53 - 68)  BP: 99/61 (12 May 2025 11:37) (99/61 - 138/76)  BP(mean): --  RR: 17 (12 May 2025 11:37) (17 - 18)  SpO2: 93% (12 May 2025 11:37) (93% - 96%)    Parameters below as of 12 May 2025 11:37  Patient On (Oxygen Delivery Method): nasal cannula  O2 Flow (L/min): 3      GENERAL: NAD,  no increased WOB  HEAD:  Atraumatic, Normocephalic  EYES: EOMI, PERRLA, conjunctiva and sclera clear  ENMT: Moist mucous membranes  NECK: Supple, No JVD  NERVOUS SYSTEM:  AOx2, 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, moving other extremities   CHEST/LUNG: Clear to auscultation bilaterally; No rales, rhonchi, wheezing, or rubs  HEART: Regular rate and rhythm; No murmurs, rubs, or gallops  ABDOMEN: Soft, Nontender, Nondistended; Bowel sounds present  EXTREMITIES:  2+ Peripheral Pulses b/l, No clubbing, cyanosis, calf tenderness or edema b/l  chronically contracted RUE       LABS:             no new labs            PTT - ( 09 May 2025 04:00 )  PTT:88.2 sec  Urinalysis Basic - ( 10 May 2025 07:30 )    Color: x / Appearance: x / SG: x / pH: x  Gluc: 91 mg/dL / Ketone: x  / Bili: x / Urobili: x   Blood: x / Protein: x / Nitrite: x   Leuk Esterase: x / RBC: x / WBC x   Sq Epi: x / Non Sq Epi: x / Bacteria: x      Magnesium: 2.1 mg/dL (05-10 @ 07:30)            RECENT CULTURES:    Blood Blood-Peripheral  05-04 @ 16:32   No growth at 5 days  --  --      Blood Blood-Peripheral  05-04 @ 16:20   No growth at 5 days  --  --      Catheterized  05-04 @ 16:04   >100,000 CFU/ml Escherichia coli  --  Escherichia coli            RADIOLOGY & ADDITIONAL TESTS:          Radiology reports read and imaging reviewed  :  [ x] YES  [ ] NO  (I am not a radiologist and therefore rely on Radiologist reports to facilitate with diagnosis and treatment plans)    Consultant(s) Notes Reviewed:  [x ] YES  [ ] NO    Care Discussed with Consultants/Other Providers [x ] YES  [ ] NO  Care plan and all findings were discussed in detail with patient.  All questions and concerns addressed

## 2025-05-11 NOTE — PROGRESS NOTE ADULT - ASSESSMENT
89 y/o M w/dementia and Afib w/RVR here for acute hypoxemic respiratory failure and hypotension likely secondary to severe sepsis with septic shock due to PNA and UTI. HERMILA likely ATN. Family elected for limited medical interventions. Pt weaned off vasopressors and Hiflo and now stable for downgrade to medicine 5/9        - acute hypoxic resp failure from pna + mucous plugging. Now on nasal cannula, cont with airway clearance modalities   -  ecoli UTI. cont ceftraixone. bcx ngtd  - weaned off of vasopressors on midodrine. cont to titrate for MAP>65  - started on eliquis for afib. cont to monitor for bleeding   -  coagulopathy with INR of 7 likely from sepsis on coumadin. Now improved after dose of FFP and Vit K.   - puree diet with limited po intake  urinary retention, 5/11 feliciano replaced   - DNR/I with no feeding tube and limited medical interventions     5/11 passed bedside dysphagia screen. 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 Hip Replacement presented   Afib w/RVR here for acute hypoxemic respiratory failure and hypotension likely secondary to severe sepsis with septic shock due to PNA and UTI. HERMILA likely ATN. Family elected for limited medical interventions. Pt weaned off vasopressors and Hiflo and now stable for downgrade to medicine 5/9          # acute hypoxic resp failure from pna + mucous plugging. Now on nasal cannula, cont with airway clearance modalities   -  ecoli UTI. cont ceftraixone. bcx ngtd  - weaned off of vasopressors on midodrine. cont to titrate for MAP>65  - started on eliquis for afib. cont to monitor for bleeding   -  coagulopathy with INR of 7 likely from sepsis on coumadin. Now improved after dose of FFP and Vit K.   - puree diet with limited po intake  urinary retention, 5/11 feliciano replaced   - DNR/I with no feeding tube and limited medical interventions     5/11 passed bedside dysphagia screen. started puree diet with mildly thickened liquids     known AAA (abdominal aortic aneurysm)  history of pulmonary nodules  -- 4.1 cm will need to be followed as outpatient.

## 2025-05-12 PROCEDURE — 76937 US GUIDE VASCULAR ACCESS: CPT | Mod: 26

## 2025-05-12 PROCEDURE — 99232 SBSQ HOSP IP/OBS MODERATE 35: CPT

## 2025-05-12 PROCEDURE — 36410 VNPNXR 3YR/> PHY/QHP DX/THER: CPT

## 2025-05-12 RX ORDER — COLLAGENASE CLOSTRIDIUM HIST. 250 UNIT/G
1 OINTMENT (GRAM) TOPICAL DAILY
Refills: 0 | Status: DISCONTINUED | OUTPATIENT
Start: 2025-05-12 | End: 2025-05-14

## 2025-05-12 RX ADMIN — APIXABAN 2.5 MILLIGRAM(S): 2.5 TABLET, FILM COATED ORAL at 11:24

## 2025-05-12 RX ADMIN — SCOPOLAMINE 1 PATCH: 1 PATCH, EXTENDED RELEASE TRANSDERMAL at 07:21

## 2025-05-12 RX ADMIN — NYSTATIN 1 APPLICATION(S): 100000 CREAM TOPICAL at 17:21

## 2025-05-12 RX ADMIN — DEXTROMETHORPHAN HBR, GUAIFENESIN 200 MILLIGRAM(S): 200 LIQUID ORAL at 11:22

## 2025-05-12 RX ADMIN — DEXTROMETHORPHAN HBR, GUAIFENESIN 200 MILLIGRAM(S): 200 LIQUID ORAL at 23:06

## 2025-05-12 RX ADMIN — Medication 2 TABLET(S): at 23:07

## 2025-05-12 RX ADMIN — FINASTERIDE 5 MILLIGRAM(S): 1 TABLET, FILM COATED ORAL at 11:23

## 2025-05-12 RX ADMIN — APIXABAN 2.5 MILLIGRAM(S): 2.5 TABLET, FILM COATED ORAL at 23:07

## 2025-05-12 RX ADMIN — Medication 80 MILLIGRAM(S): at 14:32

## 2025-05-12 RX ADMIN — SCOPOLAMINE 1 PATCH: 1 PATCH, EXTENDED RELEASE TRANSDERMAL at 19:20

## 2025-05-12 RX ADMIN — Medication 1 DOSE(S): at 17:21

## 2025-05-12 RX ADMIN — SODIUM CHLORIDE 55 MILLILITER(S): 9 INJECTION, SOLUTION INTRAVENOUS at 16:08

## 2025-05-12 RX ADMIN — DEXTROMETHORPHAN HBR, GUAIFENESIN 200 MILLIGRAM(S): 200 LIQUID ORAL at 00:21

## 2025-05-12 RX ADMIN — APIXABAN 2.5 MILLIGRAM(S): 2.5 TABLET, FILM COATED ORAL at 00:21

## 2025-05-12 RX ADMIN — NYSTATIN 1 APPLICATION(S): 100000 CREAM TOPICAL at 05:52

## 2025-05-12 RX ADMIN — Medication 4 MILLILITER(S): at 05:38

## 2025-05-12 RX ADMIN — DEXTROMETHORPHAN HBR, GUAIFENESIN 200 MILLIGRAM(S): 200 LIQUID ORAL at 17:25

## 2025-05-12 RX ADMIN — TAMSULOSIN HYDROCHLORIDE 0.8 MILLIGRAM(S): 0.4 CAPSULE ORAL at 23:07

## 2025-05-12 RX ADMIN — Medication 4 MILLILITER(S): at 17:12

## 2025-05-12 RX ADMIN — Medication 1 APPLICATION(S): at 05:52

## 2025-05-12 RX ADMIN — MIDODRINE HYDROCHLORIDE 30 MILLIGRAM(S): 5 TABLET ORAL at 17:25

## 2025-05-12 RX ADMIN — Medication 80 MILLIGRAM(S): at 23:07

## 2025-05-12 RX ADMIN — MIDODRINE HYDROCHLORIDE 30 MILLIGRAM(S): 5 TABLET ORAL at 11:24

## 2025-05-12 RX ADMIN — DEXTROMETHORPHAN HBR, GUAIFENESIN 200 MILLIGRAM(S): 200 LIQUID ORAL at 05:51

## 2025-05-12 RX ADMIN — IPRATROPIUM BROMIDE AND ALBUTEROL SULFATE 3 MILLILITER(S): .5; 2.5 SOLUTION RESPIRATORY (INHALATION) at 17:11

## 2025-05-12 RX ADMIN — Medication 80 MILLIGRAM(S): at 05:52

## 2025-05-12 NOTE — PROGRESS NOTE ADULT - ASSESSMENT
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 Hip Replacement presented from home   Afib w/RVR here for acute hypoxemic respiratory failure and hypotension likely secondary to severe sepsis with septic shock due to PNA and UTI. HERMILA likely ATN. Family elected for limited medical interventions. Pt weaned off vasopressors and Hiflo and now stable for downgrade to medicine 5/9        DISCHARGE DIAGNOSES:     # acute hypoxic resp failure from pna + mucous plugging. Now on nasal cannula, cont with airway clearance modalities   -  ecoli UTI. cont ceftraixone. bcx ngtd  - weaned off of vasopressors on midodrine. cont to titrate for MAP>65  - started on eliquis for afib. cont to monitor for bleeding   -  coagulopathy with INR of 7 likely from sepsis on coumadin. Now improved after dose of FFP and Vit K.   - puree diet with limited po intake  urinary retention, 5/11 feliciano replaced   - DNR/I with no feeding tube and limited medical interventions     5/11 passed bedside dysphagia screen. started puree diet with mildly thickened liquids     known AAA (abdominal aortic aneurysm)  history of pulmonary nodules  -- 4.1 cm will need to be followed as outpatient.   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 Hip Replacement presented from home hypoxic 85% and AMS.   Afib w/RVR here for acute hypoxemic respiratory failure and hypotension likely secondary to severe sepsis with septic shock due to PNA and UTI. HERMILA likely ATN. Family elected for limited medical interventions. Pt weaned off vasopressors and Hiflo and now stable for downgrade to medicine 5/9        DISCHARGE DIAGNOSES:   #Acute respiratory failure with hypoxia POA , sec to PNA + mucous plugging , was on HFNC in ICU, Now on nasal cannula  #Acute metabolic encephalopathy POA, likely due to above   # E.coli UTI , s/p ceftriaxone course    #Septic shock with lactic acidosis POA, requiring pressors in icu, resolved; -Blood Cx --NGTD  , continue with midodrine, titrate off as tolerated   #Leukocytosis , improved   #thrombocytopenia likely due to septic shock, table   # HERMILA POA, likely ATN, improved   #Chronic Atrial fibrillation with RVR , rate controlled , continue with eliquis   #coagulopathy POA with INR 7 ,Now improved after dose of FFP and Vit K in icu   #GIB in ICU, coumadin was held and coagulopathy was reversed, now H/H stable , no further episodes of GIB   #BPH with urinary retention, failed TOV, feliciano was replaced 5/11   #dysphagia , failed s/s 5/12, NPO , continue with D5W   #DNR/DNI (per daughter, patient does not have history of dementia)   # unstageable sacral ulcer , does not appear infected, PT wound recs appreciated       --spoke with daughter Aleksandra [phone] >>looking for comfort/hospice disposition. She states she is not interested in feeding tubes. Stated that during the day she prefers that medical team speaks with her brother Srini [phone].  --awaiting palliative care follow-up     xray abd shows distended stomach, abd exam benign. no vomiting or nausea. ordered simethicone.       known AAA (abdominal aortic aneurysm)  history of pulmonary nodules  -- 4.1 cm will need to be followed as outpatient.    Preventative Measures   eliquis --dvt ppx  fall, aspiration precautions   HOBE

## 2025-05-12 NOTE — SWALLOW BEDSIDE ASSESSMENT ADULT - PHARYNGEAL PHASE
Decreased laryngeal elevation/Cough post oral intake Decreased laryngeal elevation/Cough post oral intake/Multiple swallows

## 2025-05-12 NOTE — PROGRESS NOTE ADULT - SUBJECTIVE AND OBJECTIVE BOX
PROGRESS NOTE:     Patient is a 89y old  Male who presents with a chief complaint of SOB, hypoxemia, LLL CAP and UTI. (09 May 2025 08:21)        SUBJECTIVE & OBJECTIVE:   Pt seen and examined at bedside in AM    no overnight events.   no new complaints        poor historian     PHYSICAL EXAM:  Vital Signs Last 24 Hrs  T(C): 36.2 (12 May 2025 11:37), Max: 36.4 (12 May 2025 05:01)  T(F): 97.2 (12 May 2025 11:37), Max: 97.5 (12 May 2025 05:01)  HR: 56 (12 May 2025 11:37) (53 - 68)  BP: 99/61 (12 May 2025 11:37) (99/61 - 138/76)  BP(mean): --  RR: 17 (12 May 2025 11:37) (17 - 18)  SpO2: 93% (12 May 2025 11:37) (93% - 96%)    Parameters below as of 12 May 2025 11:37  Patient On (Oxygen Delivery Method): nasal cannula  O2 Flow (L/min): 3      GENERAL: NAD,  no increased WOB  HEAD:  Atraumatic, Normocephalic  EYES: EOMI, PERRLA, conjunctiva and sclera clear  ENMT: Moist mucous membranes  NECK: Supple, No JVD  NERVOUS SYSTEM:  AOx2, 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, moving other extremities   CHEST/LUNG: Clear to auscultation bilaterally; No rales, rhonchi, wheezing, or rubs  HEART: Regular rate and rhythm; No murmurs, rubs, or gallops  ABDOMEN: Soft, Nontender, Nondistended; Bowel sounds present  EXTREMITIES:  2+ Peripheral Pulses b/l, No clubbing, cyanosis, calf tenderness or edema b/l  chronically contracted RUE       LABS:             no new labs            PTT - ( 09 May 2025 04:00 )  PTT:88.2 sec  Urinalysis Basic - ( 10 May 2025 07:30 )    Color: x / Appearance: x / SG: x / pH: x  Gluc: 91 mg/dL / Ketone: x  / Bili: x / Urobili: x   Blood: x / Protein: x / Nitrite: x   Leuk Esterase: x / RBC: x / WBC x   Sq Epi: x / Non Sq Epi: x / Bacteria: x      Magnesium: 2.1 mg/dL (05-10 @ 07:30)            RECENT CULTURES:    Blood Blood-Peripheral  05-04 @ 16:32   No growth at 5 days  --  --      Blood Blood-Peripheral  05-04 @ 16:20   No growth at 5 days  --  --      Catheterized  05-04 @ 16:04   >100,000 CFU/ml Escherichia coli  --  Escherichia coli            RADIOLOGY & ADDITIONAL TESTS:          Radiology reports read and imaging reviewed  :  [ x] YES  [ ] NO  (I am not a radiologist and therefore rely on Radiologist reports to facilitate with diagnosis and treatment plans)    Consultant(s) Notes Reviewed:  [x ] YES  [ ] NO    Care Discussed with Consultants/Other Providers [x ] YES  [ ] NO  Care plan and all findings were discussed in detail with patient.  All questions and concerns addressed

## 2025-05-12 NOTE — SWALLOW BEDSIDE ASSESSMENT ADULT - H & P REVIEW
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 Hurt placed in ER. Per family found to be hypoxic by EMS.  NKDA per report.  Full DNR in effect."/yes

## 2025-05-12 NOTE — PROCEDURE NOTE - NSPROCDETAILS_GEN_ALL_CORE
guidewire recovered/lumen(s) aspirated and flushed/sterile dressing applied/sterile technique, catheter placed/ultrasound guidance with use of sterile gel and probe cove
location identified, draped/prepped, sterile technique used, needle inserted/introduced/positive blood return obtained via catheter/connected to a pressurized flush line/sutured in place/hemostasis with direct pressure, dressing applied/Seldinger technique/all materials/supplies accounted for at end of procedure
US Guided/blood seen on insertion/dressing applied/flushes easily/secured in place
US guided/blood seen on insertion/dressing applied/flushes easily/secured in place

## 2025-05-12 NOTE — SWALLOW BEDSIDE ASSESSMENT ADULT - COMMENTS
pt seen bedside seated upright on NC02, alert, AAOx3 with periods of confusion. Noted contracted right arm. Pt responded to questions for assessment with fair accuracy, verbalized his wants/needs and followed simple 1 step directives. Noted low weak vocal quality.    Swallow history. Per charting, bedside swallow evaluations completed at [facility] 10/03/2022 with recommendations for regular solids/thin liquids and at [facility] 1/9/2018 with recommendations for soft/thin liquid.    Xray chest 5/04/2025 IMPRESSION: Moderate LEFT effusion and/or lower zone airspace consolidation obscuring diaphragm contour. RIGHT lung parenchyma clear. noted wet breath sounds post PO trials.

## 2025-05-12 NOTE — SWALLOW BEDSIDE ASSESSMENT ADULT - SWALLOW EVAL: DIAGNOSIS
pt presented with oropharyngeal dysphagia for puree, moderate thick liquid, mild thick liquid and thin liquid. oral phase marked by adequate labial seal/stripping utensil,  adequate oral containment, slowed bolus manipulation formation and slowed anterior posterior transport with adequate oral clearance. suspect reduced laryngeal elevation to palpation and audible wet breath sound post po trials across all PO trials. Noted overt cough for puree and thin liquids. suggest pt not safe for po diet at risk for aspiration. oral means of nutrition/hydration contraindicated.

## 2025-05-12 NOTE — PROCEDURE NOTE - NSINDICATIONS_GEN_A_CORE
antibiotic therapy/emergency venous access
critical illness/emergency venous access/hemodynamic monitoring/hypertonic/irritant infusion/venous access/volume resuscitation
antibiotic therapy/emergency venous access
blood sampling/cannulation purposes/critical patient/monitoring purposes

## 2025-05-12 NOTE — PROCEDURE NOTE - NSINFORMCONSENT_GEN_A_CORE
Pt BIBA stating pt is from an assisted living who has been hypoxic since yesterday evening and was 88% on 4L of O2. Per ems pt was recently diagnosed at the facility with COVID, ems also stated pt just had oxycodone given to him at the facility for c/o leg pain. Pt stated he had SOB of CP upon arrival today at the hospital.   
Benefits, risks, and possible complications of procedure explained to patient/caregiver who verbalized understanding and gave verbal consent.
Benefits, risks, and possible complications of procedure explained to patient/caregiver who verbalized understanding and gave verbal consent.
This was an emergent procedure.
This was an emergent procedure.

## 2025-05-12 NOTE — PHYSICAL THERAPY INITIAL EVALUATION ADULT - PATIENT PROFILE REVIEW, REHAB EVAL
A1c is well controlled at 6.7. Continue on metformin, Trulicity and Jardiance. Continue to monitor fasting glucose and A1c.   Lab Results   Component Value Date    HGBA1C 6.7 (H) 08/15/2023 yes

## 2025-05-12 NOTE — PHYSICAL THERAPY INITIAL EVALUATION ADULT - PERTINENT HX OF CURRENT PROBLEM, REHAB EVAL
89 y/o M w/dementia and Afib w/RVR here for acute hypoxemic respiratory failure and hypotension likely secondary to severe sepsis with septic shock due to PNA and UTI. HERMILA likely ATN. Family elected for limited medical interventions. Pt weaned off vasopressors and Hiflo and now stable for downgrade to medicine 5/9.

## 2025-05-12 NOTE — SWALLOW BEDSIDE ASSESSMENT ADULT - ADDITIONAL RECOMMENDATIONS
Short term goals:  1. Suggest medical team initiate GOC conversation for pt /family wishes regarding oral feeding and PEG tube.   2. Consider palliative care consult for GOC conversation with pt/family

## 2025-05-12 NOTE — PHYSICAL THERAPY INITIAL EVALUATION ADULT - GENERAL OBSERVATIONS, REHAB EVAL
Pt was seen in semi-supine c O2 at 3l/min  through nasal cannula and feliciano catheter +, alert but confused and unable to follow any command.  Pt presented c flexion contracture of franci UE and extention contracture in franci LE and edema in franci feet. Pt is totally dependent in all functional mobility. P.T. wound care initial evaluation performed c RNMadalyn present. Sacrum c unstageable pressure and franci feet c blanchable erythema and no pressure upcer. Sacral wound was cleaned, dressing applied and documented in flowsheet A&I  and c recommendation in flowsheet plan of care.

## 2025-05-12 NOTE — PROCEDURE NOTE - NSSITEPREP_SKIN_A_CORE
chlorhexidine
chlorhexidine
chlorhexidine/Adherence to aseptic technique: hand hygiene prior to donning barriers (gown, gloves), don cap and mask, sterile drape over patient
chlorhexidine/Adherence to aseptic technique: hand hygiene prior to donning barriers (gown, gloves), don cap and mask, sterile drape over patient

## 2025-05-12 NOTE — PROCEDURE NOTE - NSPROCNAME_GEN_A_CORE
Arterial Puncture/Cannulation
Peripheral Line Insertion
Peripheral Line Insertion
Central Line Insertion

## 2025-05-12 NOTE — SWALLOW BEDSIDE ASSESSMENT ADULT - SWALLOW EVAL: FUNCTIONAL LEVEL AT TIME OF EVAL
Pt alert and accepted PO trials. Pt alert and accepted PO trials. RN Madalyn reported suctioning secretions throughout her shift and during mealtime.

## 2025-05-13 DIAGNOSIS — E43 UNSPECIFIED SEVERE PROTEIN-CALORIE MALNUTRITION: ICD-10-CM

## 2025-05-13 PROCEDURE — 99233 SBSQ HOSP IP/OBS HIGH 50: CPT

## 2025-05-13 PROCEDURE — 99232 SBSQ HOSP IP/OBS MODERATE 35: CPT

## 2025-05-13 PROCEDURE — 99497 ADVNCD CARE PLAN 30 MIN: CPT | Mod: 25

## 2025-05-13 RX ORDER — SODIUM CHLORIDE 9 G/1000ML
1000 INJECTION, SOLUTION INTRAVENOUS
Refills: 0 | Status: DISCONTINUED | OUTPATIENT
Start: 2025-05-13 | End: 2025-05-13

## 2025-05-13 RX ADMIN — SCOPOLAMINE 1 PATCH: 1 PATCH, EXTENDED RELEASE TRANSDERMAL at 07:07

## 2025-05-13 RX ADMIN — SCOPOLAMINE 1 PATCH: 1 PATCH, EXTENDED RELEASE TRANSDERMAL at 18:46

## 2025-05-13 RX ADMIN — MIDODRINE HYDROCHLORIDE 30 MILLIGRAM(S): 5 TABLET ORAL at 06:04

## 2025-05-13 RX ADMIN — SODIUM CHLORIDE 55 MILLILITER(S): 9 INJECTION, SOLUTION INTRAVENOUS at 12:14

## 2025-05-13 RX ADMIN — Medication 80 MILLIGRAM(S): at 06:04

## 2025-05-13 RX ADMIN — DEXTROMETHORPHAN HBR, GUAIFENESIN 200 MILLIGRAM(S): 200 LIQUID ORAL at 18:46

## 2025-05-13 RX ADMIN — DEXTROMETHORPHAN HBR, GUAIFENESIN 200 MILLIGRAM(S): 200 LIQUID ORAL at 12:15

## 2025-05-13 RX ADMIN — Medication 1 APPLICATION(S): at 12:15

## 2025-05-13 RX ADMIN — Medication 4 MILLILITER(S): at 05:35

## 2025-05-13 RX ADMIN — DEXTROMETHORPHAN HBR, GUAIFENESIN 200 MILLIGRAM(S): 200 LIQUID ORAL at 06:04

## 2025-05-13 RX ADMIN — APIXABAN 2.5 MILLIGRAM(S): 2.5 TABLET, FILM COATED ORAL at 12:14

## 2025-05-13 RX ADMIN — MIDODRINE HYDROCHLORIDE 30 MILLIGRAM(S): 5 TABLET ORAL at 12:14

## 2025-05-13 RX ADMIN — FINASTERIDE 5 MILLIGRAM(S): 1 TABLET, FILM COATED ORAL at 12:14

## 2025-05-13 RX ADMIN — Medication 1 APPLICATION(S): at 05:39

## 2025-05-13 RX ADMIN — SCOPOLAMINE 1 PATCH: 1 PATCH, EXTENDED RELEASE TRANSDERMAL at 19:20

## 2025-05-13 RX ADMIN — NYSTATIN 1 APPLICATION(S): 100000 CREAM TOPICAL at 07:07

## 2025-05-13 RX ADMIN — NYSTATIN 1 APPLICATION(S): 100000 CREAM TOPICAL at 18:50

## 2025-05-13 NOTE — PROGRESS NOTE ADULT - CONVERSATION DETAILS
Spoke with patient's wife at bedside.  She is aware patient's appetite has not been great and his mental status has overall been poor with periods where he is better than others.  Discussed patient's appetite may continue to be poor and likely worse in the setting of acute illness.  She said patient would not want to be fed via tube.  She asked this NP to reach out to her son to discuss.      Called and spoke with patient's son, Lux [phone] and spoke via phone as he was driving and unable to speak in person.  Discussed above conversation and he said they want patient to be comfortable and not suffer.  Discussed role of feeding tube and explained that it would not add to comfort or quality of life and would pose risk of edema, increased secretions, aspiration risks and patients can dislodge them.  Explained appetite may not recover but would recommend letting patient eat as tolerated for comfort.  Explained he may not eat well which may be more indicative that he is nearing end of life and the body is preparing for something else.  He said that they would prefer not to pursue a feeding tube given above information.  For now, wish to continue with current medical management and see how patient does.  He hopes he can get a bit better, understands even if he has some improvement will likely be worse for the wear.  They understand he may not improve.  [facility] on file with DNR/I and HCP on file as well.
Spoke w patient's son/HCP Lux  via phone as currently unavailable to come to the hospital, regarding further GOC given continued clinical decline, remains NPO following speech evaluation. He expressed that given his age and complications, their main goal is that he be comfortable and symptoms controlled. [facility] on file for DNR/DNI, limited medical treatments, no feeding tubes. Reviewed [facility] options given change in treatment focus, updated for DNR/DNI, do not rehospitalize unless symptoms cannot be managed, comfort measures only, no IVF, determine use of antibiotics, no feeding tubes, prefer comfort feeds as tolerated. Discussed IVF at this time would likely only add to symptom burden including increasing secretions and edema as well as possible lung congestion, he is agreeable not to continue at this time. Discussed appropriate for comfort feeds as tolerated for his pleasure, understands risk of ongoing aspiration.     Educated on hospice services/locations, he stated that he has been speaking w SW and currently have a dual plan for evaluation by [facility] or [facility] for comfort care depending on symptomatology. Support provided.

## 2025-05-13 NOTE — PROGRESS NOTE ADULT - PROBLEM SELECTOR PLAN 1
req HFNC, ICU monitoring, treated for septic shock 2/2 PNA, UTI, since downgraded to medicine, O2 currently stable on NC.   DNR/DNI  on scopolamine patch for secretions  aspiration precautions  morphine 2mg ivp q2 h prn dyspnea

## 2025-05-13 NOTE — PROGRESS NOTE ADULT - SUBJECTIVE AND OBJECTIVE BOX
PROGRESS NOTE:     Patient is a 89y old  Male who presents with a chief complaint of SOB, hypoxemia, LLL CAP and UTI. (09 May 2025 08:21)        SUBJECTIVE & OBJECTIVE:   Pt seen and examined at bedside in AM    no overnight events.   no new complaints        poor historian     PHYSICAL EXAM:  Vital Signs Last 24 Hrs  T(C): 36.4 (13 May 2025 11:45), Max: 36.4 (13 May 2025 00:17)  T(F): 97.6 (13 May 2025 11:45), Max: 97.6 (13 May 2025 05:01)  HR: 87 (13 May 2025 11:45) (60 - 87)  BP: 98/59 (13 May 2025 11:45) (98/59 - 119/42)  BP(mean): --  RR: 18 (13 May 2025 11:45) (17 - 18)  SpO2: 94% (13 May 2025 11:45) (91% - 96%)    Parameters below as of 13 May 2025 11:45  Patient On (Oxygen Delivery Method): nasal cannula          GENERAL: NAD,  no increased WOB  HEAD:  Atraumatic, Normocephalic  EYES: EOMI, PERRLA, conjunctiva and sclera clear  ENMT: Moist mucous membranes  NECK: Supple, No JVD  NERVOUS SYSTEM:  AOx2, 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, moving other extremities   CHEST/LUNG: Clear to auscultation bilaterally; No rales, rhonchi, wheezing, or rubs  HEART: Regular rate and rhythm; No murmurs, rubs, or gallops  ABDOMEN: Soft, Nontender, Nondistended; Bowel sounds present  EXTREMITIES:  2+ Peripheral Pulses b/l, No clubbing, cyanosis, calf tenderness or edema b/l  chronically contracted RUE       LABS:             no new labs            PTT - ( 09 May 2025 04:00 )  PTT:88.2 sec  Urinalysis Basic - ( 10 May 2025 07:30 )    Color: x / Appearance: x / SG: x / pH: x  Gluc: 91 mg/dL / Ketone: x  / Bili: x / Urobili: x   Blood: x / Protein: x / Nitrite: x   Leuk Esterase: x / RBC: x / WBC x   Sq Epi: x / Non Sq Epi: x / Bacteria: x      Magnesium: 2.1 mg/dL (05-10 @ 07:30)            RECENT CULTURES:    Blood Blood-Peripheral  05-04 @ 16:32   No growth at 5 days  --  --      Blood Blood-Peripheral  05-04 @ 16:20   No growth at 5 days  --  --      Catheterized  05-04 @ 16:04   >100,000 CFU/ml Escherichia coli  --  Escherichia coli            RADIOLOGY & ADDITIONAL TESTS:          Radiology reports read and imaging reviewed  :  [ x] YES  [ ] NO  (I am not a radiologist and therefore rely on Radiologist reports to facilitate with diagnosis and treatment plans)    Consultant(s) Notes Reviewed:  [x ] YES  [ ] NO    Care Discussed with Consultants/Other Providers [x ] YES  [ ] NO  Care plan and all findings were discussed in detail with patient.  All questions and concerns addressed

## 2025-05-13 NOTE — PROGRESS NOTE ADULT - REASON FOR ADMISSION
SOB, hypoxemia, LLL CAP and UTI.

## 2025-05-13 NOTE — PROGRESS NOTE ADULT - PROBLEM SELECTOR PLAN 6
Placed RX at  , sent pt update on [facility].     Winter Iqbal MA     bedbound since leg injury 2020, dependent for care. Freq positioning, has skin failure, off loading

## 2025-05-13 NOTE — PROGRESS NOTE ADULT - PROBLEM SELECTOR PLAN 3
likely 2/2 TME, sepsis, mental status waxes and wanes, usually alert per family, deny hx of dementia  reorientation, cluster care

## 2025-05-13 NOTE — PROGRESS NOTE ADULT - PROVIDER SPECIALTY LIST ADULT
Critical Care
CT Surgery
Critical Care
Critical Care
Hospitalist
Critical Care
Critical Care
Hospitalist
Critical Care
Hospitalist
Palliative Care
Hospitalist
Palliative Care

## 2025-05-13 NOTE — PROGRESS NOTE ADULT - ASSESSMENT
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 Hip Replacement presented from home hypoxic 85% and AMS.   Afib w/RVR here for acute hypoxemic respiratory failure and hypotension likely secondary to severe sepsis with septic shock due to PNA and UTI. HERMILA likely ATN. Family elected for limited medical interventions. Pt weaned off vasopressors and Hiflo and now stable for downgrade to medicine 5/9        DISCHARGE DIAGNOSES:   #Acute respiratory failure with hypoxia POA , sec to PNA + mucous plugging , was on HFNC in ICU, Now on nasal cannula  #Acute metabolic encephalopathy POA, likely due to above   # E.coli UTI , s/p ceftriaxone course    #Septic shock with lactic acidosis POA, requiring pressors in icu, resolved; -Blood Cx --NGTD  , off midodrine   #Leukocytosis , improved   #thrombocytopenia likely due to septic shock, table   # HERMILA POA, likely ATN, improved   #Chronic Atrial fibrillation with RVR , rate controlled , continue with eliquis   #coagulopathy POA with INR 7 ,Now improved after dose of FFP and Vit K in icu   #GIB in ICU, coumadin was held and coagulopathy was reversed, now H/H stable , no further episodes of GIB   #BPH with urinary retention, failed TOV, feliciano was replaced 5/11   #dysphagia , failed s/s 5/12   #DNR/DNI (per daughter, patient does not have history of dementia)   # unstageable sacral ulcer , does not appear infected, PT wound recs appreciated , frequent repositioning       --spoke with daughter Aleksandra [phone] >>looking for comfort/hospice disposition. She states she is not interested in feeding tubes. Stated that during the day she prefers that medical team speaks with her brother Srini [phone].  --palliative following, patient was accepted to [facility] inpatient hospice at [facility]. comfort measures only   morphine 2mg ivp q2 h prn dyspnea.  no feeding tubes per family wishes  no further IVF given edema, secretions, family in agreement.  family requesting comfort feeds       xray abd shows distended stomach, abd exam benign. no vomiting or nausea. ordered simethicone.       known AAA (abdominal aortic aneurysm)  history of pulmonary nodules  -- 4.1 cm will need to be followed as outpatient if within GOC     Preventative Measures   eliquis --dvt ppx  fall, aspiration precautions   HOBE

## 2025-05-13 NOTE — PROGRESS NOTE ADULT - PROBLEM SELECTOR PLAN 5
Clinical evidence indicates that the patient has Severe protein calorie malnutrition/ 3rd degree    In context of     Acute Illness/Injury (>7days)       Energy/Food intake <50% of estimated energy requirement >5 days  Weight loss: Moderate - severe (lbs lost recently)  Body Fat loss: Severe   (Cachexia, temporal wasting, contracted, muscle atrophy)  Muscle mass loss: Severe  (Skin failure/pressure ulcers)  Fluid Accumulation: Severe (Fluid overload, ascites, pleural effusions)   Strength: weakened severe (bedbound)    Recommend:   pleasure feeds as tolerated - aspiration precautions, careful hand-feeding, teaching to caregivers  nutritional supplements as tolerated, nutrition consult    no feeding tubes per family wishes  no further IVF given edema, secretions, family in agreement

## 2025-05-13 NOTE — PROGRESS NOTE ADULT - TIME BILLING
min spent reviewing patient's chart,  discussing in IDR, examining patient, discussing plan with patient and family and staff, reviewing consultant recommendations/communicating with consultants, writing progress note and placing orders.
Time spent for extensive review of the physical chart, electronic health record, and documentation to obtain collateral information including but not limited to:    - Current inpatient records (ED, H&P, primary team, and consultants if applicable)   - Inpatient values/results (biomarkers, immunoassays, imaging, and microbiology results)   - Current or proposed treatment plans   - Pharmacotherapy review   Time spent for counseling and education with patient/family  Time spent discussing and coordinating care with primary team and interdisciplinary staff and floor staff.

## 2025-05-13 NOTE — PROGRESS NOTE ADULT - PROBLEM SELECTOR PLAN 7
Son Lux is primary HCP, daughter Aleksandra is alternate, copy in chart. GOC discussion as above, MOLST updated for comfort measures only, no further IVF, DNH unless symptoms cannot be managed. DNR/DNI, no feeding tubes. Hospice eligible, IPU vs SNF w comfort depending on symptomatology. Palliative will follow. Discussed w primary team, AVIS.

## 2025-05-13 NOTE — PROGRESS NOTE ADULT - ASSESSMENT
89 year old male PMH of HTN, HLD, ?dementia presented to the ED for AMS, and hypoexmia.  Patient admitted to general medical floor now in ICU on pressor support and HFNC.  Palliative consulted for assistance with GOC.

## 2025-05-13 NOTE — PROGRESS NOTE ADULT - SUBJECTIVE AND OBJECTIVE BOX
follow up on:  complex medical decision making related to goals of care      OVERNIGHT EVENTS: no overnight events, somewhat lethargic,  answers simple questions, denies pain, NAD.     Review of systems:     Pain:  [ ] yes [x ] no  QOL impact -   Location -                    Aggravating factors -  Quality -  Radiation -  Timing-  Severity (0-10 scale):  Minimal acceptable level (0-10 scale):      Limited due to poor mental status    MEDICATIONS  (STANDING):  apixaban 2.5 milliGRAM(s) Oral <User Schedule>  chlorhexidine 2% Cloths 1 Application(s) Topical <User Schedule>  collagenase Ointment 1 Application(s) Topical daily  finasteride 5 milliGRAM(s) Oral daily  fluticasone propionate/ salmeterol 250-50 MICROgram(s) Diskus 1 Dose(s) Inhalation two times a day  guaiFENesin Oral Liquid (Sugar-Free) 200 milliGRAM(s) Oral every 6 hours  midodrine. 30 milliGRAM(s) Oral three times a day  nystatin Powder 1 Application(s) Topical two times a day  scopolamine 1 mG/72 Hr(s) Patch 1 Patch Transdermal every 72 hours  senna 2 Tablet(s) Oral at bedtime  sodium chloride 3%  Inhalation 4 milliLiter(s) Inhalation every 12 hours  tamsulosin 0.8 milliGRAM(s) Oral at bedtime    MEDICATIONS  (PRN):  albuterol/ipratropium for Nebulization 3 milliLiter(s) Nebulizer every 6 hours PRN Shortness of Breath and/or Wheezing  morphine  - Injectable 2 milliGRAM(s) IV Push every 2 hours PRN dyspnea or severe pain      PHYSICAL EXAM:  Vital Signs Last 24 Hrs  T(C): 36.4 (13 May 2025 11:45), Max: 36.4 (13 May 2025 00:17)  T(F): 97.6 (13 May 2025 11:45), Max: 97.6 (13 May 2025 05:01)  HR: 87 (13 May 2025 11:45) (60 - 87)  BP: 98/59 (13 May 2025 11:45) (98/59 - 119/42)  BP(mean): --  RR: 18 (13 May 2025 11:45) (17 - 18)  SpO2: 94% (13 May 2025 11:45) (91% - 96%)    Parameters below as of 13 May 2025 11:45  Patient On (Oxygen Delivery Method): nasal cannula       Palliative Performance Scale/Karnofsky Score: 20      GENERAL: somewhat lethargic, chronically ill appearing,  NAD  HEENT: Atraumatic, oropharynx clear, neck supple  CHEST/LUNG: mildly labored  HEART: Regular rate and rhythm    ABDOMEN: Soft, Nontender, Nondistended   MUSCULOSKELETAL: +UE L>R edema,  bedbound  NERVOUS SYSTEM:  somewhat lethargic, answers simple questions  SKIN: unstageable sacral noted  Oral intake: npo    LABS:                RADIOLOGY & ADDITIONAL STUDIES:

## 2025-05-14 ENCOUNTER — TRANSCRIPTION ENCOUNTER (OUTPATIENT)
Age: 89
End: 2025-05-14

## 2025-05-14 VITALS
DIASTOLIC BLOOD PRESSURE: 64 MMHG | SYSTOLIC BLOOD PRESSURE: 106 MMHG | TEMPERATURE: 97 F | RESPIRATION RATE: 17 BRPM | HEART RATE: 67 BPM | OXYGEN SATURATION: 95 %

## 2025-05-14 PROCEDURE — 99232 SBSQ HOSP IP/OBS MODERATE 35: CPT

## 2025-05-14 RX ORDER — APIXABAN 2.5 MG/1
1 TABLET, FILM COATED ORAL
Qty: 0 | Refills: 0 | DISCHARGE
Start: 2025-05-14

## 2025-05-14 RX ORDER — SCOPOLAMINE 1 MG/3D
1 PATCH, EXTENDED RELEASE TRANSDERMAL
Qty: 0 | Refills: 0 | DISCHARGE
Start: 2025-05-14

## 2025-05-14 RX ADMIN — Medication 1 APPLICATION(S): at 13:29

## 2025-05-14 RX ADMIN — Medication 2 MILLIGRAM(S): at 14:16

## 2025-05-14 RX ADMIN — DEXTROMETHORPHAN HBR, GUAIFENESIN 200 MILLIGRAM(S): 200 LIQUID ORAL at 00:35

## 2025-05-14 RX ADMIN — Medication 1 APPLICATION(S): at 05:16

## 2025-05-14 RX ADMIN — APIXABAN 2.5 MILLIGRAM(S): 2.5 TABLET, FILM COATED ORAL at 00:35

## 2025-05-14 RX ADMIN — NYSTATIN 1 APPLICATION(S): 100000 CREAM TOPICAL at 05:49

## 2025-05-14 RX ADMIN — IPRATROPIUM BROMIDE AND ALBUTEROL SULFATE 3 MILLILITER(S): .5; 2.5 SOLUTION RESPIRATORY (INHALATION) at 12:50

## 2025-05-14 RX ADMIN — Medication 2 MILLIGRAM(S): at 13:28

## 2025-05-14 RX ADMIN — SCOPOLAMINE 1 PATCH: 1 PATCH, EXTENDED RELEASE TRANSDERMAL at 07:45

## 2025-05-14 RX ADMIN — Medication 2 TABLET(S): at 00:35

## 2025-05-14 RX ADMIN — DEXTROMETHORPHAN HBR, GUAIFENESIN 200 MILLIGRAM(S): 200 LIQUID ORAL at 05:49

## 2025-05-14 NOTE — DISCHARGE NOTE NURSING/CASE MANAGEMENT/SOCIAL WORK - PATIENT PORTAL LINK FT
You can access the FollowMyHealth Patient Portal offered by [facility] by registering at the following website: [email or web address]. By joining [facility]’s FollowMyHealth portal, you will also be able to view your health information using other applications (apps) compatible with our system.

## 2025-05-14 NOTE — DISCHARGE NOTE PROVIDER - NSDCCPCAREPLAN_GEN_ALL_CORE_FT
PRINCIPAL DISCHARGE DIAGNOSIS  Diagnosis: Sepsis  Assessment and Plan of Treatment: Comfort measures

## 2025-05-14 NOTE — DISCHARGE NOTE NURSING/CASE MANAGEMENT/SOCIAL WORK - NSDCPEFALRISK_GEN_ALL_CORE
For information on Fall & Injury Prevention, visit: [email or web address] OR  [email or web address] OR  https://www.cdc.gov/steadi/patient.html

## 2025-05-14 NOTE — DISCHARGE NOTE PROVIDER - NSDCMRMEDTOKEN_GEN_ALL_CORE_FT
apixaban 2.5 mg oral tablet: 1 tab(s) orally 2 times a day  scopolamine: 1 patch transdermal every 72 hours

## 2025-05-14 NOTE — DISCHARGE NOTE PROVIDER - HOSPITAL COURSE
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 He presented from home with hypoxia (oxygen saturation of 85%) and altered mental status.    The patient was admitted with acute hypoxemic respiratory failure and hypotension, likely secondary to severe sepsis with septic shock due to pneumonia and a urinary tract infection (UTI) caused by Escherichia coli. He also had acute kidney injury (HERMILA), likely due to acute tubular necrosis (ATN). In the intensive care unit (ICU), he was initially managed with high-flow nasal cannula (HFNC) oxygen therapy and vasopressors for septic shock. He was treated with ceftriaxone for the UTI. His septic shock resolved, and he was weaned off vasopressors and HFNC, allowing for downgrade to the medical floor. The family elected for limited medical interventions.    Other issues addressed included acute respiratory failure with hypoxia due to pneumonia and mucous plugging (improved, now on nasal cannula oxygen), acute metabolic encephalopathy (resolved), leukocytosis (improved), thrombocytopenia due to septic shock (stable), coagulopathy with an initial INR of 7 (reversed with fresh frozen plasma and vitamin K), gastrointestinal bleeding in the ICU (resolved after holding warfarin and reversing coagulopathy), BPH with urinary retention (failed trial of void, Hurt catheter replaced), dysphagia (failed swallow study), unstageable sacral ulcer (not infected, wound care recommendations given), known abdominal aortic aneurysm, and history of pulmonary nodules (4.1 cm).    The patient's daughter, Aleksandra, was looking for comfort/hospice disposition and stated she was not interested in feeding tubes. DNR/DNI.    Palliative care was consulted, and the patient was accepted to the inpatient hospice at [facility] for comfort measures only. Morphine was ordered for dyspnea, no feeding tubes were initiated per family wishes, and no further intravenous fluids were given due to edema, secretions, and family agreement. The family requested comfort feeds. Prophylactic measures included apixaban (Eliquis), fall precautions, aspiration precautions, and head of bed elevation (HOBE).    Discharge Diagnoses:    Acute respiratory failure with hypoxia secondary to pneumonia and mucous plugging  Acute metabolic encephalopathy 2/2 sepsis  Escherichia coli urinary tract infection  Septic shock with lactic acidosis (resolved)  Leukocytosis 2/2 UTI/PNA  Thrombocytopenia due to septic shock  Acute kidney injury, likely acute tubular necrosis (improved)  Chronic atrial fibrillation with rapid ventricular rate  Coagulopathy in setting of warfarin use (reversed)  Gastrointestinal bleeding in setting of warfarin use(resolved)  Benign prostatic hyperplasia with urinary retention  Dysphagia  Unstageable sacral ulcer  Known abdominal aortic aneurysm  History of pulmonary nodules    Patient seen and evaluated. DC to inpatient hospice at [facility].   DC time 37 mins

## 2025-05-14 NOTE — DISCHARGE NOTE NURSING/CASE MANAGEMENT/SOCIAL WORK - FINANCIAL ASSISTANCE
[facility] provides services at a reduced cost to those who are determined to be eligible through [facility]’s financial assistance program. Information regarding [facility]’s financial assistance program can be found by going to [email or web address] or by calling [phone].

## 2025-05-14 NOTE — CHART NOTE - NSCHARTNOTEFT_GEN_A_CORE
Pt sleeping during visit. 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 Pt presented with hypoxia and AMS; admitted with acute hypoxemic respiratory failure and hypotension likely secondary to severe sepsis with septic shock due to PNA and UTI. HERMILA likely ATN. downgraded to medicine 05/09, O2 currently stable on NC. Per palliative GOC note on 05/13, MOLST updated for comfort measures only, no further IVF, DNR/DNI, DNH unless symptoms cannot be managed, no feeding tubes. Hospice eligible; pt accepted at inpatient hospice at [facility]; awaiting bed availability.    Factors impacting intake: [x] acute illness; lethargy; dysphagia; self-feed difficulty    Diet Prescription: Diet, Pureed:   Moderately Thick Liquids (MODTHICKLIQS) (05-13-25 @ 14:49)    Intake: 26-75% for documented meals per flow sheets; requires total feeding assistance    Current Weight: no wt x several days; request current and daily weights  % Weight Change: N/A  3+ edema b/l ankles per flow sheets    Physical appearance: Unable to perform nutrition-focused physical exam due to pt sleeping soundly. Pt lying under covers with only face exposed; severe temporal wasting and severe orbital depletion observed.    Pertinent Medications: MEDICATIONS  (STANDING):  apixaban 2.5 milliGRAM(s) Oral <User Schedule>  chlorhexidine 2% Cloths 1 Application(s) Topical <User Schedule>  collagenase Ointment 1 Application(s) Topical daily  guaiFENesin Oral Liquid (Sugar-Free) 200 milliGRAM(s) Oral every 6 hours  nystatin Powder 1 Application(s) Topical two times a day  scopolamine 1 mG/72 Hr(s) Patch 1 Patch Transdermal every 72 hours  senna 2 Tablet(s) Oral at bedtime    MEDICATIONS  (PRN):  albuterol/ipratropium for Nebulization 3 milliLiter(s) Nebulizer every 6 hours PRN Shortness of Breath and/or Wheezing  morphine  - Injectable 2 milliGRAM(s) IV Push every 2 hours PRN dyspnea or severe pain    Pertinent Labs:  05-10 Phos 2.6 mg/dL 05-10 Alb 2.0 g/dL[L]    Skin: Pressure ulcer x 4  1. right heel; suspected deep tissue injury   2. left heel; stage I  3. sacrum; unstageable  4. right buttock; suspected deep tissue injury    Estimated Needs:   [ ] no change since previous assessment  [ ] recalculated:     Previous Nutrition Diagnosis:   [x] Increased Nutrient Needs... protein-energy  Etiology: increased demand for nutrient  Signs/Symptoms: pressure ulcers    Goal: nutrition support as per goals of care - not applicable  New Goal: Pt to be provided with nutrition/hydration as medically feasible, as tolerated, and within pt/family's wishes for care    Nutrition Diagnosis is [x] ongoing  [ ] resolved [ ] not applicable     New Nutrition Diagnosis: [x] not applicable      Interventions:   Recommend  [ ] Change Diet To:  [x] Nutrition Supplement: add Magic Cup x 2/day (provides 580 kcal, 18 g protein)  [ ] Nutrition Support  [x] Other: Continue to provide encouragement and assistance with PO intake    Monitoring and Evaluation:   [ x ] PO intake [ x ] Tolerance to diet prescription [ x ] weights [ x ] labs[ x ] follow up per protocol  [ ] other: Pt sleeping during visit. 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 Pt presented with hypoxia and AMS; admitted with acute hypoxemic respiratory failure and hypotension likely secondary to severe sepsis with septic shock due to PNA and UTI. HERMILA likely ATN. downgraded to medicine 05/09, O2 currently stable on NC. Per palliative GOC note on 05/13, MOLST updated for comfort measures only, no further IVF, DNR/DNI, DNH unless symptoms cannot be managed, no feeding tubes. Hospice eligible; pt accepted for inpatient hospice at [facility]; awaiting bed availability.    Factors impacting intake: [x] acute illness; lethargy; dysphagia (s/p swallow eval on 05/12 with SLP recommendations for NPO pending pt/family wishes; pt now on pleasure feeds); self-feed difficulty    Diet Prescription: Diet, Pureed:   Moderately Thick Liquids (MODTHICKLIQS) (05-13-25 @ 14:49)    Intake: 26-75% for documented meals per flow sheets; requires total feeding assistance    Current Weight: no wt x several days; request current and daily weights  % Weight Change: N/A  3+ edema b/l ankles per flow sheets    Physical appearance: Unable to perform nutrition-focused physical exam due to pt sleeping soundly. Pt lying under covers with only face exposed; severe temporal wasting and severe orbital depletion observed.    Pertinent Medications: MEDICATIONS  (STANDING):  apixaban 2.5 milliGRAM(s) Oral <User Schedule>  chlorhexidine 2% Cloths 1 Application(s) Topical <User Schedule>  collagenase Ointment 1 Application(s) Topical daily  guaiFENesin Oral Liquid (Sugar-Free) 200 milliGRAM(s) Oral every 6 hours  nystatin Powder 1 Application(s) Topical two times a day  scopolamine 1 mG/72 Hr(s) Patch 1 Patch Transdermal every 72 hours  senna 2 Tablet(s) Oral at bedtime    MEDICATIONS  (PRN):  albuterol/ipratropium for Nebulization 3 milliLiter(s) Nebulizer every 6 hours PRN Shortness of Breath and/or Wheezing  morphine  - Injectable 2 milliGRAM(s) IV Push every 2 hours PRN dyspnea or severe pain    Pertinent Labs:  05-10 Phos 2.6 mg/dL 05-10 Alb 2.0 g/dL[L]    Skin: Pressure ulcer x 4  1. right heel; suspected deep tissue injury   2. left heel; stage I  3. sacrum; unstageable  4. right buttock; suspected deep tissue injury    Estimated Needs:   [x] no change since previous assessment  [ ] recalculated:     Previous Nutrition Diagnosis:   [x] Increased Nutrient Needs... protein-energy  Etiology: increased demand for nutrient  Signs/Symptoms: pressure ulcers    Goal: nutrition support as per goals of care - not applicable  New Goal: Pt to be provided with nutrition/hydration as medically feasible, as tolerated, and within pt/family's wishes for care    Nutrition Diagnosis is [x] ongoing  [ ] resolved [ ] not applicable     New Nutrition Diagnosis: [x] not applicable      Interventions:   Recommend  [ ] Change Diet To:  [x] Nutrition Supplement: add Magic Cup x 2/day (provides 580 kcal, 18 g protein)  [ ] Nutrition Support  [x] Other: Continue to provide encouragement and assistance with PO intake    Monitoring and Evaluation:   [ x ] PO intake [ x ] Tolerance to diet prescription [ x ] weights [ x ] labs[ x ] follow up per protocol  [ ] other:

## 2025-05-27 DIAGNOSIS — J96.01 ACUTE RESPIRATORY FAILURE WITH HYPOXIA: ICD-10-CM

## 2025-05-27 DIAGNOSIS — Z91.018 ALLERGY TO OTHER FOODS: ICD-10-CM

## 2025-05-27 DIAGNOSIS — A41.9 SEPSIS, UNSPECIFIED ORGANISM: ICD-10-CM

## 2025-05-27 DIAGNOSIS — K92.2 GASTROINTESTINAL HEMORRHAGE, UNSPECIFIED: ICD-10-CM

## 2025-05-27 DIAGNOSIS — I48.20 CHRONIC ATRIAL FIBRILLATION, UNSPECIFIED: ICD-10-CM

## 2025-05-27 DIAGNOSIS — Z91.041 RADIOGRAPHIC DYE ALLERGY STATUS: ICD-10-CM

## 2025-05-27 DIAGNOSIS — L89.150 PRESSURE ULCER OF SACRAL REGION, UNSTAGEABLE: ICD-10-CM

## 2025-05-27 DIAGNOSIS — A41.51 SEPSIS DUE TO ESCHERICHIA COLI [E. COLI]: ICD-10-CM

## 2025-05-27 DIAGNOSIS — N39.0 URINARY TRACT INFECTION, SITE NOT SPECIFIED: ICD-10-CM

## 2025-05-27 DIAGNOSIS — I71.40 ABDOMINAL AORTIC ANEURYSM, WITHOUT RUPTURE, UNSPECIFIED: ICD-10-CM

## 2025-05-27 DIAGNOSIS — Z79.2 LONG TERM (CURRENT) USE OF ANTIBIOTICS: ICD-10-CM

## 2025-05-27 DIAGNOSIS — F03.90 UNSPECIFIED DEMENTIA, UNSPECIFIED SEVERITY, WITHOUT BEHAVIORAL DISTURBANCE, PSYCHOTIC DISTURBANCE, MOOD DISTURBANCE, AND ANXIETY: ICD-10-CM

## 2025-05-27 DIAGNOSIS — Z91.010 ALLERGY TO PEANUTS: ICD-10-CM

## 2025-05-27 DIAGNOSIS — R91.8 OTHER NONSPECIFIC ABNORMAL FINDING OF LUNG FIELD: ICD-10-CM

## 2025-05-27 DIAGNOSIS — Z51.5 ENCOUNTER FOR PALLIATIVE CARE: ICD-10-CM

## 2025-05-27 DIAGNOSIS — N17.0 ACUTE KIDNEY FAILURE WITH TUBULAR NECROSIS: ICD-10-CM

## 2025-05-27 DIAGNOSIS — R53.2 FUNCTIONAL QUADRIPLEGIA: ICD-10-CM

## 2025-05-27 DIAGNOSIS — Z79.01 LONG TERM (CURRENT) USE OF ANTICOAGULANTS: ICD-10-CM

## 2025-05-27 DIAGNOSIS — Z96.643 PRESENCE OF ARTIFICIAL HIP JOINT, BILATERAL: ICD-10-CM

## 2025-05-27 DIAGNOSIS — J44.9 CHRONIC OBSTRUCTIVE PULMONARY DISEASE, UNSPECIFIED: ICD-10-CM

## 2025-05-27 DIAGNOSIS — E78.5 HYPERLIPIDEMIA, UNSPECIFIED: ICD-10-CM

## 2025-05-27 DIAGNOSIS — J18.9 PNEUMONIA, UNSPECIFIED ORGANISM: ICD-10-CM

## 2025-05-27 DIAGNOSIS — N40.1 BENIGN PROSTATIC HYPERPLASIA WITH LOWER URINARY TRACT SYMPTOMS: ICD-10-CM

## 2025-05-27 DIAGNOSIS — D69.59 OTHER SECONDARY THROMBOCYTOPENIA: ICD-10-CM

## 2025-05-27 DIAGNOSIS — R65.21 SEVERE SEPSIS WITH SEPTIC SHOCK: ICD-10-CM

## 2025-05-27 DIAGNOSIS — Z74.01 BED CONFINEMENT STATUS: ICD-10-CM

## 2025-05-27 DIAGNOSIS — Z66 DO NOT RESUSCITATE: ICD-10-CM

## 2025-05-27 DIAGNOSIS — R13.10 DYSPHAGIA, UNSPECIFIED: ICD-10-CM

## 2025-05-27 DIAGNOSIS — D68.32 HEMORRHAGIC DISORDER DUE TO EXTRINSIC CIRCULATING ANTICOAGULANTS: ICD-10-CM

## 2025-05-27 DIAGNOSIS — I21.4 NON-ST ELEVATION (NSTEMI) MYOCARDIAL INFARCTION: ICD-10-CM

## 2025-05-27 DIAGNOSIS — I10 ESSENTIAL (PRIMARY) HYPERTENSION: ICD-10-CM

## 2025-05-27 DIAGNOSIS — G93.41 METABOLIC ENCEPHALOPATHY: ICD-10-CM

## 2025-06-10 NOTE — DISCHARGE NOTE ADULT - LAUNCH MEDICATION RECONCILIATION
Last office visit 05/22/2025  Upcoming visit 06/24/2025  Last refilled 05/22/2025   <<-----Click here for Discharge Medication Review
